# Patient Record
Sex: MALE | Race: WHITE | NOT HISPANIC OR LATINO | Employment: OTHER | URBAN - METROPOLITAN AREA
[De-identification: names, ages, dates, MRNs, and addresses within clinical notes are randomized per-mention and may not be internally consistent; named-entity substitution may affect disease eponyms.]

---

## 2017-11-22 PROBLEM — E78.5 HYPERLIPIDEMIA: Status: ACTIVE | Noted: 2017-11-22

## 2018-11-11 PROBLEM — I21.19 ACUTE ST ELEVATION MYOCARDIAL INFARCTION (STEMI) OF INFERIOR WALL (HCC): Status: ACTIVE | Noted: 2018-11-11

## 2018-11-12 PROBLEM — I25.10 CORONARY ARTERY DISEASE INVOLVING NATIVE CORONARY ARTERY OF NATIVE HEART WITHOUT ANGINA PECTORIS: Status: ACTIVE | Noted: 2018-11-12

## 2018-11-12 PROBLEM — I21.19 ACUTE ST ELEVATION MYOCARDIAL INFARCTION (STEMI) OF INFERIOR WALL (HCC): Status: RESOLVED | Noted: 2018-11-11 | Resolved: 2018-11-12

## 2020-11-03 PROBLEM — I10 HYPERTENSION: Status: RESOLVED | Noted: 2020-11-03 | Resolved: 2020-11-03

## 2021-11-05 PROBLEM — R55 SYNCOPE WITH NORMAL NEUROLOGIC EXAMINATION: Status: ACTIVE | Noted: 2021-11-05

## 2021-11-06 PROBLEM — F01.50 VASCULAR DEMENTIA WITHOUT BEHAVIORAL DISTURBANCE (HCC): Status: ACTIVE | Noted: 2021-11-06
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
## 2021-11-06 PROBLEM — Z91.89 AT RISK FOR DELIRIUM: Status: ACTIVE | Noted: 2021-11-06
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
## 2022-08-25 PROBLEM — I50.22 CHRONIC SYSTOLIC (CONGESTIVE) HEART FAILURE (HCC): Status: ACTIVE | Noted: 2022-08-25

## 2023-07-31 LAB
ANION GAP IN SER/PLAS: 15 MMOL/L (ref 10–20)
CALCIUM (MG/DL) IN SER/PLAS: 8 MG/DL (ref 8.6–10.3)
CARBON DIOXIDE, TOTAL (MMOL/L) IN SER/PLAS: 20 MMOL/L (ref 21–32)
CHLORIDE (MMOL/L) IN SER/PLAS: 107 MMOL/L (ref 98–107)
CREATININE (MG/DL) IN SER/PLAS: 1.06 MG/DL (ref 0.5–1.3)
ERYTHROCYTE DISTRIBUTION WIDTH (RATIO) BY AUTOMATED COUNT: 14.4 % (ref 11.5–14.5)
ERYTHROCYTE MEAN CORPUSCULAR HEMOGLOBIN CONCENTRATION (G/DL) BY AUTOMATED: 32.3 G/DL (ref 32–36)
ERYTHROCYTE MEAN CORPUSCULAR VOLUME (FL) BY AUTOMATED COUNT: 89 FL (ref 80–100)
ERYTHROCYTES (10*6/UL) IN BLOOD BY AUTOMATED COUNT: 5.8 X10E12/L (ref 4.5–5.9)
GFR MALE: 71 ML/MIN/1.73M2
GLUCOSE (MG/DL) IN SER/PLAS: 53 MG/DL (ref 74–99)
HEMATOCRIT (%) IN BLOOD BY AUTOMATED COUNT: 51.7 % (ref 41–52)
HEMOGLOBIN (G/DL) IN BLOOD: 16.7 G/DL (ref 13.5–17.5)
LEUKOCYTES (10*3/UL) IN BLOOD BY AUTOMATED COUNT: 16.5 X10E9/L (ref 4.4–11.3)
PLATELETS (10*3/UL) IN BLOOD AUTOMATED COUNT: 312 X10E9/L (ref 150–450)
POTASSIUM (MMOL/L) IN SER/PLAS: 4.5 MMOL/L (ref 3.5–5.3)
SODIUM (MMOL/L) IN SER/PLAS: 137 MMOL/L (ref 136–145)
UREA NITROGEN (MG/DL) IN SER/PLAS: 28 MG/DL (ref 6–23)

## 2023-08-03 LAB
ERYTHROCYTE DISTRIBUTION WIDTH (RATIO) BY AUTOMATED COUNT: 13.6 % (ref 11.5–14.5)
ERYTHROCYTE MEAN CORPUSCULAR HEMOGLOBIN CONCENTRATION (G/DL) BY AUTOMATED: 33.9 G/DL (ref 32–36)
ERYTHROCYTE MEAN CORPUSCULAR VOLUME (FL) BY AUTOMATED COUNT: 87 FL (ref 80–100)
ERYTHROCYTES (10*6/UL) IN BLOOD BY AUTOMATED COUNT: 5.2 X10E12/L (ref 4.5–5.9)
HEMATOCRIT (%) IN BLOOD BY AUTOMATED COUNT: 45.4 % (ref 41–52)
HEMOGLOBIN (G/DL) IN BLOOD: 15.4 G/DL (ref 13.5–17.5)
LEUKOCYTES (10*3/UL) IN BLOOD BY AUTOMATED COUNT: 13.5 X10E9/L (ref 4.4–11.3)
PLATELETS (10*3/UL) IN BLOOD AUTOMATED COUNT: 249 X10E9/L (ref 150–450)

## 2023-08-08 LAB — C. DIFFICILE TOXIN, PCR: DETECTED

## 2023-08-09 LAB
CAMPYLOBACTER GP: NOT DETECTED
NOROVIRUS GI/GII: NOT DETECTED
ROTAVIRUS A: NOT DETECTED
SALMONELLA SP.: NOT DETECTED
SHIGA TOXIN 1: NOT DETECTED
SHIGA TOXIN 2: NOT DETECTED
SHIGELLA SP.: NOT DETECTED
VIBRIO GRP.: NOT DETECTED
YERSINIA ENTEROCOLITICA: NOT DETECTED

## 2023-08-10 LAB
ERYTHROCYTE DISTRIBUTION WIDTH (RATIO) BY AUTOMATED COUNT: 13.9 % (ref 11.5–14.5)
ERYTHROCYTE MEAN CORPUSCULAR HEMOGLOBIN CONCENTRATION (G/DL) BY AUTOMATED: 34.1 G/DL (ref 32–36)
ERYTHROCYTE MEAN CORPUSCULAR VOLUME (FL) BY AUTOMATED COUNT: 86 FL (ref 80–100)
ERYTHROCYTES (10*6/UL) IN BLOOD BY AUTOMATED COUNT: 4.49 X10E12/L (ref 4.5–5.9)
HEMATOCRIT (%) IN BLOOD BY AUTOMATED COUNT: 38.4 % (ref 41–52)
HEMOGLOBIN (G/DL) IN BLOOD: 13.1 G/DL (ref 13.5–17.5)
KEPPRA: 2 UG/ML (ref 10–40)
LEUKOCYTES (10*3/UL) IN BLOOD BY AUTOMATED COUNT: 6.7 X10E9/L (ref 4.4–11.3)
PLATELETS (10*3/UL) IN BLOOD AUTOMATED COUNT: 222 X10E9/L (ref 150–450)

## 2023-08-14 LAB
ANION GAP IN SER/PLAS: 11 MMOL/L (ref 10–20)
CALCIUM (MG/DL) IN SER/PLAS: 8.1 MG/DL (ref 8.6–10.3)
CARBON DIOXIDE, TOTAL (MMOL/L) IN SER/PLAS: 25 MMOL/L (ref 21–32)
CHLORIDE (MMOL/L) IN SER/PLAS: 110 MMOL/L (ref 98–107)
CREATININE (MG/DL) IN SER/PLAS: 0.91 MG/DL (ref 0.5–1.3)
ERYTHROCYTE DISTRIBUTION WIDTH (RATIO) BY AUTOMATED COUNT: 14.3 % (ref 11.5–14.5)
ERYTHROCYTE MEAN CORPUSCULAR HEMOGLOBIN CONCENTRATION (G/DL) BY AUTOMATED: 32.7 G/DL (ref 32–36)
ERYTHROCYTE MEAN CORPUSCULAR VOLUME (FL) BY AUTOMATED COUNT: 88 FL (ref 80–100)
ERYTHROCYTES (10*6/UL) IN BLOOD BY AUTOMATED COUNT: 4.72 X10E12/L (ref 4.5–5.9)
GFR MALE: 85 ML/MIN/1.73M2
GLUCOSE (MG/DL) IN SER/PLAS: 68 MG/DL (ref 74–99)
HEMATOCRIT (%) IN BLOOD BY AUTOMATED COUNT: 41.6 % (ref 41–52)
HEMOGLOBIN (G/DL) IN BLOOD: 13.6 G/DL (ref 13.5–17.5)
LEUKOCYTES (10*3/UL) IN BLOOD BY AUTOMATED COUNT: 7.7 X10E9/L (ref 4.4–11.3)
PLATELETS (10*3/UL) IN BLOOD AUTOMATED COUNT: 298 X10E9/L (ref 150–450)
POTASSIUM (MMOL/L) IN SER/PLAS: 3.7 MMOL/L (ref 3.5–5.3)
SODIUM (MMOL/L) IN SER/PLAS: 142 MMOL/L (ref 136–145)
UREA NITROGEN (MG/DL) IN SER/PLAS: 9 MG/DL (ref 6–23)

## 2023-08-22 LAB — C. DIFFICILE TOXIN, PCR: NOT DETECTED

## 2024-01-01 ENCOUNTER — TELEPHONE (OUTPATIENT)
Dept: LDA SERVICES | Facility: HOSPITAL | Age: 79
End: 2024-01-01

## 2024-06-21 ENCOUNTER — HOSPITAL ENCOUNTER (INPATIENT)
Facility: HOSPITAL | Age: 79
End: 2024-06-21
Attending: STUDENT IN AN ORGANIZED HEALTH CARE EDUCATION/TRAINING PROGRAM | Admitting: INTERNAL MEDICINE
Payer: MEDICARE

## 2024-06-21 ENCOUNTER — APPOINTMENT (OUTPATIENT)
Dept: RADIOLOGY | Facility: HOSPITAL | Age: 79
End: 2024-06-21
Payer: MEDICARE

## 2024-06-21 ENCOUNTER — APPOINTMENT (OUTPATIENT)
Dept: CARDIOLOGY | Facility: HOSPITAL | Age: 79
End: 2024-06-21
Payer: MEDICARE

## 2024-06-21 DIAGNOSIS — T17.908D ASPIRATION INTO AIRWAY, SUBSEQUENT ENCOUNTER: ICD-10-CM

## 2024-06-21 DIAGNOSIS — Z86.73 HX OF TIA (TRANSIENT ISCHEMIC ATTACK) AND STROKE: ICD-10-CM

## 2024-06-21 DIAGNOSIS — R29.90 STROKE-LIKE SYMPTOMS: Primary | ICD-10-CM

## 2024-06-21 LAB
ALBUMIN SERPL BCP-MCNC: 4 G/DL (ref 3.4–5)
ALP SERPL-CCNC: 66 U/L (ref 33–136)
ALT SERPL W P-5'-P-CCNC: 18 U/L (ref 10–52)
ANION GAP SERPL CALC-SCNC: 15 MMOL/L (ref 10–20)
AORTIC VALVE MEAN GRADIENT: 2 MMHG
AORTIC VALVE PEAK VELOCITY: 1.01 M/S
APPEARANCE UR: CLEAR
APTT PPP: 37 SECONDS (ref 27–38)
AST SERPL W P-5'-P-CCNC: 18 U/L (ref 9–39)
AV PEAK GRADIENT: 4.1 MMHG
AVA (PEAK VEL): 3.24 CM2
AVA (VTI): 3.55 CM2
BASOPHILS # BLD AUTO: 0.04 X10*3/UL (ref 0–0.1)
BASOPHILS NFR BLD AUTO: 0.3 %
BILIRUB SERPL-MCNC: 1.1 MG/DL (ref 0–1.2)
BILIRUB UR STRIP.AUTO-MCNC: NEGATIVE MG/DL
BUN SERPL-MCNC: 15 MG/DL (ref 6–23)
CALCIUM SERPL-MCNC: 8.9 MG/DL (ref 8.6–10.3)
CARDIAC TROPONIN I PNL SERPL HS: 18 NG/L (ref 0–20)
CHLORIDE SERPL-SCNC: 107 MMOL/L (ref 98–107)
CO2 SERPL-SCNC: 23 MMOL/L (ref 21–32)
COLOR UR: ABNORMAL
CREAT SERPL-MCNC: 1.13 MG/DL (ref 0.5–1.3)
EGFRCR SERPLBLD CKD-EPI 2021: 65 ML/MIN/1.73M*2
EJECTION FRACTION APICAL 4 CHAMBER: 46.2
EOSINOPHIL # BLD AUTO: 0.14 X10*3/UL (ref 0–0.4)
EOSINOPHIL NFR BLD AUTO: 1.2 %
ERYTHROCYTE [DISTWIDTH] IN BLOOD BY AUTOMATED COUNT: 14 % (ref 11.5–14.5)
EST. AVERAGE GLUCOSE BLD GHB EST-MCNC: 117 MG/DL
GLUCOSE SERPL-MCNC: 91 MG/DL (ref 74–99)
GLUCOSE UR STRIP.AUTO-MCNC: NORMAL MG/DL
HBA1C MFR BLD: 5.7 %
HCT VFR BLD AUTO: 50.4 % (ref 41–52)
HGB BLD-MCNC: 17 G/DL (ref 13.5–17.5)
IMM GRANULOCYTES # BLD AUTO: 0.04 X10*3/UL (ref 0–0.5)
IMM GRANULOCYTES NFR BLD AUTO: 0.3 % (ref 0–0.9)
INR PPP: 1 (ref 0.9–1.1)
KETONES UR STRIP.AUTO-MCNC: NEGATIVE MG/DL
LEFT ATRIUM VOLUME AREA LENGTH INDEX BSA: 24.5 ML/M2
LEFT VENTRICLE INTERNAL DIMENSION DIASTOLE: 5.4 CM (ref 3.5–6)
LEFT VENTRICULAR OUTFLOW TRACT DIAMETER: 2.4 CM
LEUKOCYTE ESTERASE UR QL STRIP.AUTO: ABNORMAL
LV EJECTION FRACTION BIPLANE: 46 %
LYMPHOCYTES # BLD AUTO: 2.12 X10*3/UL (ref 0.8–3)
LYMPHOCYTES NFR BLD AUTO: 17.9 %
MCH RBC QN AUTO: 30.3 PG (ref 26–34)
MCHC RBC AUTO-ENTMCNC: 33.7 G/DL (ref 32–36)
MCV RBC AUTO: 90 FL (ref 80–100)
MITRAL VALVE E/A RATIO: 0.77
MITRAL VALVE E/E' RATIO: 9.9
MONOCYTES # BLD AUTO: 1.25 X10*3/UL (ref 0.05–0.8)
MONOCYTES NFR BLD AUTO: 10.6 %
NEUTROPHILS # BLD AUTO: 8.25 X10*3/UL (ref 1.6–5.5)
NEUTROPHILS NFR BLD AUTO: 69.7 %
NITRITE UR QL STRIP.AUTO: NEGATIVE
NRBC BLD-RTO: 0 /100 WBCS (ref 0–0)
PH UR STRIP.AUTO: 6.5 [PH]
PLATELET # BLD AUTO: 256 X10*3/UL (ref 150–450)
POTASSIUM SERPL-SCNC: 4.7 MMOL/L (ref 3.5–5.3)
PROT SERPL-MCNC: 7 G/DL (ref 6.4–8.2)
PROT UR STRIP.AUTO-MCNC: NEGATIVE MG/DL
PROTHROMBIN TIME: 10.8 SECONDS (ref 9.8–12.8)
RBC # BLD AUTO: 5.61 X10*6/UL (ref 4.5–5.9)
RBC # UR STRIP.AUTO: NEGATIVE /UL
RBC #/AREA URNS AUTO: ABNORMAL /HPF
RIGHT VENTRICLE FREE WALL PEAK S': 12.3 CM/S
SODIUM SERPL-SCNC: 140 MMOL/L (ref 136–145)
SP GR UR STRIP.AUTO: 1.02
SQUAMOUS #/AREA URNS AUTO: ABNORMAL /HPF
TRICUSPID ANNULAR PLANE SYSTOLIC EXCURSION: 2.3 CM
UROBILINOGEN UR STRIP.AUTO-MCNC: NORMAL MG/DL
WBC # BLD AUTO: 11.8 X10*3/UL (ref 4.4–11.3)
WBC #/AREA URNS AUTO: ABNORMAL /HPF
YEAST BUDDING #/AREA UR COMP ASSIST: PRESENT /HPF

## 2024-06-21 PROCEDURE — 81001 URINALYSIS AUTO W/SCOPE: CPT | Performed by: STUDENT IN AN ORGANIZED HEALTH CARE EDUCATION/TRAINING PROGRAM

## 2024-06-21 PROCEDURE — 70551 MRI BRAIN STEM W/O DYE: CPT

## 2024-06-21 PROCEDURE — 83036 HEMOGLOBIN GLYCOSYLATED A1C: CPT | Performed by: INTERNAL MEDICINE

## 2024-06-21 PROCEDURE — 99223 1ST HOSP IP/OBS HIGH 75: CPT | Performed by: INTERNAL MEDICINE

## 2024-06-21 PROCEDURE — 2500000001 HC RX 250 WO HCPCS SELF ADMINISTERED DRUGS (ALT 637 FOR MEDICARE OP): Performed by: INTERNAL MEDICINE

## 2024-06-21 PROCEDURE — 93005 ELECTROCARDIOGRAM TRACING: CPT

## 2024-06-21 PROCEDURE — 99223 1ST HOSP IP/OBS HIGH 75: CPT | Performed by: PSYCHIATRY & NEUROLOGY

## 2024-06-21 PROCEDURE — 99285 EMERGENCY DEPT VISIT HI MDM: CPT | Mod: 25

## 2024-06-21 PROCEDURE — 70547 MR ANGIOGRAPHY NECK W/O DYE: CPT | Performed by: RADIOLOGY

## 2024-06-21 PROCEDURE — 84484 ASSAY OF TROPONIN QUANT: CPT | Performed by: STUDENT IN AN ORGANIZED HEALTH CARE EDUCATION/TRAINING PROGRAM

## 2024-06-21 PROCEDURE — 70547 MR ANGIOGRAPHY NECK W/O DYE: CPT

## 2024-06-21 PROCEDURE — 71045 X-RAY EXAM CHEST 1 VIEW: CPT

## 2024-06-21 PROCEDURE — 93306 TTE W/DOPPLER COMPLETE: CPT

## 2024-06-21 PROCEDURE — 71045 X-RAY EXAM CHEST 1 VIEW: CPT | Performed by: RADIOLOGY

## 2024-06-21 PROCEDURE — 85025 COMPLETE CBC W/AUTO DIFF WBC: CPT | Performed by: STUDENT IN AN ORGANIZED HEALTH CARE EDUCATION/TRAINING PROGRAM

## 2024-06-21 PROCEDURE — 2060000001 HC INTERMEDIATE ICU ROOM DAILY

## 2024-06-21 PROCEDURE — 70450 CT HEAD/BRAIN W/O DYE: CPT | Performed by: STUDENT IN AN ORGANIZED HEALTH CARE EDUCATION/TRAINING PROGRAM

## 2024-06-21 PROCEDURE — 70450 CT HEAD/BRAIN W/O DYE: CPT

## 2024-06-21 PROCEDURE — 80053 COMPREHEN METABOLIC PANEL: CPT | Performed by: STUDENT IN AN ORGANIZED HEALTH CARE EDUCATION/TRAINING PROGRAM

## 2024-06-21 PROCEDURE — 71250 CT THORAX DX C-: CPT | Performed by: RADIOLOGY

## 2024-06-21 PROCEDURE — 96372 THER/PROPH/DIAG INJ SC/IM: CPT | Performed by: INTERNAL MEDICINE

## 2024-06-21 PROCEDURE — 70544 MR ANGIOGRAPHY HEAD W/O DYE: CPT

## 2024-06-21 PROCEDURE — 93306 TTE W/DOPPLER COMPLETE: CPT | Performed by: INTERNAL MEDICINE

## 2024-06-21 PROCEDURE — 70551 MRI BRAIN STEM W/O DYE: CPT | Performed by: RADIOLOGY

## 2024-06-21 PROCEDURE — 2500000004 HC RX 250 GENERAL PHARMACY W/ HCPCS (ALT 636 FOR OP/ED): Performed by: INTERNAL MEDICINE

## 2024-06-21 PROCEDURE — 85610 PROTHROMBIN TIME: CPT | Performed by: STUDENT IN AN ORGANIZED HEALTH CARE EDUCATION/TRAINING PROGRAM

## 2024-06-21 PROCEDURE — 3E0336Z INTRODUCTION OF NUTRITIONAL SUBSTANCE INTO PERIPHERAL VEIN, PERCUTANEOUS APPROACH: ICD-10-PCS | Performed by: INTERNAL MEDICINE

## 2024-06-21 PROCEDURE — 82947 ASSAY GLUCOSE BLOOD QUANT: CPT

## 2024-06-21 PROCEDURE — 85730 THROMBOPLASTIN TIME PARTIAL: CPT | Performed by: STUDENT IN AN ORGANIZED HEALTH CARE EDUCATION/TRAINING PROGRAM

## 2024-06-21 PROCEDURE — 71250 CT THORAX DX C-: CPT

## 2024-06-21 PROCEDURE — 36415 COLL VENOUS BLD VENIPUNCTURE: CPT | Performed by: STUDENT IN AN ORGANIZED HEALTH CARE EDUCATION/TRAINING PROGRAM

## 2024-06-21 RX ORDER — HYDRALAZINE HYDROCHLORIDE 25 MG/1
25 TABLET, FILM COATED ORAL EVERY 6 HOURS PRN
Status: DISCONTINUED | OUTPATIENT
Start: 2024-06-23 | End: 2024-06-27 | Stop reason: HOSPADM

## 2024-06-21 RX ORDER — LOPERAMIDE HCL 2 MG
2 TABLET ORAL AS NEEDED
COMMUNITY

## 2024-06-21 RX ORDER — LIDOCAINE 40 MG/G
1 CREAM TOPICAL DAILY PRN
COMMUNITY

## 2024-06-21 RX ORDER — ASPIRIN 325 MG
500 TABLET, DELAYED RELEASE (ENTERIC COATED) ORAL DAILY
COMMUNITY
End: 2024-06-27 | Stop reason: HOSPADM

## 2024-06-21 RX ORDER — HYDRALAZINE HYDROCHLORIDE 20 MG/ML
10 INJECTION INTRAMUSCULAR; INTRAVENOUS
Status: ACTIVE | OUTPATIENT
Start: 2024-06-21 | End: 2024-06-23

## 2024-06-21 RX ORDER — ASPIRIN 81 MG/1
81 TABLET ORAL DAILY
Status: DISCONTINUED | OUTPATIENT
Start: 2024-06-21 | End: 2024-06-27 | Stop reason: HOSPADM

## 2024-06-21 RX ORDER — ASPIRIN 325 MG
50000 TABLET, DELAYED RELEASE (ENTERIC COATED) ORAL
COMMUNITY

## 2024-06-21 RX ORDER — CLOPIDOGREL BISULFATE 75 MG/1
75 TABLET ORAL DAILY
COMMUNITY

## 2024-06-21 RX ORDER — ASPIRIN 300 MG/1
150 SUPPOSITORY RECTAL DAILY
Status: DISCONTINUED | OUTPATIENT
Start: 2024-06-21 | End: 2024-06-27 | Stop reason: HOSPADM

## 2024-06-21 RX ORDER — ACETAMINOPHEN 325 MG/1
650 TABLET ORAL EVERY 4 HOURS PRN
COMMUNITY

## 2024-06-21 RX ORDER — ATORVASTATIN CALCIUM 40 MG/1
80 TABLET, FILM COATED ORAL NIGHTLY
Status: DISCONTINUED | OUTPATIENT
Start: 2024-06-21 | End: 2024-06-27 | Stop reason: HOSPADM

## 2024-06-21 RX ORDER — CHOLESTYRAMINE 4 G/9G
1 POWDER, FOR SUSPENSION ORAL DAILY
COMMUNITY
End: 2024-06-27 | Stop reason: HOSPADM

## 2024-06-21 RX ORDER — MAGNESIUM HYDROXIDE 2400 MG/10ML
30 SUSPENSION ORAL AS NEEDED
COMMUNITY

## 2024-06-21 RX ORDER — LABETALOL HYDROCHLORIDE 5 MG/ML
10 INJECTION, SOLUTION INTRAVENOUS EVERY 10 MIN PRN
Status: DISCONTINUED | OUTPATIENT
Start: 2024-06-21 | End: 2024-06-22

## 2024-06-21 RX ORDER — METOPROLOL SUCCINATE 100 MG/1
100 TABLET, EXTENDED RELEASE ORAL DAILY
COMMUNITY
End: 2024-06-27 | Stop reason: HOSPADM

## 2024-06-21 RX ORDER — LOSARTAN POTASSIUM 100 MG/1
100 TABLET ORAL DAILY
COMMUNITY

## 2024-06-21 RX ORDER — BISACODYL 10 MG/1
10 SUPPOSITORY RECTAL AS NEEDED
COMMUNITY

## 2024-06-21 RX ORDER — ENOXAPARIN SODIUM 100 MG/ML
40 INJECTION SUBCUTANEOUS EVERY 24 HOURS
Status: DISCONTINUED | OUTPATIENT
Start: 2024-06-21 | End: 2024-06-27 | Stop reason: HOSPADM

## 2024-06-21 RX ORDER — ATORVASTATIN CALCIUM 40 MG/1
40 TABLET, FILM COATED ORAL DAILY
Status: ON HOLD | COMMUNITY
End: 2024-06-23 | Stop reason: DRUGHIGH

## 2024-06-21 RX ORDER — LANOLIN ALCOHOL/MO/W.PET/CERES
1000 CREAM (GRAM) TOPICAL DAILY
COMMUNITY

## 2024-06-21 RX ORDER — AMOXICILLIN 250 MG
2 CAPSULE ORAL 2 TIMES DAILY PRN
Status: DISCONTINUED | OUTPATIENT
Start: 2024-06-21 | End: 2024-06-27 | Stop reason: HOSPADM

## 2024-06-21 RX ORDER — HYDRALAZINE HYDROCHLORIDE 25 MG/1
25 TABLET, FILM COATED ORAL 3 TIMES DAILY
COMMUNITY

## 2024-06-21 RX ORDER — CHOLECALCIFEROL (VITAMIN D3) 50 MCG
100 TABLET ORAL DAILY
COMMUNITY
End: 2024-06-27 | Stop reason: HOSPADM

## 2024-06-21 SDOH — SOCIAL STABILITY: SOCIAL INSECURITY: ABUSE: ADULT

## 2024-06-21 SDOH — SOCIAL STABILITY: SOCIAL INSECURITY: WERE YOU ABLE TO COMPLETE ALL THE BEHAVIORAL HEALTH SCREENINGS?: NO

## 2024-06-21 SDOH — SOCIAL STABILITY: SOCIAL INSECURITY: HAVE YOU HAD THOUGHTS OF HARMING ANYONE ELSE?: UNABLE TO ASSESS

## 2024-06-21 ASSESSMENT — COGNITIVE AND FUNCTIONAL STATUS - GENERAL
WALKING IN HOSPITAL ROOM: A LOT
MOBILITY SCORE: 13
MOVING FROM LYING ON BACK TO SITTING ON SIDE OF FLAT BED WITH BEDRAILS: A LITTLE
PERSONAL GROOMING: A LOT
DAILY ACTIVITIY SCORE: 13
CLIMB 3 TO 5 STEPS WITH RAILING: TOTAL
MOVING TO AND FROM BED TO CHAIR: A LOT
PATIENT BASELINE BEDBOUND: YES
TURNING FROM BACK TO SIDE WHILE IN FLAT BAD: A LITTLE
STANDING UP FROM CHAIR USING ARMS: A LOT
DRESSING REGULAR LOWER BODY CLOTHING: A LITTLE
TOILETING: A LOT
DRESSING REGULAR UPPER BODY CLOTHING: A LITTLE
HELP NEEDED FOR BATHING: A LOT
EATING MEALS: TOTAL

## 2024-06-21 ASSESSMENT — ACTIVITIES OF DAILY LIVING (ADL)
DRESSING YOURSELF: UNABLE TO ASSESS
GROOMING: UNABLE TO ASSESS
JUDGMENT_ADEQUATE_SAFELY_COMPLETE_DAILY_ACTIVITIES: UNABLE TO ASSESS
FEEDING YOURSELF: UNABLE TO ASSESS
WALKS IN HOME: UNABLE TO ASSESS
BATHING: UNABLE TO ASSESS
HEARING - LEFT EAR: UNABLE TO ASSESS
TOILETING: UNABLE TO ASSESS
HEARING - RIGHT EAR: UNABLE TO ASSESS
ADEQUATE_TO_COMPLETE_ADL: UNABLE TO ASSESS
PATIENT'S MEMORY ADEQUATE TO SAFELY COMPLETE DAILY ACTIVITIES?: UNABLE TO ASSESS

## 2024-06-21 ASSESSMENT — LIFESTYLE VARIABLES
HOW MANY STANDARD DRINKS CONTAINING ALCOHOL DO YOU HAVE ON A TYPICAL DAY: PATIENT DECLINED
AUDIT-C TOTAL SCORE: -1
AUDIT-C TOTAL SCORE: -1
EVER HAD A DRINK FIRST THING IN THE MORNING TO STEADY YOUR NERVES TO GET RID OF A HANGOVER: NO
HAVE YOU EVER FELT YOU SHOULD CUT DOWN ON YOUR DRINKING: NO
SKIP TO QUESTIONS 9-10: 0
HAVE PEOPLE ANNOYED YOU BY CRITICIZING YOUR DRINKING: NO
TOTAL SCORE: 0
HOW OFTEN DO YOU HAVE 6 OR MORE DRINKS ON ONE OCCASION: PATIENT DECLINED
HOW OFTEN DO YOU HAVE A DRINK CONTAINING ALCOHOL: PATIENT DECLINED
EVER FELT BAD OR GUILTY ABOUT YOUR DRINKING: NO

## 2024-06-21 ASSESSMENT — PAIN SCALES - GENERAL
PAINLEVEL_OUTOF10: 0 - NO PAIN

## 2024-06-21 ASSESSMENT — PAIN - FUNCTIONAL ASSESSMENT
PAIN_FUNCTIONAL_ASSESSMENT: 0-10
PAIN_FUNCTIONAL_ASSESSMENT: FLACC (FACE, LEGS, ACTIVITY, CRY, CONSOLABILITY)

## 2024-06-21 NOTE — ED PROVIDER NOTES
Patient is presenting to the emergency department activated as a stroke alert, his current NIH stroke scale is 4.    HPI   Chief Complaint   Patient presents with    Stroke       History/Exam limitations: mental status, due to condition, dementia, and communication barrier aphasic.   Additional history was obtained from EMS personnel, nursing home, and past medical records.    HPI:  Patient is an 79-year-old male, coming from a skilled nursing facility, he has a reported history of prior stroke with vascular dementia, aphasia and dysphagia following cerebral infarction, atherosclerotic heart disease of native coronary arteries, repeated falls, hypertension, hyperlipidemia, unsteadiness, GERD, generalized muscle weakness, ulcerative colitis, he is presenting to the emergency department as reported stroke alert.  Reportedly was seen normal at 545 this morning and then at 6:00 for recheck he was confused and aphasic.  He was sent in for further evaluation.  Reportedly his baseline is ANO x 4 despite his history of vascular dementia with CVA       Last Known Well Time: 0545 on 6/21/24        ------------------------------------------------------------------------------------------------------------------------------------------     Physical Exam:    ED Triage Vitals  Temp: n/a  Pulse: n/a  Resp: n/a  BP: n/a  SpO2: n/a  Temp src: n/a  Heart Rate Source: n/a  Patient Position: n/a  BP Location: n/a  FiO2 (%): n/a     Gen: Not in acute distress  Head/Neck: NCAT  Eyes: Anicteric sclerae, noninjected conjunctivae  Nose: No rhinorrhea  Mouth:  MMM  Heart: Regular rate and rhythm w/ no murmurs, rubs, gallops  Lungs: CTAB w/o wheezes, rales, rhonchi, no increased work of breathing  Abdomen: Soft, NT/ND  Musculoskeletal: No deformities  Extremities: No edema.  Neurologic: Please see below for NIHSS  Skin: No rashes noted  Psychological: Calm        Interval: Baseline  Time: 8:46 AM  Person Administering Scale: Galdino WELLS  MD Joel     1a  Level of consciousness: 0=alert; keenly responsive  1b. LOC questions:  0=Performs both tasks correctly  1c. LOC commands: 0=Performs both tasks correctly  2.  Best Gaze: 0=normal  3. Visual: 0=No visual loss  4. Facial Palsy: 1=Minor paralysis (flattened nasolabial fold, asymmetric on smiling)  5a. Motor left arm: 0=No drift, limb holds 90 (or 45) degrees for full 10 seconds  5b.  Motor right arm: 0=No drift, limb holds 90 (or 45) degrees for full 10 seconds  6a. motor left le=No drift, limb holds 90 (or 45) degrees for full 10 seconds  6b  Motor right le=No drift, limb holds 90 (or 45) degrees for full 10 seconds  7. Limb Ataxia: 0=Absent  8.  Sensory: 0=Normal; no sensory loss  9. Best Language:  2=Severe Aphasia: fragmentary expression, inference needed, cannot identify materials  10. Dysarthria: 1=Mild to moderate, patient slurs at least some words and at worst, can be understood with some difficulty  11. Extinction and Inattention: 0=No abnormality    Total:   4        VAN: VAN: Negative     ------------------------------------------------------------------------------------------------------------------------------------------     Medical Decision Making: Patient is presenting to the emergency department today as a stroke alert, NIH of 4.  Taken immediately to CT which showed mild to moderate cerebral volume loss and chronic microvascular ischemia as in prior with no acute hemorrhage or herniation.  Workup was undertaken with labs and additional imaging.  I did discuss the option of pursuing TNK with the family at bedside, risk-benefit conversation was had, after discussion, TNK was deferred as the risks in this case outweigh any potential benefit given the patient's baseline state.  Patient was ultimately admitted to medicine undergo additional stroke workup and continued evaluation with neurology consultation          EKG interpreted by myself: Sinus rhythm with a  "ventricular rate of 70, normal axis, nonspecific interventricular conduction delay noted with prolonged ME and QRS interval, normal QTc interval, no evidence of an acute STEMI.     Independent Interpretation of Studies: I independently interpreted the CT head and see No obvious evidence of intracranial hemorrhage and No obvious evidence of skull fracture    Chronic Medical Conditions Significantly Affecting Care: dementia    Social Determinants of Health Significantly Affecting Care: none     External Records Reviewed: I reviewed recent and relevant outside records including: nursing facility records     Discussion of Management with Other Providers:   I discussed the patient/results with:  admitting team      IV Thrombolysis IV Thrombolysis Checklist        IV Thrombolysis Given: No; Thrombolysis contraindication reason: Patient was eligible for IV Thrombolysis but it was not given because pt/family refuse IV thrombolysis following discussion of benefits/risks         Procedure  [unfilled]                                   No data recorded                  Patient History   Past Medical History:   Diagnosis Date    Stroke (Multi)      History reviewed. No pertinent surgical history.  No family history on file.  Social History     Tobacco Use    Smoking status: Never     Passive exposure: Never    Smokeless tobacco: Never   Vaping Use    Vaping status: Not on file   Substance Use Topics    Alcohol use: Not on file    Drug use: Not on file       Physical Exam   Visit Vitals  /87 (BP Location: Right arm, Patient Position: Lying) Comment: rn notified   Pulse 71   Temp 36.6 °C (97.8 °F) (Temporal)   Resp 19   Ht 1.88 m (6' 2\")   Wt 91 kg (200 lb 9.9 oz)   SpO2 93%   BMI 25.76 kg/m²   Smoking Status Never   BSA 2.18 m²      Physical Exam    XR chest 2 views   Final Result   1.  Mild changes of chronic lung disease. Feeding tube tip looped in   the stomach as described above.                  MACRO:   None      "   Signed by: Jevon Fields 6/24/2024 8:45 AM   Dictation workstation:   [id number]      XR abdomen 1 view   Final Result   Enteric tube tip terminates over the gastric body.             MACRO:   None.        Signed by: Jim Person 6/23/2024 10:20 PM   Dictation workstation:   [id number]      XR abdomen 1 view   Final Result   1.  Feeding tube coils in the stomach.        MACRO:   None        Signed by: Jevon Ching 6/23/2024 7:04 PM   Dictation workstation:   [id number]      XR abdomen 1 view   Final Result   As above        MACRO:   None        Signed by: Shadi Pardo 6/23/2024 11:28 AM   Dictation workstation:   [id number]      CT chest wo IV contrast   Final Result   1.  No features of aspiration pneumonia. There is bronchial   thickening detected.   2. Heart size is enlarged robust coronary calcification. Ectasia of   the ascending aorta.   3. Hepatic steatosis             Signed by: Jevon Ching 6/21/2024 8:43 PM   Dictation workstation:   [id number]      MR brain wo IV contrast   Final Result   MRI Brain:        1. Acute infarct of the posterior limb of the right internal   capsule/anterior thalamus.   2. Moderate global parenchymal volume loss with additional remote   infarcts and chronic white matter changes favoring microangiopathy as   above.        MRA:        1. The right vertebral artery is occluded or markedly stenosed.   2. Flow irregularity within the distal right internal carotid artery   without definite visualization of the right anterior choroidal artery.   3. Otherwise, no evidence of major vessel cutoff or significant   stenosis on MRA head and neck.        I personally reviewed the images/study and I agree with the resident   findings as stated by Gavi Reddy MD. This study was interpreted at   [place], [place].        MACRO:   None        Signed by: Rob He 6/21/2024 4:31 PM   Dictation workstation:   [id number]       MR angio head wo IV contrast   Final Result   MRI Brain:        1. Acute infarct of the posterior limb of the right internal   capsule/anterior thalamus.   2. Moderate global parenchymal volume loss with additional remote   infarcts and chronic white matter changes favoring microangiopathy as   above.        MRA:        1. The right vertebral artery is occluded or markedly stenosed.   2. Flow irregularity within the distal right internal carotid artery   without definite visualization of the right anterior choroidal artery.   3. Otherwise, no evidence of major vessel cutoff or significant   stenosis on MRA head and neck.        I personally reviewed the images/study and I agree with the resident   findings as stated by Gavi Reddy MD. This study was interpreted at   [place], [place].        MACRO:   None        Signed by: Rob He 6/21/2024 4:31 PM   Dictation workstation:   [id number]      MR angio neck wo IV contrast   Final Result   MRI Brain:        1. Acute infarct of the posterior limb of the right internal   capsule/anterior thalamus.   2. Moderate global parenchymal volume loss with additional remote   infarcts and chronic white matter changes favoring microangiopathy as   above.        MRA:        1. The right vertebral artery is occluded or markedly stenosed.   2. Flow irregularity within the distal right internal carotid artery   without definite visualization of the right anterior choroidal artery.   3. Otherwise, no evidence of major vessel cutoff or significant   stenosis on MRA head and neck.        I personally reviewed the images/study and I agree with the resident   findings as stated by Gavi Reddy MD. This study was interpreted at   [place], [place].        MACRO:   None        Signed by: Rob He 6/21/2024 4:31 PM   Dictation workstation:   [id number]      Transthoracic Echo (TTE) Complete    Final Result      XR chest 1 view   Final Result   Allowing for the aforementioned limitation, no acute cardiopulmonary   disease.        Signed by: Yayo Barragan 6/21/2024 10:20 AM   Dictation workstation:   [id number]      CT brain attack head wo IV contrast   Final Result   1. No acute intracranial hemorrhage or serious brain herniation. If   continued clinical concern, advise further assessment by dedicated   brain MRI.   2. Mild to moderate cerebral volume loss and sequelae of chronic   microvascular ischemia as in prior.        MACRO:   None        Signed by: Conner Rosa 6/21/2024 9:11 AM   Dictation workstation:   [id number]          Labs Reviewed   CBC WITH AUTO DIFFERENTIAL - Abnormal       Result Value    WBC 11.8 (*)     nRBC 0.0      RBC 5.61      Hemoglobin 17.0      Hematocrit 50.4      MCV 90      MCH 30.3      MCHC 33.7      RDW 14.0      Platelets 256      Neutrophils % 69.7      Immature Granulocytes %, Automated 0.3      Lymphocytes % 17.9      Monocytes % 10.6      Eosinophils % 1.2      Basophils % 0.3      Neutrophils Absolute 8.25 (*)     Immature Granulocytes Absolute, Automated 0.04      Lymphocytes Absolute 2.12      Monocytes Absolute 1.25 (*)     Eosinophils Absolute 0.14      Basophils Absolute 0.04     URINALYSIS WITH REFLEX MICROSCOPIC - Abnormal    Color, Urine Light-Yellow      Appearance, Urine Clear      Specific Gravity, Urine 1.021      pH, Urine 6.5      Protein, Urine NEGATIVE      Glucose, Urine Normal      Blood, Urine NEGATIVE      Ketones, Urine NEGATIVE      Bilirubin, Urine NEGATIVE      Urobilinogen, Urine Normal      Nitrite, Urine NEGATIVE      Leukocyte Esterase, Urine 25 Lorna/µL (*)    HEMOGLOBIN A1C - Abnormal    Hemoglobin A1C 5.7 (*)     Estimated Average Glucose 117      Narrative:     Diagnosis of Diabetes-Adults  Non-Diabetic: < or = 5.6%  Increased risk for developing diabetes: 5.7-6.4%  Diagnostic of diabetes: > or = 6.5%    Monitoring of Diabetes  Age  (y)....................... Therapeutic Goal (%)  Adults: >18.........................<7.0  Pediatrics: 13-18...................<7.5  Pediatrics: 7-12....................<8.0  Pediatrics: 0-6..................... 7.5-8.5    American Diabetes Association. Diabetes Care 33(S1), Jan 2010       LIPID PANEL - Abnormal    Cholesterol 163      HDL-Cholesterol 39.3      Cholesterol/HDL Ratio 4.1      LDL Calculated 75      VLDL 49 (*)     Triglycerides 245 (*)     Non HDL Cholesterol 124     B-TYPE NATRIURETIC PEPTIDE - Abnormal     (*)     Narrative:        <100 pg/mL - Heart failure unlikely  100-299 pg/mL - Intermediate probability of acute heart                  failure exacerbation. Correlate with clinical                  context and patient history.    >=300 pg/mL - Heart Failure likely. Correlate with clinical                  context and patient history.    BNP testing is performed using different testing methodology at [facility] than at other [facility] hospitals. Direct result comparisons should only be made within the same method.      MICROSCOPIC ONLY, URINE - Abnormal    WBC, Urine 1-5      RBC, Urine 1-2      Squamous Epithelial Cells, Urine 1-9 (SPARSE)      Budding Yeast, Urine PRESENT (*)    CBC WITH AUTO DIFFERENTIAL - Abnormal    WBC 11.0      nRBC 0.0      RBC 5.59      Hemoglobin 16.8      Hematocrit 48.5      MCV 87      MCH 30.1      MCHC 34.6      RDW 13.3      Platelets 296      Neutrophils % 82.3      Immature Granulocytes %, Automated 0.3      Lymphocytes % 11.8      Monocytes % 5.5      Eosinophils % 0.0      Basophils % 0.1      Neutrophils Absolute 9.05 (*)     Immature Granulocytes Absolute, Automated 0.03      Lymphocytes Absolute 1.30      Monocytes Absolute 0.61      Eosinophils Absolute 0.00      Basophils Absolute 0.01     BASIC METABOLIC PANEL - Abnormal    Glucose 137 (*)     Sodium 138      Potassium 3.9      Chloride 106      Bicarbonate 18 (*)     Anion Gap 18       Urea Nitrogen 24 (*)     Creatinine 1.16      eGFR 63      Calcium 9.1     CBC WITH AUTO DIFFERENTIAL - Abnormal    WBC 14.6 (*)     nRBC 0.0      RBC 5.36      Hemoglobin 16.1      Hematocrit 47.3      MCV 88      MCH 30.0      MCHC 34.0      RDW 13.9      Platelets 274      Neutrophils % 69.9      Immature Granulocytes %, Automated 0.3      Lymphocytes % 18.3      Monocytes % 10.6      Eosinophils % 0.7      Basophils % 0.2      Neutrophils Absolute 10.17 (*)     Immature Granulocytes Absolute, Automated 0.05      Lymphocytes Absolute 2.66      Monocytes Absolute 1.54 (*)     Eosinophils Absolute 0.10      Basophils Absolute 0.03     BASIC METABOLIC PANEL - Abnormal    Glucose 90      Sodium 138      Potassium 3.4 (*)     Chloride 107      Bicarbonate 20 (*)     Anion Gap 14      Urea Nitrogen 29 (*)     Creatinine 1.11      eGFR 67      Calcium 9.0     CBC WITH AUTO DIFFERENTIAL - Abnormal    WBC 10.2      nRBC 0.0      RBC 5.41      Hemoglobin 16.2      Hematocrit 47.6      MCV 88      MCH 29.9      MCHC 34.0      RDW 14.0      Platelets 268      Neutrophils % 67.4      Immature Granulocytes %, Automated 0.2      Lymphocytes % 18.1      Monocytes % 11.9      Eosinophils % 1.9      Basophils % 0.5      Neutrophils Absolute 6.89 (*)     Immature Granulocytes Absolute, Automated 0.02      Lymphocytes Absolute 1.85      Monocytes Absolute 1.21 (*)     Eosinophils Absolute 0.19      Basophils Absolute 0.05     BASIC METABOLIC PANEL - Abnormal    Glucose 101 (*)     Sodium 140      Potassium 3.6      Chloride 108 (*)     Bicarbonate 22      Anion Gap 14      Urea Nitrogen 24 (*)     Creatinine 1.13      eGFR 65      Calcium 8.7     CBC WITH AUTO DIFFERENTIAL - Abnormal    WBC 9.0      nRBC 0.0      RBC 5.04      Hemoglobin 15.0      Hematocrit 44.5      MCV 88      MCH 29.8      MCHC 33.7      RDW 13.7      Platelets 238      Neutrophils % 66.3      Immature Granulocytes %, Automated 0.2      Lymphocytes % 18.3       Monocytes % 11.7      Eosinophils % 3.1      Basophils % 0.4      Neutrophils Absolute 5.99 (*)     Immature Granulocytes Absolute, Automated 0.02      Lymphocytes Absolute 1.65      Monocytes Absolute 1.06 (*)     Eosinophils Absolute 0.28      Basophils Absolute 0.04     BASIC METABOLIC PANEL - Abnormal    Glucose 115 (*)     Sodium 139      Potassium 3.5      Chloride 107      Bicarbonate 24      Anion Gap 12      Urea Nitrogen 20      Creatinine 0.94      eGFR 81      Calcium 8.2 (*)    POCT GLUCOSE - Abnormal    POCT Glucose 101 (*)    POCT GLUCOSE - Abnormal    POCT Glucose 118 (*)    POCT GLUCOSE - Abnormal    POCT Glucose 135 (*)    POCT GLUCOSE - Abnormal    POCT Glucose 132 (*)    POCT GLUCOSE - Abnormal    POCT Glucose 129 (*)    POCT GLUCOSE - Abnormal    POCT Glucose 123 (*)    POCT GLUCOSE - Abnormal    POCT Glucose 112 (*)    POCT GLUCOSE - Abnormal    POCT Glucose 112 (*)    POCT GLUCOSE - Abnormal    POCT Glucose 126 (*)    POCT GLUCOSE - Abnormal    POCT Glucose 121 (*)    POCT GLUCOSE - Abnormal    POCT Glucose 111 (*)    POCT GLUCOSE - Abnormal    POCT Glucose 120 (*)    POCT GLUCOSE - Abnormal    POCT Glucose 110 (*)    POCT GLUCOSE - Abnormal    POCT Glucose 127 (*)    POCT GLUCOSE - Abnormal    POCT Glucose 124 (*)    POCT GLUCOSE - Abnormal    POCT Glucose 119 (*)    POCT GLUCOSE - Abnormal    POCT Glucose 119 (*)    POCT GLUCOSE - Abnormal    POCT Glucose 112 (*)    COMPREHENSIVE METABOLIC PANEL - Normal    Glucose 91      Sodium 140      Potassium 4.7      Chloride 107      Bicarbonate 23      Anion Gap 15      Urea Nitrogen 15      Creatinine 1.13      eGFR 65      Calcium 8.9      Albumin 4.0      Alkaline Phosphatase 66      Total Protein 7.0      AST 18      Bilirubin, Total 1.1      ALT 18     TROPONIN I, HIGH SENSITIVITY - Normal    Troponin I, High Sensitivity 18      Narrative:     Less than 99th percentile of normal range cutoff-  Female and children under 18 years old <14 ng/L;  Male <21 ng/L: Negative  Repeat testing should be performed if clinically indicated.     Female and children under 18 years old 14-50 ng/L; Male 21-50 ng/L:  Consistent with possible cardiac damage and possible increased clinical   risk. Serial measurements may help to assess extent of myocardial damage.     >50 ng/L: Consistent with cardiac damage, increased clinical risk and  myocardial infarction. Serial measurements may help assess extent of   myocardial damage.      NOTE: Children less than 1 year old may have higher baseline troponin   levels and results should be interpreted in conjunction with the overall   clinical context.     NOTE: Troponin I testing is performed using a different   testing methodology at [facility] than at other   [facility]. Direct result comparisons should only   be made within the same method.   PROTIME-INR - Normal    Protime 10.8      INR 1.0     APTT - Normal    aPTT 37      Narrative:     The APTT is no longer used for monitoring Unfractionated Heparin Therapy. For monitoring Heparin Therapy, use the Heparin Assay.   MAGNESIUM - Normal    Magnesium 2.08     MAGNESIUM - Normal    Magnesium 2.06     VITAMIN B12 - Normal    Vitamin B12 261     MAGNESIUM - Normal    Magnesium 2.08     MAGNESIUM - Normal    Magnesium 1.86     POCT GLUCOSE - Normal    POCT Glucose 89     POCT GLUCOSE - Normal    POCT Glucose 99     POCT GLUCOSE - Normal    POCT Glucose 74     POCT GLUCOSE - Normal    POCT Glucose 90     POCT GLUCOSE - Normal    POCT Glucose 98     POCT GLUCOSE - Normal    POCT Glucose 93     POCT GLUCOSE - Normal    POCT Glucose 91     POCT GLUCOSE - Normal    POCT Glucose 88     POCT GLUCOSE METER   POCT GLUCOSE METER   POCT GLUCOSE METER   POCT GLUCOSE METER   POCT GLUCOSE METER   POCT GLUCOSE METER   POCT GLUCOSE METER   POCT GLUCOSE METER   POCT GLUCOSE METER         ED Course & MDM   Diagnoses as of 07/28/24 0042   Stroke-like symptoms           Medical Decision  Making         Your medication list        START taking these medications        Instructions Last Dose Given Next Dose Due   amoxicillin-pot clavulanate 875-125 mg tablet  Commonly known as: Augmentin      Take 1 tablet by mouth 2 times a day. 1 twice a day for 10 doses per NG.       aspirin 81 mg EC tablet      Take 1 tablet (81 mg) by mouth once daily. Take for 21 days and stop       atorvastatin 80 mg tablet  Commonly known as: Lipitor      Take 1 tablet (80 mg) by mouth once daily at bedtime.       carvedilol 6.25 mg tablet  Commonly known as: Coreg      Take 1 tablet (6.25 mg) by mouth 2 times a day.              CHANGE how you take these medications        Instructions Last Dose Given Next Dose Due   cholecalciferol 50,000 unit capsule  Commonly known as: Vitamin D-3  What changed: Another medication with the same name was removed. Continue taking this medication, and follow the directions you see here.                  CONTINUE taking these medications        Instructions Last Dose Given Next Dose Due   acetaminophen 325 mg tablet  Commonly known as: Tylenol           clopidogrel 75 mg tablet  Commonly known as: Plavix           cyanocobalamin 1,000 mcg tablet  Commonly known as: Vitamin B-12           Dulcolax (bisacodyl) 10 mg suppository  Generic drug: bisacodyl           hydrALAZINE 25 mg tablet  Commonly known as: Apresoline           lidocaine 4 % cream  Commonly known as: LMX           loperamide 2 mg tablet  Commonly known as: Imodium A-D           losartan 100 mg tablet  Commonly known as: Cozaar           magnesium hydroxide 2,400 mg/10 mL suspension suspension  Commonly known as: Milk of Magnesia                  STOP taking these medications      cholestyramine 4 gram packet  Commonly known as: Questran        Fish OiL 60- mg capsule  Generic drug: omega-3        metoprolol succinate  mg 24 hr tablet  Commonly known as: Toprol-XL                  Where to Get Your Medications         These medications were sent to [facility]  [address] [address]      Hours: 8AM to 6PM Mon-Fri, 8AM to 4PM Sat-Sun Phone: [phone]   amoxicillin-pot clavulanate 875-125 mg tablet  aspirin 81 mg EC tablet  atorvastatin 80 mg tablet       Information about where to get these medications is not yet available    Ask your nurse or doctor about these medications  carvedilol 6.25 mg tablet         Procedure  Critical Care    Performed by: Galdino Maldonado MD  Authorized by: Galdino Maldonado MD    Critical care provider statement:     Critical care time (minutes):  43    Critical care time was exclusive of:  Separately billable procedures and treating other patients    Critical care was necessary to treat or prevent imminent or life-threatening deterioration of the following conditions:  CNS failure or compromise    Critical care was time spent personally by me on the following activities:  Development of treatment plan with patient or surrogate, evaluation of patient's response to treatment, examination of patient, obtaining history from patient or surrogate, ordering and performing treatments and interventions, ordering and review of laboratory studies, ordering and review of radiographic studies, re-evaluation of patient's condition and review of old charts    Care discussed with: admitting provider         *This report was transcribed using voice recognition software.  Every effort was made to ensure accuracy; however, inadvertent computerized transcription errors may be present.*  Galdino Mladonado MD  07/28/24         Galdino Maldonado MD  07/28/24 0042

## 2024-06-21 NOTE — CONSULTS
"History Of Present Illness  Abdi Wilson is a 79 y.o. male left handed admitted to [facility] on 6/21/24 presenting with aphasia. Neurology consulted for above.    Last known well: 0545h 6/21/24  Had stroke symptoms resolved at time of presentation: No    Inpatient consult to Neurology  Consult performed by: Abdi Pimentel MD  Consult ordered by: Joe Lopez MD      Listed history of hypertension, dyslipidemia, coronary artery disease, prior stroke 20 years ago, ulcerative colitis, GERD, vascular dementia, falls.    Patient seen in the ER, daughter-in-law at the bedside.  Daughter-in-law provided much of the history, if only because patient has expressive aphasia.    At the time of me seeing the patient, admission H&P and ED documentation were not finalized yet.    Patient is a nursing home resident for at least 4 years now.  At baseline, he is mostly wheelchair-bound, but is able to walk with walker with assistance.  He is able to talk and answer questions, but takes a while to think about the answers.  He moves all extremities symmetrically.  He is somewhat forgetful, and does carry a history of \"dementia\".    It is in the backdrop of this that this patient apparently was last seen to be his usual about 0545h on 6/21/2024.  At 0600h, he apparently was noted to be confused and aphasic and was sent in for further evaluation.  Initial vital signs documented in the ED included blood pressure 163/91, heart rate 68, afebrile.  His NIH stroke scale score was 4.  Head CT without contrast done did not show any acute bleed, however there was mild to moderate cerebral volume loss and sequelae of chronic microvascular ischemia.  It appears that discussion about possible IV tenecteplase was made, but family elected to defer tenecteplase treatment.  Patient admitted to the hospital for further evaluation and management.    Per daughter-in-law, patient symptoms have not been better or worse since arriving in the " "ED.    Patient has no known seizure.  Patient's last known stroke was about 20 years ago.  Patient not sure about his medications from the nursing home.  Patient not sure if he is on a blood thinner.    He used to run marathons and \"really took good care of himself\" when he was younger.    Denies smoking, alcohol use, or street drug use.      Review of Systems   Constitutional:  Negative for appetite change, chills and fever.   HENT:  Negative for ear pain and nosebleeds.    Eyes:  Negative for discharge and itching.   Respiratory:  Negative for chest tightness.    Cardiovascular:  Negative for chest pain and palpitations.   Gastrointestinal:  Negative for abdominal distention, abdominal pain and nausea.   Endocrine: Negative for cold intolerance and heat intolerance.   Genitourinary:  Negative for difficulty urinating and dysuria.   Musculoskeletal:  Negative for myalgias.   Neurological:  Negative for dizziness, seizures and numbness.     Past Medical History  No past medical history on file.    Surgical History  No past surgical history on file.    Social History       Home Meds  (Not in a hospital admission)      Allergies  Patient has no known allergies.    Current Meds  Scheduled medications  aspirin, 81 mg, oral, Daily  atorvastatin, 80 mg, oral, Nightly  enoxaparin, 40 mg, subcutaneous, q24h  perflutren lipid microspheres, 0.5-10 mL of dilution, intravenous, Once in imaging  sulfur hexafluoride microsphr, 2 mL, intravenous, Once in imaging      Continuous medications     PRN medications  PRN medications: hydrALAZINE **FOLLOWED BY** [START ON 6/23/2024] hydrALAZINE, labetaloL, oxygen, sennosides-docusate sodium    Last Recorded Vitals  Blood pressure 151/85, pulse 60, temperature 36.6 °C (97.9 °F), temperature source Temporal, resp. rate 18, height 1.88 m (6' 2\"), weight 99 kg (218 lb 4.1 oz), SpO2 98%.    Awake, alert, appears to be able to comprehend, in no distress  Expressive aphasia +/- " dysarthria  Well-nourished, in bed  No leg edema    Mental status exam as above, tries to communicate verbally but difficult  Unable to fully assess fund of knowledge/memory  Fair attention span  Pupils round reactive to light, 3-4 mm, (-) RAPD   Fundoscopic examination was attempted but fundus was not visualized bilaterally   Full EOMs intact, no nystagmus, no ptosis   V1 to V3 sensation is intact   (+) ? L facial asymmetry at rest but appears symmetric on smiling (pt does not want to smile fully despite multiple requests)  Hearing grossly intact   (+) dysarthria (?)  Good shoulder shrug bilaterally   Tongue is midline     Motor strength is 5/5 on all extremities, tone/bulk normal   Reflexes trace on all 4 extremities, downgoing toes bilaterally   Sensation is intact to light touch,  vibration on all 4 extremities except absent vib distal to B ankles  Finger to nose test intact bilaterally   I did not have him stand or walk           NIH Stroke Scale  1A. Level of Consciousness: Alert, Keenly Responsive  1B. Ask Month and Age: Both Questions Right  1C. Blink Eyes & Squeeze Hands: Performs Both Tasks  2. Best Gaze: Normal  3. Visual: No Visual Loss  4. Facial Palsy: Minor Paralysis  5A. Motor - Left Arm: No Drift  5B. Motor - Right Arm: No Drift  6A. Motor - Left Leg: No Drift  6B. Motor - Right Leg: No Drift  7. Limb Ataxia: Absent  8. Sensory Loss: Normal  9. Best Language: Severe Aphasia  10. Dysarthria: Mild-to-Moderate Dysarthria  11. Extinction and Inattention: No Abnormality  NIH Stroke Scale: 4    Relevant Results  I have personally reviewed the following:    Labs  Results for orders placed or performed during the hospital encounter of 06/21/24 (from the past 24 hour(s))   ECG 12 lead   Result Value Ref Range    Ventricular Rate 70 BPM    Atrial Rate 69 BPM    WA Interval 294 ms    QRS Duration 121 ms    QT Interval 441 ms    QTC Calculation(Bazett) 476 ms    P Axis 15 degrees    R Axis -9 degrees    T Axis  146 degrees    QRS Count 12 beats    Q Onset 253 ms    T Offset 474 ms    QTC Fredericia 464 ms   CBC and Auto Differential   Result Value Ref Range    WBC 11.8 (H) 4.4 - 11.3 x10*3/uL    nRBC 0.0 0.0 - 0.0 /100 WBCs    RBC 5.61 4.50 - 5.90 x10*6/uL    Hemoglobin 17.0 13.5 - 17.5 g/dL    Hematocrit 50.4 41.0 - 52.0 %    MCV 90 80 - 100 fL    MCH 30.3 26.0 - 34.0 pg    MCHC 33.7 32.0 - 36.0 g/dL    RDW 14.0 11.5 - 14.5 %    Platelets 256 150 - 450 x10*3/uL    Neutrophils % 69.7 40.0 - 80.0 %    Immature Granulocytes %, Automated 0.3 0.0 - 0.9 %    Lymphocytes % 17.9 13.0 - 44.0 %    Monocytes % 10.6 2.0 - 10.0 %    Eosinophils % 1.2 0.0 - 6.0 %    Basophils % 0.3 0.0 - 2.0 %    Neutrophils Absolute 8.25 (H) 1.60 - 5.50 x10*3/uL    Immature Granulocytes Absolute, Automated 0.04 0.00 - 0.50 x10*3/uL    Lymphocytes Absolute 2.12 0.80 - 3.00 x10*3/uL    Monocytes Absolute 1.25 (H) 0.05 - 0.80 x10*3/uL    Eosinophils Absolute 0.14 0.00 - 0.40 x10*3/uL    Basophils Absolute 0.04 0.00 - 0.10 x10*3/uL   Comprehensive metabolic panel   Result Value Ref Range    Glucose 91 74 - 99 mg/dL    Sodium 140 136 - 145 mmol/L    Potassium 4.7 3.5 - 5.3 mmol/L    Chloride 107 98 - 107 mmol/L    Bicarbonate 23 21 - 32 mmol/L    Anion Gap 15 10 - 20 mmol/L    Urea Nitrogen 15 6 - 23 mg/dL    Creatinine 1.13 0.50 - 1.30 mg/dL    eGFR 65 >60 mL/min/1.73m*2    Calcium 8.9 8.6 - 10.3 mg/dL    Albumin 4.0 3.4 - 5.0 g/dL    Alkaline Phosphatase 66 33 - 136 U/L    Total Protein 7.0 6.4 - 8.2 g/dL    AST 18 9 - 39 U/L    Bilirubin, Total 1.1 0.0 - 1.2 mg/dL    ALT 18 10 - 52 U/L   Troponin I, High Sensitivity   Result Value Ref Range    Troponin I, High Sensitivity 18 0 - 20 ng/L   Protime-INR   Result Value Ref Range    Protime 10.8 9.8 - 12.8 seconds    INR 1.0 0.9 - 1.1   APTT   Result Value Ref Range    aPTT 37 27 - 38 seconds   Hemoglobin A1C   Result Value Ref Range    Hemoglobin A1C 5.7 (H) see below %    Estimated Average Glucose 117 Not  Established mg/dL   Transthoracic Echo (TTE) Complete   Result Value Ref Range    BSA 2.27 m2       Imaging results  No MRI head results found for the past 12 months     === 06/21/24 ===    CT BRAIN ATTACK HEAD WO CONTRAST    - Impression -  1. No acute intracranial hemorrhage or serious brain herniation. If  continued clinical concern, advise further assessment by dedicated  brain MRI.  2. Mild to moderate cerebral volume loss and sequelae of chronic  microvascular ischemia as in prior.    MACRO:  None    Signed by: Conner Rosa 6/21/2024 9:11 AM  Dictation workstation:   [id number]       IV Thrombolysis IV Thrombolysis Checklist      IV Thrombolysis Given: No; Thrombolysis contraindication reason: Patient was eligible for IV Thrombolysis but it was not given because pt/family refuse IV thrombolysis following discussion of benefits/risks       Assessment/Plan    New-onset expressive aphasia  2.    +/- dysarthria  3.    Hx prior stroke with residual expressive aphasia and walking difficulty  4.    Hx dementia (? Vascular)  Suspected acute ischemic stroke  IV TNK deferred by family per documentation  Unclear if L facial asymmetry is new or old  Pt appears to comprehend questions and at least able to answer yes/no by nodding head  Discussed with pt/daughter-in-law, likely stroke--symptoms, natural history, pathophysiology, risk factors explained and management  Head CT wo this admit grossly unchanged from 7/2023 head CT--no gross new large area of ischemia in posterior fossa--though there were multiple areas of chronic ischemia/encephalomalacia that likely anything new/recent will be difficult to appeciate  Unclear facility meds at this time--may need escalation of antiplatelet treatment if needed  Cannot completely exclude possibility of metabolic encephalopathy in setting of underlying dementia, though pt appears to be clearly aphasic, not confused/delirious    Type: Ischemic stroke  Subtype/etiology: presumably  lacune (small vessel disease)  Vessels involved: poss dominant hemisphere Broca's area  Neurological manifestations: worse aphasia  NIHSS (worst at presentation): 4   Diagnostic evaluation: Head CT, MRI brain, MRA brain/neck  Antiplatelet/antithrombotic plan for stroke prevention: ?  VTE prophylaxis: per hospitalist  Vascular Risk Factor modification goals:  Blood pressure goals: avoid hypotension SBP <100 that could worsen cerebral perfusion, Ischemic stroke- early permissive hypertension SBP < 220 mmHg with cautious inpatient lowering  Lipid Goals: education on healthy diet and statin therapy to maintain or achieve goal LDL-cholesterol < 70mg  Glucose Goals: early treatment of hyperglycemia to goal glucose 140-180 mg/dl with long-term goal A1c < 7%   Smoking Cessation and Education  Assessment for Rehabilitation needs   Patient and family education on signs and symptoms of stroke, calling 911, healthy strategies for stroke prevention.      Recommendations:  Escalation of antiplatelet treatment pending facility med list  Start/continue high intensity statin  Check lipid panel/Hba1c  Do MRI brain wo/MRA brain wo/MRA neck wo  5.   Check 2D echocardiogram   6.   Allow permissive hypertension for 48-72 hours after stroke onset  7.   Control vascular risk factors  8.   Cardiac telemetry  9. PT/OT/ST eval--likely will benefit from acute rehab    Discussed with pt/daughter in law. Message sent to primary team. All questions answered. Please call with questions.    Abdi Pimentel MD  6/21/2024  2:51 PM

## 2024-06-21 NOTE — PROGRESS NOTES
PHARMACY STROKE RESPONSE      Patient Name: Abdi Wilson  MRN: [id number]  Location: [place] [address]    Patient Weight (kg):   Wt Readings from Last 1 Encounters:   06/21/24 99 kg (218 lb 4.1 oz)        An acute Brain Attack has been activated, pharmacy participated in multidisciplinary team bedside response for Abdi Wilson.  Contraindications for fibrinolytic therapy have been reviewed by pharmacy and any issues relating to medication therapy have been discussed directly with the provider(s) caring for this patient.     Pharmacy aided in the bedside response for fibrinolytic therapy. Patient did not receive fibrinolysis.         Thank you for allowing me to take part in the care of this patient.     Toña Salazar, PharmD  6/21/2024  9:54 AM         References:    Neurological [place] Stroke Tools   Neurological [place] IV Thrombolysis Checklist

## 2024-06-21 NOTE — H&P
History Of Present Illness  Abdi Wilson is a 79 y.o. male with past medical history of hypertension, hyperlipidemia, coronary artery disease, CVA, ulcerative colitis GERD and ? Seizures presents to the emergency room from the skilled nursing facility with strokelike symptoms.  Patient is a poor historian due to his dysphagia but according to the family at bedside his last known well was about 5:45 AM this morning and by 6 and a wellness check attempted on him showed aphasia.  Patient is usually wheelchair-bound due to his chronic ambulatory difficulties.  But even with his dementia is able to communicate with family although with difficulty in remembering details.  It was also noticed that patient had slight drooping on the left side of the face.  EMS was called and patient was transported to the emergency room for further evaluation.      On admission in the emergency room he was hemodynamically stable with a slightly elevated blood pressure of 163/91 mmHg, heart rate of 68/min, respirate rate of 20/min, temperature of 36.1 °C and was saturating 95% on nasal cannula oxygen.  Initial CBC and BMP were unremarkable except for WBC of 11.8.  A CT of the brain was done which showed no acute intracranial hemorrhage or seizures brain herniation.  NIH stroke scale was 4 however after discussion with ER physician family elected to skip TNK.  He has been admitted for further evaluation and management.      Past Medical History  He has no past medical history on file.    Surgical History  He has no past surgical history on file.     Social History  He has no history on file for tobacco use, alcohol use, and drug use.    Family History  No family history on file.     Allergies  Patient has no known allergies.    Review of Systems  Unable to obtain due to expressive aphasia  Physical Exam     Constitutional: Well developed, well nourished, in  no acute respiratory distress. Laying back in bed comfortably, follows commands but  minimally verbally responsive   Eyes: PERRL, EOMI   Head/Neck: Normocephalic, atraumatic. Neck supple,  no thyromegaly, JVD. Trachea midline   Respiratory/Thorax: Normal respiratory effort. Lungs  CTAB with no wheezing, rales, or rhonchi noted   Cardiovascular: RRR, no murmurs, rubs, or gallops  noted. Peripheral pulses 2+   Gastrointestinal: Bowel sounds normal. Abdomen soft,  nontender, nondistended, with no palpable masses   Musculoskeletal: No gross deformities. No gross muscular  weakness with no ROM limitation   Extremities: No lower extremity edema noted bilaterally   Neurological: Expressive aphasia with some dysphasia.but obeys command for follow-up of at least grade 4-5 in both upper and lower extremities.  Romberg sign and ataxia evaluation not done.   Psychological: Appropriate mood and affect   Skin: No rashes or lesions noted     Last Recorded Vitals  BP (!) 151/115   Pulse 60   Temp 36.1 °C (97 °F)   Resp 20   Wt 99 kg (218 lb 4.1 oz)   SpO2 94%     Relevant Results             Assessment/Plan   Principal Problem:    Stroke-like symptoms      #Dysarthria/AMS? Stroke  Will consider delirium in the setting of metabolic derangement since patient has dementia.  Admitted under telemetry on the stepdown unit  Resume Plavix and add aspirin and atorvastatin  MRI of the brain and MRA of the head and neck  Hemoglobin A1c and lipid panel  Echocardiogram  Permissive hypertension using IV hydralazine and labetalol with parameters for 48-72 hours  Chest x-ray showed no signs of infection, urine analysis still pending  Neurologist consulted    #Hypertension  Permissive hypertension as above    # Hyperlipidemia  Atorvastatin as above    #CODE STATUS  Patient currently a DNR CCA    #DVT prophylaxis  Subcutaneous Lovenox    Joe Lopez MD

## 2024-06-21 NOTE — PROGRESS NOTES
Abdi Wilson is a 79 y.o. male admitted for Stroke-like symptoms. Pharmacy reviewed the patient's [id number] medications and allergies for accuracy.    The list below reflects the PTA list prior to pharmacy medication history. A summary a changes to the PTA medication list has been listed below. Please review each medication in order reconciliation for additional clarification and justification.    Source of information:  Facility list    Medications added:  Apap 325mg 2t q4 prn  Loperamide 2mg prn  Aspercreme 4% AAA every day prn  Atorvastatin 40mg hs  Clopidogrel 75mg every day   Bisacodyl 10mg prn  Fish oil 500mg every day   Hydralazine 25mg tid  Losartan 100mg every day   Metoprolol ER 100mg every day   MOM 30ml prn  Questran 4g 1 pkt every day   Vitamin B12 1000mcg every day   Vitamin D3 4000 units every day   Vitamin D3 50,000 units once a week on Monays      Medications modified:    Medications to be removed:    Medications of concern:      None       Apurva Watson

## 2024-06-21 NOTE — PROGRESS NOTES
Speech-Language Pathology                 Therapy Communication Note    Patient Name: Abdi Wilson  MRN: [id number]  Today's Date: 6/21/2024     Discipline: Speech Language Pathology    Missed Visit Reason: Missed Time Reason: Unavailable (Comment) (Off floor for MRI)    Missed Time: Attempt    Comment: Pt off the floor for MRI.  SLP d/w pt's son and dtr in law that SLP would be in 06/22/2024 to evaluate for swallowing and sp/lang.  Pt family given a variety of communication board handouts.   SLP recommended starting with y/n questions and starting with simple questions that they know the answers to before going to more complex questions.     SLP will follow and complete assessments when pt available.

## 2024-06-22 LAB
BNP SERPL-MCNC: 207 PG/ML (ref 0–99)
CHOLEST SERPL-MCNC: 163 MG/DL (ref 0–199)
CHOLESTEROL/HDL RATIO: 4.1
GLUCOSE BLD MANUAL STRIP-MCNC: 101 MG/DL (ref 74–99)
GLUCOSE BLD MANUAL STRIP-MCNC: 118 MG/DL (ref 74–99)
GLUCOSE BLD MANUAL STRIP-MCNC: 89 MG/DL (ref 74–99)
GLUCOSE BLD MANUAL STRIP-MCNC: 99 MG/DL (ref 74–99)
HDLC SERPL-MCNC: 39.3 MG/DL
LDLC SERPL CALC-MCNC: 75 MG/DL
NON HDL CHOLESTEROL: 124 MG/DL (ref 0–149)
TRIGL SERPL-MCNC: 245 MG/DL (ref 0–149)
VLDL: 49 MG/DL (ref 0–40)

## 2024-06-22 PROCEDURE — 97530 THERAPEUTIC ACTIVITIES: CPT | Mod: GO

## 2024-06-22 PROCEDURE — 2500000004 HC RX 250 GENERAL PHARMACY W/ HCPCS (ALT 636 FOR OP/ED): Performed by: INTERNAL MEDICINE

## 2024-06-22 PROCEDURE — 2500000005 HC RX 250 GENERAL PHARMACY W/O HCPCS: Performed by: INTERNAL MEDICINE

## 2024-06-22 PROCEDURE — 82947 ASSAY GLUCOSE BLOOD QUANT: CPT | Mod: 91

## 2024-06-22 PROCEDURE — 83880 ASSAY OF NATRIURETIC PEPTIDE: CPT | Performed by: INTERNAL MEDICINE

## 2024-06-22 PROCEDURE — 97162 PT EVAL MOD COMPLEX 30 MIN: CPT | Mod: KX,GP

## 2024-06-22 PROCEDURE — 92523 SPEECH SOUND LANG COMPREHEN: CPT | Mod: GN | Performed by: PHARMACIST

## 2024-06-22 PROCEDURE — 2060000001 HC INTERMEDIATE ICU ROOM DAILY

## 2024-06-22 PROCEDURE — 99233 SBSQ HOSP IP/OBS HIGH 50: CPT | Performed by: INTERNAL MEDICINE

## 2024-06-22 PROCEDURE — 2500000001 HC RX 250 WO HCPCS SELF ADMINISTERED DRUGS (ALT 637 FOR MEDICARE OP): Performed by: INTERNAL MEDICINE

## 2024-06-22 PROCEDURE — 36415 COLL VENOUS BLD VENIPUNCTURE: CPT | Performed by: INTERNAL MEDICINE

## 2024-06-22 PROCEDURE — 92610 EVALUATE SWALLOWING FUNCTION: CPT | Mod: GN | Performed by: PHARMACIST

## 2024-06-22 PROCEDURE — 99232 SBSQ HOSP IP/OBS MODERATE 35: CPT | Performed by: PSYCHIATRY & NEUROLOGY

## 2024-06-22 PROCEDURE — 97166 OT EVAL MOD COMPLEX 45 MIN: CPT | Mod: GO

## 2024-06-22 PROCEDURE — 92526 ORAL FUNCTION THERAPY: CPT | Mod: GN | Performed by: PHARMACIST

## 2024-06-22 PROCEDURE — 80061 LIPID PANEL: CPT | Performed by: INTERNAL MEDICINE

## 2024-06-22 RX ORDER — DEXAMETHASONE SODIUM PHOSPHATE 10 MG/ML
10 INJECTION INTRAMUSCULAR; INTRAVENOUS ONCE
Status: COMPLETED | OUTPATIENT
Start: 2024-06-22 | End: 2024-06-22

## 2024-06-22 SDOH — ECONOMIC STABILITY: HOUSING INSECURITY: IN THE LAST 12 MONTHS, HOW MANY PLACES HAVE YOU LIVED?: 1

## 2024-06-22 SDOH — ECONOMIC STABILITY: TRANSPORTATION INSECURITY
IN THE PAST 12 MONTHS, HAS LACK OF TRANSPORTATION KEPT YOU FROM MEETINGS, WORK, OR FROM GETTING THINGS NEEDED FOR DAILY LIVING?: PATIENT UNABLE TO ANSWER

## 2024-06-22 SDOH — ECONOMIC STABILITY: TRANSPORTATION INSECURITY
IN THE PAST 12 MONTHS, HAS THE LACK OF TRANSPORTATION KEPT YOU FROM MEDICAL APPOINTMENTS OR FROM GETTING MEDICATIONS?: PATIENT UNABLE TO ANSWER

## 2024-06-22 SDOH — ECONOMIC STABILITY: HOUSING INSECURITY
IN THE LAST 12 MONTHS, WAS THERE A TIME WHEN YOU DID NOT HAVE A STEADY PLACE TO SLEEP OR SLEPT IN A SHELTER (INCLUDING NOW)?: PATIENT UNABLE TO ANSWER

## 2024-06-22 SDOH — ECONOMIC STABILITY: INCOME INSECURITY
HOW HARD IS IT FOR YOU TO PAY FOR THE VERY BASICS LIKE FOOD, HOUSING, MEDICAL CARE, AND HEATING?: PATIENT UNABLE TO ANSWER

## 2024-06-22 SDOH — ECONOMIC STABILITY: INCOME INSECURITY
IN THE LAST 12 MONTHS, WAS THERE A TIME WHEN YOU WERE NOT ABLE TO PAY THE MORTGAGE OR RENT ON TIME?: PATIENT UNABLE TO ANSWER

## 2024-06-22 ASSESSMENT — COGNITIVE AND FUNCTIONAL STATUS - GENERAL
PERSONAL GROOMING: A LOT
EATING MEALS: TOTAL
STANDING UP FROM CHAIR USING ARMS: TOTAL
DAILY ACTIVITIY SCORE: 8
DRESSING REGULAR LOWER BODY CLOTHING: A LOT
EATING MEALS: A LOT
MOVING TO AND FROM BED TO CHAIR: A LOT
MOVING TO AND FROM BED TO CHAIR: TOTAL
CLIMB 3 TO 5 STEPS WITH RAILING: TOTAL
TURNING FROM BACK TO SIDE WHILE IN FLAT BAD: A LOT
DRESSING REGULAR LOWER BODY CLOTHING: TOTAL
STANDING UP FROM CHAIR USING ARMS: A LOT
MOBILITY SCORE: 11
TOILETING: TOTAL
WALKING IN HOSPITAL ROOM: A LOT
HELP NEEDED FOR BATHING: A LOT
PERSONAL GROOMING: TOTAL
WALKING IN HOSPITAL ROOM: TOTAL
DRESSING REGULAR UPPER BODY CLOTHING: A LOT
TURNING FROM BACK TO SIDE WHILE IN FLAT BAD: A LOT
MOVING FROM LYING ON BACK TO SITTING ON SIDE OF FLAT BED WITH BEDRAILS: A LOT
MOBILITY SCORE: 8
DAILY ACTIVITIY SCORE: 10
CLIMB 3 TO 5 STEPS WITH RAILING: TOTAL
MOVING FROM LYING ON BACK TO SITTING ON SIDE OF FLAT BED WITH BEDRAILS: A LOT
DRESSING REGULAR UPPER BODY CLOTHING: TOTAL
HELP NEEDED FOR BATHING: A LOT
TOILETING: TOTAL

## 2024-06-22 ASSESSMENT — PAIN - FUNCTIONAL ASSESSMENT
PAIN_FUNCTIONAL_ASSESSMENT: 0-10

## 2024-06-22 ASSESSMENT — PAIN SCALES - GENERAL
PAINLEVEL_OUTOF10: 0 - NO PAIN

## 2024-06-22 ASSESSMENT — ACTIVITIES OF DAILY LIVING (ADL)
LACK_OF_TRANSPORTATION: PATIENT UNABLE TO ANSWER
BATHING_ASSISTANCE: MAXIMAL

## 2024-06-22 NOTE — PROGRESS NOTES
Physical Therapy    Physical Therapy Evaluation    Patient Name: Abdi Wilson  MRN: [id number]  Today's Date: 6/22/2024   Time Calculation  Start Time: 0910  Stop Time: 0930  Time Calculation (min): 20 min    Assessment/Plan   PT Assessment  PT Assessment Results: Decreased strength, Decreased endurance, Impaired balance, Decreased mobility, Decreased safety awareness, Decreased cognition, Impaired judgement  Rehab Prognosis: Fair  End of Session Communication: Bedside nurse  End of Session Patient Position: Bed, 3 rail up, Alarm on  IP OR SWING BED PT PLAN  Inpatient or Swing Bed: Inpatient  PT Plan  Treatment/Interventions: Bed mobility, Transfer training, Gait training, Balance training, Strengthening, Therapeutic exercise  PT Plan: Ongoing PT  PT Frequency: 5 times per week  PT Discharge Recommendations: Moderate intensity level of continued care  PT - OK to Discharge: Yes      Subjective   General Visit Information:  General  Reason for Referral: stroke  Referred By: John MORE  Past Medical History Relevant to Rehab: hypertension, hyperlipidemia, coronary artery disease, CVA, ulcerative colitis GERD - from chart  Co-Treatment: OT  Co-Treatment Reason: safety of patient  Prior to Session Communication: Bedside nurse  Patient Position Received: Bed, 3 rail up, Alarm on  General Comment: son & thomas in room    Home Living:  Home Living  Type of Home: Skilled Nursing facility  Home Adaptive Equipment: Walker rolling or standard (wheelchair as well)    Prior Level of Function:  Prior Function Per Pt/Caregiver Report  Level of [place]: Needs assistance with ADLs (per son he can ambulate with FWW; has a wheelchair as well)    Precautions:  Precautions  Medical Precautions: Fall precautions       Objective   Pain:  Pain Assessment  Pain Assessment: 0-10  0-10 (Numeric) Pain Score:  (patient unable to communicate pain level)    Cognition:  Cognition  Overall Cognitive Status: Impaired    General  Assessments:  General Observation  General Observation: purewick in place     Functional Assessments:  Bed Mobility  Bed Mobility: Yes  Bed Mobility 1  Bed Mobility 1: Supine to sitting, Sitting to supine  Level of Assistance 1: Maximum assistance (x2)    Transfers  Transfer: Yes  Transfer 1  Transfer From 1: Sit to, Stand to  Transfer to 1: Sit, Stand  Transfer Level of Assistance 1: Maximum assistance (x2)    Ambulation/Gait Training  Ambulation/Gait Training Performed: Yes  Ambulation/Gait Training 1  Surface 1: Level tile  Device 1: No device  Assistance 1: Maximum assistance (x2)  Comments/Distance (ft) 1: 2 feet side stepping to HOB    Extremity/Trunk Assessments:  Strength RLE  R Knee Flexion: 4/5  R Knee Extension: 4/5  Strength LLE  L Knee Flexion: 4/5  L Knee Extension: 4/5    Outcome Measures:  [facility] Basic Mobility  Turning from your back to your side while in a flat bed without using bedrails: A lot  Moving from lying on your back to sitting on the side of a flat bed without using bedrails: A lot  Moving to and from bed to chair (including a wheelchair): A lot  Standing up from a chair using your arms (e.g. wheelchair or bedside chair): A lot  To walk in hospital room: A lot  Climbing 3-5 steps with railing: Total  Basic Mobility - Total Score: 11    Encounter Problems       Encounter Problems (Active)       PT Problem       PT Goal 1 (Progressing)       Start:  06/22/24    Expected End:  07/06/24       Increase EMELY LE strength to 5/5 to ease capability to perform bed mobility and transfers         PT Goal 2 (Progressing)       Start:  06/22/24    Expected End:  07/06/24       The patient will be able to perform supine to sit bed mobility Min A x 1         PT Goal 3 (Progressing)       Start:  06/22/24    Expected End:  07/06/24       The patient will be able to perform sit to stands transfers Min A x 1 with FWW         PT Goal 4 (Progressing)       Start:  06/22/24    Expected End:  07/06/24       The  patient will be able to ambulate 10 feet with FWW                 Education Documentation  Precautions, taught by Chaparro Fortune at 6/22/2024 10:12 AM.  Learner: Family  Readiness: Acceptance  Method: Explanation  Response: Verbalizes Understanding    Mobility Training, taught by Chaparro Fortune at 6/22/2024 10:12 AM.  Learner: Family  Readiness: Acceptance  Method: Explanation  Response: Verbalizes Understanding    Education Comments  No comments found.

## 2024-06-22 NOTE — CARE PLAN
Problem: PT Problem  Goal: PT Goal 1  Description: Increase EMELY LE strength to 5/5 to ease capability to perform bed mobility and transfers  6/22/2024 1024 by Chaparro Fortune  Outcome: Progressing  6/22/2024 1011 by Chaparro Fortune  Outcome: Progressing  Goal: PT Goal 2  Description: The patient will be able to perform supine to sit bed mobility Min A x 1  6/22/2024 1024 by Chaparro Fortune  Outcome: Progressing  6/22/2024 1011 by Chaparro Fortune  Outcome: Progressing  Goal: PT Goal 3  Description: The patient will be able to perform sit to stands transfers Min A x 1 with FWW  6/22/2024 1024 by Chaparro Fortune  Outcome: Progressing  6/22/2024 1011 by Chaparro Fortune  Outcome: Progressing  Goal: PT Goal 4  Description: The patient will be able to ambulate 10 feet with FWW   6/22/2024 1024 by Chaparro Fortune  Outcome: Progressing  6/22/2024 1011 by Chaparro Fortune  Outcome: Progressing

## 2024-06-22 NOTE — PROGRESS NOTES
Speech-Language Pathology Clinical Swallow and  Speech/Language/Cognition Evaluation    Patient Name: Abdi Wilson  MRN: [id number]  : 1945  Today's Date: 24  Start time: Start Time: 945  Stop time: Stop Time: 6  Time calculation (min) : Time Calculation (min): 41 min  21 min swallow eval   10 min speech/language eval   10 min swallow treatment    ASSESSMENT  Impressions:  Severe oral phase and suspected pharyngeal phase dysphagia based on clinical swallow evaluation.    Pt presents with severe receptive and expressive language impairment. He would benefit from skilled ST to improve swallow safety, improve functional communication skills for identifying wants/needs, improve functional communication skills for basic thoughts/ideas, and improve quality of life  Prognosis: Good    PLAN  Recommendations:  MBSS recommended: Yes, however pt is not appropriate for MBSS this date due to current severely impacted swallowing function  Solid consistency: NPO  Liquid consistency: NPO; 3-4 ice chips after completion of oral care for comfort and swallow stimulation.   Medication administration: Non oral    Recommended communication strategies:   simplify language  give simple, 1-step commands  ask questions in yes/no format  give pt extra time to respond  reduce visual and auditory distractions to help pt focus on task at hand  use slow speech rate  speak loudly/clearly  cue pt to look at you before giving instructions or asking questions    Recommended frequency/duration:  Skilled SLP services recommended: Yes  Frequency: 3x/week  Duration: Current admission  Discharge recommendation: Recommend MODERATE intensity ST upon DC in order to ensure safety with least restrictive diet.  Treatment/Interventions: assess diet tolerance, Bolus trials, Diet recommendations, Patient/family education, Complete MBSS, recommendation for speech/language services, recommendations for communication strategies  Strengths:  Family/caregiver support  Barriers to participation in tx: Severity of symptoms and Comorbidities    Goals (start date 6/22/24. Anticipated time frame for goal attainment: 2 weeks):  Pt will consume PO trials with SLP without s/sx aspiration in order to determine readiness for MBSS   Status: Goal initiated this date   Progress this date: N/A  Pt demonstrate improved secretion management as evidence by dry vocal quality, reduced baseline coughing, and no further need for suctioning.   Status: Goal initiated this date   Progress this date: n/a  Pt will use multimodal communication to communicate basic wants/needs by answering yes/no question in 80% of opportunities given min assist.   Status: Goal initiated this date   Progress this date: n/a  Pt will demonstrate CV sounds (ba, pa, ma) in 80% of opportunities given frequent phonemic placement cues.    Status: Goal initiated this date   Progress this date: n/a    SUBJECTIVE    PMHx relevant to rehab:   hypertension, hyperlipidemia, coronary artery disease, CVA, ulcerative colitis GERD, vascular dementia, and falls.     Chief complaint: Pt was admitted on 6/21/24 due to stroke-like symptoms    Relevant imaging results:  CT Chest:   1.  No features of aspiration pneumonia. There is bronchial  thickening detected.  2. Heart size is enlarged robust coronary calcification. Ectasia of  the ascending aorta.  3. Hepatic steatosis    XR Chest:   Allowing for the aforementioned limitation, no acute cardiopulmonary  disease.    MRI Brain:  1. Acute infarct of the posterior limb of the right internal  capsule/anterior thalamus.  2. Moderate global parenchymal volume loss with additional remote  infarcts and chronic white matter changes favoring microangiopathy as  above.      MRA:  1. The right vertebral artery is occluded or markedly stenosed.  2. Flow irregularity within the distal right internal carotid artery  without definite visualization of the right anterior choroidal  artery.  3. Otherwise, no evidence of major vessel cutoff or significant  stenosis on MRA head and neck.    General Visit Information:  SLP Received On: 06/22/24  Patient Class: Inpatient  Living Environment: Home  Ordering Physician: Joe Lopez MD  Reason for Referral: Swallow/speech & language evaluation  Prior to Session Communication: Bedside nurse    RN cleared pt to participate in session.    Pt son reported pt demonstrating excess coughing throughout the day yesterday. His son stated that prior to hospitalization, pt has displayed swallowing difficulty with coughing during meals but was on reg/thin diet.     Son reported pt hx included 2 prior massive strokes. Pt family reports pt ability to participate in conversation with them prior to onset of symptoms. However, pt has always been a quiet individual. Pt son states pt currently unable to answer questions verbally however, will answer yes/no questions inconsistently.    Date of Onset: 06/21/24  Date of Order: 06/21/24  Base[employer] Diet: Regular/thin  Current Diet : NPO    Pain Assessment  Pain Assessment: 0-10  Pain Score: 0 - No pain    Pt was alert, pleasant, and cooperative for session.  Orientation: Oriented to self and Did not assess orientation to time  Ability to follow functional commands: Follows simple commands inconsistently  Nutritional status: Appears well-nourished/no concerns    Respiratory status: Room air  Baseline Vocal Quality: Weak  Volitional Cough: Weak, Wet  Volitional Swallow: Absent  Patient positioning: Upright in bed      OBJECTIVE  Clinical swallow evaluation completed and consisted of interview, oral motor assessment, and PO trials ( 5 ice chips and 1 sip of thin water via spoon). Oral care completed prior to PO trials. Gag reflex present during oral care.   ORAL PHASE: Natural dentition in poor condition. Oral mucosa were pink, moist, and free of obvious lesions. Lingual and labial strength were difficult to assess due  to pt inconsistent ability to follow simple commands. However, lingual and labial strength/ROM appear diminished. Labial seal appears diminished. Mastication was not assessed due to pt not being appropriate to consume regular solids.  A/P transit and oral clearance was incoordinated and delayed, with oral holding of ice chips for up to 60+ sec.  PHARYNGEAL PHASE: Pharyngeal swallow was visualized in only 1 opportunities. Otherwise, pharyngeal swallow appeared absent. Immediate and delayed s/sx aspiration/penetration were observed with all consistencies. This was characterized by wet/gargled coughing. SLP used yankauer to suction with min amount of material removed from oropharynx.    Was 3oz challenge administered: No, deferred due to safety concerns.      Motor speech:  Intelligibility: nonverbal; except yes/no   Perceptual voice characteristics: Breathy, Weak     Receptive language:  Following 1-step commands: inconsistently follows commands  Simple yes/no questions: answered yes/no (verbal/gesture) accurately in 13/15 trials.     Expressive language:  Expressive language characteristics: responds inconsistently with yes/no (verbal/gestures) only this date.     Cognition:  Recall of demographic info: unable to accurately assess due to severity of communication deficits.  Attention: pt able to direct gaze towards therapist for duration of session. Appeared to be attempting to participate in all tasks.     Formal assessments were not completed at this date due to pt current level of functioning. SLP to continue formal assessment later this date.     Treatment/Education:  Results and recommendations were relayed to: Patient, Family, Bedside nurse, and Physician  Education provided: Yes   Learner: Patient and Family   Barriers to learning: Acuteness of illness barrier and Cognitive limitations barrier   Method of teaching: Verbal   Topic: role of ST, results of assessment, risk for aspiration, recommended diet  modifications, recommendation for MBSS, recommended communication strategies, and recommendation for speech/language/cognition therapy   Outcome of teaching: Caregiver demonstrated good understanding and Pt demonstrated partial understanding  Treatment provided: Yes  SLP provided pt with additional PO trials after completion of CSE. Pt demonstrated immediate s/sx of aspiration demonstrating wet/gargled coughing with ice chips. Pt able to initiate pharyngeal swallow x1 given verbal cues.   SLP provided pt with suctioning after demonstrating excessive coughing to assist with clearing liquid out of throat.   Next Treatment Priority: Continue evaluating swallowing function to determine appropriateness PO diet recommendations. Continue assessing speech/language impairment

## 2024-06-22 NOTE — PROGRESS NOTES
Occupational Therapy  Evaluation    Patient Name: Abdi Wilson  MRN: [id number]  Today's Date: 6/22/2024  Time Calculation  Start Time: 0904  Stop Time: 0927  Time Calculation (min): 23 min    Current Problem:   1. Stroke-like symptoms    2. Hx of TIA (transient ischemic attack) and stroke        OT order: OT eval and treat   Referred by: Joe Lopez MD  Reason for referral: ADLs and Safety Assess  Past medical history related to rehab: No past medical history on file.   No past surgical history on file.      Pt admitted 6/21 with CVA symptoms: aphasia and L facial droop    Orders received, chart reviewed, eval complete  MRI brain 6/21   IMPRESSION:  1. Acute infarct of the posterior limb of the right internal  capsule/anterior thalamus.  2. Moderate global parenchymal volume loss with additional remote  infarcts and chronic white matter changes favoring microangiopathy as  above.    Precautions:   Medical Precautions: Fall precautions    ASSESSMENT  OT Assessment: Pt demonstrated decreased activity tolerance, balance and safety awareness which impacted safe ADL/IADL completion. Pt is not at their baseline functional participation at this time. Pt would benefit from continued skills occupational therapy services during hospitalization in order to promote independence in daily routines for safe discharge . Pt with Decreased ADL status, Decreased upper extremity strength, Decreased safe judgment during ADL, Decreased endurance, Decreased functional mobility, Decreased cognition  Prognosis: Good  Barriers to discharge: None  Tolerance: Patient tolerated treatment well (with encouragement)    PLAN  Frequency: 4 times per week  Treatment Interventions: ADL retraining, Functional transfer training, UE strengthening/ROM, Endurance training, Patient/family training, Equipment evaluation/education, Cognitive reorientation, Visual perceptual retraining, Neuromuscular reeducation, Fine motor coordination activities,  Compensatory technique education  Discharge Recommendations: Moderate intensity level of continued care  OT OK to discharge: Yes    GENERAL VISIT INFORMATION   Start of session communication: Bedside nurse  End of session communication: Bedside nurse  Family/caregiver present: No  Caregiver feedback: n/a  Co-Treatment: PT  Reason for co-treatment: Co-eval and treat completed in order to optimize patient safety and function in order to promote OT goal achievement   Position Pt Received:  Bed, 3 rail up, Alarm on  End of session position: Bed, 3 rail up, Alarm on    SUBJECTIVE  Home Living:  Type of Home:  (Pt admitted from LTC facility where he has PRN assist with ADLs and functional mobility.)     Prior Level of Function:  Level of [place]:  (Pt is a poor historian. Pt's son and granddaughter present and assist with PLOF. Per report, pt uses FWW vs wc. Can complete functional mobility in the halls, but needs A for ADLs)    Pain:  Assessment: 0-10  Score:  (no s/s of pain, Pt shook head no when asked about pain. Limited verbal responses throughout session.)    OBJECTIVE  Cognition:  Overall Cognitive Status: Impaired (grossly oriented to 1. Pt with hx of dementia at baseline, formal orientation assessments limited 2/2 aphasia. Pt responds to his name)             Current ADL function:   EATING:   (NT pending SLP evaluation)     GROOMING:  (mod A washing face EOB)     BATHING: Maximal (simulated EOB)     UB DRESSING:  (mod A for hospital gown)     LB DRESSING:  (total A for footwear)     TOILETING:  (total A - simulated in standing for breif management and casandra care. 2 person assist)      Activity Tolerance:  Endurance:  (fair- tolerance with light activity. needs encouragement for functional mobility)    Bed Mobility/Transfers:   Bed Mobility  Bed Mobility: Yes  Bed Mobility 1  Bed Mobility 1: Supine to sitting  Level of Assistance 1: Maximum assistance, +2  Bed Mobility 2  Bed Mobility  2: Sitting to  supine  Level of Assistance 2: Maximum assistance, +2  Transfers  Transfer: No    Ambulation/Gait Training:  Functional Mobility  Functional Mobility Performed: Yes (Pt completed 1 STS from EOB with bed elevated and max x2 assist. Pt took a few side steps to the R to sit higher up in bed with max A x2.)    Sitting Balance:  Static Sitting Balance  Static Sitting-Balance Support:  (close SBA/CGA)  Dynamic Sitting Balance  Dynamic Sitting-Balance Support:  (min/mod A)    Standing Balance:  Static Standing Balance  Static Standing-Balance Support:  (max x2)  Dynamic Standing Balance  Dynamic Standing-Balance Support:  (max x2)    Vision: Vision - Basic Assessment  Current Vision:  (not formally assessed 2/2 cognition and aphasia)    Sensation:  Light Touch:  (not formally assessed 2/2 cognition and aphasia)    Strength:  Strength Comments: Pt is L handed per son. pt grossly 3/5 strength in BUE    Perception:  Inattention/Neglect: Appears intact    Coordination:  Movements are Fluid and Coordinated: No (impaired UE coorindation during functional tasks)     Hand Function:  Hand Function  Gross Grasp: Functional    Extremities: RUE   RUE : Within Functional Limits and LUE   LUE: Within Functional Limits    Outcome Measures: [facility] Daily Activity    Putting on and taking off regular lower body clothing: Total   Bathing (including washing, rinsing, drying): A lot   Putting on and taking off regular upper body clothing: A lot   Toileting, which includes using toilet, bedpan or urinal: Total   Taking care of personal grooming such as brushing teeth: A lot   Eating Meals: A lot  Daily Activity - Total Score: 10    Therapeutic Intervention   Pt edu on role of OT in the acute care setting. Pt verbalized understanding   Pt edu on the importance of completing ADLs as independently as possible while in the hospital in order to promote activity tolerance, strength, and return to PLOF. Pt verbalized understanding.   Pt edu to use call  light and not attempt any functional mobility until staff present. Pt verbalized understanding.   Pt completed above Adls with assist noted including instructions and cuing to promote goal achievement and return to PLOF.     EDUCATION:     Education Documentation  Handouts, taught by Abelardo Alarcon OT at 6/22/2024  9:59 AM.  Learner: Family, Patient  Readiness: Acceptance  Method: Explanation  Response: Needs Reinforcement    Body Mechanics, taught by Abelardo Alarcon OT at 6/22/2024  9:59 AM.  Learner: Family, Patient  Readiness: Acceptance  Method: Explanation  Response: Needs Reinforcement    Precautions, taught by Abelardo Alarcon OT at 6/22/2024  9:59 AM.  Learner: Family, Patient  Readiness: Acceptance  Method: Explanation  Response: Needs Reinforcement    Home Exercise Program, taught by Abelardo Alarcon OT at 6/22/2024  9:59 AM.  Learner: Family, Patient  Readiness: Acceptance  Method: Explanation  Response: Needs Reinforcement    ADL Training, taught by Abelardo Alarcon OT at 6/22/2024  9:59 AM.  Learner: Family, Patient  Readiness: Acceptance  Method: Explanation  Response: Needs Reinforcement    Education Comments  No comments found.        Goals:   Encounter Problems       Encounter Problems (Active)       ADLs       Patient will perform UB and LB bathing with moderate assist level of assistance and PRN AE/DME. (Progressing)       Start:  06/22/24    Expected End:  07/05/24            Patient with complete upper body dressing with minimal assist  level of assistance donning and doffing all UE clothes with PRN adaptive equipment while supported sitting (Progressing)       Start:  06/22/24    Expected End:  07/05/24            Patient with complete lower body dressing with moderate assist level of assistance donning and doffing all LE clothes  with PRN adaptive equipment while supported sitting (Progressing)       Start:  06/22/24    Expected End:  07/05/24            Patient  will feed self with supervision level of assistance and using PRN adaptive equipment. (Progressing)       Start:  06/22/24    Expected End:  07/05/24            Patient will complete daily grooming tasks brushing teeth and washing face/hair with supervision level of assistance and PRN adaptive equipment while supported sitting. (Progressing)       Start:  06/22/24    Expected End:  07/05/24            Patient will complete toileting including hygiene clothing management/hygiene with moderate assist level of assistance and PRN DME/AE. (Progressing)       Start:  06/22/24    Expected End:  07/05/24               EXERCISE/STRENGTHENING       Patient will complete BUE exercise with min A to promote ROM, strength, and coordination for increased participation in Adls and functional mobility  (Progressing)       Start:  06/22/24    Expected End:  07/05/24 06/22/24 at 10:01 AM - NUNO WRIGHT OT

## 2024-06-22 NOTE — PROGRESS NOTES
Social work consult placed for discharge planning. SW reviewed pt's chart and communicated with TCC. No SW needs foreseen at this time. SW signing off; available upon request.

## 2024-06-22 NOTE — PROGRESS NOTES
"PROGRESS NOTE    Patient seen this afternoon, son and granddaughter at bedside.    Pt remains aphasic. Family also reports not swallowing well and considering putting in feeding tube.    MRI did show acute infarct in posterior limb of R internal capsule/thalamus, with vol loss and remote infarcts. R vert artery markedly stenosed (though no posterior circulation acute stroke), flow irregularity within distal R ICA with no definite visualization of R anterior choroidal artery.    Allergies  Patient has no known allergies.    Current Meds  Scheduled medications  [Held by provider] aspirin, 81 mg, oral, Daily  aspirin, 150 mg, rectal, Daily  [Held by provider] atorvastatin, 80 mg, oral, Nightly  enoxaparin, 40 mg, subcutaneous, q24h  perflutren lipid microspheres, 0.5-10 mL of dilution, intravenous, Once in imaging      Continuous medications  Adult Clinimix Parenteral Nutrition, 83 mL/hr      PRN medications  PRN medications: hydrALAZINE **FOLLOWED BY** [START ON 6/23/2024] hydrALAZINE, oxygen, sennosides-docusate sodium    Last Recorded Vitals  Blood pressure 150/87, pulse 69, temperature 36.2 °C (97.2 °F), temperature source Temporal, resp. rate 18, height 1.88 m (6' 2\"), weight 92.3 kg (203 lb 7.8 oz), SpO2 92%.    Neuro Exam    Awake, alert, appears to be able to comprehend, in no distress  Expressive aphasia +/- dysarthria  Well-nourished, in bed     Mental status exam as above, tries to communicate verbally but difficult  (+) ? L facial asymmetry at rest but appears symmetric on smiling (pt does not want to smile fully despite multiple requests)  Hearing grossly intact     I did not have him stand or walk    Relevant Results  I have personally reviewed the following:    Labs  Results for orders placed or performed during the hospital encounter of 06/21/24 (from the past 24 hour(s))   Transthoracic Echo (TTE) Complete   Result Value Ref Range    LVOT diam 2.40 cm    LV Biplane EF 46 %    MV E/A ratio 0.77     MV " avg E/e' ratio 9.90     Tricuspid annular plane systolic excursion 2.3 cm    AV mn grad 2.0 mmHg    LA vol index A/L 24.5 ml/m2    AV pk samra 1.01 m/s    RV free wall pk S' 12.30 cm/s    LVIDd 5.40 cm    Aortic Valve Area by Continuity of VTI 3.55 cm2    Aortic Valve Area by Continuity of Peak Velocity 3.24 cm2    AV pk grad 4.1 mmHg    LV A4C EF 46.2    Urinalysis with Reflex Microscopic   Result Value Ref Range    Color, Urine Light-Yellow Light-Yellow, Yellow, Dark-Yellow    Appearance, Urine Clear Clear    Specific Gravity, Urine 1.021 1.005 - 1.035    pH, Urine 6.5 5.0, 5.5, 6.0, 6.5, 7.0, 7.5, 8.0    Protein, Urine NEGATIVE NEGATIVE, 10 (TRACE), 20 (TRACE) mg/dL    Glucose, Urine Normal Normal mg/dL    Blood, Urine NEGATIVE NEGATIVE    Ketones, Urine NEGATIVE NEGATIVE mg/dL    Bilirubin, Urine NEGATIVE NEGATIVE    Urobilinogen, Urine Normal Normal mg/dL    Nitrite, Urine NEGATIVE NEGATIVE    Leukocyte Esterase, Urine 25 Lorna/µL (A) NEGATIVE   Microscopic Only, Urine   Result Value Ref Range    WBC, Urine 1-5 1-5, NONE /HPF    RBC, Urine 1-2 NONE, 1-2, 3-5 /HPF    Squamous Epithelial Cells, Urine 1-9 (SPARSE) Reference range not established. /HPF    Budding Yeast, Urine PRESENT (A) NONE /HPF   Lipid Panel   Result Value Ref Range    Cholesterol 163 0 - 199 mg/dL    HDL-Cholesterol 39.3 mg/dL    Cholesterol/HDL Ratio 4.1     LDL Calculated 75 <=99 mg/dL    VLDL 49 (H) 0 - 40 mg/dL    Triglycerides 245 (H) 0 - 149 mg/dL    Non HDL Cholesterol 124 0 - 149 mg/dL   B-Type Natriuretic Peptide   Result Value Ref Range     (H) 0 - 99 pg/mL   POCT GLUCOSE   Result Value Ref Range    POCT Glucose 89 74 - 99 mg/dL   POCT GLUCOSE   Result Value Ref Range    POCT Glucose 99 74 - 99 mg/dL       Imaging results  === 06/21/24 ===    CT CHEST WO IV CONTRAST    - Impression -  1.  No features of aspiration pneumonia. There is bronchial  thickening detected.  2. Heart size is enlarged robust coronary calcification. Ectasia  of  the ascending aorta.  3. Hepatic steatosis      Signed by: Jevon Ching 6/21/2024 8:43 PM  Dictation workstation:   [id number]   === 06/21/24 ===    MR NECK ANGIO WO IV CONTRAST    - Impression -  MRI Brain:    1. Acute infarct of the posterior limb of the right internal  capsule/anterior thalamus.  2. Moderate global parenchymal volume loss with additional remote  infarcts and chronic white matter changes favoring microangiopathy as  above.    MRA:    1. The right vertebral artery is occluded or markedly stenosed.  2. Flow irregularity within the distal right internal carotid artery  without definite visualization of the right anterior choroidal artery.  3. Otherwise, no evidence of major vessel cutoff or significant  stenosis on MRA head and neck.    I personally reviewed the images/study and I agree with the resident  findings as stated by Gavi Reddy MD. This study was interpreted at  [place], [place].    MACRO:  None    Signed by: Rob He 6/21/2024 4:31 PM  Dictation workstation:   [id number]       Assessment/Plan    Acute R posterior limb internal capsule infarct  New-onset expressive aphasia 6/21/24  3.    +/- dysarthria  4.    Dysphagia  5.    Hx prior stroke with residual expressive aphasia and walking difficulty  6.    Hx dementia (? Vascular)  7.    R vertebral artery disease  8.    Cerebrovascular disease  IV TNK deferred by family per documentation  I was told pt was on clopidogrel and atorvastatin at nursing home    It is interesting that a R posterior limb internal capsule infarct is presenting with seeming expressive aphasia    Recommendations:  ASA rectal for now  Restart clopidogrel/statin when able  3.   Allow permissive hypertension for at least 24 hours more  4.   Control vascular risk factors  5.   Cardiac telemetry  6. PT/OT/ST eval  7.  Continue supportive care    Discussed with pt/family. All questions answered. Please call with  questions.     No inpatient neurology consult service available at [facility] on 6/23/24. Please call  transfer line if with urgent needs.    Abdi Pimentel MD  6/22/2024  2:04 PM

## 2024-06-22 NOTE — CARE PLAN
Problem: PT Problem  Goal: PT Goal 1  Description: Increase EMELY LE strength to 5/5 to ease capability to perform bed mobility and transfers  Outcome: Progressing  Goal: PT Goal 2  Description: The patient will be able to perform supine to sit bed mobility Min A x 1  Outcome: Progressing  Goal: PT Goal 3  Description: The patient will be able to perform sit to stands transfers Min A x 1 with FWW  Outcome: Progressing  Goal: PT Goal 4  Description: The patient will be able to ambulate 10 feet with FWW   Outcome: Progressing

## 2024-06-22 NOTE — PROGRESS NOTES
Abdi Wilson is a 79 y.o. male on day 1 of admission presenting with Stroke-like symptoms.    Subjective   Abdi Wilson is a 79 y.o. male with past medical history of hypertension, hyperlipidemia, coronary artery disease, CVA, ulcerative colitis GERD and ? Seizures presents to the emergency room from the skilled nursing facility with strokelike symptoms.  Patient is a poor historian due to his dysphagia but according to the family at bedside his last known well was about 5:45 AM this morning and by 6 and a wellness check attempted on him showed aphasia.  Patient is usually wheelchair-bound due to his chronic ambulatory difficulties.  But even with his dementia is able to communicate with family although with difficulty in remembering details.  It was also noticed that patient had slight drooping on the left side of the face.  EMS was called and patient was transported to the emergency room for further evaluation.      On admission in the emergency room he was hemodynamically stable with a slightly elevated blood pressure of 163/91 mmHg, heart rate of 68/min, respirate rate of 20/min, temperature of 36.1 °C and was saturating 95% on nasal cannula oxygen.  Initial CBC and BMP were unremarkable except for WBC of 11.8.  A CT of the brain was done which showed no acute intracranial hemorrhage or seizures brain herniation.  NIH stroke scale was 4 however after discussion with ER physician family elected to skip TNK.  He has been admitted for further evaluation and management.        Past Medical History  He has no past medical history on file.     Surgical History  He has no past surgical history on file.     Social History  He has no history on file for tobacco use, alcohol use, and drug use.     Family History  Family History   No family history on file.        Allergies  Patient has no known allergies.    6/22: Patient with significant dysphagia so n.p.o. did consult surgery to place KO feed.  Will use PPN overnight.  " Will give one-time dose of IV Decadron to help with reducing the swelling from the stroke to see whether this will help with symptoms and function in the short-term.  MRI does demonstrate acute infarct of the posterior limb of the right internal capsule/anterior thalamus.  Moderate global parenchymal volume loss with additional remote infarcts.  The right vertebral artery is markedly stenosed flow irregularity within the distal right internal carotid artery without definitive visualization of the right anterior choroidal artery.  Patient chronically wheelchair-bound patient does have dementia normally does have difficulty remembering details but is usually able to communicate with family well.         Objective     Physical Exam    EXAM:    Constitutional: Patient is aphasic currently    Head and facial: Breathing with his mouth open    Neck: No gross JVD    Lungs: Clear to auscultation    Heart: Regular heart rate and mike    Abdomen: Nontender    Pelvis/: No flank tenderness    Extremities: No gross edema    Neurologic: Neurologic aphasic somewhat snoring continuously move limbs able to raise hands and feet with commands    Dermatologic: No gross abnormalities    Psychiatric/behavioral: Unable to assess due to expressive aphasia      Last Recorded Vitals  Blood pressure 150/87, pulse 69, temperature 36.2 °C (97.2 °F), temperature source Temporal, resp. rate 18, height 1.88 m (6' 2\"), weight 92.3 kg (203 lb 7.8 oz), SpO2 92%.  Intake/Output last 3 Shifts:  No intake/output data recorded.    Relevant Results    Current Facility-Administered Medications:     Adult Clinimix Parenteral Nutrition, 83 mL/hr, intravenous, Daily PN, Ezekiel Cintron MD    [Held by provider] aspirin EC tablet 81 mg, 81 mg, oral, Daily, Joe Lopez MD    aspirin suppository 150 mg, 150 mg, rectal, Daily, Joe Lopez MD, 150 mg at 06/22/24 0807    [Held by provider] atorvastatin (Lipitor) tablet 80 mg, 80 mg, " oral, Nightly, Joe Lopez MD    dexAMETHasone (PF) (Decadron) injection 10 mg, 10 mg, intravenous, Once, Ezekiel Cintron MD    enoxaparin (Lovenox) syringe 40 mg, 40 mg, subcutaneous, q24h, Joe Lopez MD, 40 mg at 06/22/24 1216    hydrALAZINE (Apresoline) injection 10 mg, 10 mg, intravenous, q20 min PRN **FOLLOWED BY** [START ON 6/23/2024] hydrALAZINE (Apresoline) tablet 25 mg, 25 mg, oral, q6h PRN, Joe Lopez MD    labetaloL (Normodyne,Trandate) injection 10 mg, 10 mg, intravenous, q10 min PRN, Joe Lopez MD    oxygen (O2) therapy, , inhalation, Continuous PRN - O2/gases, Joe Lopez MD    perflutren lipid microspheres (Definity) injection 0.5-10 mL of dilution, 0.5-10 mL of dilution, intravenous, Once in imaging, Joe Loepz MD    sennosides-docusate sodium (Nasreen-Colace) 8.6-50 mg per tablet 2 tablet, 2 tablet, oral, BID PRN, Joe Lopez MD    sulfur hexafluoride microsphr (Lumason) injection 24.28 mg, 2 mL, intravenous, Once in imaging, Joe Lopez MD     Labs from the last few days have been reviewed.    Assessment/Plan   Acute stroke right anterior thalamus  Vertebral artery occlusion  Dysphagia  Expressive aphasia  Dementia  Hypertension  Hyperlipidemia  DNR CCA  DVT prophylaxis subcu Lovenox    Rectal aspirin  PPN overnight  Keofeed-surgery consult  Plavix  Statin  One-time dose of Decadron  Neurology consult  PT and OT  Speech therapy  N.p.o.  Permissive hypertension  Check labs in a.m.  See orders complete plan    .       I spent 40 minutes in the professional and overall care of this patient.      Ezekiel Cintron MD

## 2024-06-23 ENCOUNTER — APPOINTMENT (OUTPATIENT)
Dept: RADIOLOGY | Facility: HOSPITAL | Age: 79
End: 2024-06-23
Payer: MEDICARE

## 2024-06-23 VITALS
BODY MASS INDEX: 25.86 KG/M2 | WEIGHT: 201.5 LBS | HEART RATE: 95 BPM | DIASTOLIC BLOOD PRESSURE: 97 MMHG | HEIGHT: 74 IN | SYSTOLIC BLOOD PRESSURE: 176 MMHG | RESPIRATION RATE: 20 BRPM | TEMPERATURE: 97.3 F | OXYGEN SATURATION: 95 %

## 2024-06-23 LAB
ANION GAP SERPL CALC-SCNC: 18 MMOL/L (ref 10–20)
ATRIAL RATE: 69 BPM
BASOPHILS # BLD AUTO: 0.01 X10*3/UL (ref 0–0.1)
BASOPHILS NFR BLD AUTO: 0.1 %
BUN SERPL-MCNC: 24 MG/DL (ref 6–23)
CALCIUM SERPL-MCNC: 9.1 MG/DL (ref 8.6–10.3)
CHLORIDE SERPL-SCNC: 106 MMOL/L (ref 98–107)
CO2 SERPL-SCNC: 18 MMOL/L (ref 21–32)
CREAT SERPL-MCNC: 1.16 MG/DL (ref 0.5–1.3)
EGFRCR SERPLBLD CKD-EPI 2021: 63 ML/MIN/1.73M*2
EOSINOPHIL # BLD AUTO: 0 X10*3/UL (ref 0–0.4)
EOSINOPHIL NFR BLD AUTO: 0 %
ERYTHROCYTE [DISTWIDTH] IN BLOOD BY AUTOMATED COUNT: 13.3 % (ref 11.5–14.5)
GLUCOSE BLD MANUAL STRIP-MCNC: 123 MG/DL (ref 74–99)
GLUCOSE BLD MANUAL STRIP-MCNC: 129 MG/DL (ref 74–99)
GLUCOSE BLD MANUAL STRIP-MCNC: 132 MG/DL (ref 74–99)
GLUCOSE BLD MANUAL STRIP-MCNC: 135 MG/DL (ref 74–99)
GLUCOSE SERPL-MCNC: 137 MG/DL (ref 74–99)
HCT VFR BLD AUTO: 48.5 % (ref 41–52)
HGB BLD-MCNC: 16.8 G/DL (ref 13.5–17.5)
IMM GRANULOCYTES # BLD AUTO: 0.03 X10*3/UL (ref 0–0.5)
IMM GRANULOCYTES NFR BLD AUTO: 0.3 % (ref 0–0.9)
LYMPHOCYTES # BLD AUTO: 1.3 X10*3/UL (ref 0.8–3)
LYMPHOCYTES NFR BLD AUTO: 11.8 %
MAGNESIUM SERPL-MCNC: 2.08 MG/DL (ref 1.6–2.4)
MCH RBC QN AUTO: 30.1 PG (ref 26–34)
MCHC RBC AUTO-ENTMCNC: 34.6 G/DL (ref 32–36)
MCV RBC AUTO: 87 FL (ref 80–100)
MONOCYTES # BLD AUTO: 0.61 X10*3/UL (ref 0.05–0.8)
MONOCYTES NFR BLD AUTO: 5.5 %
NEUTROPHILS # BLD AUTO: 9.05 X10*3/UL (ref 1.6–5.5)
NEUTROPHILS NFR BLD AUTO: 82.3 %
NRBC BLD-RTO: 0 /100 WBCS (ref 0–0)
P AXIS: 15 DEGREES
PLATELET # BLD AUTO: 296 X10*3/UL (ref 150–450)
POTASSIUM SERPL-SCNC: 3.9 MMOL/L (ref 3.5–5.3)
PR INTERVAL: 294 MS
Q ONSET: 253 MS
QRS COUNT: 12 BEATS
QRS DURATION: 121 MS
QT INTERVAL: 441 MS
QTC CALCULATION(BAZETT): 476 MS
QTC FREDERICIA: 464 MS
R AXIS: -9 DEGREES
RBC # BLD AUTO: 5.59 X10*6/UL (ref 4.5–5.9)
SODIUM SERPL-SCNC: 138 MMOL/L (ref 136–145)
T AXIS: 146 DEGREES
T OFFSET: 474 MS
VENTRICULAR RATE: 70 BPM
WBC # BLD AUTO: 11 X10*3/UL (ref 4.4–11.3)

## 2024-06-23 PROCEDURE — 2500000001 HC RX 250 WO HCPCS SELF ADMINISTERED DRUGS (ALT 637 FOR MEDICARE OP): Performed by: INTERNAL MEDICINE

## 2024-06-23 PROCEDURE — 2500000001 HC RX 250 WO HCPCS SELF ADMINISTERED DRUGS (ALT 637 FOR MEDICARE OP)

## 2024-06-23 PROCEDURE — 43761 REPOSITION GASTROSTOMY TUBE: CPT | Performed by: INTERNAL MEDICINE

## 2024-06-23 PROCEDURE — 74018 RADEX ABDOMEN 1 VIEW: CPT | Performed by: RADIOLOGY

## 2024-06-23 PROCEDURE — 82947 ASSAY GLUCOSE BLOOD QUANT: CPT | Mod: 91

## 2024-06-23 PROCEDURE — 99233 SBSQ HOSP IP/OBS HIGH 50: CPT | Performed by: INTERNAL MEDICINE

## 2024-06-23 PROCEDURE — 2060000001 HC INTERMEDIATE ICU ROOM DAILY

## 2024-06-23 PROCEDURE — 2500000004 HC RX 250 GENERAL PHARMACY W/ HCPCS (ALT 636 FOR OP/ED): Performed by: INTERNAL MEDICINE

## 2024-06-23 PROCEDURE — 0DH67UZ INSERTION OF FEEDING DEVICE INTO STOMACH, VIA NATURAL OR ARTIFICIAL OPENING: ICD-10-PCS | Performed by: INTERNAL MEDICINE

## 2024-06-23 PROCEDURE — 74018 RADEX ABDOMEN 1 VIEW: CPT | Performed by: STUDENT IN AN ORGANIZED HEALTH CARE EDUCATION/TRAINING PROGRAM

## 2024-06-23 PROCEDURE — 83735 ASSAY OF MAGNESIUM: CPT | Performed by: INTERNAL MEDICINE

## 2024-06-23 PROCEDURE — 3E0G76Z INTRODUCTION OF NUTRITIONAL SUBSTANCE INTO UPPER GI, VIA NATURAL OR ARTIFICIAL OPENING: ICD-10-PCS | Performed by: INTERNAL MEDICINE

## 2024-06-23 PROCEDURE — 36415 COLL VENOUS BLD VENIPUNCTURE: CPT | Performed by: INTERNAL MEDICINE

## 2024-06-23 PROCEDURE — 85025 COMPLETE CBC W/AUTO DIFF WBC: CPT | Performed by: INTERNAL MEDICINE

## 2024-06-23 PROCEDURE — 80048 BASIC METABOLIC PNL TOTAL CA: CPT | Performed by: INTERNAL MEDICINE

## 2024-06-23 PROCEDURE — 74018 RADEX ABDOMEN 1 VIEW: CPT

## 2024-06-23 RX ORDER — ASPIRIN 325 MG
500 TABLET, DELAYED RELEASE (ENTERIC COATED) ORAL DAILY
Status: DISCONTINUED | OUTPATIENT
Start: 2024-06-23 | End: 2024-06-27 | Stop reason: HOSPADM

## 2024-06-23 RX ORDER — HYDRALAZINE HYDROCHLORIDE 25 MG/1
25 TABLET, FILM COATED ORAL 3 TIMES DAILY
Status: DISCONTINUED | OUTPATIENT
Start: 2024-06-23 | End: 2024-06-24

## 2024-06-23 RX ORDER — METOPROLOL SUCCINATE 50 MG/1
100 TABLET, EXTENDED RELEASE ORAL DAILY
Status: DISCONTINUED | OUTPATIENT
Start: 2024-06-23 | End: 2024-06-25

## 2024-06-23 RX ORDER — CHOLESTYRAMINE 4 G/9G
1 POWDER, FOR SUSPENSION ORAL DAILY
Status: DISCONTINUED | OUTPATIENT
Start: 2024-06-23 | End: 2024-06-27 | Stop reason: HOSPADM

## 2024-06-23 RX ORDER — CHOLECALCIFEROL (VITAMIN D3) 25 MCG
4000 TABLET ORAL DAILY
Status: DISCONTINUED | OUTPATIENT
Start: 2024-06-23 | End: 2024-06-27 | Stop reason: HOSPADM

## 2024-06-23 RX ORDER — ACETAMINOPHEN 325 MG/1
650 TABLET ORAL EVERY 4 HOURS PRN
Status: DISCONTINUED | OUTPATIENT
Start: 2024-06-23 | End: 2024-06-27 | Stop reason: HOSPADM

## 2024-06-23 RX ORDER — CLOPIDOGREL BISULFATE 75 MG/1
75 TABLET ORAL DAILY
Status: DISCONTINUED | OUTPATIENT
Start: 2024-06-23 | End: 2024-06-27 | Stop reason: HOSPADM

## 2024-06-23 RX ORDER — MAGNESIUM HYDROXIDE 2400 MG/10ML
30 SUSPENSION ORAL AS NEEDED
Status: DISCONTINUED | OUTPATIENT
Start: 2024-06-23 | End: 2024-06-27 | Stop reason: HOSPADM

## 2024-06-23 RX ORDER — LOPERAMIDE HYDROCHLORIDE 2 MG/1
2 CAPSULE ORAL 3 TIMES DAILY PRN
Status: DISCONTINUED | OUTPATIENT
Start: 2024-06-23 | End: 2024-06-27 | Stop reason: HOSPADM

## 2024-06-23 RX ORDER — ASPIRIN 325 MG
50000 TABLET, DELAYED RELEASE (ENTERIC COATED) ORAL
Status: DISCONTINUED | OUTPATIENT
Start: 2024-06-23 | End: 2024-06-25

## 2024-06-23 RX ORDER — LANOLIN ALCOHOL/MO/W.PET/CERES
1000 CREAM (GRAM) TOPICAL DAILY
Status: DISCONTINUED | OUTPATIENT
Start: 2024-06-23 | End: 2024-06-27 | Stop reason: HOSPADM

## 2024-06-23 RX ORDER — LOSARTAN POTASSIUM 50 MG/1
100 TABLET ORAL DAILY
Status: DISCONTINUED | OUTPATIENT
Start: 2024-06-23 | End: 2024-06-27 | Stop reason: HOSPADM

## 2024-06-23 RX ORDER — BISACODYL 10 MG/1
10 SUPPOSITORY RECTAL AS NEEDED
Status: DISCONTINUED | OUTPATIENT
Start: 2024-06-23 | End: 2024-06-27 | Stop reason: HOSPADM

## 2024-06-23 ASSESSMENT — COGNITIVE AND FUNCTIONAL STATUS - GENERAL
DRESSING REGULAR UPPER BODY CLOTHING: A LOT
CLIMB 3 TO 5 STEPS WITH RAILING: TOTAL
TURNING FROM BACK TO SIDE WHILE IN FLAT BAD: A LOT
EATING MEALS: TOTAL
TOILETING: TOTAL
PERSONAL GROOMING: TOTAL
DRESSING REGULAR LOWER BODY CLOTHING: A LOT
DAILY ACTIVITIY SCORE: 9
HELP NEEDED FOR BATHING: A LOT
STANDING UP FROM CHAIR USING ARMS: A LOT
WALKING IN HOSPITAL ROOM: TOTAL
MOBILITY SCORE: 11
MOVING TO AND FROM BED TO CHAIR: A LOT
MOVING FROM LYING ON BACK TO SITTING ON SIDE OF FLAT BED WITH BEDRAILS: A LITTLE

## 2024-06-23 ASSESSMENT — PAIN SCALES - PAIN ASSESSMENT IN ADVANCED DEMENTIA (PAINAD)
TOTALSCORE: 0
BREATHING: NORMAL
CONSOLABILITY: NO NEED TO CONSOLE
FACIALEXPRESSION: SMILING OR INEXPRESSIVE
BODYLANGUAGE: RELAXED

## 2024-06-23 ASSESSMENT — PAIN SCALES - GENERAL
PAINLEVEL_OUTOF10: 0 - NO PAIN

## 2024-06-23 ASSESSMENT — PAIN - FUNCTIONAL ASSESSMENT
PAIN_FUNCTIONAL_ASSESSMENT: 0-10
PAIN_FUNCTIONAL_ASSESSMENT: 0-10

## 2024-06-23 NOTE — CARE PLAN
The patient's goals for the shift include      The clinical goals for the shift include Patient will have improvments in swallowing this shift.        Problem: General Stroke  Goal: Establish a mutual long term goal with patient by discharge  Outcome: Progressing  Goal: Demonstrate improvement in neurological exam throughout the shift  Outcome: Progressing  Goal: Maintain BP within ordered limits throughout shift  Outcome: Progressing  Goal: Participate in treatment (ie., meds, therapy) throughout shift  Outcome: Progressing  Goal: No symptoms of aspiration throughout shift  Outcome: Progressing  Goal: No symptoms of hemorrhage throughout shift  Outcome: Progressing  Goal: Tolerate enteral feeding throughout shift  Outcome: Progressing  Goal: Decreased nausea/vomiting throughout shift  Outcome: Progressing  Goal: Controlled blood glucose throughout shift  Outcome: Progressing  Goal: Out of bed three times today  Outcome: Progressing     Problem: Skin  Goal: Decreased wound size/increased tissue granulation at next dressing change  Outcome: Progressing  Goal: Participates in plan/prevention/treatment measures  Outcome: Progressing  Goal: Prevent/manage excess moisture  Outcome: Progressing  Goal: Prevent/minimize sheer/friction injuries  Outcome: Progressing  Goal: Promote/optimize nutrition  Outcome: Progressing  Goal: Promote skin healing  Outcome: Progressing     Problem: Fall/Injury  Goal: Not fall by end of shift  Outcome: Progressing  Goal: Be free from injury by end of the shift  Outcome: Progressing  Goal: Verbalize understanding of personal risk factors for fall in the hospital  Outcome: Progressing  Goal: Verbalize understanding of risk factor reduction measures to prevent injury from fall in the home  Outcome: Progressing  Goal: Use assistive devices by end of the shift  Outcome: Progressing  Goal: Pace activities to prevent fatigue by end of the shift  Outcome: Progressing

## 2024-06-23 NOTE — PROGRESS NOTES
Abdi Wilson is a 79 y.o. male on day 2 of admission presenting with Stroke-like symptoms.    Subjective   Abdi Wilson is a 79 y.o. male with past medical history of hypertension, hyperlipidemia, coronary artery disease, CVA, ulcerative colitis GERD and ? Seizures presents to the emergency room from the skilled nursing facility with strokelike symptoms.  Patient is a poor historian due to his dysphagia but according to the family at bedside his last known well was about 5:45 AM this morning and by 6 and a wellness check attempted on him showed aphasia.  Patient is usually wheelchair-bound due to his chronic ambulatory difficulties.  But even with his dementia is able to communicate with family although with difficulty in remembering details.  It was also noticed that patient had slight drooping on the left side of the face.  EMS was called and patient was transported to the emergency room for further evaluation.      On admission in the emergency room he was hemodynamically stable with a slightly elevated blood pressure of 163/91 mmHg, heart rate of 68/min, respirate rate of 20/min, temperature of 36.1 °C and was saturating 95% on nasal cannula oxygen.  Initial CBC and BMP were unremarkable except for WBC of 11.8.  A CT of the brain was done which showed no acute intracranial hemorrhage or seizures brain herniation.  NIH stroke scale was 4 however after discussion with ER physician family elected to skip TNK.  He has been admitted for further evaluation and management.        Past Medical History  He has no past medical history on file.     Surgical History  He has no past surgical history on file.     Social History  He has no history on file for tobacco use, alcohol use, and drug use.     Family History  Family History   No family history on file.        Allergies  Patient has no known allergies.    6/22: Patient with significant dysphagia so n.p.o. did consult surgery to place KO feed.  Will use PPN overnight.  " Will give one-time dose of IV Decadron to help with reducing the swelling from the stroke to see whether this will help with symptoms and function in the short-term.  MRI does demonstrate acute infarct of the posterior limb of the right internal capsule/anterior thalamus.  Moderate global parenchymal volume loss with additional remote infarcts.  The right vertebral artery is markedly stenosed flow irregularity within the distal right internal carotid artery without definitive visualization of the right anterior choroidal artery.  Patient chronically wheelchair-bound patient does have dementia normally does have difficulty remembering details but is usually able to communicate with family well.  6/23: Patient's has a KO feed tube and that what appears to be in stomach on film awaiting official reading.  Will start some tube feeds after that is read.  Receiving TPN currently.  Speech is improved a little today.  Continue with supportive care likely will begin meds down KO feed starting tomorrow.  Continue PT OT and speech therapy.         Objective     Physical Exam    EXAM:    Constitutional: Patient is able to speak a little bit today.    Head and facial: Patient has a Keofeed in his left nostril currently.    Neck: No gross JVD    Lungs: Clear to auscultation some coughing bilaterally with moistness left base    Heart: Regular heart rate and mike    Abdomen: Nontender    Pelvis/: No flank tenderness    Extremities: No gross edema    Neurologic: Neurologic aphasic somewhat snoring continuously move limbs able to raise hands and feet with commands    Dermatologic: No gross abnormalities    Psychiatric/behavioral: Unable to assess due to expressive aphasia      Last Recorded Vitals  Blood pressure 154/84, pulse 80, temperature 36.7 °C (98.1 °F), temperature source Temporal, resp. rate 18, height 1.88 m (6' 2\"), weight 91.4 kg (201 lb 8 oz), SpO2 93%.  Intake/Output last 3 Shifts:  I/O last 3 completed " shifts:  In: 678.8 (7.4 mL/kg)   Out: 1200 (13.1 mL/kg) [Urine:1200 (0.4 mL/kg/hr)]  Weight: 91.4 kg     Relevant Results    Current Facility-Administered Medications:     Adult Clinimix Parenteral Nutrition, 83 mL/hr, intravenous, Daily PN, Ezekiel Cintron MD, Last Rate: 83 mL/hr at 06/23/24 0419, Rate Verify at 06/23/24 0419    [Held by provider] aspirin EC tablet 81 mg, 81 mg, oral, Daily, Joe Lopez MD    aspirin suppository 150 mg, 150 mg, rectal, Daily, Joe Lopez MD, 150 mg at 06/23/24 0840    [Held by provider] atorvastatin (Lipitor) tablet 80 mg, 80 mg, oral, Nightly, Joe Lopez MD    enoxaparin (Lovenox) syringe 40 mg, 40 mg, subcutaneous, q24h, Joe Lopez MD, 40 mg at 06/22/24 1216    hydrALAZINE (Apresoline) injection 10 mg, 10 mg, intravenous, q20 min PRN **FOLLOWED BY** hydrALAZINE (Apresoline) tablet 25 mg, 25 mg, oral, q6h PRN, Joe Lopez MD    oxygen (O2) therapy, , inhalation, Continuous PRN - O2/gases, Joe Lopez MD    perflutren lipid microspheres (Definity) injection 0.5-10 mL of dilution, 0.5-10 mL of dilution, intravenous, Once in imaging, Joe Lopez MD    sennosides-docusate sodium (Nasreen-Colace) 8.6-50 mg per tablet 2 tablet, 2 tablet, oral, BID PRN, Joe Lopez MD     Labs from the last few days have been reviewed.    Assessment/Plan   Acute stroke right anterior thalamus  Vertebral artery occlusion  Dysphagia  Expressive aphasia  Dementia  Hypertension  Hyperlipidemia  DNR CCA  DVT prophylaxis subcu Lovenox    Rectal aspirin  PPN overnight  Keofeed-await x-ray reading   Likely start tube feeds today  Plavix  Statin  Pulsed dosing thiamine  Neurology consult  PT and OT  Speech therapy  N.p.o.  Permissive hypertension  Check labs in a.m.  See orders complete plan    .       I spent 40 minutes in the professional and overall care of this patient.      Ezekiel Cintron MD

## 2024-06-23 NOTE — CARE PLAN
The patient's goals for the shift include  no further neuro decline this shift    The clinical goals for the shift include Patient will have improvments in swallowing this shift.

## 2024-06-23 NOTE — NURSING NOTE
Walked past patient's room and noticed he was holding his dobhoff in his hand. Catheter is intact and patient is in no apparent distress. Dr. Cintron notified. Attempted to notify Dr. Velásquez, but doctor is currently in OR.

## 2024-06-23 NOTE — PROGRESS NOTES
6/23/24 1235   06/23/24 1234   Discharge Planning   Living Arrangements Alone   Support Systems Children   Assistance Needed Wheelchair bound   Type of Residence Nursing home/residential care   Home or Post Acute Services Post acute facilities (Rehab/SNF/etc)   Type of Post Acute Facility Services Long term care   Patient expects to be discharged to: [facility]   Does the patient need discharge transport arranged? Yes   RoundTrip coordination needed? Yes   Patient Choice   Provider Choice list and CMS website (https://medicare.gov/care-compare#search) for post-acute Quality and Resource Measure Data were provided and reviewed with: Family   Patient / Family choosing to utilize agency / facility established prior to hospitalization Yes     Spoke with pt, pt sonRafi, and pt granddaughter, Rashaun. Confirmed from Rafi that pt is LTC currently at [facility]. Pt uses wheelchair to get around and usually is able to transfer self to and from the bed. Confirmed plan is for pt to return to [facility] pending pt needs. Pt currently with an NG tube in. Requested [facility] to send a return LTC referral and to see if there are any barriers to return. Reviewed IMM with Pt and pt Son. SonRafi, verbalized understanding and signed IMM. Copy given. No further needs noted at this time.   Dolores Strickland RN, BSN, [facility] Supervisor

## 2024-06-23 NOTE — POST-PROCEDURE NOTE
Patient with consent.  Patient had Keofeed/Dobbhoff placed in the left nostril and confirmed by x-ray.  Currently coiled in the stomach.  This was then bridled in place after reviewing x-ray.  Final reading demonstrates end of feeding tube in the stomach coiled.  Will begin gentle tube feeds keeping patient upright.    Successful placement of feeding tube and bridle placement.  Feeding tube in left nostril.  End of tube in stomach.

## 2024-06-23 NOTE — CONSULTS
"Reason For Consult  Post CVA dysphagia    History Of Present Illness  Abdi Wilson is a 79 y.o. male presenting with post CVA dysphagia.  He also has dementia.  He is unable to safely swallow at this point.  I discussed with the patient who has minimal understanding I discussed with his son Rafi who agrees with placement of Dobbhoff as requested by attending..     Past Medical History  He has no past medical history on file.    Surgical History  He has no past surgical history on file.     Social History  He has no history on file for tobacco use, alcohol use, and drug use.    Family History  No family history on file.     Allergies  Patient has no known allergies.    Review of Systems  Patient has currently no complaints     Physical Exam  Awake alert no distress  Regular rate rhythm without murmurs  Lungs clear  Abdomen benign     Last Recorded Vitals  Blood pressure 154/84, pulse 80, temperature 36.7 °C (98.1 °F), temperature source Temporal, resp. rate 18, height 1.88 m (6' 2\"), weight 91.4 kg (201 lb 8 oz), SpO2 93%.    Relevant Results         Assessment/Plan     After obtaining consent from son a Dobbhoff was placed on first pass in left nares.  Patient tolerated well.  Dobbhoff taped in place and x-ray pending.  Assuming Dobbhoff is in place may start feeding per attending I will sign off    I spent 25 minutes in the professional and overall care of this patient.      Jevon Velásquez MD    "

## 2024-06-24 ENCOUNTER — APPOINTMENT (OUTPATIENT)
Dept: RADIOLOGY | Facility: HOSPITAL | Age: 79
End: 2024-06-24
Payer: MEDICARE

## 2024-06-24 LAB
ANION GAP SERPL CALC-SCNC: 14 MMOL/L (ref 10–20)
BASOPHILS # BLD AUTO: 0.03 X10*3/UL (ref 0–0.1)
BASOPHILS NFR BLD AUTO: 0.2 %
BUN SERPL-MCNC: 29 MG/DL (ref 6–23)
CALCIUM SERPL-MCNC: 9 MG/DL (ref 8.6–10.3)
CHLORIDE SERPL-SCNC: 107 MMOL/L (ref 98–107)
CO2 SERPL-SCNC: 20 MMOL/L (ref 21–32)
CREAT SERPL-MCNC: 1.11 MG/DL (ref 0.5–1.3)
EGFRCR SERPLBLD CKD-EPI 2021: 67 ML/MIN/1.73M*2
EOSINOPHIL # BLD AUTO: 0.1 X10*3/UL (ref 0–0.4)
EOSINOPHIL NFR BLD AUTO: 0.7 %
ERYTHROCYTE [DISTWIDTH] IN BLOOD BY AUTOMATED COUNT: 13.9 % (ref 11.5–14.5)
GLUCOSE BLD MANUAL STRIP-MCNC: 112 MG/DL (ref 74–99)
GLUCOSE BLD MANUAL STRIP-MCNC: 74 MG/DL (ref 74–99)
GLUCOSE BLD MANUAL STRIP-MCNC: 90 MG/DL (ref 74–99)
GLUCOSE BLD MANUAL STRIP-MCNC: 93 MG/DL (ref 74–99)
GLUCOSE BLD MANUAL STRIP-MCNC: 98 MG/DL (ref 74–99)
GLUCOSE SERPL-MCNC: 90 MG/DL (ref 74–99)
HCT VFR BLD AUTO: 47.3 % (ref 41–52)
HGB BLD-MCNC: 16.1 G/DL (ref 13.5–17.5)
IMM GRANULOCYTES # BLD AUTO: 0.05 X10*3/UL (ref 0–0.5)
IMM GRANULOCYTES NFR BLD AUTO: 0.3 % (ref 0–0.9)
LYMPHOCYTES # BLD AUTO: 2.66 X10*3/UL (ref 0.8–3)
LYMPHOCYTES NFR BLD AUTO: 18.3 %
MAGNESIUM SERPL-MCNC: 2.06 MG/DL (ref 1.6–2.4)
MCH RBC QN AUTO: 30 PG (ref 26–34)
MCHC RBC AUTO-ENTMCNC: 34 G/DL (ref 32–36)
MCV RBC AUTO: 88 FL (ref 80–100)
MONOCYTES # BLD AUTO: 1.54 X10*3/UL (ref 0.05–0.8)
MONOCYTES NFR BLD AUTO: 10.6 %
NEUTROPHILS # BLD AUTO: 10.17 X10*3/UL (ref 1.6–5.5)
NEUTROPHILS NFR BLD AUTO: 69.9 %
NRBC BLD-RTO: 0 /100 WBCS (ref 0–0)
PLATELET # BLD AUTO: 274 X10*3/UL (ref 150–450)
POTASSIUM SERPL-SCNC: 3.4 MMOL/L (ref 3.5–5.3)
RBC # BLD AUTO: 5.36 X10*6/UL (ref 4.5–5.9)
SODIUM SERPL-SCNC: 138 MMOL/L (ref 136–145)
VIT B12 SERPL-MCNC: 261 PG/ML (ref 211–911)
WBC # BLD AUTO: 14.6 X10*3/UL (ref 4.4–11.3)

## 2024-06-24 PROCEDURE — 2500000001 HC RX 250 WO HCPCS SELF ADMINISTERED DRUGS (ALT 637 FOR MEDICARE OP): Performed by: INTERNAL MEDICINE

## 2024-06-24 PROCEDURE — 2500000004 HC RX 250 GENERAL PHARMACY W/ HCPCS (ALT 636 FOR OP/ED): Performed by: INTERNAL MEDICINE

## 2024-06-24 PROCEDURE — 92526 ORAL FUNCTION THERAPY: CPT | Mod: GN | Performed by: PHARMACIST

## 2024-06-24 PROCEDURE — 85025 COMPLETE CBC W/AUTO DIFF WBC: CPT | Performed by: INTERNAL MEDICINE

## 2024-06-24 PROCEDURE — 99233 SBSQ HOSP IP/OBS HIGH 50: CPT | Performed by: INTERNAL MEDICINE

## 2024-06-24 PROCEDURE — 83735 ASSAY OF MAGNESIUM: CPT | Performed by: INTERNAL MEDICINE

## 2024-06-24 PROCEDURE — 2500000001 HC RX 250 WO HCPCS SELF ADMINISTERED DRUGS (ALT 637 FOR MEDICARE OP)

## 2024-06-24 PROCEDURE — 97116 GAIT TRAINING THERAPY: CPT | Mod: GP,CQ

## 2024-06-24 PROCEDURE — 2060000001 HC INTERMEDIATE ICU ROOM DAILY

## 2024-06-24 PROCEDURE — 99233 SBSQ HOSP IP/OBS HIGH 50: CPT | Performed by: NURSE PRACTITIONER

## 2024-06-24 PROCEDURE — 82947 ASSAY GLUCOSE BLOOD QUANT: CPT | Mod: 91

## 2024-06-24 PROCEDURE — 82607 VITAMIN B-12: CPT | Performed by: INTERNAL MEDICINE

## 2024-06-24 PROCEDURE — 71046 X-RAY EXAM CHEST 2 VIEWS: CPT

## 2024-06-24 PROCEDURE — 97110 THERAPEUTIC EXERCISES: CPT | Mod: GP,CQ

## 2024-06-24 PROCEDURE — 92507 TX SP LANG VOICE COMM INDIV: CPT | Mod: GN | Performed by: PHARMACIST

## 2024-06-24 PROCEDURE — 97530 THERAPEUTIC ACTIVITIES: CPT | Mod: GO,CO

## 2024-06-24 PROCEDURE — 71046 X-RAY EXAM CHEST 2 VIEWS: CPT | Performed by: RADIOLOGY

## 2024-06-24 PROCEDURE — 80048 BASIC METABOLIC PNL TOTAL CA: CPT | Performed by: INTERNAL MEDICINE

## 2024-06-24 PROCEDURE — 36415 COLL VENOUS BLD VENIPUNCTURE: CPT | Performed by: INTERNAL MEDICINE

## 2024-06-24 RX ORDER — POTASSIUM CHLORIDE 1.5 G/1.58G
40 POWDER, FOR SOLUTION ORAL ONCE
Status: COMPLETED | OUTPATIENT
Start: 2024-06-24 | End: 2024-06-24

## 2024-06-24 RX ORDER — CYANOCOBALAMIN 1000 UG/ML
1000 INJECTION, SOLUTION INTRAMUSCULAR; SUBCUTANEOUS ONCE
Status: COMPLETED | OUTPATIENT
Start: 2024-06-24 | End: 2024-06-24

## 2024-06-24 ASSESSMENT — COGNITIVE AND FUNCTIONAL STATUS - GENERAL
PERSONAL GROOMING: A LOT
DAILY ACTIVITIY SCORE: 6
TURNING FROM BACK TO SIDE WHILE IN FLAT BAD: A LOT
DAILY ACTIVITIY SCORE: 6
HELP NEEDED FOR BATHING: TOTAL
MOVING TO AND FROM BED TO CHAIR: A LOT
TURNING FROM BACK TO SIDE WHILE IN FLAT BAD: A LOT
WALKING IN HOSPITAL ROOM: TOTAL
STANDING UP FROM CHAIR USING ARMS: TOTAL
MOVING FROM LYING ON BACK TO SITTING ON SIDE OF FLAT BED WITH BEDRAILS: A LOT
STANDING UP FROM CHAIR USING ARMS: A LOT
CLIMB 3 TO 5 STEPS WITH RAILING: TOTAL
MOBILITY SCORE: 10
WALKING IN HOSPITAL ROOM: A LOT
TOILETING: TOTAL
TOILETING: TOTAL
HELP NEEDED FOR BATHING: A LOT
WALKING IN HOSPITAL ROOM: TOTAL
DAILY ACTIVITIY SCORE: 11
PERSONAL GROOMING: TOTAL
DRESSING REGULAR LOWER BODY CLOTHING: TOTAL
DRESSING REGULAR UPPER BODY CLOTHING: A LOT
DRESSING REGULAR LOWER BODY CLOTHING: A LOT
CLIMB 3 TO 5 STEPS WITH RAILING: TOTAL
EATING MEALS: TOTAL
HELP NEEDED FOR BATHING: TOTAL
MOBILITY SCORE: 6
MOBILITY SCORE: 11
STANDING UP FROM CHAIR USING ARMS: A LOT
TOILETING: A LOT
DRESSING REGULAR UPPER BODY CLOTHING: TOTAL
PERSONAL GROOMING: TOTAL
DRESSING REGULAR UPPER BODY CLOTHING: TOTAL
MOVING TO AND FROM BED TO CHAIR: A LOT
DRESSING REGULAR LOWER BODY CLOTHING: TOTAL
TURNING FROM BACK TO SIDE WHILE IN FLAT BAD: TOTAL
MOVING FROM LYING ON BACK TO SITTING ON SIDE OF FLAT BED WITH BEDRAILS: A LOT
CLIMB 3 TO 5 STEPS WITH RAILING: TOTAL
EATING MEALS: TOTAL
MOVING TO AND FROM BED TO CHAIR: TOTAL
MOVING FROM LYING ON BACK TO SITTING ON SIDE OF FLAT BED WITH BEDRAILS: TOTAL
EATING MEALS: TOTAL

## 2024-06-24 ASSESSMENT — PAIN - FUNCTIONAL ASSESSMENT
PAIN_FUNCTIONAL_ASSESSMENT: 0-10

## 2024-06-24 ASSESSMENT — PAIN SCALES - GENERAL
PAINLEVEL_OUTOF10: 0 - NO PAIN

## 2024-06-24 ASSESSMENT — PAIN SCALES - PAIN ASSESSMENT IN ADVANCED DEMENTIA (PAINAD)
FACIALEXPRESSION: SMILING OR INEXPRESSIVE
BREATHING: NORMAL
CONSOLABILITY: NO NEED TO CONSOLE
BODYLANGUAGE: RELAXED
TOTALSCORE: 0

## 2024-06-24 NOTE — CONSULTS
Nutrition Initial Assessment:   Nutrition Assessment    Reason for Assessment: Tube feeding assessment and order    Medical history per chart: Abdi Wilson is a 79 y.o. male with past medical history of hypertension, hyperlipidemia, coronary artery disease, CVA, ulcerative colitis, GERD, dementia, and ? Seizures presents to the emergency room from the skilled nursing facility with strokelike symptoms. Patient is usually wheelchair-bound due to his chronic ambulatory difficulties.     6/24: Pt awake, unable to answer questions, but daughter-in-law at bedside. Pt currently has Dobhoff tube with Jevity 1.5 running at 20mL/hr which was placed yesterday.  Noted pt received TPN x 1 day starting on 6/22.  Reviewed TF, and goal rate to be determined with family.  Will continue to follow speech recommendations.  Pt was eating well prior to stroke and visitor does not endorse any pt weight loss PTA. Will continue to monitor and adjust recommendations per protocol.     Nutrition History:        Current Diet: Adult [employer]ix Parenteral Nutrition  Enteral feeding with NPO 20; 1800 mL free water; Every 4 hours  Jevity 1.5 @ 20 ml/hr    Nutrition Related Findings:   Oral Symptoms: swallowing and (new dysphagia)      GI symptoms: no GI issues at this time.   BM: Last BM Date: 06/22/24  Wound Type: none (nursing/wound notes provide further details)  Edema: No  Food allergies: NKFA. has No Known Allergies.  Meds/Labs reviewed.  ampicillin-sulbactam, 3 g, intravenous, q6h  aspirin, 81 mg, oral, Daily  aspirin, 150 mg, rectal, Daily  atorvastatin, 80 mg, oral, Nightly  [Held by provider] cholecalciferol, 50,000 Units, oral, Every Sunday  [Held by provider] cholecalciferol, 4,000 Units, oral, Daily  [Held by provider] cholestyramine, 1 packet, oral, Daily  clopidogrel, 75 mg, oral, Daily  [Held by provider] cyanocobalamin, 1,000 mcg, oral, Daily  enoxaparin, 40 mg, subcutaneous, q24h  losartan, 100 mg, oral, Daily  metoprolol succinate  "XL, 100 mg, oral, Daily  [Held by provider] omega-3, 500 mg, oral, Daily  thiamine, 500 mg, intravenous, Daily       Adult Clinimix Parenteral Nutrition, 83 mL/hr, Last Rate: Stopped (06/23/24 1959)         Nutrition Significant Labs:  Results from last 7 days   Lab Units 06/24/24  0514 06/23/24  0524 06/21/24  0906   GLUCOSE mg/dL 90 137* 91   SODIUM mmol/L 138 138 140   POTASSIUM mmol/L 3.4* 3.9 4.7   CHLORIDE mmol/L 107 106 107   CO2 mmol/L 20* 18* 23   BUN mg/dL 29* 24* 15   CREATININE mg/dL 1.11 1.16 1.13   EGFR mL/min/1.73m*2 67 63 65   CALCIUM mg/dL 9.0 9.1 8.9   MAGNESIUM mg/dL 2.06 2.08  --      Lab Results   Component Value Date    HGBA1C 5.7 (H) 06/21/2024    HGBA1C 5.9 (A) 07/18/2023     Results from last 7 days   Lab Units 06/24/24  0926 06/23/24 2018 06/23/24  1525 06/23/24  1141 06/23/24  0755 06/22/24  2020 06/22/24  1536 06/22/24  1104   POCT GLUCOSE mg/dL 74 123* 129* 132* 135* 118* 101* 99       Anthropometrics:  Height: 188 cm (6' 2\")   Weight: 91 kg (200 lb 9.9 oz)   BMI (Calculated): 25.75  IBW/kg (Dietitian Calculated): 86.4 kg  Percent of IBW: 105 %          Weight History:   Wt Readings from Last 10 Encounters:   06/24/24 91 kg (200 lb 9.9 oz)   09/16/22 90 kg (198 lb 6.6 oz)        Weight Change %:  Weight History / % Weight Change: Limited weight records available to assess weight change on monthly/yearly basis. Weight appears stable x1.5 years per available records (9/2022, 90kg; 6/2024, 91kg). Weights during admission indicate potential loss. ? accuracy of admission weight on 6/21, this would be sig loss of 8.3% x<1 week if accurate. (6/21 (admit date), 99kg; 6/22, 92.3kg; 6/23, 91.4kg; 6/24, 91kg). 1.5% sig loss since 6/22  .  Significant Weight Loss: Yes  Interpretation of Weight Loss: 1-2% in 1 week       Nutrition Focused Physical Exam Findings:  defer: no visible losses noted. Pt behind curtain with visitor, will continue to monitor for losses and assess as needed.     Estimated " Needs:   Total Energy Estimated Needs (kCal):  (7450-4107)  Method for Estimating Needs: 25-30kcal/kg current weight  Total Protein Estimated Needs (g):  ()  Method for Estimating Needs: 1-1.2g/kg current weight  Total Fluid Estimated Needs (mL):  (2217-1958)  Method for Estimating Needs: 1mL/kcal        Nutrition Diagnosis   Nutrition Diagnosis:  Malnutrition Diagnosis  Patient has Malnutrition Diagnosis: No    Nutrition Diagnosis  Patient has Nutrition Diagnosis: Yes  Diagnosis Status (1): New  Nutrition Diagnosis 1: Inadequate oral intake  Related to (1): dysphagia and new NG tube with need for enteral nutrition  As Evidenced by (1): NPO status x3 days       Nutrition Interventions/Recommendations   Nutrition Interventions and Recommendations:        Nutrition Prescription:  Individualized Nutrition Prescription Provided for : Enteral nutrition recommendation        Nutrition Interventions:   Food and/or Nutrient Delivery Interventions  Interventions: Enteral intake  Enteral Intake: Other (Comment) (enteral feed recommendations)  Goal: TF of Jevity 1.5 @ 70 ml/hr: 2520 kcals, 107 gm protein, 1277 ml free water.  Recommend Free water flush of 250 ml Q6H to provide with TF: 2277 ml free water.  Additional Interventions: Begin at 20mL/hr and advance rate 10mL q4h until goal rate of 70mL/hr is reached.         Nutrition Education:   Education Documentation  No documentation found.      Nutrition Counseling  Counseling Theoretical Approach: Other (Comment)  Goal: Discussed tube feed briefly with visitor and explained rate adjustment and recommended tube feed for patient's nutrition needs.       Nutrition Monitoring and Evaluation   Monitoring/Evaluation:   Food/Nutrient Related History Monitoring  Monitoring and Evaluation Plan: Energy intake  Energy Intake: Estimated energy intake  Criteria: Pt will meet estimated energy needs via enteral nutrition    Body Composition/Growth/Weight History  Monitoring and  Evaluation Plan: Weight  Weight: Measured weight  Criteria: Stable weight    Biochemical Data, Medical Tests and Procedures  Monitoring and Evaluation Plan: Electrolyte/renal panel, Glucose/endocrine profile  Electrolyte and Renal Panel: BUN, Creatinine, Magnesium, Phosphorus, Potassium, Sodium  Criteria: WNL  Glucose/Endocrine Profile: Glucose, casual  Criteria: WNL    Nutrition Focused Physical Findings  Monitoring and Evaluation Plan: Skin, Adipose, Muscles  Adipose: Other (Comment)  Criteria: Monitor for losses  Muscles: Other (Comment)  Criteria: Monitor for losses  Skin: Other (Comment)  Criteria: Maintain integrity            Time Spent/Follow-up Reminder:   Follow Up  Time Spent (min): 35 minutes  Last Date of Nutrition Visit: 06/24/24  Nutrition Follow-Up Needed?: Dietitian to reassess per policy  Follow up Comment: 6/27-7/1

## 2024-06-24 NOTE — PROGRESS NOTES
Occupational Therapy    OT Treatment    Patient Name: Abdi Wilson  MRN: [id number]  Today's Date: 6/24/2024  Time Calculation  Start Time: 1355  Stop Time: 1613  Time Calculation (min): 138 min first tx 13:55-14:11, second treatment 16:00-16:13 Total of 29 mins treatment time          Assessment:  End of Session Communication: Bedside nurse  End of Session Patient Position:  (left with PTA EOB)     Plan:  Treatment Interventions: ADL retraining, Functional transfer training, UE strengthening/ROM, Endurance training, Patient/family training, Equipment evaluation/education, Cognitive reorientation, Visual perceptual retraining, Neuromuscular reeducation, Fine motor coordination activities, Compensatory technique education      Subjective   Previous Visit Info:  OT Last Visit  OT Received On: 06/24/24  General:  General  Co-Treatment: PT (overlap with mobility on second attempt to get pt. back to bed.)  General Comment: Pt. is anxious on arrival nursing reports he is attempting to get up out of bed. Assisted getting pt. up in chair. MAX A x2 he had difficulty coordinating side step toward bed. On the second attempt patient needed MOD A x2 for transfers using FWW pt. is impulsive and need 2 skilled person to assist with mobility as he is a fall risk.       Pain:  Pain Assessment  Pain Assessment: 0-10  0-10 (Numeric) Pain Score: 0 - No pain    Objective    Cognition:  Cognition  Overall Cognitive Status: Impaired  Orientation Level:  (expressive aphasia noted. unable to access)  Coordination:     Activities of Daily Living: Grooming  Grooming Level of Assistance: Minimum assistance  Grooming Where Assessed: Chair  Grooming Comments: wiping mouth pt. drooling    Toileting  Toileting Level of Assistance: Maximum assistance (x2 one to assist with balance while another performs hygiene)  Where Assessed:  (from EOB and chair)  Functional Standing Tolerance:  Time: up to 1 min 30 secs  Activity: multiple stance with toileting  and transfers  Functional Standing Tolerance Comments: MOD A x2 maintain balance.  Bed Mobility/Transfers: Bed Mobility  Bed Mobility: Yes  Bed Mobility 1  Bed Mobility 1: Supine to sitting  Level of Assistance 1: Maximum assistance, +2, Moderate verbal cues, Moderate tactile cues  Bed Mobility Comments 1: tactile cues for hand placement  Bed Mobility 2  Bed Mobility  2: Rolling right  Level of Assistance 2: Moderate assistance  Bed Mobility Comments 2: cueing for hand placement    Transfers  Transfer: Yes  Transfer 1  Transfer From 1: Bed to  Transfer to 1: Chair with arms  Technique 1: Stand pivot  Transfer Device 1:  (arm in arm)  Transfer Level of Assistance 1: Maximum assistance, +2, Moderate verbal cues, Moderate tactile cues  Trials/Comments 1: LUCRETIA and RN assisted patient to chair noted RLE having difficulty steppiing to R side  Transfers 2  Transfer From 2: Chair with arms to  Transfer to 2: Bed  Technique 2: Stand pivot  Transfer Device 2:  (arm in arm)  Transfer Level of Assistance 2: Moderate assistance, +2  Trials/Comments 2: with PTA  improved transfer to bed  Transfers 3  Transfer From 3: Bed to  Transfer to 3: Stand  Technique 3: Sit to stand, Stand to sit  Transfer Device 3: Walker (FWW)  Transfer Level of Assistance 3: Moderate assistance, +2  Trials/Comments 3: pt. is impulsive need cueing for safety and balance as he tend to cross feet over one another high fall risk. Will benefit from continue skilled OT treatement to increase safety and self independence.      Functional Mobility:  Functional Mobility  Functional Mobility Performed: Yes  Functional Mobility 1  Surface 1:  (short distance from bed to hallway and turn around back to bed  x2)  Device 1: Rolling walker  Assistance 1: Moderate assistance, Loss of balance (Comment), Moderate tactile cues, Moderate verbal cues (x2 pt. tend to cross feet over one another and became unsteady. When stopped and re-started patient was able to regain  stability.)  Quality of Functional Mobility 1: Inconsistent stride length  Sitting Balance:  Static Sitting Balance  Static Sitting-Balance Support: Feet supported, Bilateral upper extremity supported  Static Sitting-Level of Assistance: Minimum assistance  Static Sitting-Comment/Number of Minutes: pt. tolerated up to 3-4 mins    Outcome Measures:[facility] Daily Activity  Putting on and taking off regular lower body clothing: A lot  Bathing (including washing, rinsing, drying): A lot  Putting on and taking off regular upper body clothing: A lot  Toileting, which includes using toilet, bedpan or urinal: A lot  Taking care of personal grooming such as brushing teeth: A lot  Eating Meals: Total  Daily Activity - Total Score: 11        Education Documentation  Body Mechanics, taught by LUCRETIA Chun at 6/24/2024  4:38 PM.  Learner: Patient  Readiness: Acceptance  Method: Explanation  Response: Verbalizes Understanding, Needs Reinforcement    Precautions, taught by LUCRETIA Chun at 6/24/2024  4:38 PM.  Learner: Patient  Readiness: Acceptance  Method: Explanation  Response: Verbalizes Understanding, Needs Reinforcement    Education Comments  No comments found.        OP EDUCATION:       Goals:  Encounter Problems       Encounter Problems (Active)       ADLs       Patient will perform UB and LB bathing with moderate assist level of assistance and PRN AE/DME. (Not Progressing)       Start:  06/22/24    Expected End:  07/05/24            Patient with complete upper body dressing with minimal assist  level of assistance donning and doffing all UE clothes with PRN adaptive equipment while supported sitting (Not Progressing)       Start:  06/22/24    Expected End:  07/05/24            Patient with complete lower body dressing with moderate assist level of assistance donning and doffing all LE clothes  with PRN adaptive equipment while supported sitting (Progressing)       Start:  06/22/24    Expected End:   07/05/24            Patient will feed self with supervision level of assistance and using PRN adaptive equipment. (Not Progressing)       Start:  06/22/24    Expected End:  07/05/24            Patient will complete daily grooming tasks brushing teeth and washing face/hair with supervision level of assistance and PRN adaptive equipment while supported sitting. (Progressing)       Start:  06/22/24    Expected End:  07/05/24            Patient will complete toileting including hygiene clothing management/hygiene with moderate assist level of assistance and PRN DME/AE. (Progressing)       Start:  06/22/24    Expected End:  07/05/24               EXERCISE/STRENGTHENING       Patient will complete BUE exercise with min A to promote ROM, strength, and coordination for increased participation in Adls and functional mobility  (Not Progressing)       Start:  06/22/24    Expected End:  07/05/24

## 2024-06-24 NOTE — PROGRESS NOTES
"Speech-Language Pathology Clinical Swallow Treatment and Speech/Language Treatment    Patient Name: Abdi Wilson  MRN: [id number]  : 1945  Today's Date: 24  Start time: Start Time: 1040  Stop time: Stop Time: 1130  Time calculation (min) : Time Calculation (min): 50 min    ASSESSMENT  SLP TX Intervention Outcome: Making Progress Towards Goals  Treatment Tolerance: Patient tolerated treatment well    Impressions:   Severe oral phase and suspected pharyngeal phase dysphagia based on clinical swallow evaluation.     Pt presents with severe expressive communication impairment (dysarthria and suspected apraxia of speech>potential aphasia). Pt demonstrating mild improvement in intentional communication this date. He would benefit from skilled ST to improve swallow safety, improve functional communication skills for identifying wants/needs, improve functional communication skills for basic thoughts/ideas, and improve quality of life.     Pt daughter-in-law reported that pt is continuing to have difficulty swallowing this date. She reported that pt is inconsistently using single words to communicate. She reported pt continues to vocalize \"yes/no\" and feels that he is understanding what she is saying based off his responses.     PLAN  MBSS recommended: Yes, however pt is not appropriate for MBSS this date due to current severely impacted swallowing function   Recommended solid consistency: NPO  Recommended liquid consistency: NPO; 3-4 ice chips after completion of oral care for comfort and swallow stimulation.   Recommended medication administration: Non oral  Discharge recommendation: Recommend HIGH intensity ST upon DC in order to ensure safety with least restrictive diet.  Inpatient/Swing Bed or Outpatient: Inpatient  SLP TX Plan: Continue Plan of Care  SLP Plan: Skilled SLP  SLP Frequency: 3x per week  Duration: Current admission  Next Treatment Priority: Continue evaluating swallowing function to determine " "appropriateness PO diet recommendations. Continue treating speech/language impairment.          SUBJECTIVE  Respiratory status: Room air  Positioning: Upright in bed  Pt was alert, pleasant, and cooperative for session. Pt became teary when physician discussed his deficits and POC. Pt became fatigued as session progressed.    Pain Assessment  PAINAD Score: 0    Orientation: Oriented to self and Did not assess orientation to place or time  Ability to follow functional commands: Follows simple commands inconsistently    OBJECTIVE  Therapeutic Swallow  Therapeutic Swallow Intervention : PO Trials, Caregiver Education, Oral Strengthening Techniques  Swallow/Speech-Language Comments:   Pt with wet vocal quality and wet respirations prior to PO trials. SLP completed oral care prior to PO trials. Pt with significant discomfort during oral care.  SLP provided pt with ice chips x4. Pt unable to complete pharyngeal swallow any of the ice chips and required SLP to utilize yankauer for suction of remaining material in oropharynx. SLP was able to clear min to mod material. Pt demonstrated s/sx of aspiration with wet/gargled coughing after ice chip trials.  Pt demonstrated the ability to suction excess saliva from his own mouth. SLP advised family member present to only permit pt to use suction when someone is present in the room with him.  Pt attempted to write down what he wanted to tell SLP/daughter-in-law with a pen and paper. Due to being limited to fatigue, pt only able to write \"I'm.\" SLP provided pt with options to what he might be trying to say however, unable to concluded his intended thought. SLP had pt take a break from writing as he appeared frustrated/fatigued.   SLP will provide pt with communication boards with a better variety of options for his wants/needs later this date.     Treatment/Education:  Results and recommendations were relayed to: Patient and Family  Education provided: Yes   Learner: Patient and " Family   Barriers to learning: Acuteness of illness barrier, Cognitive limitations barrier, and Communication limitations barrier   Method of teaching: Verbal   Topic: Role of ST, results of assessment, risk for aspiration, recommendation for MBSS, recommended safe swallow strategies, education of oral strengthening exercises, and recommended communication strategies.    Outcome of teaching: Caregiver demonstrated good understanding and Pt demonstrated partial understanding    Goals:(start date 6/22/24. Anticipated time frame for goal attainment: 2 weeks):  Pt will consume PO trials with SLP without s/sx aspiration in order to determine readiness for MBSS              Status: Partial progress              Progress this date: pt was stimulable for cues for oral manipulation of ice chips.   Pt demonstrate improved secretion management as evidence by dry vocal quality, reduced baseline coughing, and no further need for suctioning.              Status: Decline in function              Progress this date: Pt demonstrating excess drooling and requiring yankauer for suction as he is unable to manage secretions independently.   Pt will use multimodal communication to communicate basic wants/needs by answering yes/no question in 80% of opportunities given min assist.              Status: Progressing              Progress this date: Pt inconsistently demonstrated verbal yes/no responses to questions asked, and was responsive to visual/verbal cues to respond to remaining yes/no questions throughout session. Pt becoming more intelligible with short/single words such as yes and no. Pt yes/no appear to be meaningful.  Pt will demonstrate CV sounds (ba, pa, ma) in 80% of opportunities given frequent phonemic placement cues.               Status: No progress              Progress this date: goal not addressed during session.

## 2024-06-24 NOTE — CARE PLAN
The patient's goals for the shift include      The clinical goals for the shift include no further neuro decline this shift    Over the shift, the patient did make progress toward the following goals. Barriers to progression include understanding. Recommendations to address these barriers include education.

## 2024-06-24 NOTE — PROGRESS NOTES
Physical Therapy    Physical Therapy Treatment    Patient Name: Abdi Wilson  MRN: [id number]  Today's Date: 6/24/2024  Time Calculation  Start Time: 1555  Stop Time: 1627  Time Calculation (min): 32 min    Assessment/Plan   PT Assessment  End of Session Communication: Bedside nurse  Assessment Comment: pt required frequent cues for safety awareness and for energy conservatioon during activity. pt fatigues quickly and right sidedlean worsens but pt attempts to keep amb despite cues to rest  End of Session Patient Position: Bed, 3 rail up, Alarm on     PT Plan  Treatment/Interventions: Bed mobility, Transfer training, Gait training, Balance training, Strengthening, Therapeutic exercise  PT Plan: Ongoing PT  PT Frequency: 5 times per week  PT Discharge Recommendations: Moderate intensity level of continued care  PT - OK to Discharge: Yes    General Visit Information:   PT  Visit  PT Received On: 06/24/24  General  Prior to Session Communication: Bedside nurse  Patient Position Received: Up in chair, Alarm on    Subjective   Precautions:  Precautions  Medical Precautions: Fall precautions    Objective   Pain:  Pain Assessment  Pain Assessment: 0-10  0-10 (Numeric) Pain Score: 0 - No pain  Cognition:  Cognition  Orientation Level: Disoriented to place, Disoriented to time, Disoriented to situation    Treatments:  Therapeutic Exercise  Therapeutic Exercise Performed: Yes  Therapeutic Exercise Activity 1: pt completed seated LE  exercises: heel raises x 10 reps, LAQ x 10 reps, hip flexion x 10 reps, hip adduction x 10 reps, hip abduction x  10 reps    Therapeutic Activity  Therapeutic Activity Performed: Yes  Therapeutic Activity 1: pt completed 3 static standswith Mod Ax1-2 to mainatin for 30 seconds to 1 min each    Bed Mobility  Bed Mobility: Yes  Bed Mobility 1  Bed Mobility 1: Sitting to supine  Level of Assistance 1: Moderate assistance, Moderate verbal cues, Moderate tactile cues  Bed Mobility Comments 1: cued for  proper sequencing    Ambulation/Gait Training  Ambulation/Gait Training Performed: Yes  Ambulation/Gait Training 1  Surface 1: Level tile  Device 1: Rolling walker  Assistance 1: Moderate assistance, Maximum verbal cues, Maximum tactile cues, Loss of balance (Comment) (+2)  Quality of Gait 1: Narrow base of support, Inconsistent stride length, Decreased step length, Shuffling gait, Forward flexed posture (COG displaced to the right while feet stayed left; frequent verbal and tactile cues given to correct)  Comments/Distance (ft) 1: 5 feet; 30 feet x 2; cues and assist required for safe navigation of walker  Transfers  Transfer: Yes  Transfer 1  Technique 1: Sit to stand, Stand to sit  Transfer Device 1: Walker  Transfer Level of Assistance 1: Moderate assistance, +2, Moderate verbal cues, Moderate tactile cues    Outcome Measures:  [facility] Basic Mobility  Turning from your back to your side while in a flat bed without using bedrails: A lot  Moving from lying on your back to sitting on the side of a flat bed without using bedrails: A lot  Moving to and from bed to chair (including a wheelchair): A lot  Standing up from a chair using your arms (e.g. wheelchair or bedside chair): A lot  To walk in hospital room: A lot  Climbing 3-5 steps with railing: Total  Basic Mobility - Total Score: 11    Education Documentation  Precautions, taught by Yareli Trevino PTA at 6/24/2024  5:05 PM.  Learner: Patient  Readiness: Acceptance  Method: Explanation  Response: Needs Reinforcement    Mobility Training, taught by Yareli Trevino PTA at 6/24/2024  5:05 PM.  Learner: Patient  Readiness: Acceptance  Method: Explanation  Response: Needs Reinforcement    Education Comments  No comments found.        OP EDUCATION:       Encounter Problems       Encounter Problems (Active)       PT Problem       PT Goal 1 (Progressing)       Start:  06/22/24    Expected End:  07/06/24       Increase EMELY LE strength to 5/5 to ease capability to perform  bed mobility and transfers         PT Goal 2 (Progressing)       Start:  06/22/24    Expected End:  07/06/24       The patient will be able to perform supine to sit bed mobility Min A x 1         PT Goal 3 (Progressing)       Start:  06/22/24    Expected End:  07/06/24       The patient will be able to perform sit to stands transfers Min A x 1 with FWW         PT Goal 4 (Progressing)       Start:  06/22/24    Expected End:  07/06/24       The patient will be able to ambulate 10 feet with FWW

## 2024-06-24 NOTE — PROGRESS NOTES
Speech-Language Pathology                 Therapy Communication Note    Patient Name: Abdi Wilson  MRN: [id number]  Today's Date: 6/24/2024     Discipline: Speech Language Pathology    Missed Visit Reason:  Pt unavailable    Missed Time: Attempt    Comment: SLP attempted to see pt however, pt unavailable due to receiving care from nursing staff. SLP will re-attempt to see pt at a later time this date schedule permitting.

## 2024-06-24 NOTE — PROGRESS NOTES
[facility] Progress Note    Abdi Wilson is a 79 y.o. male on day 3 of admission presenting with Stroke-like symptoms.  Subjective   Pt seen this morning by myself for first time. Initially seen by Dr. Pimentel this weekend. Daughter-in-law at bedside. Pt resting in bed. Per family, pt seems to be no better or no worse since arrival in her opinion. Pt is able to clearly get out some words but still mostly aphasic still. Still L facial asymmetry noted. Does have dobhoff in place with bridle. Is having difficulty swallowing secretions. Sat bed upright. Encouraged to spit out secretions.        Objective     Vitals:    06/23/24 2333 06/24/24 0408 06/24/24 0800 06/24/24 0925   BP: (!) 176/97 174/89  (!) 186/71   BP Location:  Left arm  Left arm   Patient Position:  Lying  Lying   Pulse: 95 65  69   Resp: 20 22  20   Temp: 36.3 °C (97.3 °F) 36.1 °C (96.9 °F)  36.1 °C (97 °F)   TempSrc: Temporal Temporal  Temporal   SpO2: 95% 96% 96% 94%   Weight:  91 kg (200 lb 9.9 oz)     Height:             Physical Exam  Vitals reviewed.   Constitutional:       General: He is awake.   Eyes:      General: Lids are normal.      Extraocular Movements: Extraocular movements intact.      Pupils: Pupils are equal, round, and reactive to light.   Neurological:      Mental Status: He is alert.      Motor: Motor strength is normal.      Neurological Exam  Mental Status  Awake and alert. Expressive aphasia present. Follows two-step commands.    Cranial Nerves  CN III, IV, VI: Extraocular movements intact bilaterally. Normal lids and orbits bilaterally. Pupils equal round and reactive to light bilaterally.  CN V: Facial sensation is normal.  CN VII:  Left: There is central facial weakness.  CN VIII: Hearing is normal.  CN XI: Shoulder shrug strength is normal.  CN XII: Tongue midline without atrophy or fasciculations.    Motor  Normal muscle bulk throughout. No fasciculations present. Normal muscle tone. No abnormal involuntary movements.  Strength is 5/5 throughout all four extremities.    Sensory  Light touch is normal in upper and lower extremities.     Coordination  Right: Finger-to-nose normal.Left: Finger-to-nose normal.      Scheduled medications  ampicillin-sulbactam, 3 g, intravenous, q6h  aspirin, 81 mg, oral, Daily  aspirin, 150 mg, rectal, Daily  atorvastatin, 80 mg, oral, Nightly  [Held by provider] cholecalciferol, 50,000 Units, oral, Every   [Held by provider] cholecalciferol, 4,000 Units, oral, Daily  [Held by provider] cholestyramine, 1 packet, oral, Daily  clopidogrel, 75 mg, oral, Daily  [Held by provider] cyanocobalamin, 1,000 mcg, oral, Daily  enoxaparin, 40 mg, subcutaneous, q24h  losartan, 100 mg, oral, Daily  metoprolol succinate XL, 100 mg, oral, Daily  [Held by provider] omega-3, 500 mg, oral, Daily  thiamine, 500 mg, intravenous, Daily      Continuous medications  Adult Clinimix Parenteral Nutrition, 83 mL/hr, Last Rate: Stopped (24)      PRN medications  PRN medications: [Held by provider] acetaminophen, bisacodyl, [] hydrALAZINE **FOLLOWED BY** hydrALAZINE, [Held by provider] loperamide, magnesium hydroxide, oxygen, sennosides-docusate sodium     Lab Results   Component Value Date    WBC 14.6 (H) 2024    RBC 5.36 2024    HGB 16.1 2024    HCT 47.3 2024     2024     2024    K 3.4 (L) 2024     2024    BUN 29 (H) 2024    CREATININE 1.11 2024    EGFR 67 2024    CALCIUM 9.0 2024    ALKPHOS 66 2024    AST 18 2024    ALT 18 2024    MG 2.06 2024    JKFOESSI02 460 2023    VITD25 36 2023    HGBA1C 5.7 (H) 2024    LDLCALC 75 2024    CHOL 163 2024    HDL 39.3 2024    TRIG 245 (H) 2024    TSH 1.24 2023    TSH 3.35 2023    TSH 3.63 2022     MR brain wo IV contrast 2024  MR angio head wo IV contrast 2024  MR angio neck wo IV  contrast 06/21/2024    Narrative  Interpreted By:  Rob He and Ravi Shweta  STUDY:  MR BRAIN WO IV CONTRAST; MR ANGIO HEAD WO IV CONTRAST; MR ANGIO NECK  WO IV CONTRAST;  6/21/2024 3:55 pm    INDICATION:  Signs/Symptoms:Dysarthria ? CVA; Signs/Symptoms:Dysarthria ?CVA.  Stroke protocol.    COMPARISON:  CT head 06/21/2020    ACCESSION NUMBER(S):  [id number]; [id number]; [id number]    ORDERING CLINICIAN:  LUCY NUNES    TECHNIQUE:  Axial T2, FLAIR, DWI, gradient echo T2 and  sagittal and coronal T1  weighted images of brain were acquired.    Time-of-flight MRA of the head  and neck was performed. The images  were reviewed as source images and maximum intensity projections.    FINDINGS:  Brain:    CSF Spaces:  The ventricles, sulci and basal cisterns are concordant in size with  parenchymal volume in the setting of moderate parenchymal volume loss.    Parenchyma:  Acute infarct of the posterior limb of the right internal  capsule/anterior thalamus. In addition, there is likely a remote  infarct involving the external capsule with gliosis/encephalomalacia  and probable remote hemorrhage given local rim susceptibility  artifact (likely hemosiderin deposition).    Severe periventricular and subcortical white matter FLAIR and T2  hyperintensities are present, which may be associated with chronic  small vessel ischemic disease. T2 hyperintense and FLAIR hypointense  foci are present within the basal ganglia favoring perivascular  spaces remote infarcts. There is no mass effect or midline shift.    Paranasal Sinuses and Mastoids:  Visualized paranasal sinuses and mastoid air cells demonstrate  mucosal opacification of the left maxillary sinus with possible  retention cyst/polyp.    MRA of head:    Anterior circulation:  Irregular flow signal is noted within the  distal right internal carotid artery including the ophthalmic and  communicating segments. Evaluation of the anterior choroidal artery  is  limited on MRI, however it is not definitively visualized on the  right. Otherwise, there is expected flow signal in bilateral  intracranial internal carotid arteries, bilateral carotid terminals,  bilateral proximal anterior and middle cerebral arteries.    Posterior circulation: Left dominant vertebral artery with no flow  noted in the intradural segment of the right vertebral artery.  Otherwise, the vertebrobasilar junction, basilar artery and proximal  posterior cerebral arteries demonstrate expected flow signal.    MRA of neck:    The source images are mildly degraded by artifact.    Right carotid vessels:  There is expected flow signal in the  visualized portion of the common carotid artery.  There is mild  attenuation of flow signal at the carotid bifurcation which may be  secondary to flow related artifact. The internal carotid artery in  the neck demonstrates expected flow signal, though it has a tortuous  course. There is 0% stenosis of the ICA by NASCET criteria.    Left carotid vessels:   There is expected flow signal in the  visualized portion of the common carotid artery.  There is mild  attenuation of flow signal at the carotid bifurcation which may be  secondary to flow related artifact. The internal carotid artery in  the neck demonstrates expected flow signal, though there is tortuous  course. There is 0% stenosis of the ICA by NASCET criteria.    Vertebral vessels: Left dominant vertebral artery. No flow signal is  noted in the right vertebral artery. The intradural segment of the  right vertebral artery is nearly is large in caliber as the left  intradural segment suggesting occlusion versus severe  stenosis/limited flow.    Impression  MRI Brain:    1. Acute infarct of the posterior limb of the right internal  capsule/anterior thalamus.  2. Moderate global parenchymal volume loss with additional remote  infarcts and chronic white matter changes favoring microangiopathy as  above.    MRA:    1.  The right vertebral artery is occluded or markedly stenosed.  2. Flow irregularity within the distal right internal carotid artery  without definite visualization of the right anterior choroidal artery.  3. Otherwise, no evidence of major vessel cutoff or significant  stenosis on MRA head and neck.    I personally reviewed the images/study and I agree with the resident  findings as stated by Gavi Reddy MD. This study was interpreted at  [facility], [place], [place].    MACRO:  None    Signed by: Rob He 6/21/2024 4:31 PM  Dictation workstation:   [id number]      CT brain attack head wo IV contrast 06/21/2024    Narrative  Interpreted By:  Conner Rosa,  STUDY:  CT BRAIN ATTACK HEAD WO IV CONTRAST;  6/21/2024 8:46 am    INDICATION:  Signs/Symptoms:Stroke Evaluation. Facial droop (L), confusion,  slurred speech/aphasia.    COMPARISON:  Brain CT scan dated 07/17/2020    ACCESSION NUMBER(S):  [id number]    ORDERING CLINICIAN:  HIREN MAURER    TECHNIQUE:  Noncontrast axial CT scan of head was performed. Angled reformats in  brain and bone windows were generated. The images were reviewed in  bone, brain, blood and soft tissue windows.    FINDINGS:  CSF Spaces: The ventricles, sulci and basal cisterns are prominent.  There is no extraaxial fluid collection.    Parenchyma: Mild cerebral volume loss. Bilateral periventricular  white matter ill-defined hypodensities likely sequelae of chronic  microvascular ischemia. The grey-white differentiation is intact.  There is no mass effect or midline shift.  There is no intracranial  hemorrhage.    Calvarium: The calvarium is unremarkable.    Paranasal sinuses and mastoids: Visualized paranasal sinuses and  mastoids are clear.    Impression  1. No acute intracranial hemorrhage or serious brain herniation. If  continued clinical concern, advise further assessment by dedicated  brain MRI.  2. Mild to moderate cerebral volume loss  and sequelae of chronic  microvascular ischemia as in prior.    MACRO:  None    Signed by: Conner Rosa 6/21/2024 9:11 AM  Dictation workstation:   [id number]      NIH Stroke Scale  1A. Level of Consciousness: Alert, Keenly Responsive  1B. Ask Month and Age: No Questions Right  1C. Blink Eyes & Squeeze Hands: Performs Both Tasks  2. Best Gaze: Normal  3. Visual: No Visual Loss  4. Facial Palsy: Minor Paralysis  5A. Motor - Left Arm: No Drift  5B. Motor - Right Arm: No Drift  6A. Motor - Left Leg: No Drift  6B. Motor - Right Leg: No Drift  7. Limb Ataxia: Absent  8. Sensory Loss: Normal  9. Best Language: Severe Aphasia  10. Dysarthria: Mild-to-Moderate Dysarthria  11. Extinction and Inattention: No Abnormality  NIH Stroke Scale: 6      [place] Coma Scale  Best Eye Response: Spontaneous  Best Verbal Response: Incomprehensible sounds  Best Motor Response: Follows commands  [place] Coma Scale Score: 12        Assessment/Plan   This patient currently has cardiac telemetry ordered; if you would like to modify or discontinue the telemetry order, click here to go to the orders activity to modify/discontinue the order.  Principal Problem:    Stroke-like symptoms      IMPRESSION:  Abdi Wilson is a 79 y.o. male with history of HTN, DLD, CAD, prior stroke 20 years ago, and vascular dementia presented for confusion and aphasia. From SNF, has been resident x 4 years. Essentially wheelchair bound at baseline but can transfer briefly with walker and assistance per report. On Plavix and atorvastatin prior to arrival.     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 Discussed with family, will likely need DAPT indefinitely as had stroke while on plavix.    Continue swallowing precautions, dobhoff in place  Aspiration precautions, discussed with RN, needs suctioning set up at bedside to clear secretions.   Continue SLP  Pt would likely benefit from acute rehab if able.   > 48 hours from stroke onset, ok to normalize BP now  Continue to monitor and manage stroke risk factors.   Continue cardiac telemetry  Continue supportive care.     Discussed with patient and family, all questions answered.   Discussed separately with Dr. Cintron.   Will sign off from neurology, please reach out if further input needed.  Can follow up with myself in ~4 weeks.        I personally spent >55 minutes today, exclusive of procedures, providing care for this patient, including preparation, face to face time, documentation and other services such as review of medical records, diagnostic result, patient education, counseling, coordination of care as specified in the encounter.    Angela Garcia, APRN-CNP

## 2024-06-24 NOTE — PROGRESS NOTES
[facility] confirmed patient is LTC and no auth is needed to return.     1310 Patient is pending PT/ OT reassessment today. Patient may need to return to [facility] under skilled. TCC to follow.

## 2024-06-24 NOTE — PROGRESS NOTES
Abdi Wilson is a 79 y.o. male on day 3 of admission presenting with Stroke-like symptoms.    Subjective   Abdi Wilson is a 79 y.o. male with past medical history of hypertension, hyperlipidemia, coronary artery disease, CVA, ulcerative colitis GERD and ? Seizures presents to the emergency room from the skilled nursing facility with strokelike symptoms.  Patient is a poor historian due to his dysphagia but according to the family at bedside his last known well was about 5:45 AM this morning and by 6 and a wellness check attempted on him showed aphasia.  Patient is usually wheelchair-bound due to his chronic ambulatory difficulties.  But even with his dementia is able to communicate with family although with difficulty in remembering details.  It was also noticed that patient had slight drooping on the left side of the face.  EMS was called and patient was transported to the emergency room for further evaluation.      On admission in the emergency room he was hemodynamically stable with a slightly elevated blood pressure of 163/91 mmHg, heart rate of 68/min, respirate rate of 20/min, temperature of 36.1 °C and was saturating 95% on nasal cannula oxygen.  Initial CBC and BMP were unremarkable except for WBC of 11.8.  A CT of the brain was done which showed no acute intracranial hemorrhage or seizures brain herniation.  NIH stroke scale was 4 however after discussion with ER physician family elected to skip TNK.  He has been admitted for further evaluation and management.        Past Medical History  He has no past medical history on file.     Surgical History  He has no past surgical history on file.     Social History  He has no history on file for tobacco use, alcohol use, and drug use.     Family History  Family History   No family history on file.        Allergies  Patient has no known allergies.    6/22: Patient with significant dysphagia so n.p.o. did consult surgery to place KO feed.  Will use PPN overnight.   Will give one-time dose of IV Decadron to help with reducing the swelling from the stroke to see whether this will help with symptoms and function in the short-term.  MRI does demonstrate acute infarct of the posterior limb of the right internal capsule/anterior thalamus.  Moderate global parenchymal volume loss with additional remote infarcts.  The right vertebral artery is markedly stenosed flow irregularity within the distal right internal carotid artery without definitive visualization of the right anterior choroidal artery.  Patient chronically wheelchair-bound patient does have dementia normally does have difficulty remembering details but is usually able to communicate with family well.  6/23: Patient's has a KO feed tube and that what appears to be in stomach on film awaiting official reading.  Will start some tube feeds after that is read.  Receiving TPN currently.  Speech is improved a little today.  Continue with supportive care likely will begin meds down KO feed starting tomorrow.  Continue PT OT and speech therapy.  6/24: Patient still with significant dysphagia and expressive aphasia.  Is having some slight aspiration of secretions will start Unasyn right lower lobe pneumonia very small.  Will need to work on discharge may need acute rehab.  Patient somewhat tearful today understandably.       Objective     Physical Exam    EXAM:    Constitutional: Patient is able to speak a little bit today.    Head and facial: Patient has a Keofeed in his left nostril currently.    Neck: No gross JVD    Lungs: Clear to auscultation some coughing bilaterally with moistness left base    Heart: Regular heart rate and mike    Abdomen: Nontender    Pelvis/: No flank tenderness    Extremities: No gross edema    Neurologic: Neurologic aphasic somewhat snoring continuously move limbs able to raise hands and feet with commands    Dermatologic: No gross abnormalities    Psychiatric/behavioral: Unable to assess due to  "expressive aphasia      Last Recorded Vitals  Blood pressure (!) 186/71, pulse 69, temperature 36.1 °C (97 °F), temperature source Temporal, resp. rate 20, height 1.88 m (6' 2\"), weight 91 kg (200 lb 9.9 oz), SpO2 94%.  Intake/Output last 3 Shifts:  I/O last 3 completed shifts:  In: 2079.2 (22.8 mL/kg) [IV Piggyback:100]  Out: 1650 (18.1 mL/kg) [Urine:1650 (0.5 mL/kg/hr)]  Weight: 91 kg     Relevant Results    Current Facility-Administered Medications:     [Held by provider] acetaminophen (Tylenol) tablet 650 mg, 650 mg, oral, q4h PRN, Joe Lopez MD    Adult Clinimix Parenteral Nutrition, 83 mL/hr, intravenous, Daily PN, Ezekiel Cintron MD, Stopped at 24    ampicillin-sulbactam (Unasyn) in sodium chloride 0.9 % 100 mL 3 g, 3 g, intravenous, q6h, Ezekiel Cintron MD, Stopped at 24    aspirin EC tablet 81 mg, 81 mg, oral, Daily, Marybel Varma PA-C, 81 mg at 24    aspirin suppository 150 mg, 150 mg, rectal, Daily, Joe Lopez MD, 150 mg at 24    atorvastatin (Lipitor) tablet 80 mg, 80 mg, oral, Nightly, Marybel Varma PA-C, 80 mg at 24    bisacodyl (Dulcolax) suppository 10 mg, 10 mg, rectal, PRN, Ezekiel Cintron MD    [Held by provider] cholecalciferol (Vitamin D-3) capsule 50,000 Units, 50,000 Units, oral, Every , Ezekiel Cintron MD    [Held by provider] cholecalciferol (Vitamin D-3) tablet 4,000 Units, 4,000 Units, oral, Daily, Joe Lopez MD    [Held by provider] cholestyramine (Questran) 4 gram packet 4 g, 1 packet, oral, Daily, Joe Lopez MD    clopidogrel (Plavix) tablet 75 mg, 75 mg, oral, Daily, Marybel Varma PA-C, 75 mg at 24 0928    [Held by provider] cyanocobalamin (Vitamin B-12) tablet 1,000 mcg, 1,000 mcg, oral, Daily, Joe Lopez MD    enoxaparin (Lovenox) syringe 40 mg, 40 mg, subcutaneous, q24h, Joe Lopez MD, 40 mg at 24 1232    [] hydrALAZINE (Apresoline) " injection 10 mg, 10 mg, intravenous, q20 min PRN **FOLLOWED BY** hydrALAZINE (Apresoline) tablet 25 mg, 25 mg, oral, q6h PRN, Joe Lopez MD    [Held by provider] loperamide (Imodium) capsule 2 mg, 2 mg, oral, TID PRN, Joe Lopez MD    losartan (Cozaar) tablet 100 mg, 100 mg, oral, Daily, Marybel Varma PA-C, 100 mg at 06/24/24 0919    magnesium hydroxide (Milk of Magnesia) 2,400 mg/10 mL suspension 30 mL, 30 mL, oral, PRN, Ezekiel Cintron MD    metoprolol succinate XL (Toprol-XL) 24 hr tablet 100 mg, 100 mg, oral, Daily, Marybel Varma PA-C, 100 mg at 06/24/24 0919    [Held by provider] omega-3 (Fish Oil) capsule 500 mg, 500 mg, oral, Daily, Joe Lopez MD    oxygen (O2) therapy, , inhalation, Continuous PRN - O2/gases, Joe Lopez MD    sennosides-docusate sodium (Nasreen-Colace) 8.6-50 mg per tablet 2 tablet, 2 tablet, oral, BID PRN, Joe Lopez MD    thiamine (Vitamin B1) 500 mg in dextrose 5% 100 mL IV, 500 mg, intravenous, Daily, Ezekiel Cintron MD, Stopped at 06/23/24 1302     Labs from the last few days have been reviewed.    Assessment/Plan   Acute stroke right anterior thalamus  Vertebral artery occlusion  Dysphagia  Expressive aphasia  Small aspiration pneumonia  Dementia  Hypertension  Hyperlipidemia  DNR CCA  DVT prophylaxis subcu Lovenox    Rectal aspirin  PPN overnight  Keofeed-bridled in place  Increase tube feeds today  Suctioning as needed  Begin Unasyn  Plavix  Statin  Pulsed dosing thiamine  Neurology consult  PT and OT  Speech therapy  N.p.o.  Permissive hypertension  Check labs in a.m.  See orders complete plan    .       I spent 40 minutes in the professional and overall care of this patient.      Ezekiel Cintron MD

## 2024-06-24 NOTE — DISCHARGE INSTRUCTIONS
YOUR PERSONAL STROKE SNAPSHOT:  You have had a stroke event (stroke, TIA or hemorrhage). Please see below for your information from this hospitalization, page numbers correlate with the stroke education booklet attached for further explanation of how these numbers and values relate to your stroke event.     You have had a Ischemic stroke. This event likely happened because of large artery atherosclerosis (plaque buildup) and small vessel disease (lacunar stroke) including risk factors such as hypertension, diabetes, high cholesterol, etc. .    Your Lab Results (see Page 11):    Lipids (cholesterol):   Lab Results   Component Value Date    LDLCALC 75 06/22/2024    HDL 39.3 06/22/2024    CHOL 163 06/22/2024    TRIG 245 (H) 06/22/2024       A1C (blood sugars):  Lab Results   Component Value Date    HGBA1C 5.7 (H) 06/21/2024             Your Stroke Risk factors (Pages 10 & 11):  Check what applies.    [] Obesity    [] Diabetes    [] Smoking    [] Alcohol Use    [] Hypertension    [] High Cholesterol    [] Atrial Fibrillation    [] Sleep Apnea    [] Carotid Artery Disease    [] Clotting Disorder    [] Active Cancer    [] Family History    [] Other (______________________________________)      Your Related Medications (Page 12):    New medications:    Aspirin 81 mg daily    Continuing medications:    Plavix 75 mg daily  Atorvastatin 40 mg daily      Discharge Plan (Pages 16 & 17):    Discharge to: [] Home    [] Acute Rehab    [] Skilled Nursing/Rehabilitation    [] Other (__________________________)    Therapies:      [] Physical Therapy    [] Occupational Therapy    [] Speech Therapy    [] Cognitive Therapy

## 2024-06-25 LAB
ANION GAP SERPL CALC-SCNC: 14 MMOL/L (ref 10–20)
BASOPHILS # BLD AUTO: 0.05 X10*3/UL (ref 0–0.1)
BASOPHILS NFR BLD AUTO: 0.5 %
BUN SERPL-MCNC: 24 MG/DL (ref 6–23)
CALCIUM SERPL-MCNC: 8.7 MG/DL (ref 8.6–10.3)
CHLORIDE SERPL-SCNC: 108 MMOL/L (ref 98–107)
CO2 SERPL-SCNC: 22 MMOL/L (ref 21–32)
CREAT SERPL-MCNC: 1.13 MG/DL (ref 0.5–1.3)
EGFRCR SERPLBLD CKD-EPI 2021: 65 ML/MIN/1.73M*2
EOSINOPHIL # BLD AUTO: 0.19 X10*3/UL (ref 0–0.4)
EOSINOPHIL NFR BLD AUTO: 1.9 %
ERYTHROCYTE [DISTWIDTH] IN BLOOD BY AUTOMATED COUNT: 14 % (ref 11.5–14.5)
GLUCOSE BLD MANUAL STRIP-MCNC: 111 MG/DL (ref 74–99)
GLUCOSE BLD MANUAL STRIP-MCNC: 112 MG/DL (ref 74–99)
GLUCOSE BLD MANUAL STRIP-MCNC: 120 MG/DL (ref 74–99)
GLUCOSE BLD MANUAL STRIP-MCNC: 121 MG/DL (ref 74–99)
GLUCOSE BLD MANUAL STRIP-MCNC: 126 MG/DL (ref 74–99)
GLUCOSE SERPL-MCNC: 101 MG/DL (ref 74–99)
HCT VFR BLD AUTO: 47.6 % (ref 41–52)
HGB BLD-MCNC: 16.2 G/DL (ref 13.5–17.5)
IMM GRANULOCYTES # BLD AUTO: 0.02 X10*3/UL (ref 0–0.5)
IMM GRANULOCYTES NFR BLD AUTO: 0.2 % (ref 0–0.9)
LYMPHOCYTES # BLD AUTO: 1.85 X10*3/UL (ref 0.8–3)
LYMPHOCYTES NFR BLD AUTO: 18.1 %
MAGNESIUM SERPL-MCNC: 2.08 MG/DL (ref 1.6–2.4)
MCH RBC QN AUTO: 29.9 PG (ref 26–34)
MCHC RBC AUTO-ENTMCNC: 34 G/DL (ref 32–36)
MCV RBC AUTO: 88 FL (ref 80–100)
MONOCYTES # BLD AUTO: 1.21 X10*3/UL (ref 0.05–0.8)
MONOCYTES NFR BLD AUTO: 11.9 %
NEUTROPHILS # BLD AUTO: 6.89 X10*3/UL (ref 1.6–5.5)
NEUTROPHILS NFR BLD AUTO: 67.4 %
NRBC BLD-RTO: 0 /100 WBCS (ref 0–0)
PLATELET # BLD AUTO: 268 X10*3/UL (ref 150–450)
POTASSIUM SERPL-SCNC: 3.6 MMOL/L (ref 3.5–5.3)
RBC # BLD AUTO: 5.41 X10*6/UL (ref 4.5–5.9)
SODIUM SERPL-SCNC: 140 MMOL/L (ref 136–145)
WBC # BLD AUTO: 10.2 X10*3/UL (ref 4.4–11.3)

## 2024-06-25 PROCEDURE — 2500000004 HC RX 250 GENERAL PHARMACY W/ HCPCS (ALT 636 FOR OP/ED): Performed by: INTERNAL MEDICINE

## 2024-06-25 PROCEDURE — 85025 COMPLETE CBC W/AUTO DIFF WBC: CPT | Performed by: INTERNAL MEDICINE

## 2024-06-25 PROCEDURE — 2500000001 HC RX 250 WO HCPCS SELF ADMINISTERED DRUGS (ALT 637 FOR MEDICARE OP)

## 2024-06-25 PROCEDURE — 83735 ASSAY OF MAGNESIUM: CPT | Performed by: INTERNAL MEDICINE

## 2024-06-25 PROCEDURE — 2060000001 HC INTERMEDIATE ICU ROOM DAILY

## 2024-06-25 PROCEDURE — 97530 THERAPEUTIC ACTIVITIES: CPT | Mod: GP,CQ | Performed by: PHYSICAL THERAPY ASSISTANT

## 2024-06-25 PROCEDURE — 82947 ASSAY GLUCOSE BLOOD QUANT: CPT

## 2024-06-25 PROCEDURE — 92507 TX SP LANG VOICE COMM INDIV: CPT | Mod: GN | Performed by: PHARMACIST

## 2024-06-25 PROCEDURE — 92526 ORAL FUNCTION THERAPY: CPT | Mod: GN | Performed by: PHARMACIST

## 2024-06-25 PROCEDURE — 2500000001 HC RX 250 WO HCPCS SELF ADMINISTERED DRUGS (ALT 637 FOR MEDICARE OP): Performed by: INTERNAL MEDICINE

## 2024-06-25 PROCEDURE — 99233 SBSQ HOSP IP/OBS HIGH 50: CPT | Performed by: INTERNAL MEDICINE

## 2024-06-25 PROCEDURE — 36415 COLL VENOUS BLD VENIPUNCTURE: CPT | Performed by: INTERNAL MEDICINE

## 2024-06-25 PROCEDURE — 97530 THERAPEUTIC ACTIVITIES: CPT | Mod: GO,CO

## 2024-06-25 PROCEDURE — 97112 NEUROMUSCULAR REEDUCATION: CPT | Mod: GP,CQ | Performed by: PHYSICAL THERAPY ASSISTANT

## 2024-06-25 PROCEDURE — 80048 BASIC METABOLIC PNL TOTAL CA: CPT | Performed by: INTERNAL MEDICINE

## 2024-06-25 RX ORDER — ATORVASTATIN CALCIUM 80 MG/1
80 TABLET, FILM COATED ORAL NIGHTLY
Status: CANCELLED
Start: 2024-06-25

## 2024-06-25 RX ORDER — METOPROLOL SUCCINATE 25 MG/1
25 TABLET, EXTENDED RELEASE ORAL DAILY
Status: CANCELLED | OUTPATIENT
Start: 2024-06-25

## 2024-06-25 RX ORDER — CARVEDILOL 3.12 MG/1
3.12 TABLET ORAL 2 TIMES DAILY
Qty: 60 TABLET | Refills: 1 | Status: CANCELLED | OUTPATIENT
Start: 2024-06-25

## 2024-06-25 RX ORDER — CARVEDILOL 6.25 MG/1
6.25 TABLET ORAL 2 TIMES DAILY
Status: CANCELLED | OUTPATIENT
Start: 2024-06-25

## 2024-06-25 RX ORDER — CARVEDILOL 6.25 MG/1
6.25 TABLET ORAL 2 TIMES DAILY
Status: DISCONTINUED | OUTPATIENT
Start: 2024-06-25 | End: 2024-06-27 | Stop reason: HOSPADM

## 2024-06-25 ASSESSMENT — PAIN SCALES - PAIN ASSESSMENT IN ADVANCED DEMENTIA (PAINAD)
FACIALEXPRESSION: SMILING OR INEXPRESSIVE
TOTALSCORE: 1
BREATHING: NORMAL
NEGVOCALIZATION: OCCASIONAL MOAN/GROAN, LOW SPEECH, NEGATIVE/DISAPPROVING QUALITY
BREATHING: NORMAL
CONSOLABILITY: NO NEED TO CONSOLE
BODYLANGUAGE: RELAXED
FACIALEXPRESSION: SMILING OR INEXPRESSIVE
CONSOLABILITY: NO NEED TO CONSOLE
TOTALSCORE: 0
BODYLANGUAGE: RELAXED

## 2024-06-25 ASSESSMENT — COGNITIVE AND FUNCTIONAL STATUS - GENERAL
DRESSING REGULAR UPPER BODY CLOTHING: A LOT
TOILETING: TOTAL
MOBILITY SCORE: 6
WALKING IN HOSPITAL ROOM: TOTAL
EATING MEALS: TOTAL
DRESSING REGULAR LOWER BODY CLOTHING: TOTAL
EATING MEALS: TOTAL
MOVING TO AND FROM BED TO CHAIR: A LOT
TURNING FROM BACK TO SIDE WHILE IN FLAT BAD: A LOT
DRESSING REGULAR LOWER BODY CLOTHING: TOTAL
TOILETING: A LOT
STANDING UP FROM CHAIR USING ARMS: TOTAL
STANDING UP FROM CHAIR USING ARMS: A LOT
TURNING FROM BACK TO SIDE WHILE IN FLAT BAD: TOTAL
MOBILITY SCORE: 10
WALKING IN HOSPITAL ROOM: TOTAL
DRESSING REGULAR UPPER BODY CLOTHING: TOTAL
DAILY ACTIVITIY SCORE: 10
MOVING TO AND FROM BED TO CHAIR: TOTAL
PERSONAL GROOMING: A LOT
HELP NEEDED FOR BATHING: A LOT
DAILY ACTIVITIY SCORE: 6
MOVING FROM LYING ON BACK TO SITTING ON SIDE OF FLAT BED WITH BEDRAILS: A LOT
CLIMB 3 TO 5 STEPS WITH RAILING: TOTAL
MOVING FROM LYING ON BACK TO SITTING ON SIDE OF FLAT BED WITH BEDRAILS: TOTAL
CLIMB 3 TO 5 STEPS WITH RAILING: TOTAL
PERSONAL GROOMING: TOTAL
HELP NEEDED FOR BATHING: TOTAL

## 2024-06-25 ASSESSMENT — PAIN SCALES - GENERAL
PAINLEVEL_OUTOF10: 0 - NO PAIN

## 2024-06-25 ASSESSMENT — PAIN - FUNCTIONAL ASSESSMENT
PAIN_FUNCTIONAL_ASSESSMENT: 0-10

## 2024-06-25 NOTE — PROGRESS NOTES
Abdi Wilson is a 79 y.o. male on day 4 of admission presenting with Stroke-like symptoms.    Subjective   Abdi Wilson is a 79 y.o. male with past medical history of hypertension, hyperlipidemia, coronary artery disease, CVA, ulcerative colitis GERD and ? Seizures presents to the emergency room from the skilled nursing facility with strokelike symptoms.  Patient is a poor historian due to his dysphagia but according to the family at bedside his last known well was about 5:45 AM this morning and by 6 and a wellness check attempted on him showed aphasia.  Patient is usually wheelchair-bound due to his chronic ambulatory difficulties.  But even with his dementia is able to communicate with family although with difficulty in remembering details.  It was also noticed that patient had slight drooping on the left side of the face.  EMS was called and patient was transported to the emergency room for further evaluation.      On admission in the emergency room he was hemodynamically stable with a slightly elevated blood pressure of 163/91 mmHg, heart rate of 68/min, respirate rate of 20/min, temperature of 36.1 °C and was saturating 95% on nasal cannula oxygen.  Initial CBC and BMP were unremarkable except for WBC of 11.8.  A CT of the brain was done which showed no acute intracranial hemorrhage or seizures brain herniation.  NIH stroke scale was 4 however after discussion with ER physician family elected to skip TNK.  He has been admitted for further evaluation and management.        Past Medical History  He has no past medical history on file.     Surgical History  He has no past surgical history on file.     Social History  He has no history on file for tobacco use, alcohol use, and drug use.     Family History  Family History   No family history on file.        Allergies  Patient has no known allergies.    6/22: Patient with significant dysphagia so n.p.o. did consult surgery to place KO feed.  Will use PPN overnight.   Will give one-time dose of IV Decadron to help with reducing the swelling from the stroke to see whether this will help with symptoms and function in the short-term.  MRI does demonstrate acute infarct of the posterior limb of the right internal capsule/anterior thalamus.  Moderate global parenchymal volume loss with additional remote infarcts.  The right vertebral artery is markedly stenosed flow irregularity within the distal right internal carotid artery without definitive visualization of the right anterior choroidal artery.  Patient chronically wheelchair-bound patient does have dementia normally does have difficulty remembering details but is usually able to communicate with family well.  6/23: Patient's has a KO feed tube and that what appears to be in stomach on film awaiting official reading.  Will start some tube feeds after that is read.  Receiving TPN currently.  Speech is improved a little today.  Continue with supportive care likely will begin meds down KO feed starting tomorrow.  Continue PT OT and speech therapy.  6/24: Patient still with significant dysphagia and expressive aphasia.  Is having some slight aspiration of secretions will start Unasyn right lower lobe pneumonia very small.  Will need to work on discharge may need acute rehab.  Patient somewhat tearful today understandably.  6/25: Will switch metoprolol to carvedilol since it can be crushed and put down NG.  Working on discharge planning will need subacute versus acute rehab.  Continue IV Unasyn for now will switch to Augmentin on discharge.  Anticipate discharge in the next 48 hours.       Objective     Physical Exam    EXAM:    Constitutional: Patient is able to speak a little bit today.  Resting comfortably NG bridled in place.    Head and facial: Patient has a Keofeed in his left nostril currently.    Neck: No gross JVD    Lungs: Clear to auscultation some coughing bilaterally with moistness left base    Heart: Regular heart rate  "and mike    Abdomen: Nontender    Pelvis/: No flank tenderness    Extremities: No gross edema    Neurologic: Neurologic aphasic somewhat snoring continuously move limbs able to raise hands and feet with commands    Dermatologic: No gross abnormalities    Psychiatric/behavioral: Unable to assess due to expressive aphasia      Last Recorded Vitals  Blood pressure 160/84, pulse 73, temperature 36.5 °C (97.7 °F), temperature source Temporal, resp. rate 20, height 1.88 m (6' 2\"), weight 90.1 kg (198 lb 10.2 oz), SpO2 97%.  Intake/Output last 3 Shifts:  I/O last 3 completed shifts:  In: 1784.3 (19.8 mL/kg) [IV Piggyback:100]  Out: 2450 (27.2 mL/kg) [Urine:2450 (0.8 mL/kg/hr)]  Weight: 90.1 kg     Relevant Results  Scheduled medications  ampicillin-sulbactam, 3 g, intravenous, q6h  aspirin, 81 mg, oral, Daily  aspirin, 150 mg, rectal, Daily  atorvastatin, 80 mg, oral, Nightly  carvedilol, 6.25 mg, oral, BID  [Held by provider] cholecalciferol, 50,000 Units, oral, Every   [Held by provider] cholecalciferol, 4,000 Units, oral, Daily  [Held by provider] cholestyramine, 1 packet, oral, Daily  clopidogrel, 75 mg, oral, Daily  [Held by provider] cyanocobalamin, 1,000 mcg, oral, Daily  enoxaparin, 40 mg, subcutaneous, q24h  losartan, 100 mg, oral, Daily  [Held by provider] omega-3, 500 mg, oral, Daily      Continuous medications  Adult Clinimix Parenteral Nutrition, 83 mL/hr, Last Rate: Stopped (24)      PRN medications  PRN medications: [Held by provider] acetaminophen, bisacodyl, [] hydrALAZINE **FOLLOWED BY** hydrALAZINE, [Held by provider] loperamide, magnesium hydroxide, oxygen, sennosides-docusate sodium       Labs from the last few days have been reviewed.    Assessment/Plan   Acute stroke right anterior thalamus  Vertebral artery occlusion  Dysphagia  Expressive aphasia  Small aspiration pneumonia  Dementia  Hypertension  Hyperlipidemia  DNR CCA  DVT prophylaxis subcu Lovenox      PPN " overnight  Keofeed-bridled in place  Jevity 1.5 70 mL an hour  Free water per NG  Suctioning as needed  Begin Unasyn  Plavix  ASA per NG  Statin  Neurology consult  PT and OT  Speech therapy  N.p.o.  Start carvedilol 6.25 p.o. twice daily  Losartan 100 mg a day  Plan discharge to rehab versus subacute rehab  Check labs in a.m.  See orders complete plan    .       I spent 40 minutes in the professional and overall care of this patient.      Ezekiel Cintron MD

## 2024-06-25 NOTE — SIGNIFICANT EVENT
7000 Physician Certification      As the individual's attending physician , I certify that the above-named patient:  Is being discharged to a nursing facility directly from a hospital after receiving acute patient care at the hospital, and  Requires nursing facility services for the condition for which he/she received care in the hospital, and  Requires fewer than 30 days of nursing facility services, no later than the date of discharge.    I certify that inpatient care is required at the level recommended above. To the best of my knowledge, all information provided about the individual is a true and accurate reflection of the individual's condition.     Dr. Ezekiel Cintron    License number: [id number]  NPI: [id number]

## 2024-06-25 NOTE — PROGRESS NOTES
Occupational Therapy    OT Treatment    Patient Name: Abdi Wilson  MRN: [id number]  Today's Date: 6/25/2024  Time Calculation  Start Time: 1357  Stop Time: 1420  Time Calculation (min): 23 min        Assessment:  OT Assessment: Pt requires max A x2 to safely complete functional transfers with max A x2 and max verbal cues for safety, sequencing and transfer technique.  End of Session Communication: Bedside nurse  End of Session Patient Position: Up in chair, Alarm on     Plan:  Treatment Interventions: ADL retraining, Functional transfer training, UE strengthening/ROM, Endurance training, Patient/family training, Equipment evaluation/education, Cognitive reorientation, Visual perceptual retraining, Neuromuscular reeducation, Fine motor coordination activities, Compensatory technique education  OT Frequency: 4 times per week  OT Discharge Recommendations: Moderate intensity level of continued care  Equipment Recommended upon Discharge: Other (comment) (TBD)  OT - OK to Discharge: Yes  Treatment Interventions: ADL retraining, Functional transfer training, UE strengthening/ROM, Endurance training, Patient/family training, Equipment evaluation/education, Cognitive reorientation, Visual perceptual retraining, Neuromuscular reeducation, Fine motor coordination activities, Compensatory technique education    Subjective   Previous Visit Info:  OT Last Visit  OT Received On: 06/25/24  General:  General  Prior to Session Communication: Bedside nurse  Patient Position Received: Alarm on, Bed, 3 rail up  General Comment: Pt resting in bed with eyes closed, easily aorused.Pt agreeable and cooperative to therapy.  Precautions:  Medical Precautions: Fall precautions       Pain:  Pain Assessment  Pain Assessment: 0-10  0-10 (Numeric) Pain Score: 0 - No pain    Objective    Cognition:  Cognition  Overall Cognitive Status: Within Functional Limits         Functional Standing Tolerance:  Activity: Pt completed x2 stands tolerating for  15-30 seconds with max A x2 to maintain using arm in arm.  Bed Mobility/Transfers: Bed Mobility 1  Bed Mobility 1: Supine to sitting  Level of Assistance 1: Moderate assistance, +2, Moderate verbal cues    Transfer 1  Technique 1: Sit to stand, Stand to sit  Transfer Device 1:  (arm in arm)  Transfer Level of Assistance 1: Moderate assistance, +2, Moderate verbal cues  Transfers 2  Transfer From 2: Bed to  Transfer to 2: Chair with arms  Technique 2: Stand pivot  Transfer Device 2:  (arm in arm)  Transfer Level of Assistance 2: Maximum assistance, +2, Moderate verbal cues                    Therapy/Activity:      Therapeutic Activity  Therapeutic Activity 1: Pt tolerated sitting EOB for 5 minutes with min A.        Outcome Measures:[facility] Daily Activity  Putting on and taking off regular lower body clothing: Total  Bathing (including washing, rinsing, drying): A lot  Putting on and taking off regular upper body clothing: A lot  Toileting, which includes using toilet, bedpan or urinal: A lot  Taking care of personal grooming such as brushing teeth: A lot  Eating Meals: Total  Daily Activity - Total Score: 10        Education Documentation  Body Mechanics, taught by LUCRETIA Rm at 6/25/2024  3:43 PM.  Learner: Patient  Readiness: Acceptance  Method: Explanation  Response: Needs Reinforcement    Education Comments  No comments found.            Goals:  Encounter Problems       Encounter Problems (Active)       ADLs       Patient will perform UB and LB bathing with moderate assist level of assistance and PRN AE/DME. (Progressing)       Start:  06/22/24    Expected End:  07/05/24            Patient with complete upper body dressing with minimal assist  level of assistance donning and doffing all UE clothes with PRN adaptive equipment while supported sitting (Progressing)       Start:  06/22/24    Expected End:  07/05/24            Patient with complete lower body dressing with moderate assist level of  assistance donning and doffing all LE clothes  with PRN adaptive equipment while supported sitting (Progressing)       Start:  06/22/24    Expected End:  07/05/24            Patient will feed self with supervision level of assistance and using PRN adaptive equipment. (Progressing)       Start:  06/22/24    Expected End:  07/05/24            Patient will complete daily grooming tasks brushing teeth and washing face/hair with supervision level of assistance and PRN adaptive equipment while supported sitting. (Progressing)       Start:  06/22/24    Expected End:  07/05/24            Patient will complete toileting including hygiene clothing management/hygiene with moderate assist level of assistance and PRN DME/AE. (Progressing)       Start:  06/22/24    Expected End:  07/05/24               EXERCISE/STRENGTHENING       Patient will complete BUE exercise with min A to promote ROM, strength, and coordination for increased participation in Adls and functional mobility  (Progressing)       Start:  06/22/24    Expected End:  07/05/24

## 2024-06-25 NOTE — PROGRESS NOTES
Patient will be needing skilled services on return to [facility], they can accept him under Skilled. No auth needed.

## 2024-06-25 NOTE — CARE PLAN
The patient's goals for the shift include      The clinical goals for the shift include tolerate removing restraints      Problem: General Stroke  Goal: Establish a mutual long term goal with patient by discharge  Outcome: Progressing  Goal: Demonstrate improvement in neurological exam throughout the shift  Outcome: Progressing  Goal: Maintain BP within ordered limits throughout shift  Outcome: Progressing  Goal: Participate in treatment (ie., meds, therapy) throughout shift  Outcome: Progressing  Goal: No symptoms of aspiration throughout shift  Outcome: Progressing  Goal: No symptoms of hemorrhage throughout shift  Outcome: Progressing  Goal: Tolerate enteral feeding throughout shift  Outcome: Progressing  Goal: Decreased nausea/vomiting throughout shift  Outcome: Progressing  Goal: Controlled blood glucose throughout shift  Outcome: Progressing  Goal: Out of bed three times today  Outcome: Progressing     Problem: Skin  Goal: Decreased wound size/increased tissue granulation at next dressing change  Outcome: Progressing  Flowsheets (Taken 6/25/2024 1300)  Decreased wound size/increased tissue granulation at next dressing change: Promote sleep for wound healing  Goal: Participates in plan/prevention/treatment measures  Outcome: Progressing  Flowsheets (Taken 6/25/2024 1300)  Participates in plan/prevention/treatment measures: Elevate heels  Goal: Prevent/manage excess moisture  Outcome: Progressing  Flowsheets (Taken 6/25/2024 1300)  Prevent/manage excess moisture: Cleanse incontinence/protect with barrier cream  Goal: Prevent/minimize sheer/friction injuries  Outcome: Progressing  Flowsheets (Taken 6/25/2024 1300)  Prevent/minimize sheer/friction injuries: Turn/reposition every 2 hours/use positioning/transfer devices  Goal: Promote/optimize nutrition  Outcome: Progressing  Flowsheets (Taken 6/25/2024 1300)  Promote/optimize nutrition: Assist with feeding  Goal: Promote skin healing  Outcome:  Progressing  Flowsheets (Taken 6/25/2024 1300)  Promote skin healing: Turn/reposition every 2 hours/use positioning/transfer devices     Problem: Fall/Injury  Goal: Not fall by end of shift  Outcome: Progressing  Goal: Be free from injury by end of the shift  Outcome: Progressing  Goal: Verbalize understanding of personal risk factors for fall in the hospital  Outcome: Progressing  Goal: Verbalize understanding of risk factor reduction measures to prevent injury from fall in the home  Outcome: Progressing  Goal: Use assistive devices by end of the shift  Outcome: Progressing  Goal: Pace activities to prevent fatigue by end of the shift  Outcome: Progressing     Problem: Pain - Adult  Goal: Verbalizes/displays adequate comfort level or baseline comfort level  Outcome: Progressing     Problem: Safety - Adult  Goal: Free from fall injury  Outcome: Progressing     Problem: Discharge Planning  Goal: Discharge to home or other facility with appropriate resources  Outcome: Progressing     Problem: Chronic Conditions and Co-morbidities  Goal: Patient's chronic conditions and co-morbidity symptoms are monitored and maintained or improved  Outcome: Progressing     Problem: Safety - Medical Restraint  Goal: Remains free of injury from restraints (Restraint for Interference with Medical Device)  Outcome: Progressing  Goal: Free from restraint(s) (Restraint for Interference with Medical Device)  Outcome: Progressing

## 2024-06-25 NOTE — CARE PLAN
The patient's goals for the shift include      The clinical goals for the shift include pt will not have any further neuro deficits during shift.    Over the shift, the patient did not make progress toward the following goals. Barriers to progression include Pt remains confused and agitated. Recommendations to address these barriers include maintain the use of bilateral soft wrist restraints.

## 2024-06-25 NOTE — PROGRESS NOTES
Physical Therapy    Physical Therapy Treatment    Patient Name: Abdi Wilson  MRN: [id number]  Today's Date: 6/25/2024  Time Calculation  Start Time: 1356  Stop Time: 1420  Time Calculation (min): 24 min    Assessment/Plan   PT Assessment  End of Session Communication: Bedside nurse  Assessment Comment: pt is compliant with treatment and will benefit from further skilled PT services  End of Session Patient Position: Up in chair, Alarm on     PT Plan  Treatment/Interventions: Bed mobility, Transfer training, Gait training, Balance training, Strengthening, Therapeutic exercise  PT Plan: Ongoing PT  PT Frequency: 5 times per week  PT Discharge Recommendations: Moderate intensity level of continued care  PT - OK to Discharge: Yes      General Visit Information:   PT  Visit  PT Received On: 06/25/24  Response to Previous Treatment: Patient with no complaints from previous session.  General  Prior to Session Communication: Bedside nurse  Patient Position Received: Alarm on, Bed, 3 rail up  Preferred Learning Style: verbal, visual  General Comment: pt agreeable to PT and cleared by RN.    Precautions:  Precautions  Medical Precautions: Fall precautions    Pain:  Pain Assessment  Pain Assessment: 0-10  0-10 (Numeric) Pain Score: 0 - No pain  Cognition:  Cognition  Overall Cognitive Status: Within Functional Limits       Postural Control:  Static Sitting Balance  Static Sitting-Balance Support: Feet supported  Static Sitting-Level of Assistance: Minimum assistance  Static Sitting-Comment/Number of Minutes: cues for safety and to correct lean to R initially.  Static Standing Balance  Static Standing-Balance Support: Bilateral upper extremity supported  Static Standing-Level of Assistance: Maximum assistance (x2 with arm in arm and cues for posture.)       Treatments:  Bed Mobility  Bed Mobility: Yes  Bed Mobility 1  Bed Mobility 1: Supine to sitting  Level of Assistance 1: Moderate assistance, Moderate verbal cues, Moderate  tactile cues  Bed Mobility Comments 1: cues for technique    Transfers  Transfer: Yes  Transfer 1  Technique 1: Sit to stand, Stand to sit  Transfer Level of Assistance 1: Arm in arm assistance, Moderate assistance, +2, Maximum verbal cues  Trials/Comments 1: cues for hand placement and safety.  Transfers 2  Technique 2: Stand pivot  Transfer Level of Assistance 2: Maximum verbal cues, +2, Maximum assistance, Arm in arm assistance  Trials/Comments 2: cues for advancing LEs and safety.    Outcome Measures:  [facility] Basic Mobility  Turning from your back to your side while in a flat bed without using bedrails: A lot  Moving from lying on your back to sitting on the side of a flat bed without using bedrails: A lot  Moving to and from bed to chair (including a wheelchair): A lot  Standing up from a chair using your arms (e.g. wheelchair or bedside chair): A lot  To walk in hospital room: Total  Climbing 3-5 steps with railing: Total  Basic Mobility - Total Score: 10    Education Documentation  Precautions, taught by Chantale Cardenas PTA at 6/25/2024  2:51 PM.  Learner: Patient  Readiness: Acceptance  Method: Explanation  Response: Verbalizes Understanding, Needs Reinforcement    Mobility Training, taught by Chantale Cardenas PTA at 6/25/2024  2:51 PM.  Learner: Patient  Readiness: Acceptance  Method: Explanation  Response: Verbalizes Understanding, Needs Reinforcement    Education Comments  No comments found.               Encounter Problems       Encounter Problems (Active)       PT Problem       PT Goal 1 (Progressing)       Start:  06/22/24    Expected End:  07/06/24       Increase EMELY LE strength to 5/5 to ease capability to perform bed mobility and transfers         PT Goal 2 (Progressing)       Start:  06/22/24    Expected End:  07/06/24       The patient will be able to perform supine to sit bed mobility Min A x 1         PT Goal 3 (Progressing)       Start:  06/22/24    Expected End:  07/06/24       The patient will be  able to perform sit to stands transfers Min A x 1 with FWW         PT Goal 4 (Progressing)       Start:  06/22/24    Expected End:  07/06/24       The patient will be able to ambulate 10 feet with FWW             Pain - Adult

## 2024-06-25 NOTE — PROGRESS NOTES
Speech-Language Pathology Clinical Swallow Treatment    Patient Name: Abdi Wilson  MRN: [id number]  : 1945  Today's Date: 24  Start time: Start Time: 1421  Stop time: Stop Time: 1441  Time calculation (min) : Time Calculation (min): 20 min    ASSESSMENT  SLP TX Intervention Outcome: Making Progress Towards Goals  Treatment Tolerance: Patient limited by fatigue    Impressions:   Severe oral phase and suspected pharyngeal phase dysphagia based on clinical swallow evaluation.     Pt presents with severe expressive communication impairment (dysarthria and suspected apraxia of speech>potential aphasia). He would benefit from skilled ST to improve swallow safety, improve functional communication skills for identifying wants/needs, improve functional communication skills for basic thoughts/ideas, and improve quality of life.     PLAN  MBSS recommended: Yes, however pt is not appropriate for MBSS this date due to current severely impacted swallowing function  Recommended solid consistency: NPO  Recommended liquid consistency: NPO; 3-4 ice chips after completion of oral care for comfort and swallow stimulation.   Recommended medication administration: Non oral  Discharge recommendation: Recommend HIGH intensity ST upon DC in order to ensure safety with least restrictive diet.  Inpatient/Swing Bed or Outpatient: Inpatient  SLP TX Plan: Continue Plan of Care  SLP Plan: Skilled SLP  SLP Frequency: 3x per week  Duration: Current admission  Next Treatment Priority: Continue evaluating swallowing function to determine appropriateness PO diet recommendations; Continue oral/pharyngeal strengthening exercises and swallow stimulation as able; Continue treating speech/language impairment.          SUBJECTIVE  Prior to Session Communication: Bedside nurse  RN cleared pt to participate in session.   Respiratory status: Room air  Positioning: Upright in chair  Pt was drowsy but cooperative for session.    Pain  Assessment  Pain Assessment: 0-10  0-10 (Numeric) Pain Score: 0 - No pain    Orientation: Oriented to self, Did not assess orientation to time  Ability to follow functional commands: Follows simple commands inconsistently    OBJECTIVE  Therapeutic Swallow  Therapeutic Swallow Intervention : PO Trials, Compensatory Strategies  Swallow/Speech-language Comments:   SLP completed oral care prior to PO trials. Pt continues to demonstrate moderate discomfort during oral care but appeared more comfortable when oral care solution was diluted with water.  Pt able to complete a dry swallow on command x2 this date.  SLP provided pt with ice chips x2 and sips of mildly thickened liquid via tsp x2. Pt inconsistently remaines unable to complete pharyngeal swallow and required SLP to utilize yankauer for suction of remaining material in oropharynx x2. SLP was able to clear min to mod material. Pt demonstrated s/sx of aspiration with wet/gargled coughing after ice chip trials.  SLP provide pt with communication boards with a better variety of options for him to communicate his wants/needs. Did not trial use of AAC boards this date due to pt too drowsy at this point in the session.  SLP modeled CV sounds for the pt to repeat (pa, ma, ka). Pt repeated pa x2, ma x2, and ka x2. SLP modeled pt first and last name. Pt produced CV sounds and his name with poor to fair accuracy and required mod visual/verbal cueing from the clinician. Productions were judged to be intelligible in 5/8 trials.    Treatment/Education:  Results and recommendations were relayed to: Patient  Education provided: No    Goals:(start date 6/22/24. Anticipated time frame for goal attainment: 2 weeks):  Pt will consume PO trials with SLP without s/sx aspiration in order to determine readiness for MBSS              Status: Partial progress              Progress this date: pt continues to be stimulable for cues for oral manipulation of ice chips. However, s/sx of  aspiration through wet/gargled coughing still prominent this date.    Pt demonstrate improved secretion management as evidence by dry vocal quality, reduced baseline coughing, and no further need for suctioning.              Status: Progressing              Progress this date: Pt demonstrating improved management of drooling and did not require yankauer for suction for secretions this date, however moderate pooled secretions were seen in oral cavity.    Pt will use multimodal communication to communicate basic wants/needs by answering yes/no question in 80% of opportunities given min assist.              Status: Progressing              Progress this date: Pt inconsistently demonstrated verbal yes/no responses to questions asked, and was responsive to visual/verbal cues to respond to remaining yes/no questions throughout session. Pt more consistently responded to questions with gestural yes/no rather than verbalizing this date. Pt yes/no appear to be meaningful.    Pt will demonstrate CV sounds (ba, pa, ma) in 80% of opportunities given frequent phonemic placement cues.               Status: Progressing              Progress this date: Pt improving intelligibility of CV sounds and simple functional words this date requiring mod visual/verbal cues. Pt attempted to produce first and last name with poor to fair accuracy. 5/8 intelligible productions this date (63% acc). Pt vocal quality remains weak and strained this date.

## 2024-06-26 LAB
GLUCOSE BLD MANUAL STRIP-MCNC: 110 MG/DL (ref 74–99)
GLUCOSE BLD MANUAL STRIP-MCNC: 124 MG/DL (ref 74–99)
GLUCOSE BLD MANUAL STRIP-MCNC: 127 MG/DL (ref 74–99)
GLUCOSE BLD MANUAL STRIP-MCNC: 91 MG/DL (ref 74–99)

## 2024-06-26 PROCEDURE — 97116 GAIT TRAINING THERAPY: CPT | Mod: GP,CQ

## 2024-06-26 PROCEDURE — 2500000001 HC RX 250 WO HCPCS SELF ADMINISTERED DRUGS (ALT 637 FOR MEDICARE OP)

## 2024-06-26 PROCEDURE — 82947 ASSAY GLUCOSE BLOOD QUANT: CPT | Mod: 91

## 2024-06-26 PROCEDURE — 99233 SBSQ HOSP IP/OBS HIGH 50: CPT | Performed by: INTERNAL MEDICINE

## 2024-06-26 PROCEDURE — 2060000001 HC INTERMEDIATE ICU ROOM DAILY

## 2024-06-26 PROCEDURE — 97535 SELF CARE MNGMENT TRAINING: CPT | Mod: GO,CO

## 2024-06-26 PROCEDURE — 97530 THERAPEUTIC ACTIVITIES: CPT | Mod: GP,CQ

## 2024-06-26 PROCEDURE — 2500000004 HC RX 250 GENERAL PHARMACY W/ HCPCS (ALT 636 FOR OP/ED): Performed by: INTERNAL MEDICINE

## 2024-06-26 PROCEDURE — 92507 TX SP LANG VOICE COMM INDIV: CPT | Mod: GN | Performed by: SPEECH-LANGUAGE PATHOLOGIST

## 2024-06-26 PROCEDURE — 97110 THERAPEUTIC EXERCISES: CPT | Mod: GP,CQ

## 2024-06-26 PROCEDURE — 92526 ORAL FUNCTION THERAPY: CPT | Mod: GN | Performed by: SPEECH-LANGUAGE PATHOLOGIST

## 2024-06-26 PROCEDURE — 2500000001 HC RX 250 WO HCPCS SELF ADMINISTERED DRUGS (ALT 637 FOR MEDICARE OP): Performed by: INTERNAL MEDICINE

## 2024-06-26 ASSESSMENT — COGNITIVE AND FUNCTIONAL STATUS - GENERAL
DRESSING REGULAR LOWER BODY CLOTHING: TOTAL
HELP NEEDED FOR BATHING: A LOT
HELP NEEDED FOR BATHING: A LOT
PERSONAL GROOMING: A LOT
TOILETING: A LOT
DAILY ACTIVITIY SCORE: 11
TOILETING: A LOT
DAILY ACTIVITIY SCORE: 10
CLIMB 3 TO 5 STEPS WITH RAILING: TOTAL
DRESSING REGULAR UPPER BODY CLOTHING: TOTAL
WALKING IN HOSPITAL ROOM: A LOT
WALKING IN HOSPITAL ROOM: A LOT
MOVING TO AND FROM BED TO CHAIR: A LOT
MOVING FROM LYING ON BACK TO SITTING ON SIDE OF FLAT BED WITH BEDRAILS: A LITTLE
DRESSING REGULAR UPPER BODY CLOTHING: A LOT
TURNING FROM BACK TO SIDE WHILE IN FLAT BAD: A LOT
DRESSING REGULAR LOWER BODY CLOTHING: A LOT
DRESSING REGULAR LOWER BODY CLOTHING: A LOT
MOBILITY SCORE: 12
TOILETING: A LOT
CLIMB 3 TO 5 STEPS WITH RAILING: TOTAL
CLIMB 3 TO 5 STEPS WITH RAILING: TOTAL
HELP NEEDED FOR BATHING: TOTAL
EATING MEALS: TOTAL
PERSONAL GROOMING: TOTAL
MOBILITY SCORE: 12
MOVING TO AND FROM BED TO CHAIR: A LOT
MOVING TO AND FROM BED TO CHAIR: A LOT
DAILY ACTIVITIY SCORE: 11
DRESSING REGULAR UPPER BODY CLOTHING: A LOT
TURNING FROM BACK TO SIDE WHILE IN FLAT BAD: A LITTLE
STANDING UP FROM CHAIR USING ARMS: A LOT
STANDING UP FROM CHAIR USING ARMS: A LOT
MOVING FROM LYING ON BACK TO SITTING ON SIDE OF FLAT BED WITH BEDRAILS: A LOT
WALKING IN HOSPITAL ROOM: TOTAL
MOBILITY SCORE: 11
PERSONAL GROOMING: A LOT
EATING MEALS: TOTAL
TURNING FROM BACK TO SIDE WHILE IN FLAT BAD: A LOT
STANDING UP FROM CHAIR USING ARMS: TOTAL

## 2024-06-26 ASSESSMENT — ACTIVITIES OF DAILY LIVING (ADL)
BATHING_LEVEL_OF_ASSISTANCE: MAXIMUM ASSISTANCE
HOME_MANAGEMENT_TIME_ENTRY: 43

## 2024-06-26 ASSESSMENT — PAIN SCALES - GENERAL
PAINLEVEL_OUTOF10: 0 - NO PAIN

## 2024-06-26 ASSESSMENT — PAIN - FUNCTIONAL ASSESSMENT
PAIN_FUNCTIONAL_ASSESSMENT: 0-10

## 2024-06-26 NOTE — PROGRESS NOTES
Physical Therapy    Physical Therapy Treatment    Patient Name: Abdi Wilson  MRN: [id number]  Today's Date: 6/26/2024  Time Calculation  Start Time: 1555  Stop Time: 1627  Time Calculation (min): 32 min    Assessment/Plan   PT Assessment  End of Session Communication: Bedside nurse  End of Session Patient Position: Up in chair, Alarm on     PT Plan  Treatment/Interventions: Bed mobility, Transfer training, Gait training, Balance training, Strengthening, Therapeutic exercise  PT Plan: Ongoing PT  PT Frequency: 5 times per week  PT Discharge Recommendations: Moderate intensity level of continued care  PT - OK to Discharge: Yes    General Visit Information:   PT  Visit  PT Received On: 06/26/24  General  Prior to Session Communication: Bedside nurse  Patient Position Received: Bed, 3 rail up, Alarm on    Subjective   Precautions:  Precautions  Medical Precautions: Fall precautions    Objective   Pain:  Pain Assessment  Pain Assessment: 0-10  0-10 (Numeric) Pain Score: 0 - No pain  Cognition:  Cognition  Orientation Level: Disoriented X4 (able to nod yes/no appropriately for orientation questions)    Treatments:  Therapeutic Exercise  Therapeutic Exercise Performed: Yes  Therapeutic Exercise Activity 1: pt completed seated LE  exercises: heel raises x 15 reps, LAQ x 15 reps, hip flexion x 15 reps, hip adduction x 15 reps, hip abduction x  15 reps    Therapeutic Activity  Therapeutic Activity Performed: Yes  Therapeutic Activity 1: pt completed a static stand for 1-2 mi nwith Min Ax1-2 to maintain    Bed Mobility  Bed Mobility: Yes  Bed Mobility 1  Bed Mobility 1: Supine to sitting  Level of Assistance 1: Moderate assistance, +2, Moderate verbal cues  Bed Mobility Comments 1: cues for proper hand placement    Ambulation/Gait Training  Ambulation/Gait Training Performed: Yes  Ambulation/Gait Training 1  Surface 1: Level tile  Device 1: Rolling walker  Assistance 1: Moderate assistance, Maximum verbal cues, Maximum tactile  cues (+2)  Quality of Gait 1: Narrow base of support, Inconsistent stride length, Decreased step length, Shuffling gait, Forward flexed posture (periodic stepping on outside of walker)  Comments/Distance (ft) 1: 10 feet x 2; 65 feet; pt exhibits a strong rigth sided lean with fatigue and amb requiring frequent cues and assist to correct  Transfers  Transfer: Yes  Transfer 1  Technique 1: Sit to stand, Stand to sit  Transfer Device 1: Walker  Transfer Level of Assistance 1: Minimum assistance, +2, Minimal verbal cues, Minimal tactile cues  Trials/Comments 1: cues for proper hand placement  Transfers 2  Transfer to 2: Toilet  Transfer Device 2:  (BSC)  Transfer Level of Assistance 2: Minimum assistance, +2, Moderate verbal cues, Moderate tactile cues  Trials/Comments 2: cues for proper hand placement    Outcome Measures:  [facility] Basic Mobility  Turning from your back to your side while in a flat bed without using bedrails: A lot  Moving from lying on your back to sitting on the side of a flat bed without using bedrails: A lot  Moving to and from bed to chair (including a wheelchair): A lot  Standing up from a chair using your arms (e.g. wheelchair or bedside chair): A lot  To walk in hospital room: A lot  Climbing 3-5 steps with railing: Total  Basic Mobility - Total Score: 11    Education Documentation  No documentation found.  Education Comments  No comments found.        OP EDUCATION:       Encounter Problems       Encounter Problems (Active)       PT Problem       PT Goal 1 (Progressing)       Start:  06/22/24    Expected End:  07/06/24       Increase EMELY LE strength to 5/5 to ease capability to perform bed mobility and transfers         PT Goal 2 (Progressing)       Start:  06/22/24    Expected End:  07/06/24       The patient will be able to perform supine to sit bed mobility Min A x 1         PT Goal 3 (Progressing)       Start:  06/22/24    Expected End:  07/06/24       The patient will be able to perform sit  to stands transfers Min A x 1 with FWW         PT Goal 4 (Progressing)       Start:  06/22/24    Expected End:  07/06/24       The patient will be able to ambulate 10 feet with FWW             Pain - Adult

## 2024-06-26 NOTE — PROGRESS NOTES
Occupational Therapy    OT Treatment    Patient Name: Abdi Wilson  MRN: [id number]  Today's Date: 6/26/2024  Time Calculation  Start Time: 1001  Stop Time: 1044  Time Calculation (min): 43 min         Assessment:  End of Session Communication: Bedside nurse  End of Session Patient Position: Up in chair, Alarm on (acquired new chair for patient, chair in room was too reclined and low.)     Plan:  Treatment Interventions: ADL retraining, Functional transfer training, UE strengthening/ROM, Endurance training, Patient/family training, Equipment evaluation/education, Cognitive reorientation, Visual perceptual retraining, Neuromuscular reeducation, Fine motor coordination activities, Compensatory technique education  OT Frequency: 4 times per week  OT Discharge Recommendations: Moderate intensity level of continued care  Equipment Recommended upon Discharge: Other (comment) (TBD)  OT - OK to Discharge: Yes  Treatment Interventions: ADL retraining, Functional transfer training, UE strengthening/ROM, Endurance training, Patient/family training, Equipment evaluation/education, Cognitive reorientation, Visual perceptual retraining, Neuromuscular reeducation, Fine motor coordination activities, Compensatory technique education    Subjective   Previous Visit Info:  OT Last Visit  OT Received On: 06/26/24  General:  General  Prior to Session Communication: Bedside nurse  Patient Position Received: Bed, 3 rail up, Alarm on  Precautions:  Medical Precautions: Fall precautions (external catheter, dobhoff)     Pain:  Pain Assessment  Pain Assessment: 0-10  0-10 (Numeric) Pain Score: 0 - No pain    Objective    Cognition:  Cognition  Orientation Level: Disoriented X4 (difficult to assess due to aphasia)     Activities of Daily Living: LE Bathing  LE Bathing Level of Assistance: Maximum assistance  LE Bathing Comments: Patient able to participate in LB bathing tasks with wipes while seated on bedside commode.    LE Dressing  LE  Dressing: Yes  Sock Level of Assistance: Dependent  Adult Briefs Level of Assistance: Maximum assistance  LE Dressing Where Assessed: Bedsied commode  LE Dressing Comments: Patient incontinent of bowels while standing, applied brief and changed socks.    Toileting  Toileting Level of Assistance: Maximum assistance  Where Assessed: Bedside commode     Bed Mobility/Transfers: Bed Mobility  Bed Mobility: Yes  Bed Mobility 1  Bed Mobility 1: Supine to sitting  Level of Assistance 1: Moderate assistance, +2    Transfer 1  Technique 1: Stand to sit, Sit to stand  Transfer Device 1: Walker  Transfer Level of Assistance 1: Minimum assistance, +2    Toilet Transfers  Toilet Transfer From: Bed  Toilet Transfer Type: To  Toilet Transfer to: Standard bedside commode  Toilet Transfers: Moderate assistance (assist of 2)  Toilet Transfers Comments: Patient completed bed to bedside commode and to chair with walker and Mod assist of 2.    Functional Mobility:  Functional Mobility  Functional Mobility Performed: Yes  Functional Mobility 1  Surface 1: Level tile  Device 1: Rolling walker  Assistance 1: Moderate assistance (assist of 2.)    Outcome Measures:[facility] Daily Activity  Putting on and taking off regular lower body clothing: A lot  Bathing (including washing, rinsing, drying): A lot  Putting on and taking off regular upper body clothing: A lot  Toileting, which includes using toilet, bedpan or urinal: A lot  Taking care of personal grooming such as brushing teeth: A lot  Eating Meals: Total  Daily Activity - Total Score: 11    Education Documentation  ADL Training, taught by LUCRETIA Howell at 6/26/2024 12:11 PM.  Learner: Family, Patient  Readiness: Acceptance  Method: Explanation  Response: Needs Reinforcement    Education Comments  No comments found.           Goals:  Encounter Problems       Encounter Problems (Active)       ADLs       Patient will perform UB and LB bathing with moderate assist level of assistance  and PRN AE/DME. (Progressing)       Start:  06/22/24    Expected End:  07/05/24            Patient with complete upper body dressing with minimal assist  level of assistance donning and doffing all UE clothes with PRN adaptive equipment while supported sitting (Progressing)       Start:  06/22/24    Expected End:  07/05/24            Patient with complete lower body dressing with moderate assist level of assistance donning and doffing all LE clothes  with PRN adaptive equipment while supported sitting (Progressing)       Start:  06/22/24    Expected End:  07/05/24            Patient will feed self with supervision level of assistance and using PRN adaptive equipment. (Progressing)       Start:  06/22/24    Expected End:  07/05/24            Patient will complete daily grooming tasks brushing teeth and washing face/hair with supervision level of assistance and PRN adaptive equipment while supported sitting. (Progressing)       Start:  06/22/24    Expected End:  07/05/24            Patient will complete toileting including hygiene clothing management/hygiene with moderate assist level of assistance and PRN DME/AE. (Progressing)       Start:  06/22/24    Expected End:  07/05/24               EXERCISE/STRENGTHENING       Patient will complete BUE exercise with min A to promote ROM, strength, and coordination for increased participation in Adls and functional mobility  (Progressing)       Start:  06/22/24    Expected End:  07/05/24

## 2024-06-26 NOTE — PROGRESS NOTES
Abdi Wilson is a 79 y.o. male on day 5 of admission presenting with Stroke-like symptoms.    Subjective   Abdi Wilson is a 79 y.o. male with past medical history of hypertension, hyperlipidemia, coronary artery disease, CVA, ulcerative colitis GERD and ? Seizures presents to the emergency room from the skilled nursing facility with strokelike symptoms.  Patient is a poor historian due to his dysphagia but according to the family at bedside his last known well was about 5:45 AM this morning and by 6 and a wellness check attempted on him showed aphasia.  Patient is usually wheelchair-bound due to his chronic ambulatory difficulties.  But even with his dementia is able to communicate with family although with difficulty in remembering details.  It was also noticed that patient had slight drooping on the left side of the face.  EMS was called and patient was transported to the emergency room for further evaluation.      On admission in the emergency room he was hemodynamically stable with a slightly elevated blood pressure of 163/91 mmHg, heart rate of 68/min, respirate rate of 20/min, temperature of 36.1 °C and was saturating 95% on nasal cannula oxygen.  Initial CBC and BMP were unremarkable except for WBC of 11.8.  A CT of the brain was done which showed no acute intracranial hemorrhage or seizures brain herniation.  NIH stroke scale was 4 however after discussion with ER physician family elected to skip TNK.  He has been admitted for further evaluation and management.        Past Medical History  He has no past medical history on file.     Surgical History  He has no past surgical history on file.     Social History  He has no history on file for tobacco use, alcohol use, and drug use.     Family History  Family History   No family history on file.        Allergies  Patient has no known allergies.    6/22: Patient with significant dysphagia so n.p.o. did consult surgery to place KO feed.  Will use PPN overnight.   Will give one-time dose of IV Decadron to help with reducing the swelling from the stroke to see whether this will help with symptoms and function in the short-term.  MRI does demonstrate acute infarct of the posterior limb of the right internal capsule/anterior thalamus.  Moderate global parenchymal volume loss with additional remote infarcts.  The right vertebral artery is markedly stenosed flow irregularity within the distal right internal carotid artery without definitive visualization of the right anterior choroidal artery.  Patient chronically wheelchair-bound patient does have dementia normally does have difficulty remembering details but is usually able to communicate with family well.  6/23: Patient's has a KO feed tube and that what appears to be in stomach on film awaiting official reading.  Will start some tube feeds after that is read.  Receiving TPN currently.  Speech is improved a little today.  Continue with supportive care likely will begin meds down KO feed starting tomorrow.  Continue PT OT and speech therapy.  6/24: Patient still with significant dysphagia and expressive aphasia.  Is having some slight aspiration of secretions will start Unasyn right lower lobe pneumonia very small.  Will need to work on discharge may need acute rehab.  Patient somewhat tearful today understandably.  6/25: Will switch metoprolol to carvedilol since it can be crushed and put down NG.  Working on discharge planning will need subacute versus acute rehab.  Continue IV Unasyn for now will switch to Augmentin on discharge.  Anticipate discharge in the next 48 hours.  6/26: Patient having difficulty with KO feed clogging.  Did recently guidewire down several times and irrigate with very hot water as well as diet ginger ale.  Was able to get the tube open initially then it clogged.  Repeated the procedures and now KO feed is working for now.  Plan discharge to [facility] in the morning.  Continue IV Unasyn.  Speech therapy  "working aggressively with patient.  Somewhat fatigued sitting up in the chair today.  Continues to have difficulty controlling secretions.       Objective     Physical Exam    EXAM:    Constitutional: Patient is able to speak a little bit today.  Resting comfortably sitting up in chair NG bridled in place.    Head and facial: Patient has a Keofeed in his left nostril currently.  Some drooling    Neck: No gross JVD some drooling    Lungs: Clear slight decreased breath sounds in the bases.  Some moist cough    Heart: Regular heart rate and mike    Abdomen: Nontender    Pelvis/: No flank tenderness    Extremities: No gross edema    Neurologic: Neurologic expressive aphasia and component of receptive aphasia.  Mostly expressive with slow and difficult articulation.  Difficulty with initiating swallow response with thermal stimulation is able to do it but fatigues.  Some slight left facial droop strength is grossly normal in upper and lower extremities.  Normal touch normal finger-to-nose    Dermatologic: No gross abnormalities    Psychiatric/behavioral: Unable to assess due to expressive aphasia      Last Recorded Vitals  Blood pressure 156/89, pulse 97, temperature 36.2 °C (97.1 °F), temperature source Temporal, resp. rate 20, height 1.88 m (6' 2\"), weight 90.5 kg (199 lb 8.3 oz), SpO2 91%.  Intake/Output last 3 Shifts:  I/O last 3 completed shifts:  In: 2631 (29.1 mL/kg) [NG/GT:2231; IV Piggyback:400]  Out: 1750 (19.3 mL/kg) [Urine:1750 (0.5 mL/kg/hr)]  Weight: 90.5 kg     Relevant Results  Scheduled medications  ampicillin-sulbactam, 3 g, intravenous, q6h  aspirin, 81 mg, oral, Daily  aspirin, 150 mg, rectal, Daily  atorvastatin, 80 mg, oral, Nightly  carvedilol, 6.25 mg, oral, BID  [Held by provider] cholecalciferol, 4,000 Units, oral, Daily  [Held by provider] cholestyramine, 1 packet, oral, Daily  clopidogrel, 75 mg, oral, Daily  [Held by provider] cyanocobalamin, 1,000 mcg, oral, Daily  enoxaparin, 40 mg, " subcutaneous, q24h  losartan, 100 mg, oral, Daily  [Held by provider] omega-3, 500 mg, oral, Daily      Continuous medications       PRN medications  PRN medications: [Held by provider] acetaminophen, bisacodyl, [] hydrALAZINE **FOLLOWED BY** hydrALAZINE, [Held by provider] loperamide, magnesium hydroxide, oxygen, sennosides-docusate sodium       Labs from the last few days have been reviewed.    Assessment/Plan   Acute stroke right anterior thalamus  Vertebral artery occlusion  Dysphagia  Expressive aphasia  Small aspiration pneumonia  Dementia  Hypertension  Hyperlipidemia  DNR CCA  DVT prophylaxis subcu Lovenox        Keofeed-bridled in place  Jevity 1.5 70 mL an hour  Free water per NG  Suctioning as needed  KO feed unclogged  Unasyn  Plavix  ASA per NG  Statin  Neurology consult  PT and OT  Speech therapy  N.p.o.  carvedilol 6.25 p.o. twice daily  Losartan 100 mg a day  Plan discharge to rehab versus subacute rehab  Check labs in a.m.  See orders complete plan    .       I spent 40 minutes in the professional and overall care of this patient.      Ezekiel Cintron MD

## 2024-06-26 NOTE — CARE PLAN
The patient's goals for the shift include      The clinical goals for the shift include Pt will not pull NG tube out.    Over the shift, the patient did not make progress toward the following goals. Barriers to progression include confusion. Recommendations to address these barriers include frequent rounding.

## 2024-06-26 NOTE — PROGRESS NOTES
Speech-Language Pathology    SLP Adult Inpatient SLP Treatment:     Patient Name: Abdi Wilson  MRN: [id number]  Today's Date: 6/26/2024   Time Calculation  Start Time: 1122  Stop Time: 1226  Time Calculation (min): 64 min         Current Problem:   1. Stroke-like symptoms  Transthoracic Echo (TTE) Complete    Transthoracic Echo (TTE) Complete      2. Hx of TIA (transient ischemic attack) and stroke  Transthoracic Echo (TTE) Complete    Transthoracic Echo (TTE) Complete        Impression:  Pt was alert and cooperative during the SLP visit but as the session progressed pt limited by fatigue.  Pt continues to exhibit severe oral phase and suspected pharyngeal phase dysphagia based on continued swallow assessments as evidenced by poor oral control, decreased oral coordination, anterior bolus spillage and overall reduced oral strength as well as inability to consistently trigger the swallow independently.   Pt has a Dobbhoff  and continued tube feeding is recommended to meet adequate nutrition and hydration.     Pt continues to present with moderate to severe expressive communication impairment (dysarthria and suspected apraxia of speech>potential aphasia). In implementing AAC boards, pt was accurate at identifying 25/26 letter. Continued therapy in this area to explore other avenues of expression. Receptive communication skills appeared intact for personal and simple yes/no questions as evidenced by pointing to Y (yes) and N (no) on the alphabet board.       He would benefit from skilled ST to improve swallow safety, improve functional communication skills for identifying wants/needs, improve functional communication skills for basic thoughts/ideas, and improve quality of life.     SLP Assessment:  SLP Assessment  SLP TX Intervention Outcome: Making Progress Towards Goals  Treatment Tolerance: Patient tolerated treatment well      SLP Plan:  Plan  Inpatient/Swing Bed or Outpatient: Inpatient  SLP TX Plan: Continue Plan  of Care  SLP Plan: Skilled SLP  SLP Frequency: 3x per week  Duration: Current admission  Next Treatment Priority: Continue thermal stimulation with the SLP and assessing utilization of AAC boards.   Discussed POC: Patient, Nursing, Physician  Patient/Caregiver Agreeable: Yes  SLP - OK to Discharge: Yes (when medically stable as per physician/TANNER)      Subjective   Current Problem:  Pt presenting with receptive and expressive communication deficits and oropharyngeal dysphagia due to stroke.      Most Recent Visit:  SLP Most Recent Visit  SLP Received On: 06/26/24      General Visit Information:  General Information  Prior to Session Communication: Bedside nurse      Objective   Pain:  Pain Assessment  Pain Assessment: 0-10  0-10 (Numeric) Pain Score: 0 - No pain      Therapeutic swallow:    Pt exhibited drooling of saliva throughout the session. Pt had an immediate cough, after being cued to swallow.      SLP educated the family on the thermal stimulation exercises and the rationale behind it.      SLP utilized ice water and swabs for thermal stimulation of the right and left faucial pillars.   Decreased oral coordination noted during thermal stimulation prior to swallow initiation.   Pt triggered 19/21 swallows in response to stimulation. No voluntary swallows seen.      The swallowing trigger was timely for few trials, and at the end of the session during 18 th trial, the delay in swallowing was ~ 15 seconds. For the last two trials, pt was limited by fatigue and no swallow was triggered in response to stimulation.        Motor Speech Production:   SLP modeled CV sounds for the pt to repeat (pa, ba, ka). Pt repeated pa -2/5, ba -4/5 and ka -1/5. SLP modeled pt first and last name and pt was able to say his first name (2/2) with increased intelligibility however the last name was unintelligible. Pt had difficulty with the /k/ sound in terms of tongue retraction to produce the /k/ sound.   Similarly, during automatic  "language task, pt exhibited reduced lingual range of motion mainly for /s/, /t/, and /n/ sound.         Augmentative device intervention:  Pt was given alphabet communication board and a communication board with day to day objects and emotions in them. Pt was able to locate 25/26 letters and 10/10 (0-9) numbers in the alphabet communication board accurately without any additional cues/prompts.     Pt was provided with 7 emotion icons an pt was able to locate \"tired\" when asked how he was feeling.     Verbal:   Automatic Language Tasks: Pt was asked to name RADHA and pt was able to name until Wednesday with decreased intelligibility due to distortion of sounds and low volume. For the remaining 3 days of the week, response was a low soft tone without any recognizable sounds.     Language comprehension:  Yes/No questions: Pt was given the alphabet board and pt was able to answer 7/7 yes or no questions accurately. Pt would point to Y for yes and N for No.          Goals: (start date 6/22/24. Anticipated time frame for goal attainment: 2 weeks):  Pt will consume PO trials with SLP without s/sx aspiration in order to determine readiness for MBSS              Status: Ongoing              Progress this date: No PO trials were given during this session.      Pt demonstrate improved secretion management as evidence by dry vocal quality, reduced baseline coughing, and no further need for suctioning.              Status: Ongoing              Progress this date: Pt exhibited increased drooling during the session and pt needed verbal and visual cues x 3 to initiate one swallow and pt started coughing immediately.      Pt will use multimodal communication to communicate basic wants/needs by answering yes/no question in 80% of opportunities given min assist.              Status: Progressing              Progress this date: Pt was able to use communication board and answer 7/7 yes/no questions accurately and point to emotion icon " "\"tired\" when asked how he was feeling.      Pt will demonstrate CV sounds (ba, pa, ma) in 80% of opportunities given frequent phonemic placement cues.               Status: Progressing              Progress this date: Pt improving intelligibility of CV sounds and simple functional words this date requiring mod visual/verbal cues. Pt produced his first name with increased intelligibility however for the last name the intelligibility dropped to being unintelligible.      Treatment/Education:  Results and recommendations were relayed to: Patient, Family, and Physician  Education provided: Yes              Learner: Patient and Family              Barriers to learning: Acuteness of illness barrier and Communication limitations barrier              Method of teaching: Verbal and Written              Topic: Role of ST, results of assessment, risk for aspiration, recommended diet modifications, recommendation for MBSS, recommended safe swallow strategies, and recommendation for dysphagia follow-up              Outcome of teaching: Pt/family demonstrated good understanding                  "

## 2024-06-27 VITALS
TEMPERATURE: 97.8 F | RESPIRATION RATE: 19 BRPM | HEIGHT: 74 IN | DIASTOLIC BLOOD PRESSURE: 87 MMHG | OXYGEN SATURATION: 93 % | BODY MASS INDEX: 25.75 KG/M2 | SYSTOLIC BLOOD PRESSURE: 170 MMHG | WEIGHT: 200.62 LBS | HEART RATE: 71 BPM

## 2024-06-27 PROBLEM — R41.0 CONFUSION: Status: ACTIVE | Noted: 2024-06-27

## 2024-06-27 PROBLEM — I63.9 CEREBRAL INFARCTION, UNSPECIFIED (MULTI): Status: ACTIVE | Noted: 2023-07-28

## 2024-06-27 PROBLEM — I63.50 CEREBRAL ARTERY OCCLUSION WITH CEREBRAL INFARCTION (MULTI): Status: ACTIVE | Noted: 2021-11-06

## 2024-06-27 LAB
ANION GAP SERPL CALC-SCNC: 12 MMOL/L (ref 10–20)
BASOPHILS # BLD AUTO: 0.04 X10*3/UL (ref 0–0.1)
BASOPHILS NFR BLD AUTO: 0.4 %
BUN SERPL-MCNC: 20 MG/DL (ref 6–23)
CALCIUM SERPL-MCNC: 8.2 MG/DL (ref 8.6–10.3)
CHLORIDE SERPL-SCNC: 107 MMOL/L (ref 98–107)
CO2 SERPL-SCNC: 24 MMOL/L (ref 21–32)
CREAT SERPL-MCNC: 0.94 MG/DL (ref 0.5–1.3)
EGFRCR SERPLBLD CKD-EPI 2021: 81 ML/MIN/1.73M*2
EOSINOPHIL # BLD AUTO: 0.28 X10*3/UL (ref 0–0.4)
EOSINOPHIL NFR BLD AUTO: 3.1 %
ERYTHROCYTE [DISTWIDTH] IN BLOOD BY AUTOMATED COUNT: 13.7 % (ref 11.5–14.5)
GLUCOSE BLD MANUAL STRIP-MCNC: 112 MG/DL (ref 74–99)
GLUCOSE BLD MANUAL STRIP-MCNC: 119 MG/DL (ref 74–99)
GLUCOSE BLD MANUAL STRIP-MCNC: 119 MG/DL (ref 74–99)
GLUCOSE BLD MANUAL STRIP-MCNC: 88 MG/DL (ref 74–99)
GLUCOSE SERPL-MCNC: 115 MG/DL (ref 74–99)
HCT VFR BLD AUTO: 44.5 % (ref 41–52)
HGB BLD-MCNC: 15 G/DL (ref 13.5–17.5)
IMM GRANULOCYTES # BLD AUTO: 0.02 X10*3/UL (ref 0–0.5)
IMM GRANULOCYTES NFR BLD AUTO: 0.2 % (ref 0–0.9)
LYMPHOCYTES # BLD AUTO: 1.65 X10*3/UL (ref 0.8–3)
LYMPHOCYTES NFR BLD AUTO: 18.3 %
MAGNESIUM SERPL-MCNC: 1.86 MG/DL (ref 1.6–2.4)
MCH RBC QN AUTO: 29.8 PG (ref 26–34)
MCHC RBC AUTO-ENTMCNC: 33.7 G/DL (ref 32–36)
MCV RBC AUTO: 88 FL (ref 80–100)
MONOCYTES # BLD AUTO: 1.06 X10*3/UL (ref 0.05–0.8)
MONOCYTES NFR BLD AUTO: 11.7 %
NEUTROPHILS # BLD AUTO: 5.99 X10*3/UL (ref 1.6–5.5)
NEUTROPHILS NFR BLD AUTO: 66.3 %
NRBC BLD-RTO: 0 /100 WBCS (ref 0–0)
PLATELET # BLD AUTO: 238 X10*3/UL (ref 150–450)
POTASSIUM SERPL-SCNC: 3.5 MMOL/L (ref 3.5–5.3)
RBC # BLD AUTO: 5.04 X10*6/UL (ref 4.5–5.9)
SODIUM SERPL-SCNC: 139 MMOL/L (ref 136–145)
WBC # BLD AUTO: 9 X10*3/UL (ref 4.4–11.3)

## 2024-06-27 PROCEDURE — 2500000004 HC RX 250 GENERAL PHARMACY W/ HCPCS (ALT 636 FOR OP/ED): Performed by: INTERNAL MEDICINE

## 2024-06-27 PROCEDURE — 85025 COMPLETE CBC W/AUTO DIFF WBC: CPT | Performed by: INTERNAL MEDICINE

## 2024-06-27 PROCEDURE — 82947 ASSAY GLUCOSE BLOOD QUANT: CPT | Mod: 91

## 2024-06-27 PROCEDURE — 2500000001 HC RX 250 WO HCPCS SELF ADMINISTERED DRUGS (ALT 637 FOR MEDICARE OP)

## 2024-06-27 PROCEDURE — 2500000001 HC RX 250 WO HCPCS SELF ADMINISTERED DRUGS (ALT 637 FOR MEDICARE OP): Performed by: INTERNAL MEDICINE

## 2024-06-27 PROCEDURE — 99239 HOSP IP/OBS DSCHRG MGMT >30: CPT | Performed by: INTERNAL MEDICINE

## 2024-06-27 PROCEDURE — 36415 COLL VENOUS BLD VENIPUNCTURE: CPT | Performed by: INTERNAL MEDICINE

## 2024-06-27 PROCEDURE — 83735 ASSAY OF MAGNESIUM: CPT | Performed by: INTERNAL MEDICINE

## 2024-06-27 PROCEDURE — 80048 BASIC METABOLIC PNL TOTAL CA: CPT | Performed by: INTERNAL MEDICINE

## 2024-06-27 RX ORDER — CARVEDILOL 6.25 MG/1
6.25 TABLET ORAL 2 TIMES DAILY
Start: 2024-06-27

## 2024-06-27 RX ORDER — ASPIRIN 81 MG/1
81 TABLET ORAL DAILY
Qty: 21 TABLET | Refills: 0 | Status: SHIPPED | OUTPATIENT
Start: 2024-06-27

## 2024-06-27 RX ORDER — AMOXICILLIN AND CLAVULANATE POTASSIUM 875; 125 MG/1; MG/1
1 TABLET, FILM COATED ORAL 2 TIMES DAILY
Qty: 10 TABLET | Refills: 0 | Status: SHIPPED | OUTPATIENT
Start: 2024-06-27

## 2024-06-27 RX ORDER — ATORVASTATIN CALCIUM 80 MG/1
80 TABLET, FILM COATED ORAL NIGHTLY
Qty: 90 TABLET | Refills: 1 | Status: SHIPPED | OUTPATIENT
Start: 2024-06-27

## 2024-06-27 ASSESSMENT — PAIN SCALES - GENERAL
PAINLEVEL_OUTOF10: 0 - NO PAIN

## 2024-06-27 NOTE — DISCHARGE SUMMARY
Discharge Diagnosis      Acute stroke right anterior thalamus  Vertebral artery occlusion  Dysphagia  Expressive aphasia  Small aspiration pneumonia  Difficulty with secretions  Dementia  Hypertension  Hyperlipidemia  DNR CCA    Issues Requiring Follow-Up  [facility] and OT n.p.o. continue tube feeds.  See after visit summary and goldenrod for complete plan.    Discharge Meds     Your medication list        START taking these medications        Instructions Last Dose Given Next Dose Due   amoxicillin-pot clavulanate 875-125 mg tablet  Commonly known as: Augmentin      Take 1 tablet by mouth 2 times a day. 1 twice a day for 10 doses per NG.       aspirin 81 mg EC tablet      Take 1 tablet (81 mg) by mouth once daily. Take for 21 days and stop       atorvastatin 80 mg tablet  Commonly known as: Lipitor      Take 1 tablet (80 mg) by mouth once daily at bedtime.       carvedilol 6.25 mg tablet  Commonly known as: Coreg      Take 1 tablet (6.25 mg) by mouth 2 times a day.              CHANGE how you take these medications        Instructions Last Dose Given Next Dose Due   cholecalciferol 50,000 unit capsule  Commonly known as: Vitamin D-3  What changed: Another medication with the same name was removed. Continue taking this medication, and follow the directions you see here.                  CONTINUE taking these medications        Instructions Last Dose Given Next Dose Due   acetaminophen 325 mg tablet  Commonly known as: Tylenol           clopidogrel 75 mg tablet  Commonly known as: Plavix           cyanocobalamin 1,000 mcg tablet  Commonly known as: Vitamin B-12           Dulcolax (bisacodyl) 10 mg suppository  Generic drug: bisacodyl           hydrALAZINE 25 mg tablet  Commonly known as: Apresoline           lidocaine 4 % cream  Commonly known as: LMX           loperamide 2 mg tablet  Commonly known as: Imodium A-D           losartan 100 mg tablet  Commonly known as: Cozaar            magnesium hydroxide 2,400 mg/10 mL suspension suspension  Commonly known as: Milk of Magnesia                  STOP taking these medications      cholestyramine 4 gram packet  Commonly known as: Questran        Fish OiL 60- mg capsule  Generic drug: omega-3        metoprolol succinate  mg 24 hr tablet  Commonly known as: Toprol-XL                  Where to Get Your Medications        These medications were sent to [facility]  [id number] [address]      Hours: 8AM to 6PM Mon-Fri, 8AM to 4PM Sat-Sun Phone: [phone]   amoxicillin-pot clavulanate 875-125 mg tablet  aspirin 81 mg EC tablet  atorvastatin 80 mg tablet       Information about where to get these medications is not yet available    Ask your nurse or doctor about these medications  carvedilol 6.25 mg tablet         Test Results Pending At Discharge  Pending Labs       No current pending labs.            Hospital Course   Abdi Wilson is a 79 y.o. male on day 6 of admission presenting with Stroke-like symptoms.        Subjective   Abdi Wilson is a 79 y.o. male with past medical history of hypertension, hyperlipidemia, coronary artery disease, CVA, ulcerative colitis GERD and ? Seizures presents to the emergency room from the [facility] nursing facility with strokelike symptoms.  Patient is a poor historian due to his dysphagia but according to the family at bedside his last known well was about 5:45 AM this morning and by 6 and a wellness check attempted on him showed aphasia.  Patient is usually wheelchair-bound due to his chronic ambulatory difficulties.  But even with his dementia is able to communicate with family although with difficulty in remembering details.  It was also noticed that patient had slight drooping on the left side of the face.  EMS was called and patient was transported to the emergency room for further evaluation.      On admission in the emergency room he was hemodynamically stable with a  slightly elevated blood pressure of 163/91 mmHg, heart rate of 68/min, respirate rate of 20/min, temperature of 36.1 °C and was saturating 95% on nasal cannula oxygen.  Initial CBC and BMP were unremarkable except for WBC of 11.8.  A CT of the brain was done which showed no acute intracranial hemorrhage or seizures brain herniation.  NIH stroke scale was 4 however after discussion with ER physician family elected to skip TNK.  He has been admitted for further evaluation and management.        Past Medical History  He has no past medical history on file.     Surgical History  He has no past surgical history on file.     Social History  He has no history on file for tobacco use, alcohol use, and drug use.     Family History  Family History   No family history on file.         Allergies  Patient has no known allergies.     6/22: Patient with significant dysphagia so n.p.o. did consult surgery to place KO feed.  Will use PPN overnight.  Will give one-time dose of IV Decadron to help with reducing the swelling from the stroke to see whether this will help with symptoms and function in the short-term.  MRI does demonstrate acute infarct of the posterior limb of the right internal capsule/anterior thalamus.  Moderate global parenchymal volume loss with additional remote infarcts.  The right vertebral artery is markedly stenosed flow irregularity within the distal right internal carotid artery without definitive visualization of the right anterior choroidal artery.  Patient chronically wheelchair-bound patient does have dementia normally does have difficulty remembering details but is usually able to communicate with family well.  6/23: Patient's has a KO feed tube and that what appears to be in stomach on film awaiting official reading.  Will start some tube feeds after that is read.  Receiving TPN currently.  Speech is improved a little today.  Continue with supportive care likely will begin meds down KO feed starting  tomorrow.  Continue PT OT and speech therapy.  6/24: Patient still with significant dysphagia and expressive aphasia.  Is having some slight aspiration of secretions will start Unasyn right lower lobe pneumonia very small.  Will need to work on discharge may need acute rehab.  Patient somewhat tearful today understandably.  6/25: Will switch metoprolol to carvedilol since it can be crushed and put down NG.  Working on discharge planning will need subacute versus acute rehab.  Continue IV Unasyn for now will switch to Augmentin on discharge.  Anticipate discharge in the next 48 hours.  6/26: Patient having difficulty with KO feed clogging.  Did recently guidewire down several times and irrigate with very hot water as well as diet ginger ale.  Was able to get the tube open initially then it clogged.  Repeated the procedures and now KO feed is working for now.  Plan discharge to ECF in the morning.  Continue IV Unasyn.  Speech therapy working aggressively with patient.  Somewhat fatigued sitting up in the chair today.  Continues to have difficulty controlling secretions.  6/27: Patient still having difficulty with secretions requiring suctioning.  KO feed tube is working.  Will need intensive PT OT and speech therapy at [facility] nursing facility.  Labs are within acceptable range.     Okay to discharge to ECF hopefully they are able to handle his level of care.     See after visit summary and goldenrod for complete plan     35 minutes spent in the care of this patient.          Pertinent Physical Exam At Time of Discharge  Physical Exam  See today's progress note for more physical exam findings  Outpatient Follow-Up  No future appointments.      Ezekiel Cintron MD

## 2024-06-27 NOTE — CARE PLAN
The patient's goals for the shift include      The clinical goals for the shift include Pt will show improvement in neuro exam.    Over the shift, the patient did not make progress toward the following goals. Barriers to progression include history of CVA. Recommendations to address these barriers include frequent neuro assessments.

## 2024-06-27 NOTE — PROGRESS NOTES
Abdi Wilson is a 79 y.o. male on day 6 of admission presenting with Stroke-like symptoms.    Subjective   Abdi Wilson is a 79 y.o. male with past medical history of hypertension, hyperlipidemia, coronary artery disease, CVA, ulcerative colitis GERD and ? Seizures presents to the emergency room from the skilled nursing facility with strokelike symptoms.  Patient is a poor historian due to his dysphagia but according to the family at bedside his last known well was about 5:45 AM this morning and by 6 and a wellness check attempted on him showed aphasia.  Patient is usually wheelchair-bound due to his chronic ambulatory difficulties.  But even with his dementia is able to communicate with family although with difficulty in remembering details.  It was also noticed that patient had slight drooping on the left side of the face.  EMS was called and patient was transported to the emergency room for further evaluation.      On admission in the emergency room he was hemodynamically stable with a slightly elevated blood pressure of 163/91 mmHg, heart rate of 68/min, respirate rate of 20/min, temperature of 36.1 °C and was saturating 95% on nasal cannula oxygen.  Initial CBC and BMP were unremarkable except for WBC of 11.8.  A CT of the brain was done which showed no acute intracranial hemorrhage or seizures brain herniation.  NIH stroke scale was 4 however after discussion with ER physician family elected to skip TNK.  He has been admitted for further evaluation and management.        Past Medical History  He has no past medical history on file.     Surgical History  He has no past surgical history on file.     Social History  He has no history on file for tobacco use, alcohol use, and drug use.     Family History  Family History   No family history on file.        Allergies  Patient has no known allergies.    6/22: Patient with significant dysphagia so n.p.o. did consult surgery to place KO feed.  Will use PPN overnight.   Will give one-time dose of IV Decadron to help with reducing the swelling from the stroke to see whether this will help with symptoms and function in the short-term.  MRI does demonstrate acute infarct of the posterior limb of the right internal capsule/anterior thalamus.  Moderate global parenchymal volume loss with additional remote infarcts.  The right vertebral artery is markedly stenosed flow irregularity within the distal right internal carotid artery without definitive visualization of the right anterior choroidal artery.  Patient chronically wheelchair-bound patient does have dementia normally does have difficulty remembering details but is usually able to communicate with family well.  6/23: Patient's has a KO feed tube and that what appears to be in stomach on film awaiting official reading.  Will start some tube feeds after that is read.  Receiving TPN currently.  Speech is improved a little today.  Continue with supportive care likely will begin meds down KO feed starting tomorrow.  Continue PT OT and speech therapy.  6/24: Patient still with significant dysphagia and expressive aphasia.  Is having some slight aspiration of secretions will start Unasyn right lower lobe pneumonia very small.  Will need to work on discharge may need acute rehab.  Patient somewhat tearful today understandably.  6/25: Will switch metoprolol to carvedilol since it can be crushed and put down NG.  Working on discharge planning will need subacute versus acute rehab.  Continue IV Unasyn for now will switch to Augmentin on discharge.  Anticipate discharge in the next 48 hours.  6/26: Patient having difficulty with KO feed clogging.  Did recently guidewire down several times and irrigate with very hot water as well as diet ginger ale.  Was able to get the tube open initially then it clogged.  Repeated the procedures and now KO feed is working for now.  Plan discharge to [facility] in the morning.  Continue IV Unasyn.  Speech therapy  "working aggressively with patient.  Somewhat fatigued sitting up in the chair today.  Continues to have difficulty controlling secretions.  6/27: Patient still having difficulty with secretions requiring suctioning.  KO feed tube is working.  Will need intensive PT OT and speech therapy at [facility] nursing facility.  Labs are within acceptable range.    Okay to discharge to [facility] hopefully they are able to handle his level of care.    See after visit summary and goldenrod for complete plan    35 minutes spent in the care of this patient.       Objective     Physical Exam    EXAM:    Constitutional: Patient is able to speak a little bit today.  Resting comfortably sitting up in chair NG bridled in place.    Head and facial: Patient has a Keofeed in his left nostril currently.  Some drooling    Neck: No gross JVD some drooling    Lungs: Clear slight decreased breath sounds in the bases.  Some moist cough    Heart: Regular heart rate and mike    Abdomen: Nontender    Pelvis/: No flank tenderness    Extremities: No gross edema    Neurologic: Neurologic expressive aphasia and component of receptive aphasia.  Mostly expressive with slow and difficult articulation.  Difficulty with initiating swallow response with thermal stimulation is able to do it but fatigues.  Some slight left facial droop strength is grossly normal in upper and lower extremities.  Normal touch normal finger-to-nose    Dermatologic: No gross abnormalities    Psychiatric/behavioral: Unable to assess due to expressive aphasia      Last Recorded Vitals  Blood pressure 170/87, pulse 71, temperature 36.6 °C (97.8 °F), temperature source Temporal, resp. rate 19, height 1.88 m (6' 2\"), weight 91 kg (200 lb 9.9 oz), SpO2 93%.  Intake/Output last 3 Shifts:  I/O last 3 completed shifts:  In: 2408 (26.5 mL/kg) [NG/GT:2408]  Out: 1325 (14.6 mL/kg) [Urine:1325 (0.4 mL/kg/hr)]  Weight: 91 kg     Relevant Results  Scheduled medications  ampicillin-sulbactam, 3 g, " intravenous, q6h  aspirin, 81 mg, oral, Daily  aspirin, 150 mg, rectal, Daily  atorvastatin, 80 mg, oral, Nightly  carvedilol, 6.25 mg, oral, BID  [Held by provider] cholecalciferol, 4,000 Units, oral, Daily  [Held by provider] cholestyramine, 1 packet, oral, Daily  clopidogrel, 75 mg, oral, Daily  [Held by provider] cyanocobalamin, 1,000 mcg, oral, Daily  enoxaparin, 40 mg, subcutaneous, q24h  losartan, 100 mg, oral, Daily  [Held by provider] omega-3, 500 mg, oral, Daily      Continuous medications       PRN medications  PRN medications: [Held by provider] acetaminophen, bisacodyl, [] hydrALAZINE **FOLLOWED BY** hydrALAZINE, [Held by provider] loperamide, magnesium hydroxide, oxygen, sennosides-docusate sodium       Labs from the last few days have been reviewed.    Assessment/Plan   Acute stroke right anterior thalamus  Vertebral artery occlusion  Dysphagia  Expressive aphasia  Small aspiration pneumonia  Dementia  Hypertension  Hyperlipidemia  DNR CCA  DVT prophylaxis subcu Lovenox        See after visit summary and goldenrod for complete plan.    .       I spent 40 minutes in the professional and overall care of this patient.      Ezekiel Cintron MD

## 2024-06-27 NOTE — PROGRESS NOTES
06/27/24 1352   Discharge Planning   Does the patient need discharge transport arranged? Yes   RoundTrip coordination needed? Yes   Has discharge transport been arranged? Yes   What day is the transport expected? 06/27/24   What time is the transport expected? 1730     Notified RN and patients son of transport time of 5:30 to [facility] . Answered all questions and verbalized understanding.  Report number is

## 2024-06-27 NOTE — CARE PLAN
Patient being discharged.  Report called to [facility].  Transport scheduled for 1730    The clinical goals for the shift include pt will show improvement with neuro exam, tolerate ng tube, maintain safety      Problem: General Stroke  Goal: Establish a mutual long term goal with patient by discharge  Outcome: Adequate for Discharge  Goal: Demonstrate improvement in neurological exam throughout the shift  Outcome: Adequate for Discharge  Goal: Maintain BP within ordered limits throughout shift  Outcome: Adequate for Discharge  Goal: Participate in treatment (ie., meds, therapy) throughout shift  Outcome: Adequate for Discharge  Goal: No symptoms of aspiration throughout shift  Outcome: Adequate for Discharge  Goal: No symptoms of hemorrhage throughout shift  Outcome: Adequate for Discharge  Goal: Tolerate enteral feeding throughout shift  Outcome: Adequate for Discharge  Goal: Decreased nausea/vomiting throughout shift  Outcome: Adequate for Discharge  Goal: Controlled blood glucose throughout shift  Outcome: Adequate for Discharge  Goal: Out of bed three times today  Outcome: Adequate for Discharge     Problem: Skin  Goal: Decreased wound size/increased tissue granulation at next dressing change  Outcome: Adequate for Discharge  Goal: Participates in plan/prevention/treatment measures  Outcome: Adequate for Discharge  Goal: Prevent/manage excess moisture  Outcome: Adequate for Discharge  Goal: Prevent/minimize sheer/friction injuries  Outcome: Adequate for Discharge  Goal: Promote/optimize nutrition  Outcome: Adequate for Discharge  Goal: Promote skin healing  Outcome: Adequate for Discharge     Problem: Fall/Injury  Goal: Not fall by end of shift  Outcome: Adequate for Discharge  Goal: Be free from injury by end of the shift  Outcome: Adequate for Discharge  Goal: Verbalize understanding of personal risk factors for fall in the hospital  Outcome: Adequate for Discharge  Goal: Verbalize understanding of  risk factor reduction measures to prevent injury from fall in the home  Outcome: Adequate for Discharge  Goal: Use assistive devices by end of the shift  Outcome: Adequate for Discharge  Goal: Pace activities to prevent fatigue by end of the shift  Outcome: Adequate for Discharge     Problem: Pain - Adult  Goal: Verbalizes/displays adequate comfort level or baseline comfort level  Outcome: Adequate for Discharge     Problem: Safety - Adult  Goal: Free from fall injury  Outcome: Adequate for Discharge     Problem: Discharge Planning  Goal: Discharge to home or other facility with appropriate resources  Outcome: Adequate for Discharge     Problem: Chronic Conditions and Co-morbidities  Goal: Patient's chronic conditions and co-morbidity symptoms are monitored and maintained or improved  Outcome: Adequate for Discharge

## 2024-06-27 NOTE — CARE PLAN
The patient's goals for the shift include to get stronger    The clinical goals for the shift include Pt will show improvement in neuro exam. Pt will tolerate ng tube, maintain safety

## 2024-06-29 ENCOUNTER — HOSPITAL ENCOUNTER (EMERGENCY)
Facility: HOSPITAL | Age: 79
Discharge: HOME | End: 2024-06-29
Attending: EMERGENCY MEDICINE
Payer: MEDICARE

## 2024-06-29 VITALS
RESPIRATION RATE: 18 BRPM | BODY MASS INDEX: 34.53 KG/M2 | TEMPERATURE: 98.1 F | DIASTOLIC BLOOD PRESSURE: 100 MMHG | HEIGHT: 67 IN | OXYGEN SATURATION: 96 % | WEIGHT: 220 LBS | SYSTOLIC BLOOD PRESSURE: 162 MMHG | HEART RATE: 85 BPM

## 2024-06-29 DIAGNOSIS — T85.598A OBSTRUCTION OF FEEDING TUBE, INITIAL ENCOUNTER: Primary | ICD-10-CM

## 2024-06-29 PROCEDURE — 99283 EMERGENCY DEPT VISIT LOW MDM: CPT

## 2024-06-29 ASSESSMENT — COLUMBIA-SUICIDE SEVERITY RATING SCALE - C-SSRS
1. IN THE PAST MONTH, HAVE YOU WISHED YOU WERE DEAD OR WISHED YOU COULD GO TO SLEEP AND NOT WAKE UP?: NO
6. HAVE YOU EVER DONE ANYTHING, STARTED TO DO ANYTHING, OR PREPARED TO DO ANYTHING TO END YOUR LIFE?: NO
2. HAVE YOU ACTUALLY HAD ANY THOUGHTS OF KILLING YOURSELF?: NO

## 2024-06-29 ASSESSMENT — PAIN - FUNCTIONAL ASSESSMENT
PAIN_FUNCTIONAL_ASSESSMENT: 0-10
PAIN_FUNCTIONAL_ASSESSMENT: 0-10

## 2024-06-29 ASSESSMENT — PAIN SCALES - GENERAL
PAINLEVEL_OUTOF10: 0 - NO PAIN
PAINLEVEL_OUTOF10: 0 - NO PAIN

## 2024-06-29 NOTE — ED PROVIDER NOTES
HPI   Chief Complaint   Patient presents with    clogged NG tube       Patient presents the emergency department secondary to clogged NG tube.  Patient has an NG to been secondary to stroke.  It is a Dobbhoff tube which is bridled.  Patient is unable to provide significant history.  He does have aphasia from his stroke.  He is able to tell me his name.  He shakes his head no when asked him if he is having any pain.  Patient is on Plavix secondary to stroke.  Patient does have a DNR comfort care only order with his nursing home paperwork.                          [place] Coma Scale Score: 15                  Patient History   Past Medical History:   Diagnosis Date    Stroke (Multi)      No past surgical history on file.  No family history on file.  Social History     Tobacco Use    Smoking status: Never     Passive exposure: Never    Smokeless tobacco: Never   Vaping Use    Vaping status: Not on file   Substance Use Topics    Alcohol use: Not on file    Drug use: Not on file       Physical Exam   ED Triage Vitals [06/29/24 1944]   Temperature Heart Rate Respirations BP   36.7 °C (98.1 °F) 77 18 (!) 162/140      Pulse Ox Temp src Heart Rate Source Patient Position   95 % -- -- Sitting      BP Location FiO2 (%)     Left arm --       Physical Exam  Vitals and nursing note reviewed.   Constitutional:       General: He is not in acute distress.     Appearance: He is not toxic-appearing.      Comments: Patient is a chronically ill appearing elderly male.   HENT:      Head: Normocephalic and atraumatic.      Nose: Nose normal.      Mouth/Throat:      Mouth: Mucous membranes are moist.   Eyes:      Pupils: Pupils are equal, round, and reactive to light.   Cardiovascular:      Rate and Rhythm: Normal rate and regular rhythm.      Pulses: Normal pulses.      Heart sounds: Normal heart sounds.   Pulmonary:      Effort: Pulmonary effort is normal.      Comments: Slightly coarse breath sounds bilaterally.  No evidence of  respiratory distress.  No tachypnea or accessory muscle use.  Abdominal:      General: Abdomen is flat. Bowel sounds are normal.      Palpations: Abdomen is soft.      Tenderness: There is no abdominal tenderness. There is no guarding or rebound.   Musculoskeletal:      Cervical back: Normal range of motion.      Comments: Chronic weakness.   Skin:     General: Skin is warm and dry.      Capillary Refill: Capillary refill takes less than 2 seconds.   Neurological:      Mental Status: He is alert. Mental status is at baseline.       Labs Reviewed - No data to display  Pain Management Panel           No data to display              No orders to display     ED Course & MDM   Diagnoses as of 06/29/24 2111   Obstruction of feeding tube, initial encounter       Medical Decision Making  Patient presents secondary to clogged Dobbhoff tube.  Nursing staff will attempt to unclog the tube.  Nursing staff was able to get the NG unclogged.  At this time patient is hemodynamically stable.  He does not meet criteria for further testing in the emergency department.  He is able to go back to his skilled nursing facility.  Arrangements were made for transport back to his skilled nursing facility.        Procedure  Procedures     Karen Gutierrez MD  06/29/24 2111

## 2024-07-02 ENCOUNTER — HOSPITAL ENCOUNTER (EMERGENCY)
Facility: HOSPITAL | Age: 79
Discharge: HOME | End: 2024-07-02
Attending: EMERGENCY MEDICINE
Payer: MEDICARE

## 2024-07-02 VITALS
WEIGHT: 208.11 LBS | SYSTOLIC BLOOD PRESSURE: 165 MMHG | HEIGHT: 69 IN | BODY MASS INDEX: 30.82 KG/M2 | DIASTOLIC BLOOD PRESSURE: 77 MMHG | HEART RATE: 86 BPM | TEMPERATURE: 98 F | RESPIRATION RATE: 18 BRPM | OXYGEN SATURATION: 98 %

## 2024-07-02 DIAGNOSIS — T85.598A FEEDING TUBE DYSFUNCTION, INITIAL ENCOUNTER: Primary | ICD-10-CM

## 2024-07-02 PROCEDURE — 99283 EMERGENCY DEPT VISIT LOW MDM: CPT | Performed by: EMERGENCY MEDICINE

## 2024-07-02 ASSESSMENT — LIFESTYLE VARIABLES
TOTAL SCORE: 0
HAVE YOU EVER FELT YOU SHOULD CUT DOWN ON YOUR DRINKING: NO
EVER HAD A DRINK FIRST THING IN THE MORNING TO STEADY YOUR NERVES TO GET RID OF A HANGOVER: NO
HAVE PEOPLE ANNOYED YOU BY CRITICIZING YOUR DRINKING: NO
EVER FELT BAD OR GUILTY ABOUT YOUR DRINKING: NO

## 2024-07-02 ASSESSMENT — PAIN - FUNCTIONAL ASSESSMENT: PAIN_FUNCTIONAL_ASSESSMENT: 0-10

## 2024-07-02 ASSESSMENT — COLUMBIA-SUICIDE SEVERITY RATING SCALE - C-SSRS
2. HAVE YOU ACTUALLY HAD ANY THOUGHTS OF KILLING YOURSELF?: NO
6. HAVE YOU EVER DONE ANYTHING, STARTED TO DO ANYTHING, OR PREPARED TO DO ANYTHING TO END YOUR LIFE?: NO
1. IN THE PAST MONTH, HAVE YOU WISHED YOU WERE DEAD OR WISHED YOU COULD GO TO SLEEP AND NOT WAKE UP?: NO

## 2024-07-02 ASSESSMENT — PAIN SCALES - GENERAL
PAINLEVEL_OUTOF10: 0 - NO PAIN
PAINLEVEL_OUTOF10: 0 - NO PAIN

## 2024-07-02 NOTE — ED NOTES
Report called to Sara RN at [facility]. I explained how veronica LPN was able to get the tube flowing well. Dr Bashir at bedside to assess prior to pt discharge.      Alda Jones, RN  07/02/24 [id number]

## 2024-07-02 NOTE — ED PROVIDER NOTES
HPI   Chief Complaint   Patient presents with   • Feeding Tube     clog       Patient is here with a clogged NG tube.  Nursing home staff are unable to unclog it at the facility.  He was seen here 3 days ago.  He does have a Dobbhoff tube which is bridled.  Patient is unable to give history due to his history of stroke.  Does have aphasia and was aspirating which is why he has this Dobbhoff tube in.  He is on Plavix.  He is DNR comfort care only.                          [place] Coma Scale Score: 15                  Patient History   Past Medical History:   Diagnosis Date   • Stroke (Multi)      History reviewed. No pertinent surgical history.  No family history on file.  Social History     Tobacco Use   • Smoking status: Never     Passive exposure: Never   • Smokeless tobacco: Never   Vaping Use   • Vaping status: Not on file   Substance Use Topics   • Alcohol use: Not on file   • Drug use: Not on file       Physical Exam   ED Triage Vitals [07/02/24 1136]   Temperature Heart Rate Respirations BP   36.3 °C (97.3 °F) 81 18 (!) 166/94      Pulse Ox Temp Source Heart Rate Source Patient Position   96 % Temporal Monitor --      BP Location FiO2 (%)     -- --       Physical Exam  Constitutional:       Appearance: Normal appearance.   HENT:      Head: Normocephalic and atraumatic.      Nose:      Comments: Dobbhoff tube in place     Mouth/Throat:      Mouth: Mucous membranes are moist.   Eyes:      Extraocular Movements: Extraocular movements intact.      Pupils: Pupils are equal, round, and reactive to light.   Cardiovascular:      Rate and Rhythm: Normal rate and regular rhythm.   Pulmonary:      Effort: Pulmonary effort is normal.      Breath sounds: Normal breath sounds. No wheezing.   Abdominal:      General: Abdomen is flat.      Palpations: Abdomen is soft.   Skin:     General: Skin is warm and dry.      Capillary Refill: Capillary refill takes less than 2 seconds.   Neurological:      Mental Status: He is alert.  Mental status is at baseline.      Motor: Weakness (Diffuse generalized) present.       Labs Reviewed - No data to display  No orders to display     ED Course & MDM   Diagnoses as of 07/02/24 1207   Feeding tube dysfunction, initial encounter       Medical Decision Making  Differentials include NG tube dysfunction.  Imaging studies, if performed, were independently reviewed and interpreted by myself and confirmed by radiologist. EKG(s), if performed, were interpreted by myself.Nursing was able to get the tube functional.  There was a cath in the end that prevented it from being flushed.  This was removed and it flushes to gravity easily.  Apparently there was report from the nursing home asking for the patient to be admitted for a PEG tube however I do not feel that this is needed at this time.  The patient is in his acute phase of his illness.  He is a DNR comfort care only.  At this time he will be discharged back to facility for them to have this discussion there.        Procedure  Procedures     Benson Bashir MD  07/02/24 1208

## 2024-07-26 ENCOUNTER — APPOINTMENT (OUTPATIENT)
Dept: SURGERY | Facility: CLINIC | Age: 79
End: 2024-07-26
Payer: MEDICARE

## 2024-07-30 ENCOUNTER — APPOINTMENT (OUTPATIENT)
Dept: GENERAL RADIOLOGY | Age: 79
End: 2024-07-30
Payer: MEDICARE

## 2024-07-30 ENCOUNTER — HOSPITAL ENCOUNTER (EMERGENCY)
Age: 79
Discharge: HOME | End: 2024-07-31
Payer: MEDICARE

## 2024-07-30 DIAGNOSIS — Z01.89 ENCOUNTER FOR IMAGING STUDY TO CONFIRM NASOGASTRIC (NG) TUBE PLACEMENT: Primary | ICD-10-CM

## 2024-07-30 PROCEDURE — 74018 RADEX ABDOMEN 1 VIEW: CPT

## 2024-07-30 PROCEDURE — 99283 EMERGENCY DEPT VISIT LOW MDM: CPT

## 2024-07-30 NOTE — ED PROVIDER NOTES
Independent ANNABELLA Visit      [facility]  Department of Emergency Medicine   ED  Encounter Note  Admit Date/RoomTime: 2024  6:54 PM  ED Room: KELLEN/KELLEN    NAME: Dillon Townsend  : 1945  MRN: [id number]     Chief Complaint:  Other (NG displaced. )    History of Present Illness      Dillon Townsend is a 79 y.o. old male with a past medical history of:   Past Medical History:   Diagnosis Date    CAD (coronary artery disease)     Cerebral artery occlusion with cerebral infarction (HCC)     CVA (cerebral vascular accident) (HCC)     Dementia (HCC)     GERD (gastroesophageal reflux disease)     History of left heart catheterization (LHC)     Hyperlipidemia     Hypertension     Impaired speech articulation     Osteoarthritis     Other speech and language deficits following unspecified cerebrovascular disease     Pure hypercholesterolemia     Ulcerative colitis (HCC)     presents to the emergency department from skilled nursing facility, for evaluation of nasogastric tube for partial dislodgement / placement confirmation. NG tube placed post CVA due to dysphagia. He had two xrays post NG tube advancement at [facility] which showed NG tube was not advanced into the stomach and was sent in for further evaluation. He has a temporary bridled Dobbhoff nasogastric tube.  The patient denies discomfort.     ROS   Pertinent positives and negatives are stated within HPI, all other systems reviewed and are negative.    Past Medical History:  has a past medical history of CAD (coronary artery disease), Cerebral artery occlusion with cerebral infarction (HCC), CVA (cerebral vascular accident) (HCC), Dementia (HCC), GERD (gastroesophageal reflux disease), History of left heart catheterization (LHC), Hyperlipidemia, Hypertension, Impaired speech articulation, Osteoarthritis, Other speech and language deficits following unspecified cerebrovascular disease, Pure hypercholesterolemia, and Ulcerative colitis

## 2024-07-31 VITALS
OXYGEN SATURATION: 95 % | WEIGHT: 185 LBS | RESPIRATION RATE: 20 BRPM | BODY MASS INDEX: 25.06 KG/M2 | DIASTOLIC BLOOD PRESSURE: 81 MMHG | HEIGHT: 72 IN | HEART RATE: 82 BPM | TEMPERATURE: 98 F | SYSTOLIC BLOOD PRESSURE: 188 MMHG

## 2024-07-31 ASSESSMENT — LIFESTYLE VARIABLES
HOW OFTEN DO YOU HAVE A DRINK CONTAINING ALCOHOL: PATIENT UNABLE TO ANSWER
HOW MANY STANDARD DRINKS CONTAINING ALCOHOL DO YOU HAVE ON A TYPICAL DAY: PATIENT UNABLE TO ANSWER

## 2024-08-07 ENCOUNTER — ANESTHESIA EVENT (OUTPATIENT)
Dept: OPERATING ROOM | Facility: HOSPITAL | Age: 79
End: 2024-08-07
Payer: MEDICARE

## 2024-08-08 ENCOUNTER — ANESTHESIA (OUTPATIENT)
Dept: OPERATING ROOM | Facility: HOSPITAL | Age: 79
End: 2024-08-08
Payer: MEDICARE

## 2024-08-08 ENCOUNTER — APPOINTMENT (OUTPATIENT)
Dept: OPERATING ROOM | Facility: HOSPITAL | Age: 79
DRG: 853 | End: 2024-08-08
Payer: MEDICARE

## 2024-08-08 ENCOUNTER — HOSPITAL ENCOUNTER (OUTPATIENT)
Facility: HOSPITAL | Age: 79
Setting detail: OUTPATIENT SURGERY
Discharge: NURSING, REHAB OR LONG-TERM CARE | DRG: 853 | End: 2024-08-08
Attending: SURGERY | Admitting: SURGERY
Payer: MEDICARE

## 2024-08-08 VITALS
RESPIRATION RATE: 18 BRPM | TEMPERATURE: 97.4 F | HEART RATE: 84 BPM | HEIGHT: 69 IN | WEIGHT: 208 LBS | DIASTOLIC BLOOD PRESSURE: 90 MMHG | BODY MASS INDEX: 30.81 KG/M2 | OXYGEN SATURATION: 97 % | SYSTOLIC BLOOD PRESSURE: 150 MMHG

## 2024-08-08 DIAGNOSIS — R13.10 DYSPHAGIA: Primary | ICD-10-CM

## 2024-08-08 PROCEDURE — 2500000004 HC RX 250 GENERAL PHARMACY W/ HCPCS (ALT 636 FOR OP/ED): Performed by: ANESTHESIOLOGY

## 2024-08-08 PROCEDURE — 2500000004 HC RX 250 GENERAL PHARMACY W/ HCPCS (ALT 636 FOR OP/ED): Performed by: NURSE ANESTHETIST, CERTIFIED REGISTERED

## 2024-08-08 PROCEDURE — 7100000002 HC RECOVERY ROOM TIME - EACH INCREMENTAL 1 MINUTE: Performed by: SURGERY

## 2024-08-08 PROCEDURE — 2500000005 HC RX 250 GENERAL PHARMACY W/O HCPCS: Performed by: SURGERY

## 2024-08-08 PROCEDURE — 7100000010 HC PHASE TWO TIME - EACH INCREMENTAL 1 MINUTE: Performed by: SURGERY

## 2024-08-08 PROCEDURE — 3700000001 HC GENERAL ANESTHESIA TIME - INITIAL BASE CHARGE: Performed by: SURGERY

## 2024-08-08 PROCEDURE — 2720000007 HC OR 272 NO HCPCS: Performed by: SURGERY

## 2024-08-08 PROCEDURE — 3600000002 HC OR TIME - INITIAL BASE CHARGE - PROCEDURE LEVEL TWO: Performed by: SURGERY

## 2024-08-08 PROCEDURE — 7100000009 HC PHASE TWO TIME - INITIAL BASE CHARGE: Performed by: SURGERY

## 2024-08-08 PROCEDURE — 0DH63UZ INSERTION OF FEEDING DEVICE INTO STOMACH, PERCUTANEOUS APPROACH: ICD-10-PCS | Performed by: SURGERY

## 2024-08-08 PROCEDURE — 3600000007 HC OR TIME - EACH INCREMENTAL 1 MINUTE - PROCEDURE LEVEL TWO: Performed by: SURGERY

## 2024-08-08 PROCEDURE — 2500000004 HC RX 250 GENERAL PHARMACY W/ HCPCS (ALT 636 FOR OP/ED): Mod: JZ | Performed by: SURGERY

## 2024-08-08 PROCEDURE — 2500000005 HC RX 250 GENERAL PHARMACY W/O HCPCS: Performed by: NURSE ANESTHETIST, CERTIFIED REGISTERED

## 2024-08-08 PROCEDURE — 3700000002 HC GENERAL ANESTHESIA TIME - EACH INCREMENTAL 1 MINUTE: Performed by: SURGERY

## 2024-08-08 PROCEDURE — 7100000001 HC RECOVERY ROOM TIME - INITIAL BASE CHARGE: Performed by: SURGERY

## 2024-08-08 RX ORDER — HYDRALAZINE HYDROCHLORIDE 20 MG/ML
5 INJECTION INTRAMUSCULAR; INTRAVENOUS ONCE
Status: DISCONTINUED | OUTPATIENT
Start: 2024-08-08 | End: 2024-08-08 | Stop reason: HOSPADM

## 2024-08-08 RX ORDER — CEFAZOLIN SODIUM 2 G/100ML
2 INJECTION, SOLUTION INTRAVENOUS ONCE
Status: COMPLETED | OUTPATIENT
Start: 2024-08-08 | End: 2024-08-08

## 2024-08-08 RX ORDER — MORPHINE SULFATE 2 MG/ML
2 INJECTION, SOLUTION INTRAMUSCULAR; INTRAVENOUS EVERY 5 MIN PRN
Status: DISCONTINUED | OUTPATIENT
Start: 2024-08-08 | End: 2024-08-08 | Stop reason: HOSPADM

## 2024-08-08 RX ORDER — SODIUM CHLORIDE, SODIUM LACTATE, POTASSIUM CHLORIDE, CALCIUM CHLORIDE 600; 310; 30; 20 MG/100ML; MG/100ML; MG/100ML; MG/100ML
50 INJECTION, SOLUTION INTRAVENOUS CONTINUOUS
Status: DISCONTINUED | OUTPATIENT
Start: 2024-08-08 | End: 2024-08-08 | Stop reason: HOSPADM

## 2024-08-08 RX ORDER — LIDOCAINE HCL/PF 100 MG/5ML
SYRINGE (ML) INTRAVENOUS AS NEEDED
Status: DISCONTINUED | OUTPATIENT
Start: 2024-08-08 | End: 2024-08-08

## 2024-08-08 RX ORDER — ONDANSETRON HYDROCHLORIDE 2 MG/ML
4 INJECTION, SOLUTION INTRAVENOUS ONCE AS NEEDED
Status: DISCONTINUED | OUTPATIENT
Start: 2024-08-08 | End: 2024-08-08 | Stop reason: HOSPADM

## 2024-08-08 RX ORDER — ETOMIDATE 2 MG/ML
INJECTION INTRAVENOUS AS NEEDED
Status: DISCONTINUED | OUTPATIENT
Start: 2024-08-08 | End: 2024-08-08

## 2024-08-08 RX ORDER — LIDOCAINE HYDROCHLORIDE 10 MG/ML
INJECTION INFILTRATION; PERINEURAL AS NEEDED
Status: DISCONTINUED | OUTPATIENT
Start: 2024-08-08 | End: 2024-08-08 | Stop reason: HOSPADM

## 2024-08-08 RX ORDER — LABETALOL HYDROCHLORIDE 5 MG/ML
INJECTION, SOLUTION INTRAVENOUS AS NEEDED
Status: DISCONTINUED | OUTPATIENT
Start: 2024-08-08 | End: 2024-08-08

## 2024-08-08 RX ORDER — PROPOFOL 10 MG/ML
INJECTION, EMULSION INTRAVENOUS AS NEEDED
Status: DISCONTINUED | OUTPATIENT
Start: 2024-08-08 | End: 2024-08-08

## 2024-08-08 RX ORDER — FAMOTIDINE 10 MG/ML
20 INJECTION INTRAVENOUS ONCE
Status: COMPLETED | OUTPATIENT
Start: 2024-08-08 | End: 2024-08-08

## 2024-08-08 RX ORDER — FENTANYL CITRATE 50 UG/ML
INJECTION, SOLUTION INTRAMUSCULAR; INTRAVENOUS AS NEEDED
Status: DISCONTINUED | OUTPATIENT
Start: 2024-08-08 | End: 2024-08-08

## 2024-08-08 RX ORDER — MORPHINE SULFATE 2 MG/ML
1 INJECTION, SOLUTION INTRAMUSCULAR; INTRAVENOUS EVERY 5 MIN PRN
Status: DISCONTINUED | OUTPATIENT
Start: 2024-08-08 | End: 2024-08-08 | Stop reason: HOSPADM

## 2024-08-08 RX ORDER — METOCLOPRAMIDE HYDROCHLORIDE 5 MG/ML
5 INJECTION INTRAMUSCULAR; INTRAVENOUS ONCE
Status: COMPLETED | OUTPATIENT
Start: 2024-08-08 | End: 2024-08-08

## 2024-08-08 RX ORDER — HYDRALAZINE HYDROCHLORIDE 20 MG/ML
10 INJECTION INTRAMUSCULAR; INTRAVENOUS ONCE
Status: COMPLETED | OUTPATIENT
Start: 2024-08-08 | End: 2024-08-08

## 2024-08-08 RX ORDER — MIDAZOLAM HYDROCHLORIDE 1 MG/ML
INJECTION, SOLUTION INTRAMUSCULAR; INTRAVENOUS AS NEEDED
Status: DISCONTINUED | OUTPATIENT
Start: 2024-08-08 | End: 2024-08-08

## 2024-08-08 SDOH — HEALTH STABILITY: MENTAL HEALTH: CURRENT SMOKER: 0

## 2024-08-08 ASSESSMENT — ENCOUNTER SYMPTOMS
ABDOMINAL PAIN: 0
CHILLS: 1
DIFFICULTY URINATING: 0
HEADACHES: 0
SHORTNESS OF BREATH: 0
FEVER: 0
BACK PAIN: 0
WOUND: 0
VOMITING: 0
CHEST TIGHTNESS: 0
NAUSEA: 0

## 2024-08-08 ASSESSMENT — PAIN SCALES - GENERAL
PAINLEVEL_OUTOF10: 0 - NO PAIN
PAIN_LEVEL: 0

## 2024-08-08 ASSESSMENT — PAIN - FUNCTIONAL ASSESSMENT: PAIN_FUNCTIONAL_ASSESSMENT: 0-10

## 2024-08-08 NOTE — ANESTHESIA POSTPROCEDURE EVALUATION
Patient: Abdi Wilson    Procedure Summary       Date: 08/08/24 Room / Location: [facility] OR 02 / Virtual [facility] OR    Anesthesia Start: 1225 Anesthesia Stop: 1326    Procedure: EGD WITH PEG TUBE PLACEMENT (Abdomen) Diagnosis:       Dysphagia      (Dysphagia [R13.10])    Surgeons: Jevon Velásquez MD Responsible Provider: QUINCY Brennan    Anesthesia Type: MAC ASA Status: 3            Anesthesia Type: MAC    Vitals Value Taken Time   /98 08/08/24 1450   Temp 36.1 08/08/24 1515   Pulse 85 08/08/24 1446   Resp 25 08/08/24 1447   SpO2 94 % 08/08/24 1455   Vitals shown include unfiled device data.    Anesthesia Post Evaluation    Patient location during evaluation: PACU  Patient participation: complete - patient cannot participate  Level of consciousness: awake and alert and responsive to verbal stimuli  Pain score: 0  Pain management: satisfactory to patient  Airway patency: patent  Cardiovascular status: hemodynamically stable  Respiratory status: room air  Hydration status: acceptable  Postoperative Nausea and Vomiting: none  Comments: POST OP DIASTOLIC HTN TX WITH APRESOLINE IV.  BP TRENDING DOWN.  DIASTOLIC < 100.  CARETAKERS NOTIFIED BY PACU RN TO GIVE PT HIS EVENING DOSES OF ANTI HYPERTENSIVES BACK ON SCHEDULE.      There were no known notable events for this encounter.

## 2024-08-08 NOTE — H&P
"History Of Present Illness  Abdi Wilson is a 79 y.o. male presenting for PEG tube placement.     Past Medical History  Past Medical History:   Diagnosis Date    Stroke (Multi)        Surgical History  No past surgical history on file.     Social History  He reports that he has never smoked. He has never been exposed to tobacco smoke. He has never used smokeless tobacco. No history on file for alcohol use and drug use.    Family History  No family history on file.     Allergies  Patient has no known allergies.    Review of Systems   Constitutional:  Positive for chills. Negative for fever.   Respiratory:  Negative for chest tightness and shortness of breath.    Cardiovascular:  Negative for chest pain and leg swelling.   Gastrointestinal:  Negative for abdominal pain, nausea and vomiting.   Genitourinary:  Negative for difficulty urinating.   Musculoskeletal:  Negative for back pain.   Skin:  Negative for wound.   Neurological:  Negative for headaches.        Physical Exam  Constitutional:       General: He is not in acute distress.  HENT:      Head: Normocephalic and atraumatic.   Eyes:      Conjunctiva/sclera: Conjunctivae normal.   Cardiovascular:      Rate and Rhythm: Normal rate and regular rhythm.      Pulses: Normal pulses.   Pulmonary:      Effort: Pulmonary effort is normal. No respiratory distress.   Abdominal:      General: There is no distension.      Palpations: Abdomen is soft.      Tenderness: There is no abdominal tenderness.   Musculoskeletal:         General: No swelling.      Cervical back: Neck supple.   Skin:     General: Skin is warm and dry.   Neurological:      Mental Status: He is alert.          Last Recorded Vitals  Blood pressure (!) 179/114, pulse 92, temperature 36.1 °C (97 °F), temperature source Temporal, resp. rate 18, height 1.753 m (5' 9\"), weight 94.3 kg (208 lb).    Relevant Results       Assessment/Plan   Active Problems:  There are no active Hospital Problems.      79 male " presenting for PEG tube placement.    -OR for EGD with PEG tube placement       Nadia Massey DO

## 2024-08-08 NOTE — ANESTHESIA PREPROCEDURE EVALUATION
Patient: Abdi Wilson    Procedure Information       Date/Time: 08/08/24 1330    Procedure: EGD WITH PEG TUBE PLACEMENT (Abdomen) - EGD WITH PEG TUBE PLACEMENT  60 MIN    Location: [facility] OR 02 / Virtual [facility] OR    Surgeons: Jevon Velásquez MD            Relevant Problems   Anesthesia (within normal limits)      Neuro   (+) Cerebral artery occlusion with cerebral infarction (Multi)       Clinical information reviewed:    Allergies   Problems  Med Hx             NPO Detail:  No data recorded     Physical Exam    Airway  Mallampati: III     Cardiovascular - normal exam     Dental    Pulmonary - normal exam     Abdominal          Anesthesia Plan    History of general anesthesia?: yes  History of complications of general anesthesia?: no    ASA 3     MAC     The patient is not a current smoker.    Anesthetic plan and risks discussed with patient and healthcare power of .  Use of blood products discussed with who consented to blood products.

## 2024-08-08 NOTE — OP NOTE
EGD WITH PEG TUBE PLACEMENT Operative Note     Date: 2024  OR Location: [facility]    Name: Abdi Wilson, : 1945, Age: 79 y.o., MRN: [id number], Sex: male    Diagnosis  Pre-op Diagnosis      * Dysphagia [R13.10] Post-op Diagnosis     * Dysphagia [R13.10]     Procedures  EGD WITH PEG TUBE PLACEMENT  19380 - MS PERQ REPLACEMENT GTUBE NOT REQ REVJ [facility]      Surgeons      * Jevon Velásquez - Primary    Resident/Fellow/Other Assistant:  Surgeons and Role:  * No surgeons found with a matching role *  Nadia Massey  Procedure Summary  Anesthesia: Monitor Anesthesia Care  ASA: III  Anesthesia Staff: CRNA: QUINCY Brennan  Estimated Blood Loss: 1mL  Intra-op Medications: Administrations occurring from 1330 to 1500 on 24:  * No intraprocedure medications in log *        Specimen: No specimens collected     Staff:   Circulator: Brooke Bentleyub Person: Margret         Drains and/or Catheters:   NG/OG/Feeding Tube Left nostril (Active)       Tourniquet Times:         Implants:     Findings:       Indications: Abdi Wilson is an 79 y.o. male who is having surgery for Dysphagia [R13.10].       The patient was seen in the preoperative area. The risks, benefits, complications, treatment options, non-operative alternatives, expected recovery and outcomes were discussed with the patient. The possibilities of reaction to medication, pulmonary aspiration, injury to surrounding structures, bleeding, recurrent infection, the need for additional procedures, failure to diagnose a condition, and creating a complication requiring transfusion or operation were discussed with the patient. The patient concurred with the proposed plan, giving informed consent.  The site of surgery was properly noted/marked if necessary per policy. The patient has been actively warmed in preoperative area. Preoperative antibiotics have been ordered and given within 1 hours of incision. Venous thrombosis prophylaxis are not  indicated.    Procedure Details: Patient had a previous stroke has been having a Dobbhoff tube long-term now wants to convert to PEG tube.    Patient was brought to the operating room sedated placed in the supine position.  EGD scope was placed in his mouth passed without incident to the duodenum.  Retrograde duodenoscopy was normal scope was used to insufflate the stomach the stomach was normal on anteflexed and retroflexed views.  Retrograde duodenoscopy was normal.  The scope was used to insufflate the stomach lights were turned out and where the red dot was patient was prepped and draped.  Safe track technique was then used to ascertain trajectory.  Once we obtain the trajectory of the larger needle was placed through the incision and once in the gastric lumen was snared the needle was removed and through the catheter at the string was placed grasped with a snare brought out of the patient's mouth and the PEG tube was placed retrograde.  The PEG tube was affixed at 3 cm at the skin.  This had easy spin ability.  The scope was then replaced and PEG tube was found to be in good position without bleeding.  Scope was removed.  He was dressed and will be returned to the recovery room in satisfactory condition.  Complications:  None; patient tolerated the procedure well.    Disposition: PACU - hemodynamically stable.  Condition: stable         Additional Details:       Attending Attestation: I was present for the entire procedure.    Jevon Velásquez  Phone Number: [phone]

## 2024-08-10 ENCOUNTER — APPOINTMENT (OUTPATIENT)
Dept: RADIOLOGY | Facility: HOSPITAL | Age: 79
DRG: 853 | End: 2024-08-10
Payer: MEDICARE

## 2024-08-10 ENCOUNTER — ANESTHESIA (OUTPATIENT)
Dept: OPERATING ROOM | Facility: HOSPITAL | Age: 79
End: 2024-08-10
Payer: MEDICARE

## 2024-08-10 ENCOUNTER — ANESTHESIA EVENT (OUTPATIENT)
Dept: OPERATING ROOM | Facility: HOSPITAL | Age: 79
End: 2024-08-10
Payer: MEDICARE

## 2024-08-10 ENCOUNTER — APPOINTMENT (OUTPATIENT)
Dept: CARDIOLOGY | Facility: HOSPITAL | Age: 79
DRG: 853 | End: 2024-08-10
Payer: MEDICARE

## 2024-08-10 ENCOUNTER — HOSPITAL ENCOUNTER (INPATIENT)
Facility: HOSPITAL | Age: 79
End: 2024-08-10
Attending: EMERGENCY MEDICINE | Admitting: HOSPITALIST
Payer: MEDICARE

## 2024-08-10 DIAGNOSIS — A41.9 SEPSIS, DUE TO UNSPECIFIED ORGANISM, UNSPECIFIED WHETHER ACUTE ORGAN DYSFUNCTION PRESENT (MULTI): ICD-10-CM

## 2024-08-10 DIAGNOSIS — K65.9 PERITONITIS (MULTI): ICD-10-CM

## 2024-08-10 DIAGNOSIS — I63.50 CEREBRAL ARTERY OCCLUSION WITH CEREBRAL INFARCTION (MULTI): ICD-10-CM

## 2024-08-10 DIAGNOSIS — K94.23 PEG TUBE MALFUNCTION (MULTI): Primary | ICD-10-CM

## 2024-08-10 DIAGNOSIS — Z87.898 H/O ABDOMINAL ABSCESS: ICD-10-CM

## 2024-08-10 DIAGNOSIS — R09.89 PULMONARY CONGESTION: ICD-10-CM

## 2024-08-10 PROBLEM — R47.01 APHASIA: Status: ACTIVE | Noted: 2024-08-10

## 2024-08-10 PROBLEM — F03.90 DEMENTIA WITHOUT BEHAVIORAL DISTURBANCE (MULTI): Status: ACTIVE | Noted: 2024-08-10

## 2024-08-10 LAB
ALBUMIN SERPL BCP-MCNC: 4 G/DL (ref 3.4–5)
ALP SERPL-CCNC: 99 U/L (ref 33–136)
ALT SERPL W P-5'-P-CCNC: 16 U/L (ref 10–52)
ANION GAP SERPL CALC-SCNC: 15 MMOL/L (ref 10–20)
AST SERPL W P-5'-P-CCNC: 20 U/L (ref 9–39)
BASOPHILS # BLD AUTO: 0.05 X10*3/UL (ref 0–0.1)
BASOPHILS NFR BLD AUTO: 0.4 %
BILIRUB SERPL-MCNC: 1.5 MG/DL (ref 0–1.2)
BUN SERPL-MCNC: 37 MG/DL (ref 6–23)
CALCIUM SERPL-MCNC: 9.4 MG/DL (ref 8.6–10.3)
CHLORIDE SERPL-SCNC: 112 MMOL/L (ref 98–107)
CO2 SERPL-SCNC: 25 MMOL/L (ref 21–32)
CREAT SERPL-MCNC: 1.38 MG/DL (ref 0.5–1.3)
EGFRCR SERPLBLD CKD-EPI 2021: 51 ML/MIN/1.73M*2
EOSINOPHIL # BLD AUTO: 0.02 X10*3/UL (ref 0–0.4)
EOSINOPHIL NFR BLD AUTO: 0.1 %
ERYTHROCYTE [DISTWIDTH] IN BLOOD BY AUTOMATED COUNT: 15.7 % (ref 11.5–14.5)
GLUCOSE SERPL-MCNC: 144 MG/DL (ref 74–99)
HCT VFR BLD AUTO: 60.7 % (ref 41–52)
HGB BLD-MCNC: 19.7 G/DL (ref 13.5–17.5)
IMM GRANULOCYTES # BLD AUTO: 0.06 X10*3/UL (ref 0–0.5)
IMM GRANULOCYTES NFR BLD AUTO: 0.4 % (ref 0–0.9)
LACTATE SERPL-SCNC: 2.3 MMOL/L (ref 0.4–2)
LACTATE SERPL-SCNC: 2.4 MMOL/L (ref 0.4–2)
LACTATE SERPL-SCNC: 3.3 MMOL/L (ref 0.4–2)
LYMPHOCYTES # BLD AUTO: 1.21 X10*3/UL (ref 0.8–3)
LYMPHOCYTES NFR BLD AUTO: 8.9 %
MCH RBC QN AUTO: 29.6 PG (ref 26–34)
MCHC RBC AUTO-ENTMCNC: 32.5 G/DL (ref 32–36)
MCV RBC AUTO: 91 FL (ref 80–100)
MONOCYTES # BLD AUTO: 0.93 X10*3/UL (ref 0.05–0.8)
MONOCYTES NFR BLD AUTO: 6.9 %
NEUTROPHILS # BLD AUTO: 11.3 X10*3/UL (ref 1.6–5.5)
NEUTROPHILS NFR BLD AUTO: 83.3 %
NRBC BLD-RTO: 0 /100 WBCS (ref 0–0)
PLATELET # BLD AUTO: 269 X10*3/UL (ref 150–450)
POTASSIUM SERPL-SCNC: 4.2 MMOL/L (ref 3.5–5.3)
PROT SERPL-MCNC: 7.9 G/DL (ref 6.4–8.2)
RBC # BLD AUTO: 6.65 X10*6/UL (ref 4.5–5.9)
SODIUM SERPL-SCNC: 148 MMOL/L (ref 136–145)
WBC # BLD AUTO: 13.6 X10*3/UL (ref 4.4–11.3)

## 2024-08-10 PROCEDURE — 83605 ASSAY OF LACTIC ACID: CPT | Performed by: EMERGENCY MEDICINE

## 2024-08-10 PROCEDURE — 85025 COMPLETE CBC W/AUTO DIFF WBC: CPT | Performed by: EMERGENCY MEDICINE

## 2024-08-10 PROCEDURE — 0DH60UZ INSERTION OF FEEDING DEVICE INTO STOMACH, OPEN APPROACH: ICD-10-PCS | Performed by: SURGERY

## 2024-08-10 PROCEDURE — 2500000004 HC RX 250 GENERAL PHARMACY W/ HCPCS (ALT 636 FOR OP/ED)

## 2024-08-10 PROCEDURE — 74177 CT ABD & PELVIS W/CONTRAST: CPT | Performed by: RADIOLOGY

## 2024-08-10 PROCEDURE — 87070 CULTURE OTHR SPECIMN AEROBIC: CPT | Mod: PORLAB | Performed by: SURGERY

## 2024-08-10 PROCEDURE — 2500000004 HC RX 250 GENERAL PHARMACY W/ HCPCS (ALT 636 FOR OP/ED): Performed by: EMERGENCY MEDICINE

## 2024-08-10 PROCEDURE — 7100000001 HC RECOVERY ROOM TIME - INITIAL BASE CHARGE: Performed by: SURGERY

## 2024-08-10 PROCEDURE — 83605 ASSAY OF LACTIC ACID: CPT

## 2024-08-10 PROCEDURE — 3600000003 HC OR TIME - INITIAL BASE CHARGE - PROCEDURE LEVEL THREE: Performed by: SURGERY

## 2024-08-10 PROCEDURE — 2500000004 HC RX 250 GENERAL PHARMACY W/ HCPCS (ALT 636 FOR OP/ED): Performed by: NURSE ANESTHETIST, CERTIFIED REGISTERED

## 2024-08-10 PROCEDURE — 2550000001 HC RX 255 CONTRASTS: Performed by: EMERGENCY MEDICINE

## 2024-08-10 PROCEDURE — 99223 1ST HOSP IP/OBS HIGH 75: CPT | Performed by: NURSE PRACTITIONER

## 2024-08-10 PROCEDURE — 2500000005 HC RX 250 GENERAL PHARMACY W/O HCPCS: Performed by: NURSE ANESTHETIST, CERTIFIED REGISTERED

## 2024-08-10 PROCEDURE — 36415 COLL VENOUS BLD VENIPUNCTURE: CPT | Performed by: EMERGENCY MEDICINE

## 2024-08-10 PROCEDURE — 2020000001 HC ICU ROOM DAILY

## 2024-08-10 PROCEDURE — 3600000008 HC OR TIME - EACH INCREMENTAL 1 MINUTE - PROCEDURE LEVEL THREE: Performed by: SURGERY

## 2024-08-10 PROCEDURE — 99291 CRITICAL CARE FIRST HOUR: CPT | Performed by: EMERGENCY MEDICINE

## 2024-08-10 PROCEDURE — 71045 X-RAY EXAM CHEST 1 VIEW: CPT

## 2024-08-10 PROCEDURE — 71045 X-RAY EXAM CHEST 1 VIEW: CPT | Performed by: RADIOLOGY

## 2024-08-10 PROCEDURE — 87040 BLOOD CULTURE FOR BACTERIA: CPT | Mod: PORLAB | Performed by: EMERGENCY MEDICINE

## 2024-08-10 PROCEDURE — 0DNW0ZZ RELEASE PERITONEUM, OPEN APPROACH: ICD-10-PCS | Performed by: SURGERY

## 2024-08-10 PROCEDURE — 7100000002 HC RECOVERY ROOM TIME - EACH INCREMENTAL 1 MINUTE: Performed by: SURGERY

## 2024-08-10 PROCEDURE — 99291 CRITICAL CARE FIRST HOUR: CPT

## 2024-08-10 PROCEDURE — 3700000001 HC GENERAL ANESTHESIA TIME - INITIAL BASE CHARGE: Performed by: SURGERY

## 2024-08-10 PROCEDURE — 87077 CULTURE AEROBIC IDENTIFY: CPT | Mod: PORLAB | Performed by: SURGERY

## 2024-08-10 PROCEDURE — 3700000002 HC GENERAL ANESTHESIA TIME - EACH INCREMENTAL 1 MINUTE: Performed by: SURGERY

## 2024-08-10 PROCEDURE — 2720000007 HC OR 272 NO HCPCS: Performed by: SURGERY

## 2024-08-10 PROCEDURE — 2500000004 HC RX 250 GENERAL PHARMACY W/ HCPCS (ALT 636 FOR OP/ED): Performed by: NURSE PRACTITIONER

## 2024-08-10 PROCEDURE — 2500000005 HC RX 250 GENERAL PHARMACY W/O HCPCS: Performed by: ANESTHESIOLOGY

## 2024-08-10 PROCEDURE — 80053 COMPREHEN METABOLIC PANEL: CPT | Performed by: EMERGENCY MEDICINE

## 2024-08-10 PROCEDURE — 74177 CT ABD & PELVIS W/CONTRAST: CPT

## 2024-08-10 PROCEDURE — 93005 ELECTROCARDIOGRAM TRACING: CPT

## 2024-08-10 RX ORDER — ESOMEPRAZOLE MAGNESIUM 40 MG/1
40 GRANULE, DELAYED RELEASE ORAL
Status: DISCONTINUED | OUTPATIENT
Start: 2024-08-11 | End: 2024-08-23 | Stop reason: HOSPADM

## 2024-08-10 RX ORDER — SODIUM CHLORIDE, SODIUM LACTATE, POTASSIUM CHLORIDE, CALCIUM CHLORIDE 600; 310; 30; 20 MG/100ML; MG/100ML; MG/100ML; MG/100ML
INJECTION, SOLUTION INTRAVENOUS CONTINUOUS PRN
Status: DISCONTINUED | OUTPATIENT
Start: 2024-08-10 | End: 2024-08-10

## 2024-08-10 RX ORDER — LIDOCAINE HYDROCHLORIDE 10 MG/ML
0.1 INJECTION, SOLUTION EPIDURAL; INFILTRATION; INTRACAUDAL; PERINEURAL ONCE
Status: DISCONTINUED | OUTPATIENT
Start: 2024-08-10 | End: 2024-08-10 | Stop reason: HOSPADM

## 2024-08-10 RX ORDER — PANTOPRAZOLE SODIUM 40 MG/10ML
40 INJECTION, POWDER, LYOPHILIZED, FOR SOLUTION INTRAVENOUS
Status: DISCONTINUED | OUTPATIENT
Start: 2024-08-11 | End: 2024-08-23 | Stop reason: HOSPADM

## 2024-08-10 RX ORDER — ERGOCALCIFEROL 1.25 MG/1
1.25 CAPSULE ORAL
COMMUNITY

## 2024-08-10 RX ORDER — PHENYLEPHRINE 10 MG/250 ML(40 MCG/ML)IN 0.9 % SOD.CHLORIDE INTRAVENOUS
0-2 CONTINUOUS
Status: DISCONTINUED | OUTPATIENT
Start: 2024-08-10 | End: 2024-08-12

## 2024-08-10 RX ORDER — ROCURONIUM BROMIDE 10 MG/ML
INJECTION, SOLUTION INTRAVENOUS AS NEEDED
Status: DISCONTINUED | OUTPATIENT
Start: 2024-08-10 | End: 2024-08-10

## 2024-08-10 RX ORDER — ONDANSETRON HYDROCHLORIDE 2 MG/ML
INJECTION, SOLUTION INTRAVENOUS AS NEEDED
Status: DISCONTINUED | OUTPATIENT
Start: 2024-08-10 | End: 2024-08-10

## 2024-08-10 RX ORDER — PHENYLEPHRINE HYDROCHLORIDE 10 MG/ML
INJECTION INTRAVENOUS AS NEEDED
Status: DISCONTINUED | OUTPATIENT
Start: 2024-08-10 | End: 2024-08-10

## 2024-08-10 RX ORDER — SODIUM CHLORIDE, SODIUM LACTATE, POTASSIUM CHLORIDE, CALCIUM CHLORIDE 600; 310; 30; 20 MG/100ML; MG/100ML; MG/100ML; MG/100ML
100 INJECTION, SOLUTION INTRAVENOUS CONTINUOUS
Status: DISCONTINUED | OUTPATIENT
Start: 2024-08-10 | End: 2024-08-10 | Stop reason: HOSPADM

## 2024-08-10 RX ORDER — MORPHINE SULFATE 2 MG/ML
2 INJECTION, SOLUTION INTRAMUSCULAR; INTRAVENOUS EVERY 5 MIN PRN
Status: DISCONTINUED | OUTPATIENT
Start: 2024-08-10 | End: 2024-08-10 | Stop reason: HOSPADM

## 2024-08-10 RX ORDER — PROPOFOL 10 MG/ML
INJECTION, EMULSION INTRAVENOUS AS NEEDED
Status: DISCONTINUED | OUTPATIENT
Start: 2024-08-10 | End: 2024-08-10

## 2024-08-10 RX ORDER — PANTOPRAZOLE SODIUM 40 MG/1
40 TABLET, DELAYED RELEASE ORAL
Status: DISCONTINUED | OUTPATIENT
Start: 2024-08-11 | End: 2024-08-23 | Stop reason: HOSPADM

## 2024-08-10 RX ORDER — LIDOCAINE 40 MG/G
CREAM TOPICAL DAILY PRN
COMMUNITY

## 2024-08-10 RX ORDER — FENTANYL CITRATE 50 UG/ML
INJECTION, SOLUTION INTRAMUSCULAR; INTRAVENOUS AS NEEDED
Status: DISCONTINUED | OUTPATIENT
Start: 2024-08-10 | End: 2024-08-10

## 2024-08-10 RX ORDER — HEPARIN SODIUM 5000 [USP'U]/ML
5000 INJECTION, SOLUTION INTRAVENOUS; SUBCUTANEOUS EVERY 8 HOURS
Status: DISCONTINUED | OUTPATIENT
Start: 2024-08-10 | End: 2024-08-23 | Stop reason: HOSPADM

## 2024-08-10 RX ORDER — SODIUM CHLORIDE, SODIUM LACTATE, POTASSIUM CHLORIDE, CALCIUM CHLORIDE 600; 310; 30; 20 MG/100ML; MG/100ML; MG/100ML; MG/100ML
125 INJECTION, SOLUTION INTRAVENOUS CONTINUOUS
Status: DISCONTINUED | OUTPATIENT
Start: 2024-08-10 | End: 2024-08-12

## 2024-08-10 RX ORDER — LIDOCAINE HYDROCHLORIDE 20 MG/ML
INJECTION, SOLUTION INFILTRATION; PERINEURAL AS NEEDED
Status: DISCONTINUED | OUTPATIENT
Start: 2024-08-10 | End: 2024-08-10

## 2024-08-10 RX ORDER — HYOSCYAMINE SULFATE 0.125 MG
0.12 TABLET ORAL EVERY 4 HOURS PRN
COMMUNITY

## 2024-08-10 RX ADMIN — Medication 0.5 MCG/KG/MIN: at 21:23

## 2024-08-10 RX ADMIN — SODIUM CHLORIDE, POTASSIUM CHLORIDE, SODIUM LACTATE AND CALCIUM CHLORIDE 1000 ML: 600; 310; 30; 20 INJECTION, SOLUTION INTRAVENOUS at 22:02

## 2024-08-10 RX ADMIN — SODIUM CHLORIDE, POTASSIUM CHLORIDE, SODIUM LACTATE AND CALCIUM CHLORIDE 125 ML/HR: 600; 310; 30; 20 INJECTION, SOLUTION INTRAVENOUS at 20:16

## 2024-08-10 RX ADMIN — SODIUM CHLORIDE 500 ML: 9 INJECTION, SOLUTION INTRAVENOUS at 14:07

## 2024-08-10 RX ADMIN — SODIUM CHLORIDE 500 ML: 9 INJECTION, SOLUTION INTRAVENOUS at 16:05

## 2024-08-10 RX ADMIN — HEPARIN SODIUM 5000 UNITS: 5000 INJECTION INTRAVENOUS; SUBCUTANEOUS at 20:16

## 2024-08-10 RX ADMIN — Medication 5 L/MIN: at 19:00

## 2024-08-10 RX ADMIN — SODIUM CHLORIDE 1000 ML: 9 INJECTION, SOLUTION INTRAVENOUS at 11:42

## 2024-08-10 RX ADMIN — IOHEXOL 75 ML: 350 INJECTION, SOLUTION INTRAVENOUS at 14:09

## 2024-08-10 RX ADMIN — PIPERACILLIN SODIUM AND TAZOBACTAM SODIUM 3.38 G: 3; .375 INJECTION, SOLUTION INTRAVENOUS at 20:16

## 2024-08-10 SDOH — HEALTH STABILITY: MENTAL HEALTH: CURRENT SMOKER: 0

## 2024-08-10 SDOH — SOCIAL STABILITY: SOCIAL INSECURITY: HAVE YOU HAD THOUGHTS OF HARMING ANYONE ELSE?: UNABLE TO ASSESS

## 2024-08-10 SDOH — SOCIAL STABILITY: SOCIAL INSECURITY: ABUSE: ADULT

## 2024-08-10 SDOH — SOCIAL STABILITY: SOCIAL INSECURITY: WERE YOU ABLE TO COMPLETE ALL THE BEHAVIORAL HEALTH SCREENINGS?: NO

## 2024-08-10 ASSESSMENT — ACTIVITIES OF DAILY LIVING (ADL)
GROOMING: DEPENDENT
FEEDING YOURSELF: DEPENDENT
PATIENT'S MEMORY ADEQUATE TO SAFELY COMPLETE DAILY ACTIVITIES?: NO
JUDGMENT_ADEQUATE_SAFELY_COMPLETE_DAILY_ACTIVITIES: NO
BATHING: DEPENDENT
DRESSING YOURSELF: DEPENDENT
HEARING - RIGHT EAR: UNABLE TO ASSESS
TOILETING: DEPENDENT
ADEQUATE_TO_COMPLETE_ADL: NO
HEARING - LEFT EAR: UNABLE TO ASSESS
WALKS IN HOME: DEPENDENT

## 2024-08-10 ASSESSMENT — PAIN - FUNCTIONAL ASSESSMENT
PAIN_FUNCTIONAL_ASSESSMENT: FLACC (FACE, LEGS, ACTIVITY, CRY, CONSOLABILITY)
PAIN_FUNCTIONAL_ASSESSMENT: FLACC (FACE, LEGS, ACTIVITY, CRY, CONSOLABILITY)
PAIN_FUNCTIONAL_ASSESSMENT: CPOT (CRITICAL CARE PAIN OBSERVATION TOOL)
PAIN_FUNCTIONAL_ASSESSMENT: UNABLE TO SELF-REPORT
PAIN_FUNCTIONAL_ASSESSMENT: FLACC (FACE, LEGS, ACTIVITY, CRY, CONSOLABILITY)

## 2024-08-10 ASSESSMENT — COGNITIVE AND FUNCTIONAL STATUS - GENERAL
STANDING UP FROM CHAIR USING ARMS: TOTAL
MOVING FROM LYING ON BACK TO SITTING ON SIDE OF FLAT BED WITH BEDRAILS: TOTAL
MOVING TO AND FROM BED TO CHAIR: TOTAL
DRESSING REGULAR UPPER BODY CLOTHING: TOTAL
EATING MEALS: TOTAL
PATIENT BASELINE BEDBOUND: YES
TOILETING: TOTAL
TURNING FROM BACK TO SIDE WHILE IN FLAT BAD: TOTAL
HELP NEEDED FOR BATHING: TOTAL
MOBILITY SCORE: 6
DAILY ACTIVITIY SCORE: 6
CLIMB 3 TO 5 STEPS WITH RAILING: TOTAL
WALKING IN HOSPITAL ROOM: TOTAL
PERSONAL GROOMING: TOTAL
DRESSING REGULAR LOWER BODY CLOTHING: TOTAL

## 2024-08-10 ASSESSMENT — LIFESTYLE VARIABLES
HOW OFTEN DO YOU HAVE 6 OR MORE DRINKS ON ONE OCCASION: NEVER
AUDIT-C TOTAL SCORE: 0
SKIP TO QUESTIONS 9-10: 1
HOW MANY STANDARD DRINKS CONTAINING ALCOHOL DO YOU HAVE ON A TYPICAL DAY: PATIENT DOES NOT DRINK
HOW OFTEN DO YOU HAVE A DRINK CONTAINING ALCOHOL: NEVER
AUDIT-C TOTAL SCORE: 0

## 2024-08-10 ASSESSMENT — COLUMBIA-SUICIDE SEVERITY RATING SCALE - C-SSRS
1. IN THE PAST MONTH, HAVE YOU WISHED YOU WERE DEAD OR WISHED YOU COULD GO TO SLEEP AND NOT WAKE UP?: NO
2. HAVE YOU ACTUALLY HAD ANY THOUGHTS OF KILLING YOURSELF?: NO
6. HAVE YOU EVER DONE ANYTHING, STARTED TO DO ANYTHING, OR PREPARED TO DO ANYTHING TO END YOUR LIFE?: NO

## 2024-08-10 ASSESSMENT — PAIN SCALES - GENERAL
PAIN_LEVEL: 0
PAINLEVEL_OUTOF10: 0 - NO PAIN

## 2024-08-10 NOTE — H&P
History Obtained From: Chart and family     History Of Present Illness:  Abdi Wilson is a 79 y.o. male with PMHx s/f hypertension dyslipidemia old CVA mild cardiomyopathy ulcerative colitis GERD recent CVA in June of this year with dysphagia and expressive aphasia presenting with abdominal pain fever.  Patient had a PEG tube placed for nutritional purposes about 2 days ago.  Had presented to emergency department complaining of some pain in the abdominal area the patient was noted to have low blood pressure and fever in emergency department.  On presenting to emergency department patient had temperature 96.3 heart rate 102 respiratory rate 24 blood pressure 135/81 Javy panel shows sodium 148 potassium 4.2 chloride 112 bicarb 25 BUN 37 creatinine 1.38 glucose 144 liver enzymes within normal limits total bilirubin 1.5 initial lactate 2.5 repeat lactate 2.4 CBC showing WBC 13.6 hemoglobin 19.7 hematocrit 60.7 platelets 269 CT scan of the abdomen pelvis was done showing malposition PEG tube there is a small amount of free air, patient was seen by general surgery who felt patient had peritonitis plan is to take patient for emergent surgery.          ED Course:  ED Course as of 08/10/24 1551   Sat Aug 10, 2024   1202 Surgery resident was contacted and will come down to see the patient. [CD]   1204 EKG interpreted by me. Sinus. Rate 99. No acute ischemic changes. Normal QRS duration. Normal QTC. [CD]   1415 Blood pressure is in the 80s systolic. Was given a 500 mL fluid bolus. Lactic 2.4. White count 13.6. Creatinine 1.38. Urinalysis still pending. Chest x-ray pending. Surgery evaluated. CT abdomen/pelvis order to evaluate for further pathology. [CD]   1515 Dr. Velásquez at bedside. CT scan reveals that the G-tube is dislodged. He is going to the OR for repair. [CD]      ED Course User Index  [CD] Bora Bartlett MD         Diagnoses as of 08/10/24 1551   PEG tube malfunction (Multi)   Sepsis, due to unspecified  organism, unspecified whether acute organ dysfunction present (Multi)     Relevant Results  Results for orders placed or performed during the hospital encounter of 08/10/24 (from the past 24 hour(s))   CBC and Auto Differential   Result Value Ref Range    WBC 13.6 (H) 4.4 - 11.3 x10*3/uL    nRBC 0.0 0.0 - 0.0 /100 WBCs    RBC 6.65 (H) 4.50 - 5.90 x10*6/uL    Hemoglobin 19.7 (H) 13.5 - 17.5 g/dL    Hematocrit 60.7 (H) 41.0 - 52.0 %    MCV 91 80 - 100 fL    MCH 29.6 26.0 - 34.0 pg    MCHC 32.5 32.0 - 36.0 g/dL    RDW 15.7 (H) 11.5 - 14.5 %    Platelets 269 150 - 450 x10*3/uL    Neutrophils % 83.3 40.0 - 80.0 %    Immature Granulocytes %, Automated 0.4 0.0 - 0.9 %    Lymphocytes % 8.9 13.0 - 44.0 %    Monocytes % 6.9 2.0 - 10.0 %    Eosinophils % 0.1 0.0 - 6.0 %    Basophils % 0.4 0.0 - 2.0 %    Neutrophils Absolute 11.30 (H) 1.60 - 5.50 x10*3/uL    Immature Granulocytes Absolute, Automated 0.06 0.00 - 0.50 x10*3/uL    Lymphocytes Absolute 1.21 0.80 - 3.00 x10*3/uL    Monocytes Absolute 0.93 (H) 0.05 - 0.80 x10*3/uL    Eosinophils Absolute 0.02 0.00 - 0.40 x10*3/uL    Basophils Absolute 0.05 0.00 - 0.10 x10*3/uL   Comprehensive Metabolic Panel   Result Value Ref Range    Glucose 144 (H) 74 - 99 mg/dL    Sodium 148 (H) 136 - 145 mmol/L    Potassium 4.2 3.5 - 5.3 mmol/L    Chloride 112 (H) 98 - 107 mmol/L    Bicarbonate 25 21 - 32 mmol/L    Anion Gap 15 10 - 20 mmol/L    Urea Nitrogen 37 (H) 6 - 23 mg/dL    Creatinine 1.38 (H) 0.50 - 1.30 mg/dL    eGFR 51 (L) >60 mL/min/1.73m*2    Calcium 9.4 8.6 - 10.3 mg/dL    Albumin 4.0 3.4 - 5.0 g/dL    Alkaline Phosphatase 99 33 - 136 U/L    Total Protein 7.9 6.4 - 8.2 g/dL    AST 20 9 - 39 U/L    Bilirubin, Total 1.5 (H) 0.0 - 1.2 mg/dL    ALT 16 10 - 52 U/L   Lactate   Result Value Ref Range    Lactate 2.3 (H) 0.4 - 2.0 mmol/L   Lactate   Result Value Ref Range    Lactate 2.4 (H) 0.4 - 2.0 mmol/L      CT abdomen pelvis w IV contrast    Result Date: 8/10/2024  Interpreted By:   Yamilet Heck, STUDY: CT ABDOMEN PELVIS W IV CONTRAST;  8/10/2024 2:08 pm   INDICATION: Signs/Symptoms:abbdominal pain.   COMPARISON: None.   ACCESSION NUMBER(S): [id number]   ORDERING CLINICIAN: DONITA ROBLES   TECHNIQUE: CT of the abdomen and pelvis was performed.  75 mL Omnipaque 350   FINDINGS: LOWER CHEST: There is mild bibasilar atelectasis.   ABDOMEN:   LIVER: There is no hepatic mass.   BILE DUCTS: There is no intrahepatic, common hepatic or common bile ductal dilatation.   GALLBLADDER: The gallbladder is unremarkable.   PANCREAS: The pancreas is unremarkable.   SPLEEN: The spleen is unremarkable. There is no splenic mass or splenomegaly.   ADRENAL GLANDS: The adrenal glands are unremarkable.   KIDNEYS AND URETERS: The kidneys function symmetrically. There are benign bilateral simple renal cysts. There is no intrarenal calculus or hydronephrosis. The bladder is distended measuring 13.6 x 11.3 x 7.0 cm with a volume of 559 cc. There is bladder wall trabeculation, likely secondary to postobstructive neuropathic change from prostate enlargement.   BOWEL: There is free intraperitoneal air. There is a feeding tube insufflated in the fatty anterior to the stomach. This is where the free air is located. This could be iatrogenic from malpositioned PEG tube rather than a ruptured viscus. There is thickening of loops of small bowel in the left side of the abdomen. There is a small amount of ascites in the left side of the abdomen interdigitated between the thickened loops of small bowel. There is induration of the mesentery. This could represent Crohn's disease or enteritis. There is diffuse fatty infiltration of the colon from the mid transverse colon to the rectum. Fibrofatty infiltration can be seen with Crohn's disease, inactive.   VESSELS: There is atherosclerotic calcification of the abdominal aorta, iliac and femoral arteries.   PERITONEUM/RETROPERITONEUM/LYMPH NODES: There is no retroperitoneal or  pelvic adenopathy.   ABDOMINAL WALL: The abdominal wall is unremarkable.   BONE AND SOFT TISSUE: There is no acute osseous finding.   The prostate gland is enlarged measuring 6.2 x 4.8 cm.       1. There is a malpositioned PEG tube insufflated in the fatty anterior to the stomach. There is a small amount of free air in this location. This could be secondary to malpositioned PEG tube rather than a ruptured viscus but should be correlated clinically. 2. Nonspecific thickening of loops of small bowel in the left side of the abdomen with induration of the mesentery and a small amount of ascites. This could represent Crohn's disease or enteritis. 3. Fibrofatty infiltration of the colon from the transverse colon to the rectum. This can be seen with Crohn's disease, inactive. 4. Benign bilateral simple renal cysts. 5. The urinary bladder with a volume of 559 cc. 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   MACRO: None   Signed by: Yamilet Heck 8/10/2024 2:22 PM Dictation workstation:   [id number]    XR chest 1 view    Result Date: 8/10/2024  Interpreted By:  Yamilet Heck, STUDY: XR CHEST 1 VIEW;  8/10/2024 1:44 pm   INDICATION: Signs/Symptoms:cough.   COMPARISON: 06/24/2024   ACCESSION NUMBER(S): [id number]   ORDERING CLINICIAN: DONITA ROBLES   FINDINGS: CARDIOMEDIASTINAL SILHOUETTE: The heart is enlarged in a left ventricular configuration, unchanged.     LUNGS: Lungs are clear.   ABDOMEN: No remarkable upper abdominal findings.     BONES: No acute osseous changes.       No acute cardiopulmonary process.   MACRO: None   Signed by: Yamilet Heck 8/10/2024 1:46 PM Dictation workstation:   [id number]    XR ABDOMEN FOR NG/OG/NE TUBE PLACEMENT    Result Date: 7/30/2024  EXAMINATION: ONE SUPINE XRAY VIEW(S) OF THE ABDOMEN 7/30/2024 9:50 pm COMPARISON: 7:22 p.m. HISTORY: ORDERING SYSTEM PROVIDED HISTORY: Confirmation of course of NG tube and  location of tip of tube post advancement TECHNOLOGIST PROVIDED HISTORY: Reason for exam:->Confirmation of course of NG tube and location of tip of tube post advancement Portable?->Yes FINDINGS: Feeding tube advanced.  Tip now projects over the region of the gastric antrum/duodenal bulb.  Consider advancement 10-15 cm if post pyloric positioning is desired.  No ileus or obstruction lung bases clear.  Osseous and body wall soft tissues unremarkable.    Feeding tube tip projects over the region of the gastric antrum/duodenal bulb.    XR ABDOMEN FOR NG/OG/NE TUBE PLACEMENT    Result Date: 7/30/2024  EXAMINATION: ONE SUPINE XRAY VIEW(S) OF THE ABDOMEN 7/30/2024 7:21 pm COMPARISON: None. HISTORY: ORDERING SYSTEM PROVIDED HISTORY: G-tube Placement / Confirmation TECHNOLOGIST PROVIDED HISTORY: Reason for exam:->G-tube Placement / Confirmation FINDINGS: Single image shows esophageal route catheter into the left upper quadrant expected proximal stomach region.  No dilated bowels evident.    Findings suggestive of NG/OG into the proximal stomach. RECOMMENDATION: Clinical correlation.     Scheduled medications:  piperacillin-tazobactam, 3.375 g, intravenous, Once      Continuous medications:     PRN medications:        Past Medical History  He has a past medical history of Stroke (Multi).    Surgical History  He has no past surgical history on file.     Social History  He reports that he has never smoked. He has never been exposed to tobacco smoke. He has never used smokeless tobacco. No history on file for alcohol use and drug use.    Family History  No family history on file.     Allergies  Patient has no known allergies.    Code Status  DNR     Review of Systems   Reason unable to perform ROS: Patient is nonverbal poor historian.       Last Recorded Vitals  BP 86/62   Pulse (!) 105   Temp 35.7 °C (96.3 °F) (Temporal)   Resp 18   Wt 94.3 kg (208 lb)   SpO2 100%      Physical Exam  Constitutional:       Appearance: He is  ill-appearing and toxic-appearing. He is not diaphoretic.   HENT:      Head: Normocephalic.      Nose: Nose normal.      Mouth/Throat:      Mouth: Mucous membranes are dry.      Pharynx: No posterior oropharyngeal erythema.   Eyes:      General: No scleral icterus.     Conjunctiva/sclera: Conjunctivae normal.   Neck:      Vascular: No carotid bruit.   Cardiovascular:      Rate and Rhythm: Regular rhythm. Tachycardia present.      Heart sounds: No murmur heard.  Pulmonary:      Effort: Pulmonary effort is normal. No respiratory distress.      Breath sounds: No wheezing, rhonchi or rales.      Comments: Tachypneic  Abdominal:      General: There is distension.      Tenderness: There is abdominal tenderness. There is rebound.      Comments: No drainage at G-tube site   Musculoskeletal:      Right lower leg: No edema.      Left lower leg: No edema.   Skin:     General: Skin is warm.      Capillary Refill: Capillary refill takes less than 2 seconds.   Neurological:      Mental Status: Mental status is at baseline.      Sensory: No sensory deficit.      Comments: Patient oriented to self is very groggy has expressive aphasia secondary to recent stroke   Psychiatric:         Mood and Affect: Mood normal.         Assessment/Plan   Principal Problem:    PEG tube malfunction (Multi)    Acute abdominal pain, secondary to peritonitis pt evaluated for emergent surgery cardiac risk index 2, class 3 risk  Pt would likely die without surgical intervention, advise going forward with surgery as potential benefits significant and outway moderate associated risks  IV fluid bolus  Continue IV hydration, IV Zosyn   Follow blood cultures  Will likely need ICU support post op.   PT is DNR CCA    Dehydration  Patient appears clinically to be dehydrated  He appears to be hemo concentrated  He did receive IV fluid boluses and will continue IV fluids    G-tube dysfunction  CT scan suggest G-tube is dislodged  Plan for emergent surgery  today      HX CVA w S/L deficits, memory loss patient nonambulatory  Pt speaks some at baseline  Resume medications post surgery, is currently NPO, no GT access    GT dysfunction  Recent placement 8/8/24        No new Assessment & Plan notes have been filed under this hospital service since the last note was generated.  Service: Internal Medicine          Mainor Kramer, APRN-CNP    Dragon dictation software was used to dictate this note and thus there may be minor errors in translation/transcription including garbled speech or misspellings. Please contact for clarification if needed.

## 2024-08-10 NOTE — PROGRESS NOTES
Abdi Wilson is a 79 y.o. male admitted for No Principal Problem: There is no principal problem currently on the Problem List. Please update the Problem List and refresh.. Pharmacy reviewed the patient's [id number]-qlhgkhugd medications and allergies for accuracy.    The list below reflects the PTA list prior to pharmacy medication history. A summary a changes to the PTA medication list has been listed below. Please review each medication in order reconciliation for additional clarification and justification.    Source of information:    Medications added:  ASPERCREAM LIDOCAINE 4%  HYOSCYAMINE 0.125MG 1 Q4 PRN  Medications modified:    Medications to be removed:  AMOX/CLAV  ASPIRIN  VITAMIN B12  LIDOCAINE CREAM    Medications of concern:      Prior to Admission Medications   Prescriptions Last Dose Informant Patient Reported? Taking?   acetaminophen (Tylenol) 325 mg tablet   Yes No   Sig: Take 2 tablets (650 mg) by mouth every 4 hours if needed for mild pain (1 - 3) or fever (temp greater than 38.0 C).   amoxicillin-pot clavulanate (Augmentin) 875-125 mg tablet   No No   Sig: Take 1 tablet by mouth 2 times a day. 1 twice a day for 10 doses per NG.   aspirin 81 mg EC tablet   No No   Sig: Take 1 tablet (81 mg) by mouth once daily. Take for 21 days and stop   atorvastatin (Lipitor) 80 mg tablet   No No   Sig: Take 1 tablet (80 mg) by mouth once daily at bedtime.   bisacodyl (Dulcolax, bisacodyl,) 10 mg suppository   Yes No   Sig: Insert 1 suppository (10 mg) into the rectum if needed for constipation.   carvedilol (Coreg) 6.25 mg tablet   No No   Sig: Take 1 tablet (6.25 mg) by mouth 2 times a day.   cholecalciferol (Vitamin D-3) 50,000 unit capsule   Yes No   Sig: Take 1 capsule (50,000 Units) by mouth 1 (one) time per week. On Mondays   clopidogrel (Plavix) 75 mg tablet   Yes No   Sig: Take 1 tablet (75 mg) by mouth once daily.   cyanocobalamin (Vitamin B-12) 1,000 mcg tablet   Yes No   Sig: Take 1 tablet (1,000 mcg)  by mouth once daily.   hydrALAZINE (Apresoline) 25 mg tablet   Yes No   Sig: Take 1 tablet (25 mg) by mouth 3 times a day.   lidocaine (LMX) 4 % cream   Yes No   Sig: Apply 0.1 g topically once daily as needed for mild pain (1 - 3).   loperamide (Imodium A-D) 2 mg tablet   Yes No   Sig: Take 1 tablet (2 mg) by mouth if needed for diarrhea.   losartan (Cozaar) 100 mg tablet   Yes No   Sig: Take 1 tablet (100 mg) by mouth once daily.   magnesium hydroxide (Milk of Magnesia) 2,400 mg/10 mL suspension suspension   Yes No   Sig: Take 30 mL by mouth if needed for constipation.      Facility-Administered Medications: None       Kasandra Lopez

## 2024-08-10 NOTE — ANESTHESIA PROCEDURE NOTES
Airway  Date/Time: 8/10/2024 4:25 PM  Urgency: emergent    Airway not difficult    Staffing  Performed: CRNA   Authorized by: QUINCY Escobar    Performed by: QUINCY Escobar  Patient location during procedure: OR    Consent for Airway (if performed for an anesthetic, see related documentation for consents)  Patient identity confirmed: verbally with patient, hospital-assigned identification number and arm band  Consent: The procedure was performed in an emergent situation. Verbal consent obtained. Written consent obtained.  Risks and benefits: risks, benefits and alternatives were discussed  Consent given by: patient, guardian and power of       Indications and Patient Condition  Indications for airway management: anesthesia  Spontaneous ventilation: present  Sedation level: deep  Preoxygenated: yes  Patient position: sniffing  MILS maintained throughout  Mask difficulty assessment: 1 - vent by mask  Planned trial extubation    Final Airway Details  Final airway type: endotracheal airway      Successful airway: ETT  Cuffed: yes   Successful intubation technique: direct laryngoscopy  Facilitating devices/methods: intubating stylet  Endotracheal tube insertion site: oral  Blade: Ananya  Blade size: #3  ETT size (mm): 7.0  Cormack-Lehane Classification: grade I - full view of glottis  Placement verified by: chest auscultation and capnometry   Cuff volume (mL): 3  Measured from: lips  ETT to lips (cm): 22  Number of attempts at approach: 1  Ventilation between attempts: none  Number of other approaches attempted: 0

## 2024-08-10 NOTE — CONSULTS
"Reason For Consult  Malfunctioning PEG tube    History Of Present Illness  Abdi Wilson is a 79 y.o. male presenting with PEG tube malfunction.  Patient underwent PEG tube placement on 8/8/2024.  He has been reportedly having abdominal pain per his facility which prompted him to come to be brought to the emergency department for further evaluation.    Family is accompanying the patient at bedside to help provide some of the history as the patient's current mentation is unable to do so.  They report that he has been more lethargic than usual.  They stated he has not been having diarrhea but I are unsure how well his tube feeds have been tolerated and how many has received during this time.    The facility also noted an increased heart rate as well as lower blood pressures.     Past Medical History  He has a past medical history of Stroke (Multi).    Surgical History  He has no past surgical history on file.     Social History  He reports that he has never smoked. He has never been exposed to tobacco smoke. He has never used smokeless tobacco. No history on file for alcohol use and drug use.    Family History  No family history on file.     Allergies  Patient has no known allergies.    Review of Systems  Unable to be obtained secondary to patient's altered mentation     Physical Exam  General: Ill-appearing.  Nondiaphoretic.  HEENT: Atraumatic.  Normocephalic  Cardiovascular: Tachycardic rate and regular rhythm  Respiratory: 2 L nasal cannula without respiratory distress.  No accessory muscle usage.  No tachypnea  Abdomen: Abdomen is soft with PEG tube in place.  3 cm from the skin.  Bumper freely able to rotate.  Patient has guarding upon palpation with tenderness that is diffuse and poorly localized.    Last Recorded Vitals  Blood pressure 105/61, pulse (!) 101, temperature 35.7 °C (96.3 °F), temperature source Temporal, resp. rate 18, height 1.753 m (5' 9\"), weight 94.3 kg (208 lb), SpO2 99%.    Relevant " Results  CT scan of the abdomen pelvis shows a malpositioned PEG tube insufflating the fat anterior to the stomach     Assessment/Plan   Patient 79-year-old male with past medical history significant for multiple strokes presenting to the emergency department with abdominal pain following placement of PEG tube on 8/8/2024.  Patient is appearing septic with tachycardia and hypotension.  Labs are also significant for a leukocytosis of 13.6 with left shift.  Lactate elevated 2.4.    Discussion was had with the attending Dr. Velásquez who agrees to take the patient emergently to surgery for washout as well as reimplantation of PEG tube.    Patient has been admitted to the internal medicine service with plans to be transferred to the intensive care unit postoperatively.      Justino Prather, DO PGY-2

## 2024-08-10 NOTE — ANESTHESIA PREPROCEDURE EVALUATION
Patient: Abdi Wilson    Procedure Information       Date/Time: 08/10/24 1830    Procedure: Exploration Laparotomy    [facility]: [facility] / [facility]    Surgeons: Jevon Velásquez MD            Relevant Problems   Neuro   (+) Cerebral artery occlusion with cerebral infarction (Multi)       Clinical information reviewed:   Tobacco  Allergies  Meds  Problems  Med Hx  Surg Hx   Fam Hx  Soc   Hx        NPO Detail:  No data recorded     Physical Exam    Airway  Mallampati: III  TM distance: >3 FB  Neck ROM: limited     Cardiovascular - normal exam  Rhythm: regular     Dental   Comments: Multiple missing teeth upper and lower   Pulmonary - normal exam  (+) rhonchi, decreased breath sounds     Abdominal   Abdomen: soft and tender             Anesthesia Plan    History of general anesthesia?: yes  History of complications of general anesthesia?: no    ASA 4 - emergent     general     The patient is not a current smoker.  Patient was not previously instructed to abstain from smoking on day of procedure.  Patient did not smoke on day of procedure.    intravenous induction   Postoperative administration of opioids is intended.  Anesthetic plan and risks discussed with patient.    Plan discussed with CRNA.

## 2024-08-10 NOTE — ANESTHESIA POSTPROCEDURE EVALUATION
Patient: Abdi Wilson    Procedure Summary       Date: 08/10/24 Room / Location: [facility] 01 / [facility]    Anesthesia Start: 1619 Anesthesia Stop: 1814    Procedure: Exploration Laparotomy, washout, replacement gastrotomy tube, NESSA drain placement, lysis of adhesions Diagnosis:     Surgeons: Jevon Velásquez MD Responsible Provider: QUINCY Escobar    Anesthesia Type: general ASA Status: 4 - Emergent            Anesthesia Type: general    Vitals Value Taken Time   BP  08/10/24 1814   Temp  08/10/24 1814   Pulse 87 08/10/24 1813   Resp 39 08/10/24 1813   SpO2 82 % 08/10/24 1813   Vitals shown include unfiled device data.    Anesthesia Post Evaluation    Patient participation: complete - patient cannot participate  Level of consciousness: lethargic, sedated, awake and confused  Pain score: 0  Pain management: adequate  Multimodal analgesia pain management approach  Airway patency: patent  Two or more strategies used to mitigate risk of obstructive sleep apnea  Cardiovascular status: acceptable and hemodynamically stable  Respiratory status: face mask and acceptable  Hydration status: acceptable  Postoperative Nausea and Vomiting: none      No notable events documented.

## 2024-08-10 NOTE — ED PROVIDER NOTES
HPI   Chief Complaint   Patient presents with    Abdominal Pain       79-year-old male postop day to pick two by Dr. Velásquez. History of stroke, aphasic, peg tube placed for nutrition. At a skilled facility. Was complaining of abdominal pain today. He was localizing into the peg site. His blood pressure was also noted to be low. History difficult to obtain from him because of his stroke. Family at bedside. Further history obtained from family.              Patient History   Past Medical History:   Diagnosis Date    Stroke (Multi)      No past surgical history on file.  No family history on file.  Social History     Tobacco Use    Smoking status: Never     Passive exposure: Never    Smokeless tobacco: Never   Vaping Use    Vaping status: Not on file   Substance Use Topics    Alcohol use: Not on file    Drug use: Not on file       Physical Exam   ED Triage Vitals [08/10/24 1108]   Temperature Heart Rate Respirations BP   35.7 °C (96.3 °F) 100 (!) 24 122/88      Pulse Ox Temp Source Heart Rate Source Patient Position   94 % Temporal -- --      BP Location FiO2 (%)     -- --       Physical Exam  Vitals and nursing note reviewed.   Constitutional:       General: He is not in acute distress.     Appearance: He is ill-appearing.      Comments: Somewhat somnolent, appears chronically ill.   HENT:      Head: Normocephalic and atraumatic.   Eyes:      Conjunctiva/sclera: Conjunctivae normal.   Cardiovascular:      Rate and Rhythm: Normal rate and regular rhythm.      Heart sounds: No murmur heard.  Pulmonary:      Effort: Pulmonary effort is normal. No respiratory distress.      Breath sounds: Normal breath sounds.   Abdominal:      Palpations: Abdomen is soft.      Tenderness: There is no abdominal tenderness.      Comments: Skin surrounding Peg site is clean and dry. No drainage.   Musculoskeletal:         General: No swelling.      Cervical back: Neck supple.   Skin:     General: Skin is warm and dry.      Capillary Refill:  Capillary refill takes less than 2 seconds.   Neurological:      Comments: Neurologic exam unchanged profamily, does not follow commands.   Psychiatric:      Comments: Baseline mental status per family           ED Course & MDM   ED Course as of 08/10/24 1518   Sat Aug 10, 2024   1202 Surgery resident was contacted and will come down to see the patient. [CD]   1204 EKG interpreted by me. Sinus. Rate 99. No acute ischemic changes. Normal QRS duration. Normal QTC. [CD]   1415 Blood pressure is in the 80s systolic. Was given a 500 mL fluid bolus. Lactic 2.4. White count 13.6. Creatinine 1.38. Urinalysis still pending. Chest x-ray pending. Surgery evaluated. CT abdomen/pelvis order to evaluate for further pathology. [CD]   1515 Dr. Velásquez at bedside. CT scan reveals that the G-tube is dislodged. He is going to the OR for repair. [CD]      ED Course User Index  [CD] Bora Bartlett MD         Diagnoses as of 08/10/24 1518   PEG tube malfunction (Multi)   Sepsis, due to unspecified organism, unspecified whether acute organ dysfunction present (Multi)                 No data recorded     [place] Coma Scale Score: 10 (08/10/24 1110 : Shwtea Ivan, ISHMAEL)                           Medical Decision Making      Procedure  Critical Care    Performed by: Bora Bartlett MD  Authorized by: Bora Bartlett MD    Critical care provider statement:     Critical care time (minutes):  42    Critical care time was exclusive of:  Separately billable procedures and treating other patients    Critical care was necessary to treat or prevent imminent or life-threatening deterioration of the following conditions:  Sepsis    Critical care was time spent personally by me on the following activities:  Discussions with consultants, evaluation of patient's response to treatment, examination of patient, interpretation of cardiac output measurements, obtaining history from patient or surrogate, review of old charts, ordering and  review of radiographic studies, ordering and review of laboratory studies and ordering and performing treatments and interventions    Care discussed with: admitting provider         Bora Bartlett MD  08/10/24 7874

## 2024-08-10 NOTE — OP NOTE
Exploration Laparotomy Operative Note     Date: 8/10/2024  OR Location: [facility]    Name: Abdi Wilson, : 1945, Age: 79 y.o., MRN: [id number], Sex: male    Diagnosis  Peritonitis secondary to extraluminal PEG tube Same     Procedures  Exploration Laparotomy  76201 - NE EXPLORATORY LAPAROTOMY CELIOTOMY W/WO BIOPSY SPX  Replacement gastrostomy tube lysis of adhesions 15 minutes washout and placement of Robin-Trevino drain    Surgeons      * Jevon Velásquez - Primary    Resident/Fellow/Other Assistant:  Surgeons and Role:  * No surgeons found with a matching role *  Justino Collier  Procedure Summary  Anesthesia: General  ASA: IV  Anesthesia Staff: CRNA: QUINCY Escobar  Estimated Blood Loss: 3mL  Intra-op Medications: Administrations occurring from  to  on 08/10/24:  * No intraprocedure medications in log *      Intraprocedure I/O Totals       None           Specimen: No specimens collected     Staff:   Circulator: Judy Bentleyub Person: Diana         Drains and/or Catheters:   Closed/Suction Drain 1 LUQ Bulb 10 Fr. (Active)       NG/OG/Feeding Tube Left nostril (Active)       Urethral Catheter Non-latex 16 Fr. (Active)       Tourniquet Times:         Implants:     Findings:       Indications: Abdi Wilson is an 79 y.o. male who is having surgery for .  Peritonitis secondary to extraluminal PEG tube    The patient was seen in the preoperative area. The risks, benefits, complications, treatment options, non-operative alternatives, expected recovery and outcomes were discussed with the patient. The possibilities of reaction to medication, pulmonary aspiration, injury to surrounding structures, bleeding, recurrent infection, the need for additional procedures, failure to diagnose a condition, and creating a complication requiring transfusion or operation were discussed with the patient. The patient concurred with the proposed plan, giving informed consent.  The site of surgery was  properly noted/marked if necessary per policy. The patient has been actively warmed in preoperative area. Preoperative antibiotics have been ordered and given within 1 hours of incision. Venous thrombosis prophylaxis have been ordered including bilateral sequential compression devices    Procedure Details: Approximately 48 hours ago patient had uneventful placement of PEG tube as he has had multiple strokes.  He is unable to eat on his own.  He was sent back to the nursing home.  The PEG tube at the conclusion of the procedure was photographed intraluminally and was approximately 4 cm at the skin.  Complications:  None; patient tolerated the procedure well.  He was reported to lose mentation and become hypotensive at the nursing home and upon arrival in the emergency room he was found to be hypotensive tachycardic have peritoneal signs and on imaging have his PEG tube extraluminal.  The PEG tube was now at 2.5 cm at the skin..  On my exam he had four-quadrant peritoneal signs.  He was brought expeditiously to the operating room.In the operating room patient was placed asleep [place] catheter was placed nasogastric tube was placed abdomen was prepped and draped in the usual sterile fashion to include prepping the PEG tube in.  He had a previous lower midline incision so the initial incision was just inferior to the area of the PEG tube and was carried down to his previous incision line.  This was taken down through the subcutaneous tissues to the fascia which was open peritoneum was entered superiorly and inferiorly.  Inferiorly he did have adhesions of omentum up to the previous suture line in the abdominal wall and for approximately 15 minutes these were taken down.  Initial findings showed purulence in all 4 quadrants most notably in the left upper quadrant.  This was irrigated out.  This was suctioned out.  The PEG tube was found lodged taut against the abdominal wall.  Babcocks were placed on the stomach and he  was found to have a clean whole in the stomach that was exuding gastric contents.  The suction catheter was placed through the hole into the stomach to empty the stomach and the nasogastric tube was also used to suction the stomach.  We pulled more PEG tube into the stomach and demonstrated that the hole in the stomach from the previous PEG tube was under no tension when placed against the gastric wall in the area where the PEG tube and to the abdominal cavity.  As such two pursestrings of 0 silk were made leaving the needles on the suture.  The PEG tube was then manipulated so that the button was linearalised, placed in a Faith clamp, and easily placed back within the gastric lumen we manipulated it in the stomach to ensure no false passage.  We then again pulled the stomach up to the abdominal wall with the PEG tube the stomach was not under tension.  We tied the 2 pursestring and then using the needles already on the suture sutured the stomach to the anterior abdominal wall.  We then irrigated some more suctioned out the abdomen there was no more purulence we pulled the PEG tube to approximately 4 to 4.5 cm at the skin and sure that it was not under any tension fascia was then closed with double-stranded PDS after a Robin-Trevino drain was placed in the left upper quadrant wound was irrigated skin was closed with spaces after double tap staples and 1 inch iodoform gauze was placed as padma wounds were dressed the drain and the PEG tube were both sutured to the skin the PEG tube being sutured at the bolster level he was awakened extubated and will be moved eventually to the ICU currently in stable condition      Disposition: ICU - extubated and stable.  Condition: stable         Additional Details:       Attending Attestation: I performed the procedure.    Jevon Velásquez  Phone Number: [phone]

## 2024-08-11 VITALS
BODY MASS INDEX: 30.81 KG/M2 | OXYGEN SATURATION: 97 % | HEIGHT: 69 IN | DIASTOLIC BLOOD PRESSURE: 57 MMHG | HEART RATE: 89 BPM | SYSTOLIC BLOOD PRESSURE: 106 MMHG | TEMPERATURE: 98.8 F | WEIGHT: 208 LBS | RESPIRATION RATE: 22 BRPM

## 2024-08-11 LAB
ALBUMIN SERPL BCP-MCNC: 2.9 G/DL (ref 3.4–5)
ANION GAP SERPL CALC-SCNC: 12 MMOL/L (ref 10–20)
ANION GAP SERPL CALC-SCNC: 15 MMOL/L (ref 10–20)
APPEARANCE UR: CLEAR
BACTERIA BLD CULT: NORMAL
BACTERIA BLD CULT: NORMAL
BACTERIA SPEC CULT: ABNORMAL
BASOPHILS # BLD MANUAL: 0 X10*3/UL (ref 0–0.1)
BASOPHILS NFR BLD MANUAL: 0 %
BILIRUB UR STRIP.AUTO-MCNC: NEGATIVE MG/DL
BUN SERPL-MCNC: 41 MG/DL (ref 6–23)
BUN SERPL-MCNC: 42 MG/DL (ref 6–23)
CALCIUM SERPL-MCNC: 6.6 MG/DL (ref 8.6–10.3)
CALCIUM SERPL-MCNC: 8.2 MG/DL (ref 8.6–10.3)
CHLORIDE SERPL-SCNC: 118 MMOL/L (ref 98–107)
CHLORIDE SERPL-SCNC: 118 MMOL/L (ref 98–107)
CO2 SERPL-SCNC: 21 MMOL/L (ref 21–32)
CO2 SERPL-SCNC: 21 MMOL/L (ref 21–32)
COLOR UR: YELLOW
CREAT SERPL-MCNC: 1.5 MG/DL (ref 0.5–1.3)
CREAT SERPL-MCNC: 1.61 MG/DL (ref 0.5–1.3)
EGFRCR SERPLBLD CKD-EPI 2021: 43 ML/MIN/1.73M*2
EGFRCR SERPLBLD CKD-EPI 2021: 46 ML/MIN/1.73M*2
EOSINOPHIL # BLD MANUAL: 0 X10*3/UL (ref 0–0.4)
EOSINOPHIL NFR BLD MANUAL: 0 %
ERYTHROCYTE [DISTWIDTH] IN BLOOD BY AUTOMATED COUNT: 14.8 % (ref 11.5–14.5)
GLUCOSE SERPL-MCNC: 119 MG/DL (ref 74–99)
GLUCOSE SERPL-MCNC: 94 MG/DL (ref 74–99)
GLUCOSE UR STRIP.AUTO-MCNC: NORMAL MG/DL
GRAM STN SPEC: ABNORMAL
GRAM STN SPEC: ABNORMAL
HCT VFR BLD AUTO: 53.3 % (ref 41–52)
HGB BLD-MCNC: 17.4 G/DL (ref 13.5–17.5)
IMM GRANULOCYTES # BLD AUTO: 0.09 X10*3/UL (ref 0–0.5)
IMM GRANULOCYTES NFR BLD AUTO: 0.6 % (ref 0–0.9)
KETONES UR STRIP.AUTO-MCNC: ABNORMAL MG/DL
LACTATE SERPL-SCNC: 2.1 MMOL/L (ref 0.4–2)
LACTATE SERPL-SCNC: 2.2 MMOL/L (ref 0.4–2)
LACTATE SERPL-SCNC: 2.4 MMOL/L (ref 0.4–2)
LACTATE SERPL-SCNC: 2.7 MMOL/L (ref 0.4–2)
LEUKOCYTE ESTERASE UR QL STRIP.AUTO: NEGATIVE
LYMPHOCYTES # BLD MANUAL: 1.87 X10*3/UL (ref 0.8–3)
LYMPHOCYTES NFR BLD MANUAL: 12 %
MAGNESIUM SERPL-MCNC: 1.98 MG/DL (ref 1.6–2.4)
MCH RBC QN AUTO: 29.6 PG (ref 26–34)
MCHC RBC AUTO-ENTMCNC: 32.6 G/DL (ref 32–36)
MCV RBC AUTO: 91 FL (ref 80–100)
MONOCYTES # BLD MANUAL: 1.09 X10*3/UL (ref 0.05–0.8)
MONOCYTES NFR BLD MANUAL: 7 %
MUCOUS THREADS #/AREA URNS AUTO: ABNORMAL /LPF
NEUTROPHILS # BLD MANUAL: 12.64 X10*3/UL (ref 1.6–5.5)
NEUTS BAND # BLD MANUAL: 0.78 X10*3/UL (ref 0–0.5)
NEUTS BAND NFR BLD MANUAL: 5 %
NEUTS SEG # BLD MANUAL: 11.86 X10*3/UL (ref 1.6–5)
NEUTS SEG NFR BLD MANUAL: 76 %
NITRITE UR QL STRIP.AUTO: NEGATIVE
NRBC BLD-RTO: 0 /100 WBCS (ref 0–0)
PH UR STRIP.AUTO: 5.5 [PH]
PHOSPHATE SERPL-MCNC: 4.3 MG/DL (ref 2.5–4.9)
PLATELET # BLD AUTO: 217 X10*3/UL (ref 150–450)
POTASSIUM SERPL-SCNC: 3.1 MMOL/L (ref 3.5–5.3)
POTASSIUM SERPL-SCNC: 3.9 MMOL/L (ref 3.5–5.3)
PROT UR STRIP.AUTO-MCNC: ABNORMAL MG/DL
RBC # BLD AUTO: 5.87 X10*6/UL (ref 4.5–5.9)
RBC # UR STRIP.AUTO: ABNORMAL /UL
RBC #/AREA URNS AUTO: >20 /HPF
RBC MORPH BLD: ABNORMAL
SODIUM SERPL-SCNC: 148 MMOL/L (ref 136–145)
SODIUM SERPL-SCNC: 150 MMOL/L (ref 136–145)
SP GR UR STRIP.AUTO: 1.05
TOTAL CELLS COUNTED BLD: 100
UROBILINOGEN UR STRIP.AUTO-MCNC: NORMAL MG/DL
WBC # BLD AUTO: 15.6 X10*3/UL (ref 4.4–11.3)
WBC #/AREA URNS AUTO: ABNORMAL /HPF

## 2024-08-11 PROCEDURE — 99232 SBSQ HOSP IP/OBS MODERATE 35: CPT | Performed by: SURGERY

## 2024-08-11 PROCEDURE — 99233 SBSQ HOSP IP/OBS HIGH 50: CPT | Performed by: INTERNAL MEDICINE

## 2024-08-11 PROCEDURE — 36415 COLL VENOUS BLD VENIPUNCTURE: CPT

## 2024-08-11 PROCEDURE — C9113 INJ PANTOPRAZOLE SODIUM, VIA: HCPCS | Performed by: NURSE PRACTITIONER

## 2024-08-11 PROCEDURE — 2500000004 HC RX 250 GENERAL PHARMACY W/ HCPCS (ALT 636 FOR OP/ED): Performed by: NURSE PRACTITIONER

## 2024-08-11 PROCEDURE — 85007 BL SMEAR W/DIFF WBC COUNT: CPT | Performed by: NURSE PRACTITIONER

## 2024-08-11 PROCEDURE — 81001 URINALYSIS AUTO W/SCOPE: CPT | Performed by: EMERGENCY MEDICINE

## 2024-08-11 PROCEDURE — 2500000001 HC RX 250 WO HCPCS SELF ADMINISTERED DRUGS (ALT 637 FOR MEDICARE OP)

## 2024-08-11 PROCEDURE — 2500000004 HC RX 250 GENERAL PHARMACY W/ HCPCS (ALT 636 FOR OP/ED): Performed by: INTERNAL MEDICINE

## 2024-08-11 PROCEDURE — 2020000001 HC ICU ROOM DAILY

## 2024-08-11 PROCEDURE — 2500000004 HC RX 250 GENERAL PHARMACY W/ HCPCS (ALT 636 FOR OP/ED)

## 2024-08-11 PROCEDURE — 83605 ASSAY OF LACTIC ACID: CPT | Performed by: INTERNAL MEDICINE

## 2024-08-11 PROCEDURE — 83735 ASSAY OF MAGNESIUM: CPT | Performed by: NURSE PRACTITIONER

## 2024-08-11 PROCEDURE — 84100 ASSAY OF PHOSPHORUS: CPT

## 2024-08-11 PROCEDURE — 87086 URINE CULTURE/COLONY COUNT: CPT | Mod: PORLAB | Performed by: EMERGENCY MEDICINE

## 2024-08-11 PROCEDURE — 80048 BASIC METABOLIC PNL TOTAL CA: CPT | Mod: CCI | Performed by: INTERNAL MEDICINE

## 2024-08-11 PROCEDURE — 99024 POSTOP FOLLOW-UP VISIT: CPT | Performed by: SURGERY

## 2024-08-11 PROCEDURE — 83605 ASSAY OF LACTIC ACID: CPT

## 2024-08-11 PROCEDURE — 85027 COMPLETE CBC AUTOMATED: CPT | Performed by: NURSE PRACTITIONER

## 2024-08-11 PROCEDURE — 2500000004 HC RX 250 GENERAL PHARMACY W/ HCPCS (ALT 636 FOR OP/ED): Performed by: SURGERY

## 2024-08-11 RX ORDER — ACETAMINOPHEN 650 MG/1
650 SUPPOSITORY RECTAL EVERY 4 HOURS PRN
Status: DISCONTINUED | OUTPATIENT
Start: 2024-08-11 | End: 2024-08-11

## 2024-08-11 RX ORDER — FENTANYL CITRATE 50 UG/ML
12.5 INJECTION, SOLUTION INTRAMUSCULAR; INTRAVENOUS ONCE
Status: COMPLETED | OUTPATIENT
Start: 2024-08-11 | End: 2024-08-11

## 2024-08-11 RX ORDER — DEXTROSE MONOHYDRATE 50 MG/ML
125 INJECTION, SOLUTION INTRAVENOUS CONTINUOUS
Status: DISCONTINUED | OUTPATIENT
Start: 2024-08-11 | End: 2024-08-12

## 2024-08-11 RX ORDER — ACETAMINOPHEN 325 MG/1
650 TABLET ORAL EVERY 4 HOURS PRN
Status: DISCONTINUED | OUTPATIENT
Start: 2024-08-11 | End: 2024-08-11

## 2024-08-11 RX ORDER — ACETAMINOPHEN 10 MG/ML
1000 INJECTION, SOLUTION INTRAVENOUS EVERY 8 HOURS
Status: DISCONTINUED | OUTPATIENT
Start: 2024-08-11 | End: 2024-08-18

## 2024-08-11 RX ORDER — ACETAMINOPHEN 160 MG/5ML
650 SOLUTION ORAL EVERY 4 HOURS PRN
Status: DISCONTINUED | OUTPATIENT
Start: 2024-08-11 | End: 2024-08-11

## 2024-08-11 RX ORDER — MORPHINE SULFATE 2 MG/ML
2 INJECTION, SOLUTION INTRAMUSCULAR; INTRAVENOUS EVERY 4 HOURS PRN
Status: DISCONTINUED | OUTPATIENT
Start: 2024-08-11 | End: 2024-08-23 | Stop reason: HOSPADM

## 2024-08-11 RX ADMIN — ACETAMINOPHEN 1000 MG: 10 INJECTION INTRAVENOUS at 11:00

## 2024-08-11 RX ADMIN — HEPARIN SODIUM 5000 UNITS: 5000 INJECTION INTRAVENOUS; SUBCUTANEOUS at 04:22

## 2024-08-11 RX ADMIN — DEXTROSE MONOHYDRATE 125 ML/HR: 50 INJECTION, SOLUTION INTRAVENOUS at 08:43

## 2024-08-11 RX ADMIN — PIPERACILLIN SODIUM AND TAZOBACTAM SODIUM 3.38 G: 3; .375 INJECTION, SOLUTION INTRAVENOUS at 16:15

## 2024-08-11 RX ADMIN — DEXTROSE MONOHYDRATE 125 ML/HR: 50 INJECTION, SOLUTION INTRAVENOUS at 16:18

## 2024-08-11 RX ADMIN — ACETAMINOPHEN 650 MG: 160 SOLUTION ORAL at 07:33

## 2024-08-11 RX ADMIN — HEPARIN SODIUM 5000 UNITS: 5000 INJECTION INTRAVENOUS; SUBCUTANEOUS at 11:00

## 2024-08-11 RX ADMIN — PIPERACILLIN SODIUM AND TAZOBACTAM SODIUM 3.38 G: 3; .375 INJECTION, SOLUTION INTRAVENOUS at 01:08

## 2024-08-11 RX ADMIN — Medication 0.25 MCG/KG/MIN: at 01:13

## 2024-08-11 RX ADMIN — HEPARIN SODIUM 5000 UNITS: 5000 INJECTION INTRAVENOUS; SUBCUTANEOUS at 20:20

## 2024-08-11 RX ADMIN — PIPERACILLIN SODIUM AND TAZOBACTAM SODIUM 3.38 G: 3; .375 INJECTION, SOLUTION INTRAVENOUS at 20:20

## 2024-08-11 RX ADMIN — PIPERACILLIN SODIUM AND TAZOBACTAM SODIUM 3.38 G: 3; .375 INJECTION, SOLUTION INTRAVENOUS at 07:33

## 2024-08-11 RX ADMIN — PANTOPRAZOLE SODIUM 40 MG: 40 INJECTION, POWDER, FOR SOLUTION INTRAVENOUS at 06:22

## 2024-08-11 RX ADMIN — FENTANYL CITRATE 12.5 MCG: 50 INJECTION INTRAMUSCULAR; INTRAVENOUS at 06:56

## 2024-08-11 RX ADMIN — ACETAMINOPHEN 1000 MG: 10 INJECTION INTRAVENOUS at 18:09

## 2024-08-11 SDOH — HEALTH STABILITY: PHYSICAL HEALTH
ON AVERAGE, HOW MANY DAYS PER WEEK DO YOU ENGAGE IN MODERATE TO STRENUOUS EXERCISE (LIKE A BRISK WALK)?: PATIENT UNABLE TO ANSWER

## 2024-08-11 SDOH — HEALTH STABILITY: MENTAL HEALTH: HOW OFTEN DO YOU HAVE A DRINK CONTAINING ALCOHOL?: PATIENT UNABLE TO ANSWER

## 2024-08-11 SDOH — ECONOMIC STABILITY: HOUSING INSECURITY: IN THE PAST 12 MONTHS, HOW MANY TIMES HAVE YOU MOVED WHERE YOU WERE LIVING?: 0

## 2024-08-11 SDOH — ECONOMIC STABILITY: HOUSING INSECURITY: AT ANY TIME IN THE PAST 12 MONTHS, WERE YOU HOMELESS OR LIVING IN A SHELTER (INCLUDING NOW)?: PATIENT UNABLE TO ANSWER

## 2024-08-11 SDOH — HEALTH STABILITY: MENTAL HEALTH: HOW MANY STANDARD DRINKS CONTAINING ALCOHOL DO YOU HAVE ON A TYPICAL DAY?: PATIENT UNABLE TO ANSWER

## 2024-08-11 SDOH — HEALTH STABILITY: MENTAL HEALTH: HOW OFTEN DO YOU HAVE 6 OR MORE DRINKS ON ONE OCCASION?: PATIENT UNABLE TO ANSWER

## 2024-08-11 SDOH — HEALTH STABILITY: PHYSICAL HEALTH: ON AVERAGE, HOW MANY MINUTES DO YOU ENGAGE IN EXERCISE AT THIS LEVEL?: PATIENT UNABLE TO ANSWER

## 2024-08-11 ASSESSMENT — COGNITIVE AND FUNCTIONAL STATUS - GENERAL
WALKING IN HOSPITAL ROOM: TOTAL
DAILY ACTIVITIY SCORE: 6
HELP NEEDED FOR BATHING: TOTAL
MOBILITY SCORE: 6
TOILETING: TOTAL
DRESSING REGULAR LOWER BODY CLOTHING: TOTAL
MOVING TO AND FROM BED TO CHAIR: TOTAL
HELP NEEDED FOR BATHING: TOTAL
CLIMB 3 TO 5 STEPS WITH RAILING: TOTAL
PERSONAL GROOMING: TOTAL
DRESSING REGULAR UPPER BODY CLOTHING: TOTAL
EATING MEALS: TOTAL
DAILY ACTIVITIY SCORE: 6
STANDING UP FROM CHAIR USING ARMS: TOTAL
STANDING UP FROM CHAIR USING ARMS: TOTAL
MOVING FROM LYING ON BACK TO SITTING ON SIDE OF FLAT BED WITH BEDRAILS: A LOT
TURNING FROM BACK TO SIDE WHILE IN FLAT BAD: TOTAL
TOILETING: TOTAL
EATING MEALS: TOTAL
MOBILITY SCORE: 8
DRESSING REGULAR UPPER BODY CLOTHING: TOTAL
MOVING FROM LYING ON BACK TO SITTING ON SIDE OF FLAT BED WITH BEDRAILS: TOTAL
DRESSING REGULAR LOWER BODY CLOTHING: TOTAL
MOVING TO AND FROM BED TO CHAIR: TOTAL
CLIMB 3 TO 5 STEPS WITH RAILING: TOTAL
PERSONAL GROOMING: TOTAL
TURNING FROM BACK TO SIDE WHILE IN FLAT BAD: A LOT
WALKING IN HOSPITAL ROOM: TOTAL

## 2024-08-11 ASSESSMENT — PAIN DESCRIPTION - LOCATION: LOCATION: ABDOMEN

## 2024-08-11 ASSESSMENT — LIFESTYLE VARIABLES
AUDIT-C TOTAL SCORE: -1
SKIP TO QUESTIONS 9-10: 0

## 2024-08-11 ASSESSMENT — PAIN SCALES - GENERAL: PAINLEVEL_OUTOF10: 5 - MODERATE PAIN

## 2024-08-11 ASSESSMENT — PAIN SCALES - WONG BAKER: WONGBAKER_NUMERICALRESPONSE: HURTS LITTLE MORE

## 2024-08-11 ASSESSMENT — PAIN - FUNCTIONAL ASSESSMENT
PAIN_FUNCTIONAL_ASSESSMENT: CPOT (CRITICAL CARE PAIN OBSERVATION TOOL)

## 2024-08-11 NOTE — PROGRESS NOTES
"Abdi Wilson is a 79 y.o. male  who presented on 8/10/2024 with PEG tube malfunction (Multi) after initially being placed on 8/8/2024    Subjective   Patient brought to the intensive care unit following surgery yesterday.  Pressures remained soft with systolics in the 70s requiring peripheral phenylephrine.  NG tube remains in place.  Patient is able to move all 4 extremities in the room but is unable to engage in conversation regarding how he is feeling this morning.    Objective     Physical Exam  Constitutional:       General: He is not in acute distress.     Appearance: He is ill-appearing.      Interventions: He is not intubated.  HENT:      Head: Normocephalic and atraumatic.      Comments: Nasogastric tube in place  Cardiovascular:      Rate and Rhythm: Normal rate and regular rhythm.      Pulses: No decreased pulses.           Radial pulses are 2+ on the right side and 2+ on the left side.        Dorsalis pedis pulses are 2+ on the right side and 2+ on the left side.   Pulmonary:      Effort: Tachypnea present. No accessory muscle usage or respiratory distress. He is not intubated.   Abdominal:      Comments: Abdominal binder in place.  There is tenderness to palpation which is improved from yesterday's examination when the patient initially arrived.  He does seem to slightly wince in pain upon deep palpation but not to the same extent as he did upon septic presentation    PEG tube remains in place approximately 4 cm from the skin.  Bumper able to spin freely.  No erythema purulence or discharge from site   Musculoskeletal:      Right lower leg: No edema.      Left lower leg: No edema.   Skin:     General: Skin is cool and dry.   Neurological:      Comments: Patient is able to track and follow simple commands but is not able to engage further         Last Recorded Vitals  Blood pressure 94/69, pulse 92, temperature 36.7 °C (98.1 °F), temperature source Temporal, resp. rate (!) 31, height 1.753 m (5' 9\"), " weight 94.3 kg (208 lb), SpO2 98%.  Intake/Output last 3 Shifts:  I/O last 3 completed shifts:  In: 1400 (14.8 mL/kg) [I.V.:1400 (14.8 mL/kg)]  Out: 1036 (11 mL/kg) [Urine:1000 (0.3 mL/kg/hr); Emesis/NG output:30; Blood:6]  Weight: 94.3 kg         Assessment/Plan   Principal Problem:    PEG tube malfunction (Multi)  Active Problems:    Cerebral infarction, unspecified (Multi)    Peritonitis (Multi)    Aphasia    Patient is a 79-year-old male who initially underwent PEG tube placement due to dysphagia on 8/8/2024.  Patient Krystal presented on 8/10/2024 due to PEG tube malfunction and found to be septic on presentation the emergency department    Patient underwent replacement of gastrostomy tube due to malfunction on 8/10/2024 with Dr. Velásquez    -Continue close monitoring of care in the intensive care unit  -Continue maintenance IV fluids with as needed fluid boluses for hypotension keeping aware patient has reduced ejection fraction (appears to be 46% on most previous echo)  -MAP goals ideally greater than 65   -Continue to trend lactate  -Refrain from using PEG tube at this time for feeds or medications  -Blood cultures pending  -Continue Zosyn  -DVT prophylaxis with subcutaneous heparin and SCDs  -Currently on GI prophylaxis with PPI  -Maintain NPO diet  -Continue tracking strict I's and O's  -Maintain abdominal binder    Appreciate the assistance of the ICU team in the care of this patient    Case has been discussed with attending Dr. Deondre Prather,  PGY-2

## 2024-08-11 NOTE — PROGRESS NOTES
Abdi Wilson is a 79 y.o. male on day 1 of admission presenting with PEG tube malfunction (Multi).      Subjective   Abdi Wilson is a 79 y.o. male with PMHx s/f hypertension dyslipidemia old CVA mild cardiomyopathy ulcerative colitis GERD recent CVA in June of this year with dysphagia and expressive aphasia presenting with abdominal pain fever.  Patient had a PEG tube placed for nutritional purposes about 2 days ago.  Had presented to emergency department complaining of some pain in the abdominal area the patient was noted to have low blood pressure and fever in emergency department.  On presenting to emergency department patient had temperature 96.3 heart rate 102 respiratory rate 24 blood pressure 135/81 Javy panel shows sodium 148 potassium 4.2 chloride 112 bicarb 25 BUN 37 creatinine 1.38 glucose 144 liver enzymes within normal limits total bilirubin 1.5 initial lactate 2.5 repeat lactate 2.4 CBC showing WBC 13.6 hemoglobin 19.7 hematocrit 60.7 platelets 269 CT scan of the abdomen pelvis was done showing malposition PEG tube there is a small amount of free air, patient was seen by general surgery who felt patient had peritonitis plan is to take patient for emergent surgery.   8/11: Patient was seen and examined.  He is nonverbal at baseline and still looks lethargic this morning.  Blood pressure is improved and patient is off pressors.  Hypernatremia persistent and patient has been started on IV fluid D5 water.  Will get repeat BMP and trend.  Continue current IV antibiotics.  Blood cultures are still pending.  Gastrostomy replacement done 8/10/2024.  Continue n.p.o. by general surgery recommendation.  Repeat CBC and BMP in a.m.       Objective     Last Recorded Vitals  BP 88/61   Pulse 95   Temp 36.6 °C (97.9 °F) (Temporal)   Resp (!) 27   Wt 94.3 kg (208 lb)   SpO2 98%   Intake/Output last 3 Shifts:    Intake/Output Summary (Last 24 hours) at 8/11/2024 7296  Last data filed at 8/11/2024 7955  Gross  per 24 hour   Intake 4292.22 ml   Output 2298 ml   Net 1994.22 ml       Admission Weight  Weight: 94.3 kg (208 lb) (08/10/24 1113)    Daily Weight  08/10/24 : 94.3 kg (208 lb)    Image Results  CT abdomen pelvis w IV contrast  Narrative: Interpreted By:  Yamilet Heck,   STUDY:  CT ABDOMEN PELVIS W IV CONTRAST;  8/10/2024 2:08 pm      INDICATION:  Signs/Symptoms:abbdominal pain.      COMPARISON:  None.      ACCESSION NUMBER(S):  [id number]      ORDERING CLINICIAN:  DONITA ROBLES      TECHNIQUE:  CT of the abdomen and pelvis was performed.  75 mL Omnipaque 350      FINDINGS:  LOWER CHEST:  There is mild bibasilar atelectasis.      ABDOMEN:      LIVER:  There is no hepatic mass.      BILE DUCTS:  There is no intrahepatic, common hepatic or common bile ductal  dilatation.      GALLBLADDER:  The gallbladder is unremarkable.      PANCREAS:  The pancreas is unremarkable.      SPLEEN:  The spleen is unremarkable. There is no splenic mass or splenomegaly.      ADRENAL GLANDS:  The adrenal glands are unremarkable.      KIDNEYS AND URETERS:  The kidneys function symmetrically.  There are benign bilateral simple renal cysts.  There is no intrarenal calculus or hydronephrosis.  The bladder is distended measuring 13.6 x 11.3 x 7.0 cm with a volume  of 559 cc. There is bladder wall trabeculation, likely secondary to  postobstructive neuropathic change from prostate enlargement.      BOWEL:  There is free intraperitoneal air. There is a feeding tube  insufflated in the fatty anterior to the stomach. This is where the  free air is located. This could be iatrogenic from malpositioned PEG  tube rather than a ruptured viscus. There is thickening of loops of  small bowel in the left side of the abdomen. There is a small amount  of ascites in the left side of the abdomen interdigitated between the  thickened loops of small bowel. There is induration of the mesentery.  This could represent Crohn's disease or enteritis. There is  diffuse  fatty infiltration of the colon from the mid transverse colon to the  rectum. Fibrofatty infiltration can be seen with Crohn's disease,  inactive.      VESSELS:  There is atherosclerotic calcification of the abdominal aorta, iliac  and femoral arteries.      PERITONEUM/RETROPERITONEUM/LYMPH NODES:  There is no retroperitoneal or pelvic adenopathy.      ABDOMINAL WALL:  The abdominal wall is unremarkable.      BONE AND SOFT TISSUE:  There is no acute osseous finding.      The prostate gland is enlarged measuring 6.2 x 4.8 cm.      Impression: 1. There is a malpositioned PEG tube insufflated in the fatty  anterior to the stomach. There is a small amount of free air in this  location. This could be secondary to malpositioned PEG tube rather  than a ruptured viscus but should be correlated clinically.  2. Nonspecific thickening of loops of small bowel in the left side of  the abdomen with induration of the mesentery and a small amount of  ascites. This could represent Crohn's disease or enteritis.  3. Fibrofatty infiltration of the colon from the transverse colon to  the rectum. This can be seen with Crohn's disease, inactive.  4. Benign bilateral simple renal cysts.  5. The urinary bladder with a volume of 559 cc. Bladder wall  trabeculation, likely secondary to postobstructive uropathic change  from prostate enlargement.      MACRO:  None      Signed by: Yamilet Heck 8/10/2024 2:22 PM  Dictation workstation:   [id number]  XR chest 1 view  Narrative: Interpreted By:  Yamilet Heck,   STUDY:  XR CHEST 1 VIEW;  8/10/2024 1:44 pm      INDICATION:  Signs/Symptoms:cough.      COMPARISON:  06/24/2024      ACCESSION NUMBER(S):  [id number]      ORDERING CLINICIAN:  DONITA ROBLES      FINDINGS:  CARDIOMEDIASTINAL SILHOUETTE:  The heart is enlarged in a left ventricular configuration, unchanged.          LUNGS:  Lungs are clear.      ABDOMEN:  No remarkable upper abdominal findings.          BONES:  No  acute osseous changes.      Impression: No acute cardiopulmonary process.      MACRO:  None      Signed by: Yamilet Heck 8/10/2024 1:46 PM  Dictation workstation:   [id number]      Physical Exam  Constitutional:       Appearance: He is ill-appearing and toxic-appearing. He is not diaphoretic.   HENT:      Head: Normocephalic.      Nose: Nose normal.      Mouth/Throat:      Mouth: Mucous membranes are dry.      Pharynx: No posterior oropharyngeal erythema.   Eyes:      General: No scleral icterus.     Conjunctiva/sclera: Conjunctivae normal.   Neck:      Vascular: No carotid bruit.   Cardiovascular:      Rate and Rhythm: Regular rhythm. Tachycardia present.      Heart sounds: No murmur heard.  Pulmonary:      Effort: Pulmonary effort is normal. No respiratory distress.      Breath sounds: No wheezing, rhonchi or rales.      Comments: Tachypneic  Abdominal:      General: There is distension.      Tenderness: There is abdominal tenderness. There is rebound.      Comments: No drainage at G-tube site   Musculoskeletal:      Right lower leg: No edema.      Left lower leg: No edema.   Skin:     General: Skin is warm.      Capillary Refill: Capillary refill takes less than 2 seconds.   Neurological:      Mental Status: Mental status is at baseline.      Sensory: No sensory deficit.      Comments: Patient oriented to self is very groggy has expressive aphasia secondary to recent stroke   Psychiatric:         Mood and Affect: Mood normal.      Relevant Results               Assessment/Plan                  Principal Problem:    PEG tube malfunction (Multi)  Active Problems:    Cerebral infarction, unspecified (Multi)    Peritonitis (Multi)    Aphasia    Acute abdominal pain, secondary to peritonitis pt evaluated for emergent surgery cardiac risk index 2, class 3 risk  Pt would likely die without surgical intervention, advise going forward with surgery as potential benefits significant and outway moderate associated  risks  IV fluid bolus  Continue IV hydration, IV Zosyn   Follow blood cultures  Intensivist on board and signed off.   PT is DNR [facility]     Dehydration  Patient appears clinically to be dehydrated  He appears to be hemo concentrated  He did receive IV fluid boluses and will continue IV fluids     G-tube dysfunction  G-tube replacement completed  Continue n.p.o. for now per general surgery recommendation          HX CVA w S/L deficits, memory loss patient nonambulatory  Pt speaks some at baseline  Resume medications post surgery, is currently NPO, no GT access     GT dysfunction  Recent placement 8/8/24          Spent 35 minutes in follow-up management of this patient       Joe Lopez MD

## 2024-08-11 NOTE — CONSULTS
Critical Care Medicine Consult      Reason For Consult  Peritonitis, Septic shock    History Of Present Illness  Abdi Wilson is a 79 y.o. male with a past medical history significant for cardiomyopathy, GERD, HTN, HLD, CVA (most recently in June 2024 with residual dysphagia s/p PEG tube placement, expressive aphasia), and ulcerative colitis admitted to the ICU for peritonitis and g-tube dysfunction s/p exploratory laparotomy, washout, and replacement of gastrotomy tube. Critical care medicine consulted for septic shock. Initially presented to the hospital from his [facility] with complaints of abdominal pain, localized to peg tube site. Of note, patient had peg tube placement two days prior secondary to dysphagia in setting of recent CVA. The procedure was uneventful and patient was discharged back to his SNF. On presentation to the ED, CT scan of the abdomen and pelvis was done noting malposition of PEG tube with small amount of free air. Patient was taken to the OR and underwent and exploratory laparotomy with replacement gastrostomy tube, lysis of adhesions, washout, and placement of NESSA drain. Patient was then transferred to the ICU. Patient was hypotensive post operatively. Was treated with IV fluid boluses and started on vasopressors.     Past Medical History:   Diagnosis Date    Stroke (Multi)      History reviewed. No pertinent surgical history.  Medications Prior to Admission   Medication Sig Dispense Refill Last Dose    acetaminophen (Tylenol) 325 mg tablet Take 2 tablets (650 mg) by mouth every 4 hours if needed for mild pain (1 - 3) or fever (temp greater than 38.0 C).       amoxicillin-pot clavulanate (Augmentin) 875-125 mg tablet Take 1 tablet by mouth 2 times a day. 1 twice a day for 10 doses per NG. 10 tablet 0     aspirin 81 mg EC tablet Take 1 tablet (81 mg) by mouth once daily. Take for 21 days and stop 21 tablet 0     atorvastatin (Lipitor) 80 mg tablet Take 1 tablet (80 mg) by mouth once daily at  bedtime. 90 tablet 1     bisacodyl (Dulcolax, bisacodyl,) 10 mg suppository Insert 1 suppository (10 mg) into the rectum if needed for constipation.       carvedilol (Coreg) 6.25 mg tablet Take 1 tablet (6.25 mg) by mouth 2 times a day.       clopidogrel (Plavix) 75 mg tablet Take 1 tablet (75 mg) by mouth once daily. N-G TUBE       ergocalciferol (Vitamin D2) 1.25 MG (62740 UT) capsule Take 1 capsule (1,250 mcg) by mouth.       hydrALAZINE (Apresoline) 25 mg tablet Take 1 tablet (25 mg) by mouth 3 times a day. VIA G-TUBE       hyoscyamine (Anaspaz, Levsin) 0.125 mg tablet Take 1 tablet (0.125 mg) by mouth every 4 hours if needed for cramping. VIA G-TUBE       lidocaine (LMX) 4 % cream Apply topically once daily as needed for mild pain (1 - 3).       loperamide (Imodium A-D) 2 mg tablet Take 1 tablet (2 mg) by mouth if needed for diarrhea. VIA N-G TUBE       losartan (Cozaar) 100 mg tablet Take 1 tablet (100 mg) by mouth once daily. VIA N-G TUBE       magnesium hydroxide (Milk of Magnesia) 2,400 mg/10 mL suspension suspension Take 30 mL by mouth if needed for constipation. VIA-G-TUBE        Patient has no known allergies.  Social History     Tobacco Use    Smoking status: Never     Passive exposure: Never    Smokeless tobacco: Never     No family history on file.    Scheduled Medications:   [START ON 8/11/2024] pantoprazole, 40 mg, oral, Daily before breakfast   Or  [START ON 8/11/2024] esomeprazole, 40 mg, nasoduodenal tube, Daily before breakfast   Or  [START ON 8/11/2024] pantoprazole, 40 mg, intravenous, Daily before breakfast  heparin (porcine), 5,000 Units, subcutaneous, q8h  piperacillin-tazobactam, 3.375 g, intravenous, q6h         Continuous Medications:   lactated Ringer's, 125 mL/hr, Last Rate: 125 mL/hr (08/10/24 2016)  phenylephrine, 0-2 mcg/kg/min, Last Rate: 0.5 mcg/kg/min (08/10/24 2123)         PRN Medications:       Review of Systems:  Review of Systems   Unable to perform ROS: Patient nonverbal  (No family present at [facility])        Objective   Vitals:  Most Recent:  Vitals:    08/10/24 1948   BP:    Pulse:    Resp:    Temp: 36.7 °C (98.1 °F)   SpO2:        24hr Min/Max:  Temp  Min: 35.7 °C (96.3 °F)  Max: 36.9 °C (98.5 °F)  Pulse  Min: 85  Max: 109  BP  Min: 84/60  Max: 135/84  Resp  Min: 18  Max: 39  SpO2  Min: 92 %  Max: 100 %    LDA:   Urethral Catheter Non-latex 16 Fr. (Active)   No placement date or time found.   Placed by: resident  Catheter Type: Non-latex  Tube Size (Fr.): 16 Fr.  Catheter Balloon Size: 10 mL  Urine Returned: Yes   Number of days:        Closed/Suction Drain 1 LUQ Bulb 10 Fr. (Active)   No placement date or time found.   Tube Number: 1  Location: LUQ  Drain Tube Type: Bulb  Size (Fr.): 10 Fr.  Drain Reservoir Size (mL): 100 mL   Number of days:        NG/OG/Feeding Tube Left nostril (Active)   Placement Date/Time: 06/24/24 1400   Type of Tube: (c) Feeding Tube  Tube Location: Left nostril   Number of days: 47       Gastrostomy/Enterostomy Gastrostomy LUQ (Active)   Placement Date/Time: 08/10/24 1800   Placed by: dr gonzalez  Type: Gastrostomy  Location: LUQ   Number of days: 0         Vent settings:       Hemodynamic parameters for last 24 hours:         Intake/Output Summary (Last 24 hours) at 8/10/2024 2141  Last data filed at 8/10/2024 1803  Gross per 24 hour   Intake 1400 ml   Output 1036 ml   Net 364 ml           Physical exam:    Physical Exam  Constitutional:       General: He is not in acute distress.     Appearance: He is ill-appearing.      Interventions: Face mask in place.   HENT:      Mouth/Throat:      Mouth: Mucous membranes are dry.   Eyes:      Pupils: Pupils are equal, round, and reactive to light.   Cardiovascular:      Rate and Rhythm: Normal rate and regular rhythm.      Pulses: Normal pulses.      Heart sounds: Normal heart sounds. No murmur heard.  Pulmonary:      Effort: Tachypnea present.      Breath sounds: Normal breath sounds.   Abdominal:      General:  Bowel sounds are normal.      Palpations: Abdomen is soft.      Tenderness: There is no abdominal tenderness.      Comments: Peg tube, abdominal binder   Musculoskeletal:      Right lower leg: No edema.      Left lower leg: No edema.   Skin:     General: Skin is dry.      Capillary Refill: Capillary refill takes less than 2 seconds.   Neurological:      Mental Status: He is lethargic.      Comments: Responds to voice. Able to follow simple commands          Lab/Radiology/Diagnostic Review:  Results for orders placed or performed during the hospital encounter of 08/10/24 (from the past 24 hour(s))   CBC and Auto Differential   Result Value Ref Range    WBC 13.6 (H) 4.4 - 11.3 x10*3/uL    nRBC 0.0 0.0 - 0.0 /100 WBCs    RBC 6.65 (H) 4.50 - 5.90 x10*6/uL    Hemoglobin 19.7 (H) 13.5 - 17.5 g/dL    Hematocrit 60.7 (H) 41.0 - 52.0 %    MCV 91 80 - 100 fL    MCH 29.6 26.0 - 34.0 pg    MCHC 32.5 32.0 - 36.0 g/dL    RDW 15.7 (H) 11.5 - 14.5 %    Platelets 269 150 - 450 x10*3/uL    Neutrophils % 83.3 40.0 - 80.0 %    Immature Granulocytes %, Automated 0.4 0.0 - 0.9 %    Lymphocytes % 8.9 13.0 - 44.0 %    Monocytes % 6.9 2.0 - 10.0 %    Eosinophils % 0.1 0.0 - 6.0 %    Basophils % 0.4 0.0 - 2.0 %    Neutrophils Absolute 11.30 (H) 1.60 - 5.50 x10*3/uL    Immature Granulocytes Absolute, Automated 0.06 0.00 - 0.50 x10*3/uL    Lymphocytes Absolute 1.21 0.80 - 3.00 x10*3/uL    Monocytes Absolute 0.93 (H) 0.05 - 0.80 x10*3/uL    Eosinophils Absolute 0.02 0.00 - 0.40 x10*3/uL    Basophils Absolute 0.05 0.00 - 0.10 x10*3/uL   Comprehensive Metabolic Panel   Result Value Ref Range    Glucose 144 (H) 74 - 99 mg/dL    Sodium 148 (H) 136 - 145 mmol/L    Potassium 4.2 3.5 - 5.3 mmol/L    Chloride 112 (H) 98 - 107 mmol/L    Bicarbonate 25 21 - 32 mmol/L    Anion Gap 15 10 - 20 mmol/L    Urea Nitrogen 37 (H) 6 - 23 mg/dL    Creatinine 1.38 (H) 0.50 - 1.30 mg/dL    eGFR 51 (L) >60 mL/min/1.73m*2    Calcium 9.4 8.6 - 10.3 mg/dL    Albumin 4.0  3.4 - 5.0 g/dL    Alkaline Phosphatase 99 33 - 136 U/L    Total Protein 7.9 6.4 - 8.2 g/dL    AST 20 9 - 39 U/L    Bilirubin, Total 1.5 (H) 0.0 - 1.2 mg/dL    ALT 16 10 - 52 U/L   Blood Culture    Specimen: Peripheral Venipuncture; Blood culture   Result Value Ref Range    Blood Culture Loaded on Instrument - Culture in progress    Blood Culture    Specimen: Peripheral Venipuncture; Blood culture   Result Value Ref Range    Blood Culture Loaded on Instrument - Culture in progress    Lactate   Result Value Ref Range    Lactate 2.3 (H) 0.4 - 2.0 mmol/L   Lactate   Result Value Ref Range    Lactate 2.4 (H) 0.4 - 2.0 mmol/L     CT abdomen pelvis w IV contrast    Result Date: 8/10/2024  Interpreted By:  Yamilet Heck, STUDY: CT ABDOMEN PELVIS W IV CONTRAST;  8/10/2024 2:08 pm   INDICATION: Signs/Symptoms:abbdominal pain.   COMPARISON: None.   ACCESSION NUMBER(S): [id number]   ORDERING CLINICIAN: DONITA ROBLES   TECHNIQUE: CT of the abdomen and pelvis was performed.  75 mL Omnipaque 350   FINDINGS: LOWER CHEST: There is mild bibasilar atelectasis.   ABDOMEN:   LIVER: There is no hepatic mass.   BILE DUCTS: There is no intrahepatic, common hepatic or common bile ductal dilatation.   GALLBLADDER: The gallbladder is unremarkable.   PANCREAS: The pancreas is unremarkable.   SPLEEN: The spleen is unremarkable. There is no splenic mass or splenomegaly.   ADRENAL GLANDS: The adrenal glands are unremarkable.   KIDNEYS AND URETERS: The kidneys function symmetrically. There are benign bilateral simple renal cysts. There is no intrarenal calculus or hydronephrosis. The bladder is distended measuring 13.6 x 11.3 x 7.0 cm with a volume of 559 cc. There is bladder wall trabeculation, likely secondary to postobstructive neuropathic change from prostate enlargement.   BOWEL: There is free intraperitoneal air. There is a feeding tube insufflated in the fatty anterior to the stomach. This is where the free air is located. This  could be iatrogenic from malpositioned PEG tube rather than a ruptured viscus. There is thickening of loops of small bowel in the left side of the abdomen. There is a small amount of ascites in the left side of the abdomen interdigitated between the thickened loops of small bowel. There is induration of the mesentery. This could represent Crohn's disease or enteritis. There is diffuse fatty infiltration of the colon from the mid transverse colon to the rectum. Fibrofatty infiltration can be seen with Crohn's disease, inactive.   VESSELS: There is atherosclerotic calcification of the abdominal aorta, iliac and femoral arteries.   PERITONEUM/RETROPERITONEUM/LYMPH NODES: There is no retroperitoneal or pelvic adenopathy.   ABDOMINAL WALL: The abdominal wall is unremarkable.   BONE AND SOFT TISSUE: There is no acute osseous finding.   The prostate gland is enlarged measuring 6.2 x 4.8 cm.       1. There is a malpositioned PEG tube insufflated in the fatty anterior to the stomach. There is a small amount of free air in this location. This could be secondary to malpositioned PEG tube rather than a ruptured viscus but should be correlated clinically. 2. Nonspecific thickening of loops of small bowel in the left side of the abdomen with induration of the mesentery and a small amount of ascites. This could represent Crohn's disease or enteritis. 3. Fibrofatty infiltration of the colon from the transverse colon to the rectum. This can be seen with Crohn's disease, inactive. 4. Benign bilateral simple renal cysts. 5. The urinary bladder with a volume of 559 cc. 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   MACRO: None   Signed by: Yamilet Heck 8/10/2024 2:22 PM Dictation workstation:   [id number]    XR chest 1 view    Result Date: 8/10/2024  Interpreted By:  Yamilet Heck, STUDY: XR CHEST 1 VIEW;  8/10/2024 1:44 pm   INDICATION: Signs/Symptoms:cough.    COMPARISON: 06/24/2024   ACCESSION NUMBER(S): [id number]   ORDERING CLINICIAN: DONITA ROBLES   FINDINGS: CARDIOMEDIASTINAL SILHOUETTE: The heart is enlarged in a left ventricular configuration, unchanged.     LUNGS: Lungs are clear.   ABDOMEN: No remarkable upper abdominal findings.     BONES: No acute osseous changes.       No acute cardiopulmonary process.   MACRO: None   Signed by: Yamilet Heck 8/10/2024 1:46 PM Dictation workstation:   [id number]    XR ABDOMEN FOR NG/OG/NE TUBE PLACEMENT    Result Date: 7/30/2024  EXAMINATION: ONE SUPINE XRAY VIEW(S) OF THE ABDOMEN 7/30/2024 9:50 pm COMPARISON: 7:22 p.m. HISTORY: ORDERING SYSTEM PROVIDED HISTORY: Confirmation of course of NG tube and location of tip of tube post advancement TECHNOLOGIST PROVIDED HISTORY: Reason for exam:->Confirmation of course of NG tube and location of tip of tube post advancement Portable?->Yes FINDINGS: Feeding tube advanced.  Tip now projects over the region of the gastric antrum/duodenal bulb.  Consider advancement 10-15 cm if post pyloric positioning is desired.  No ileus or obstruction lung bases clear.  Osseous and body wall soft tissues unremarkable.    Feeding tube tip projects over the region of the gastric antrum/duodenal bulb.    XR ABDOMEN FOR NG/OG/NE TUBE PLACEMENT    Result Date: 7/30/2024  EXAMINATION: ONE SUPINE XRAY VIEW(S) OF THE ABDOMEN 7/30/2024 7:21 pm COMPARISON: None. HISTORY: ORDERING SYSTEM PROVIDED HISTORY: G-tube Placement / Confirmation TECHNOLOGIST PROVIDED HISTORY: Reason for exam:->G-tube Placement / Confirmation FINDINGS: Single image shows esophageal route catheter into the left upper quadrant expected proximal stomach region.  No dilated bowels evident.    Findings suggestive of NG/OG into the proximal stomach. RECOMMENDATION: Clinical correlation.      Assessment/Plan   Principal Problem:    PEG tube malfunction (Multi)  Active Problems:    Cerebral infarction, unspecified (Multi)    Peritonitis  (Multi)    Aphasia    Abdi Wilson is a 79 y.o. male with a past medical history significant for cardiomyopathy, GERD, HTN, HLD, CVA (most recently in June 2024 with residual dysphagia s/p PEG tube placement, expressive aphasia), and ulcerative colitis admitted to the ICU for peritonitis and g-tube dysfunction s/p exploratory laparotomy, washout, and replacement of gastrotomy tube. Critical care medicine consulted for septic shock.    Septic shock secondary to peritonitis  -Tachycardia, hypotensive, leukocytosis, with elevated lactate on admission  -Presented with complaints of abdominal pain following recent PEG tube placement on 8/8/24  -WBC 13.6  -Lactate 2.3--->2.4--->3.3  -CT abdomen pelvis with malpositioned PEG tube insufflated in the fatty anterior to the stomach. Small amount of free air  -s/p exploratory laparotomy, washout, and replacement of gastrotomy tube  -Hypotensive postoperatively with BP 80s/50s, received 2 liters IV fluid bolus  -Transferred to ICU with continued hypotension with BP as low as 60s/40s  -Initiated on Brent-synephrine to maintain MAP>60  -Blood cultures x 2 drawn in ED; pending  -Received Ancef for surgical prophylaxis  -Started empirically on zosyn for suspected peritonitis; continue pending culture results  -Ordered additional 1 liter lactated ringers IV fluid bolus  -Continue pressors as needed to maintain adequate perfusion; wean for MAP>60  -Trend lactate  -Continue IV fluids with LR at 125ml/hr    G-tube dysfunction  -s/p exploratory laparotomy, washout, and replacement of gastrotomy tube  -NPO  -PEG tube clamped  -Acute care surgery following  -Dietician consult for nutrition assessment/recommendations    History CVA  -Prior history old CVA with recent CVA June 2024  -On DAPT with ASA/Plavix  -Resume home medications in AM  -PT/OT    I spent 45 minutes of cumulative critical care time with the patient.  Greater than 50% of that time was spent in the direct collaboration and or  coordination of care of the patient.     Dragon dictation software was used to dictate this note and thus there may be minor errors in translation/transcription including garbled speech or misspellings. Please contact for clarification if needed.

## 2024-08-11 NOTE — PROGRESS NOTES
Critical Care Daily Progress        Subjective   Patient is a 79 y.o. male admitted on 8/10/2024 11:11 AM with PEG tube displacement.  Operative note reviewed.  Hole in the stomach exuding gastric contents was found.    Interval History: Hemodynamics have improved and patient has been off of pressors all night..     Scheduled Medications:   pantoprazole, 40 mg, oral, Daily before breakfast   Or  esomeprazole, 40 mg, nasoduodenal tube, Daily before breakfast   Or  pantoprazole, 40 mg, intravenous, Daily before breakfast  heparin (porcine), 5,000 Units, subcutaneous, q8h  piperacillin-tazobactam, 3.375 g, intravenous, q6h         Continuous Medications:   lactated Ringer's, 125 mL/hr, Last Rate: 125 mL/hr (08/11/24 0642)  phenylephrine, 0-2 mcg/kg/min, Last Rate: Stopped (08/11/24 0400)         PRN Medications:   PRN medications: acetaminophen **OR** acetaminophen **OR** acetaminophen    Objective   Vitals:  Most Recent:  Vitals:    08/11/24 0600   BP: 109/61   Pulse: 93   Resp: (!) 35   Temp:    SpO2: 98%       24hr Min/Max:  Temp  Min: 35.7 °C (96.3 °F)  Max: 36.9 °C (98.5 °F)  Pulse  Min: 85  Max: 109  BP  Min: 66/48  Max: 135/84  Resp  Min: 18  Max: 39  SpO2  Min: 92 %  Max: 100 %        I/O:    Intake/Output Summary (Last 24 hours) at 8/11/2024 0749  Last data filed at 8/11/2024 0642  Gross per 24 hour   Intake 3890.22 ml   Output 2268 ml   Net 1622.22 ml       Physical Exam:   Physical Exam  Vitals reviewed.   Constitutional:       General: He is not in acute distress.  HENT:      Head: Normocephalic and atraumatic.      Mouth/Throat:      Mouth: Mucous membranes are dry.   Eyes:      General: No scleral icterus.  Cardiovascular:      Rate and Rhythm: Normal rate and regular rhythm.      Heart sounds: No murmur heard.  Pulmonary:      Breath sounds: Normal breath sounds.   Abdominal:      Comments: Abdomen wrapped in a large binder.  Somewhat tender to gentle palpation.   Genitourinary:     Comments: Urinary  catheter draining  Musculoskeletal:      Cervical back: Neck supple. No rigidity.      Right lower leg: No edema.      Left lower leg: No edema.   Skin:     Findings: No rash.   Neurological:      Mental Status: He is alert. Mental status is at baseline.          Lab/Radiology/Diagnostic Review:  Results for orders placed or performed during the hospital encounter of 08/10/24 (from the past 24 hour(s))   CBC and Auto Differential   Result Value Ref Range    WBC 13.6 (H) 4.4 - 11.3 x10*3/uL    nRBC 0.0 0.0 - 0.0 /100 WBCs    RBC 6.65 (H) 4.50 - 5.90 x10*6/uL    Hemoglobin 19.7 (H) 13.5 - 17.5 g/dL    Hematocrit 60.7 (H) 41.0 - 52.0 %    MCV 91 80 - 100 fL    MCH 29.6 26.0 - 34.0 pg    MCHC 32.5 32.0 - 36.0 g/dL    RDW 15.7 (H) 11.5 - 14.5 %    Platelets 269 150 - 450 x10*3/uL    Neutrophils % 83.3 40.0 - 80.0 %    Immature Granulocytes %, Automated 0.4 0.0 - 0.9 %    Lymphocytes % 8.9 13.0 - 44.0 %    Monocytes % 6.9 2.0 - 10.0 %    Eosinophils % 0.1 0.0 - 6.0 %    Basophils % 0.4 0.0 - 2.0 %    Neutrophils Absolute 11.30 (H) 1.60 - 5.50 x10*3/uL    Immature Granulocytes Absolute, Automated 0.06 0.00 - 0.50 x10*3/uL    Lymphocytes Absolute 1.21 0.80 - 3.00 x10*3/uL    Monocytes Absolute 0.93 (H) 0.05 - 0.80 x10*3/uL    Eosinophils Absolute 0.02 0.00 - 0.40 x10*3/uL    Basophils Absolute 0.05 0.00 - 0.10 x10*3/uL   Comprehensive Metabolic Panel   Result Value Ref Range    Glucose 144 (H) 74 - 99 mg/dL    Sodium 148 (H) 136 - 145 mmol/L    Potassium 4.2 3.5 - 5.3 mmol/L    Chloride 112 (H) 98 - 107 mmol/L    Bicarbonate 25 21 - 32 mmol/L    Anion Gap 15 10 - 20 mmol/L    Urea Nitrogen 37 (H) 6 - 23 mg/dL    Creatinine 1.38 (H) 0.50 - 1.30 mg/dL    eGFR 51 (L) >60 mL/min/1.73m*2    Calcium 9.4 8.6 - 10.3 mg/dL    Albumin 4.0 3.4 - 5.0 g/dL    Alkaline Phosphatase 99 33 - 136 U/L    Total Protein 7.9 6.4 - 8.2 g/dL    AST 20 9 - 39 U/L    Bilirubin, Total 1.5 (H) 0.0 - 1.2 mg/dL    ALT 16 10 - 52 U/L   Blood Culture     Specimen: Peripheral Venipuncture; Blood culture   Result Value Ref Range    Blood Culture Loaded on Instrument - Culture in progress    Blood Culture    Specimen: Peripheral Venipuncture; Blood culture   Result Value Ref Range    Blood Culture Loaded on Instrument - Culture in progress    Lactate   Result Value Ref Range    Lactate 2.3 (H) 0.4 - 2.0 mmol/L   Lactate   Result Value Ref Range    Lactate 2.4 (H) 0.4 - 2.0 mmol/L   Lactate   Result Value Ref Range    Lactate 3.3 (H) 0.4 - 2.0 mmol/L   CBC and Auto Differential   Result Value Ref Range    WBC 15.6 (H) 4.4 - 11.3 x10*3/uL    nRBC 0.0 0.0 - 0.0 /100 WBCs    RBC 5.87 4.50 - 5.90 x10*6/uL    Hemoglobin 17.4 13.5 - 17.5 g/dL    Hematocrit 53.3 (H) 41.0 - 52.0 %    MCV 91 80 - 100 fL    MCH 29.6 26.0 - 34.0 pg    MCHC 32.6 32.0 - 36.0 g/dL    RDW 14.8 (H) 11.5 - 14.5 %    Platelets 217 150 - 450 x10*3/uL    Immature Granulocytes %, Automated 0.6 0.0 - 0.9 %    Immature Granulocytes Absolute, Automated 0.09 0.00 - 0.50 x10*3/uL   Magnesium   Result Value Ref Range    Magnesium 1.98 1.60 - 2.40 mg/dL   Renal function panel   Result Value Ref Range    Glucose 94 74 - 99 mg/dL    Sodium 150 (H) 136 - 145 mmol/L    Potassium 3.9 3.5 - 5.3 mmol/L    Chloride 118 (H) 98 - 107 mmol/L    Bicarbonate 21 21 - 32 mmol/L    Anion Gap 15 10 - 20 mmol/L    Urea Nitrogen 42 (H) 6 - 23 mg/dL    Creatinine 1.50 (H) 0.50 - 1.30 mg/dL    eGFR 46 (L) >60 mL/min/1.73m*2    Calcium 8.2 (L) 8.6 - 10.3 mg/dL    Phosphorus 4.3 2.5 - 4.9 mg/dL    Albumin 2.9 (L) 3.4 - 5.0 g/dL   Lactate   Result Value Ref Range    Lactate 2.4 (H) 0.4 - 2.0 mmol/L   Manual Differential   Result Value Ref Range    Neutrophils %, Manual 76.0 40.0 - 80.0 %    Bands %, Manual 5.0 0.0 - 5.0 %    Lymphocytes %, Manual 12.0 13.0 - 44.0 %    Monocytes %, Manual 7.0 2.0 - 10.0 %    Eosinophils %, Manual 0.0 0.0 - 6.0 %    Basophils %, Manual 0.0 0.0 - 2.0 %    Seg Neutrophils Absolute, Manual 11.86 (H) 1.60 -  5.00 x10*3/uL    Bands Absolute, Manual 0.78 (H) 0.00 - 0.50 x10*3/uL    Lymphocytes Absolute, Manual 1.87 0.80 - 3.00 x10*3/uL    Monocytes Absolute, Manual 1.09 (H) 0.05 - 0.80 x10*3/uL    Eosinophils Absolute, Manual 0.00 0.00 - 0.40 x10*3/uL    Basophils Absolute, Manual 0.00 0.00 - 0.10 x10*3/uL    Total Cells Counted 100     Neutrophils Absolute, Manual 12.64 (H) 1.60 - 5.50 x10*3/uL    RBC Morphology No significant RBC morphology present      CT abdomen pelvis w IV contrast    Result Date: 8/10/2024  Interpreted By:  Yamilet Heck, STUDY: CT ABDOMEN PELVIS W IV CONTRAST;  8/10/2024 2:08 pm   INDICATION: Signs/Symptoms:abbdominal pain.   COMPARISON: None.   ACCESSION NUMBER(S): [id number]   ORDERING CLINICIAN: DONITA ROBLES   TECHNIQUE: CT of the abdomen and pelvis was performed.  75 mL Omnipaque 350   FINDINGS: LOWER CHEST: There is mild bibasilar atelectasis.   ABDOMEN:   LIVER: There is no hepatic mass.   BILE DUCTS: There is no intrahepatic, common hepatic or common bile ductal dilatation.   GALLBLADDER: The gallbladder is unremarkable.   PANCREAS: The pancreas is unremarkable.   SPLEEN: The spleen is unremarkable. There is no splenic mass or splenomegaly.   ADRENAL GLANDS: The adrenal glands are unremarkable.   KIDNEYS AND URETERS: The kidneys function symmetrically. There are benign bilateral simple renal cysts. There is no intrarenal calculus or hydronephrosis. The bladder is distended measuring 13.6 x 11.3 x 7.0 cm with a volume of 559 cc. There is bladder wall trabeculation, likely secondary to postobstructive neuropathic change from prostate enlargement.   BOWEL: There is free intraperitoneal air. There is a feeding tube insufflated in the fatty anterior to the stomach. This is where the free air is located. This could be iatrogenic from malpositioned PEG tube rather than a ruptured viscus. There is thickening of loops of small bowel in the left side of the abdomen. There is a small amount  of ascites in the left side of the abdomen interdigitated between the thickened loops of small bowel. There is induration of the mesentery. This could represent Crohn's disease or enteritis. There is diffuse fatty infiltration of the colon from the mid transverse colon to the rectum. Fibrofatty infiltration can be seen with Crohn's disease, inactive.   VESSELS: There is atherosclerotic calcification of the abdominal aorta, iliac and femoral arteries.   PERITONEUM/RETROPERITONEUM/LYMPH NODES: There is no retroperitoneal or pelvic adenopathy.   ABDOMINAL WALL: The abdominal wall is unremarkable.   BONE AND SOFT TISSUE: There is no acute osseous finding.   The prostate gland is enlarged measuring 6.2 x 4.8 cm.       1. There is a malpositioned PEG tube insufflated in the fatty anterior to the stomach. There is a small amount of free air in this location. This could be secondary to malpositioned PEG tube rather than a ruptured viscus but should be correlated clinically. 2. Nonspecific thickening of loops of small bowel in the left side of the abdomen with induration of the mesentery and a small amount of ascites. This could represent Crohn's disease or enteritis. 3. Fibrofatty infiltration of the colon from the transverse colon to the rectum. This can be seen with Crohn's disease, inactive. 4. Benign bilateral simple renal cysts. 5. The urinary bladder with a volume of 559 cc. 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   MACRO: None   Signed by: Yamilet Heck 8/10/2024 2:22 PM Dictation workstation:   [id number]    XR chest 1 view    Result Date: 8/10/2024  Interpreted By:  Yamilet Heck, STUDY: XR CHEST 1 VIEW;  8/10/2024 1:44 pm   INDICATION: Signs/Symptoms:cough.   COMPARISON: 06/24/2024   ACCESSION NUMBER(S): [id number]   ORDERING CLINICIAN: DONITA ROBLES   FINDINGS: CARDIOMEDIASTINAL SILHOUETTE: The heart is enlarged in a left ventricular  configuration, unchanged.     LUNGS: Lungs are clear.   ABDOMEN: No remarkable upper abdominal findings.     BONES: No acute osseous changes.       No acute cardiopulmonary process.   MACRO: None   Signed by: Yamilet Heck 8/10/2024 1:46 PM Dictation workstation:   [id number]    XR ABDOMEN FOR NG/OG/NE TUBE PLACEMENT    Result Date: 7/30/2024  EXAMINATION: ONE SUPINE XRAY VIEW(S) OF THE ABDOMEN 7/30/2024 9:50 pm COMPARISON: 7:22 p.m. HISTORY: ORDERING SYSTEM PROVIDED HISTORY: Confirmation of course of NG tube and location of tip of tube post advancement TECHNOLOGIST PROVIDED HISTORY: Reason for exam:->Confirmation of course of NG tube and location of tip of tube post advancement Portable?->Yes FINDINGS: Feeding tube advanced.  Tip now projects over the region of the gastric antrum/duodenal bulb.  Consider advancement 10-15 cm if post pyloric positioning is desired.  No ileus or obstruction lung bases clear.  Osseous and body wall soft tissues unremarkable.    Feeding tube tip projects over the region of the gastric antrum/duodenal bulb.    XR ABDOMEN FOR NG/OG/NE TUBE PLACEMENT    Result Date: 7/30/2024  EXAMINATION: ONE SUPINE XRAY VIEW(S) OF THE ABDOMEN 7/30/2024 7:21 pm COMPARISON: None. HISTORY: ORDERING SYSTEM PROVIDED HISTORY: G-tube Placement / Confirmation TECHNOLOGIST PROVIDED HISTORY: Reason for exam:->G-tube Placement / Confirmation FINDINGS: Single image shows esophageal route catheter into the left upper quadrant expected proximal stomach region.  No dilated bowels evident.    Findings suggestive of NG/OG into the proximal stomach. RECOMMENDATION: Clinical correlation.                     Assessment/Plan   Principal Problem:    PEG tube malfunction (Multi)  Active Problems:    Cerebral infarction, unspecified (Multi)    Peritonitis (Multi)    Aphasia    Hypotension.  In addition to sepsis from peritonitis, patient appears to be clinically volume depleted as well as dehydrated as evidenced by  presenting hematocrit greater than 60, creatinine increased over baseline and serum sodium is greater than or equal to 148.  I suspect he received nothing for more than a day when his PEG tube had become dislodged at the nursing home. Cultures are negative or pending.  Remains on antibiotics.  Off pressors for many hours now.  I suspect he is/was a bit under volume resuscitated.  Would add some D5 water to correct the hypernatremia.  From my standpoint, I do not see a reason for continued critical care medicine follow-up, if the surgical service desires to keep the patient in the intensive care unit, they can comanage with the hospitalist and we will sign off.  Please reconsult if patient's condition worsens.    History of recent stroke, mild LV dysfunction on recent echo from a couple of months ago, history of hypertension; chronic problems stable.    Dragon dictation software was used to dictate this note and thus there may be minor errors in translation/transcription including garbled speech or misspellings. Please contact for clarification if needed.     Jim Lopez MD

## 2024-08-12 ENCOUNTER — APPOINTMENT (OUTPATIENT)
Dept: RADIOLOGY | Facility: HOSPITAL | Age: 79
DRG: 853 | End: 2024-08-12
Payer: MEDICARE

## 2024-08-12 LAB
ALBUMIN SERPL BCP-MCNC: 2.3 G/DL (ref 3.4–5)
ALBUMIN SERPL BCP-MCNC: 2.4 G/DL (ref 3.4–5)
ALP SERPL-CCNC: 44 U/L (ref 33–136)
ALT SERPL W P-5'-P-CCNC: 12 U/L (ref 10–52)
AMMONIA PLAS-SCNC: 25 UMOL/L (ref 16–53)
ANION GAP SERPL CALC-SCNC: 10 MMOL/L (ref 10–20)
ANION GAP SERPL CALC-SCNC: 8 MMOL/L (ref 10–20)
AST SERPL W P-5'-P-CCNC: 21 U/L (ref 9–39)
BILIRUB SERPL-MCNC: 1.1 MG/DL (ref 0–1.2)
BUN SERPL-MCNC: 28 MG/DL (ref 6–23)
BUN SERPL-MCNC: 38 MG/DL (ref 6–23)
CALCIUM SERPL-MCNC: 7.1 MG/DL (ref 8.6–10.3)
CALCIUM SERPL-MCNC: 7.6 MG/DL (ref 8.6–10.3)
CHLORIDE SERPL-SCNC: 108 MMOL/L (ref 98–107)
CHLORIDE SERPL-SCNC: 111 MMOL/L (ref 98–107)
CO2 SERPL-SCNC: 25 MMOL/L (ref 21–32)
CO2 SERPL-SCNC: 25 MMOL/L (ref 21–32)
CREAT SERPL-MCNC: 1.28 MG/DL (ref 0.5–1.3)
CREAT SERPL-MCNC: 1.55 MG/DL (ref 0.5–1.3)
EGFRCR SERPLBLD CKD-EPI 2021: 45 ML/MIN/1.73M*2
EGFRCR SERPLBLD CKD-EPI 2021: 56 ML/MIN/1.73M*2
ERYTHROCYTE [DISTWIDTH] IN BLOOD BY AUTOMATED COUNT: 14.3 % (ref 11.5–14.5)
GLUCOSE SERPL-MCNC: 126 MG/DL (ref 74–99)
GLUCOSE SERPL-MCNC: 95 MG/DL (ref 74–99)
HCT VFR BLD AUTO: 42.6 % (ref 41–52)
HGB BLD-MCNC: 13.7 G/DL (ref 13.5–17.5)
LACTATE SERPL-SCNC: 1.3 MMOL/L (ref 0.4–2)
LACTATE SERPL-SCNC: 2 MMOL/L (ref 0.4–2)
MCH RBC QN AUTO: 29 PG (ref 26–34)
MCHC RBC AUTO-ENTMCNC: 32.2 G/DL (ref 32–36)
MCV RBC AUTO: 90 FL (ref 80–100)
NRBC BLD-RTO: 0 /100 WBCS (ref 0–0)
PHOSPHATE SERPL-MCNC: 3.5 MG/DL (ref 2.5–4.9)
PLATELET # BLD AUTO: 183 X10*3/UL (ref 150–450)
POTASSIUM SERPL-SCNC: 3.1 MMOL/L (ref 3.5–5.3)
POTASSIUM SERPL-SCNC: 3.2 MMOL/L (ref 3.5–5.3)
PROT SERPL-MCNC: 5.1 G/DL (ref 6.4–8.2)
RBC # BLD AUTO: 4.72 X10*6/UL (ref 4.5–5.9)
SODIUM SERPL-SCNC: 138 MMOL/L (ref 136–145)
SODIUM SERPL-SCNC: 143 MMOL/L (ref 136–145)
TSH SERPL-ACNC: 3.3 MIU/L (ref 0.44–3.98)
VIT B12 SERPL-MCNC: 361 PG/ML (ref 211–911)
WBC # BLD AUTO: 12 X10*3/UL (ref 4.4–11.3)

## 2024-08-12 PROCEDURE — 36415 COLL VENOUS BLD VENIPUNCTURE: CPT | Performed by: INTERNAL MEDICINE

## 2024-08-12 PROCEDURE — 82607 VITAMIN B-12: CPT | Performed by: INTERNAL MEDICINE

## 2024-08-12 PROCEDURE — 80069 RENAL FUNCTION PANEL: CPT

## 2024-08-12 PROCEDURE — 97162 PT EVAL MOD COMPLEX 30 MIN: CPT | Mod: GP | Performed by: PHYSICAL THERAPIST

## 2024-08-12 PROCEDURE — 97166 OT EVAL MOD COMPLEX 45 MIN: CPT | Mod: GO

## 2024-08-12 PROCEDURE — C9113 INJ PANTOPRAZOLE SODIUM, VIA: HCPCS | Performed by: NURSE PRACTITIONER

## 2024-08-12 PROCEDURE — 82140 ASSAY OF AMMONIA: CPT | Performed by: INTERNAL MEDICINE

## 2024-08-12 PROCEDURE — 2500000004 HC RX 250 GENERAL PHARMACY W/ HCPCS (ALT 636 FOR OP/ED)

## 2024-08-12 PROCEDURE — 99233 SBSQ HOSP IP/OBS HIGH 50: CPT | Performed by: INTERNAL MEDICINE

## 2024-08-12 PROCEDURE — 83605 ASSAY OF LACTIC ACID: CPT | Performed by: INTERNAL MEDICINE

## 2024-08-12 PROCEDURE — 2060000001 HC INTERMEDIATE ICU ROOM DAILY

## 2024-08-12 PROCEDURE — 70450 CT HEAD/BRAIN W/O DYE: CPT

## 2024-08-12 PROCEDURE — 2500000004 HC RX 250 GENERAL PHARMACY W/ HCPCS (ALT 636 FOR OP/ED): Performed by: NURSE PRACTITIONER

## 2024-08-12 PROCEDURE — 2500000004 HC RX 250 GENERAL PHARMACY W/ HCPCS (ALT 636 FOR OP/ED): Performed by: SURGERY

## 2024-08-12 PROCEDURE — 70450 CT HEAD/BRAIN W/O DYE: CPT | Performed by: RADIOLOGY

## 2024-08-12 PROCEDURE — 2500000004 HC RX 250 GENERAL PHARMACY W/ HCPCS (ALT 636 FOR OP/ED): Performed by: INTERNAL MEDICINE

## 2024-08-12 PROCEDURE — 85027 COMPLETE CBC AUTOMATED: CPT | Performed by: SURGERY

## 2024-08-12 PROCEDURE — 84443 ASSAY THYROID STIM HORMONE: CPT | Performed by: INTERNAL MEDICINE

## 2024-08-12 RX ORDER — POTASSIUM CHLORIDE 14.9 MG/ML
20 INJECTION INTRAVENOUS
Status: COMPLETED | OUTPATIENT
Start: 2024-08-12 | End: 2024-08-13

## 2024-08-12 RX ORDER — DEXTROSE, SODIUM CHLORIDE, SODIUM LACTATE, POTASSIUM CHLORIDE, AND CALCIUM CHLORIDE 5; .6; .31; .03; .02 G/100ML; G/100ML; G/100ML; G/100ML; G/100ML
75 INJECTION, SOLUTION INTRAVENOUS CONTINUOUS
Status: ACTIVE | OUTPATIENT
Start: 2024-08-12 | End: 2024-08-13

## 2024-08-12 RX ORDER — POTASSIUM CHLORIDE 14.9 MG/ML
20 INJECTION INTRAVENOUS
Status: COMPLETED | OUTPATIENT
Start: 2024-08-12 | End: 2024-08-12

## 2024-08-12 RX ADMIN — ACETAMINOPHEN 1000 MG: 10 INJECTION INTRAVENOUS at 11:26

## 2024-08-12 RX ADMIN — HEPARIN SODIUM 5000 UNITS: 5000 INJECTION INTRAVENOUS; SUBCUTANEOUS at 05:07

## 2024-08-12 RX ADMIN — POTASSIUM CHLORIDE 20 MEQ: 14.9 INJECTION, SOLUTION INTRAVENOUS at 23:01

## 2024-08-12 RX ADMIN — MORPHINE SULFATE 2 MG: 2 INJECTION, SOLUTION INTRAMUSCULAR; INTRAVENOUS at 22:43

## 2024-08-12 RX ADMIN — HEPARIN SODIUM 5000 UNITS: 5000 INJECTION INTRAVENOUS; SUBCUTANEOUS at 13:47

## 2024-08-12 RX ADMIN — ACETAMINOPHEN 1000 MG: 10 INJECTION INTRAVENOUS at 01:59

## 2024-08-12 RX ADMIN — PIPERACILLIN SODIUM AND TAZOBACTAM SODIUM 3.38 G: 3; .375 INJECTION, SOLUTION INTRAVENOUS at 20:04

## 2024-08-12 RX ADMIN — PIPERACILLIN SODIUM AND TAZOBACTAM SODIUM 3.38 G: 3; .375 INJECTION, SOLUTION INTRAVENOUS at 08:43

## 2024-08-12 RX ADMIN — MORPHINE SULFATE 2 MG: 2 INJECTION, SOLUTION INTRAMUSCULAR; INTRAVENOUS at 08:43

## 2024-08-12 RX ADMIN — HEPARIN SODIUM 5000 UNITS: 5000 INJECTION INTRAVENOUS; SUBCUTANEOUS at 20:04

## 2024-08-12 RX ADMIN — POTASSIUM CHLORIDE 20 MEQ: 14.9 INJECTION, SOLUTION INTRAVENOUS at 06:18

## 2024-08-12 RX ADMIN — PIPERACILLIN SODIUM AND TAZOBACTAM SODIUM 3.38 G: 3; .375 INJECTION, SOLUTION INTRAVENOUS at 01:59

## 2024-08-12 RX ADMIN — POTASSIUM CHLORIDE 20 MEQ: 14.9 INJECTION, SOLUTION INTRAVENOUS at 08:43

## 2024-08-12 RX ADMIN — PIPERACILLIN SODIUM AND TAZOBACTAM SODIUM 3.38 G: 3; .375 INJECTION, SOLUTION INTRAVENOUS at 13:47

## 2024-08-12 RX ADMIN — POTASSIUM CHLORIDE 20 MEQ: 14.9 INJECTION, SOLUTION INTRAVENOUS at 21:05

## 2024-08-12 RX ADMIN — SODIUM CHLORIDE, SODIUM LACTATE, POTASSIUM CHLORIDE, CALCIUM CHLORIDE AND DEXTROSE MONOHYDRATE 75 ML/HR: 5; 600; 310; 30; 20 INJECTION, SOLUTION INTRAVENOUS at 23:05

## 2024-08-12 RX ADMIN — PANTOPRAZOLE SODIUM 40 MG: 40 INJECTION, POWDER, FOR SOLUTION INTRAVENOUS at 06:17

## 2024-08-12 RX ADMIN — ACETAMINOPHEN 1000 MG: 10 INJECTION INTRAVENOUS at 19:38

## 2024-08-12 ASSESSMENT — COGNITIVE AND FUNCTIONAL STATUS - GENERAL
WALKING IN HOSPITAL ROOM: TOTAL
DRESSING REGULAR LOWER BODY CLOTHING: TOTAL
MOVING TO AND FROM BED TO CHAIR: TOTAL
MOVING FROM LYING ON BACK TO SITTING ON SIDE OF FLAT BED WITH BEDRAILS: TOTAL
HELP NEEDED FOR BATHING: TOTAL
CLIMB 3 TO 5 STEPS WITH RAILING: TOTAL
STANDING UP FROM CHAIR USING ARMS: TOTAL
TOILETING: TOTAL
PERSONAL GROOMING: TOTAL
HELP NEEDED FOR BATHING: TOTAL
EATING MEALS: TOTAL
MOBILITY SCORE: 6
MOVING FROM LYING ON BACK TO SITTING ON SIDE OF FLAT BED WITH BEDRAILS: TOTAL
STANDING UP FROM CHAIR USING ARMS: TOTAL
MOBILITY SCORE: 6
TURNING FROM BACK TO SIDE WHILE IN FLAT BAD: TOTAL
PERSONAL GROOMING: TOTAL
CLIMB 3 TO 5 STEPS WITH RAILING: TOTAL
MOVING TO AND FROM BED TO CHAIR: TOTAL
DRESSING REGULAR UPPER BODY CLOTHING: TOTAL
DAILY ACTIVITIY SCORE: 6
TOILETING: TOTAL
DAILY ACTIVITIY SCORE: 6
DRESSING REGULAR UPPER BODY CLOTHING: TOTAL
EATING MEALS: TOTAL
WALKING IN HOSPITAL ROOM: TOTAL
TURNING FROM BACK TO SIDE WHILE IN FLAT BAD: TOTAL
DRESSING REGULAR LOWER BODY CLOTHING: TOTAL

## 2024-08-12 ASSESSMENT — ACTIVITIES OF DAILY LIVING (ADL)
BATHING_ASSISTANCE: TOTAL
ADL_ASSISTANCE: NEEDS ASSISTANCE

## 2024-08-12 ASSESSMENT — PAIN - FUNCTIONAL ASSESSMENT
PAIN_FUNCTIONAL_ASSESSMENT: CPOT (CRITICAL CARE PAIN OBSERVATION TOOL)
PAIN_FUNCTIONAL_ASSESSMENT: 0-10
PAIN_FUNCTIONAL_ASSESSMENT: CPOT (CRITICAL CARE PAIN OBSERVATION TOOL)

## 2024-08-12 ASSESSMENT — PAIN SCALES - WONG BAKER: WONGBAKER_NUMERICALRESPONSE: HURTS WHOLE LOT

## 2024-08-12 ASSESSMENT — PAIN SCALES - GENERAL: PAINLEVEL_OUTOF10: 6

## 2024-08-12 NOTE — PROGRESS NOTES
Physical Therapy    Physical Therapy Evaluation    Patient Name: Abdi Wilson  MRN: [id number]  Today's Date: 8/12/2024   Time Calculation  Start Time: 1253  Stop Time: 1316  Time Calculation (min): 23 min  05/05-A    Assessment/Plan   PT Assessment  PT Assessment Results: Decreased range of motion, Decreased strength, Decreased mobility, Decreased coordination, Decreased cognition, Impaired judgement, Impaired tone, Decreased skin integrity, Pain (postop ABD SURG)  Rehab Prognosis: Fair  Barriers to Discharge: altered mental status/lethargy  Evaluation/Treatment Tolerance: Patient limited by pain  Medical Staff Made Aware: Yes  Barriers to Participation:  (communication)  End of Session Communication: Bedside nurse, Physician  Assessment Comment: pt demos altered mental status and lethargy, abd pain postop--poor tolerance for therapy or any movement on 8/12. Recommend return [facility] undergoing stroke rehab  End of Session Patient Position: Bed, 3 rail up, Alarm on  IP OR SWING BED PT PLAN  Inpatient or Swing Bed: Inpatient  PT Plan  Treatment/Interventions: Bed mobility  PT Plan: Ongoing PT  PT Frequency: 4 times per week  PT Discharge Recommendations: Moderate intensity level of continued care  PT Recommended Transfer Status: Total assist  PT - OK to Discharge: Yes    Subjective     Current Problem:  1. PEG tube malfunction (Multi)  CANCELED: Case Request Operating Room: Exploration Laparotomy    CANCELED: Case Request Operating Room: Exploration Laparotomy      2. Sepsis, due to unspecified organism, unspecified whether acute organ dysfunction present (Multi)          Patient Active Problem List   Diagnosis    Stroke-like symptoms    Cerebral artery occlusion with cerebral infarction (Multi)    Cerebral infarction, unspecified (Multi)    Confusion    PEG tube malfunction (Multi)    Dementia without behavioral disturbance (Multi)    Peritonitis (Multi)    Aphasia       General Visit  Information:  General  Reason for Referral: transfer from SNF d/t PEG tube malfunction. ABD SURG 8/10: explor lap washout, replaced G tube.  Referred By: BENSON DARDEN. PT FOR IMPAIRED MOBILITY  Past Medical History Relevant to Rehab: old CVA, recent CVA June (was able toamb 65ft for PT), dysphagia and expressive aphasia  Family/Caregiver Present: Yes  Caregiver Feedback: son present on arrival and provided PLOF at SNF. son did not stay to observe session.  Co-Treatment: OT  Co-Treatment Reason: optimize pt safety in ICU setting  Prior to Session Communication: Bedside nurse (approved PT, RN states pt cognitive level observed (lethargic, reduced responsiveness) has been consistent during admission)  Patient Position Received: Bed, 3 rail up, Alarm off, caregiver present  General Comment: pt's eye open but poorly responsive, son needed to provide much stim to get pt to squeeze his had and wave. no eye contact.    Home Living:  Home Living  Home Living Comments: pt most recently at SNF for REHAB    Prior Level of Function:  Prior Function Per Pt/Caregiver Report  Level of [place]: Needs assistance with ADLs, Needs assistance with homemaking, Needs assistance with functional transfers  Ambulatory Assistance: Needs assistance  Gait: Assistive device, Minimal (per son, amb 45ft with FWW and assist at SNF)    Precautions:  Precautions  Medical Precautions: Fall precautions, Abdominal precautions  Precautions Comment: NG TUBE. NESSA DRAIN and ABD BINDER to abd.  NON-VERBAL (head nods Y/N), expressive aphasia       Objective     Pain:  Pain Assessment  Pain Assessment: CPOT (Critical Care Pain Observation Tool)  Mcgregor-Baker FACES Pain Rating: Hurts whole lot  Pain Location: Abdomen  Pain Interventions: Rest  Unable to Self-Report Pain Reason: Nonverbal  Activities/Procedures Causing Pain: Turning/repositioning (resistant to activity attempts to roll)  Patient Behaviors: Other (Comment), Facial grimacing (resistant with arms,  pushing therapist away)    Cognition:  Cognition  Overall Cognitive Status: Unable to assess (pt non-verbal)    General Assessments:  General Observation  General Observation: attempted to increase pt alertness with verbal/tactile stim   Activity Tolerance  Endurance: Decreased tolerance for upright activites  Activity Tolerance Comments: appears to have increased pain with movement     Postural Control  Postural Control: Impaired  Head Control: no head turn to right observed, resting head turn to left at end of session  Posture Comment: extensor hypertonus L>R LE          Functional Assessments:     Bed Mobility 1  Bed Mobility 1: Rolling right, Rolling left  Level of Assistance 1: Dependent  Bed Mobility Comments 1: resistant, pushing therapists away with LUE. unable to attempt dangling EOB.  Transfers  Transfer: No             Extremity/Trunk Assessments:        RLE   RLE : Exceptions to WFL  AROM RLE (degrees)  RLE AROM Comment: grossly 50% with rigidity  Strength RLE  RLE Overall Strength:  (NO ACTIVE MOVEMENT OBSERVED even with stim)  LLE   LLE : Exceptions to WFL  PROM LLE (degrees)  LLE PROM Comment: grossly 25% with rigidity vs ext hypertonus/pushing (exacerbated during bed mob attempts)  Strength LLE  LLE Overall Strength: Other (Comment) (NO ACTIVE movement observed despite stim)    Outcome Measures:     [facility] Basic Mobility  Turning from your back to your side while in a flat bed without using bedrails: Total  Moving from lying on your back to sitting on the side of a flat bed without using bedrails: Total  Moving to and from bed to chair (including a wheelchair): Total  Standing up from a chair using your arms (e.g. wheelchair or bedside chair): Total  To walk in hospital room: Total  Climbing 3-5 steps with railing: Total  Basic Mobility - Total Score: 6       Goals:  Encounter Problems       Encounter Problems (Active)       Balance       STG - Maintains static sitting balance with upper extremity  support       Start:  08/12/24    Expected End:  08/26/24       INTERVENTIONS:  1. Practice sitting on the edge of a bed/mat with minimal support.  2. Educate patient about maintining total hip precautions while maintaining balance.  3. Educate patient about pressure relief.  4. Educate patient about use of assistive device.            PT Transfers       STG - Patient to transfer to and from sit to supine with no greater than Min Ax2       Start:  08/12/24    Expected End:  08/26/24               Strengthening        Pt will perform 10+ reps AAROM/AROM/RROM BLE to improve functional strength needed for improved mobility.        Start:  08/12/24    Expected End:  08/26/24                 Education Documentation  Mobility Training, taught by Lindsay Dang, PT at 8/12/2024  3:20 PM.  Learner: Patient  Readiness: Nonacceptance  Method: Explanation, Demonstration  Response: No Evidence of Learning    Education Comments  No comments found.

## 2024-08-12 NOTE — PROGRESS NOTES
Abdi Wilson is a 79 y.o. male on day 2 of admission presenting with PEG tube malfunction (Multi).      Subjective   Abdi Wilson is a 79 y.o. male with PMHx s/f hypertension dyslipidemia old CVA mild cardiomyopathy ulcerative colitis GERD recent CVA in June of this year with dysphagia and expressive aphasia presenting with abdominal pain fever.  Patient had a PEG tube placed for nutritional purposes about 2 days ago.  Had presented to emergency department complaining of some pain in the abdominal area the patient was noted to have low blood pressure and fever in emergency department.  On presenting to emergency department patient had temperature 96.3 heart rate 102 respiratory rate 24 blood pressure 135/81 Javy panel shows sodium 148 potassium 4.2 chloride 112 bicarb 25 BUN 37 creatinine 1.38 glucose 144 liver enzymes within normal limits total bilirubin 1.5 initial lactate 2.5 repeat lactate 2.4 CBC showing WBC 13.6 hemoglobin 19.7 hematocrit 60.7 platelets 269 CT scan of the abdomen pelvis was done showing malposition PEG tube there is a small amount of free air, patient was seen by general surgery who felt patient had peritonitis plan is to take patient for emergent surgery.   8/11: Patient was seen and examined.  He is nonverbal at baseline and still looks lethargic this morning.  Blood pressure is improved and patient is off pressors.  Hypernatremia persistent and patient has been started on IV fluid D5 water.  Will get repeat BMP and trend.  Continue current IV antibiotics.  Blood cultures are still pending.  Gastrostomy replacement done 8/10/2024.  Continue n.p.o. by general surgery recommendation.  Repeat CBC and BMP in a.m.  8/12: Patient was seen and examined.  He remains nonverbal which is baseline however patient is not responsive or interactive.  Failed attempt at physical therapy.  Discussed with patient's son over the phone and granddaughter.  Patient was cooperating adequately with physical  therapy prior to this admission.  I will get a CT of the head to rule out any recent stroke.  Stat lactic and ammonia TSH and vitamin B12.  General surgery still recommending holding G-tube.  We will have to consider other modes of feeding within 24 hours.  Will continue IV fluids pending review of stat BMP.  Leukocytosis trending down.  Blood cultures are negative.  Continue to trend CBC and BMP daily.       Objective     Last Recorded Vitals  /87   Pulse 85   Temp 36.8 °C (98.2 °F) (Temporal)   Resp 17   Wt 94.3 kg (208 lb)   SpO2 97%   Intake/Output last 3 Shifts:    Intake/Output Summary (Last 24 hours) at 8/12/2024 1624  Last data filed at 8/12/2024 1347  Gross per 24 hour   Intake 2829.17 ml   Output 1500 ml   Net 1329.17 ml       Admission Weight  Weight: 94.3 kg (208 lb) (08/10/24 1113)    Daily Weight  08/10/24 : 94.3 kg (208 lb)    Image Results  ECG 12 Lead  Sinus tachycardia  Ventricular premature complex  Prolonged OR interval  LVH with secondary repolarization abnormality  Inferior infarct, old  Anterolateral infarct, age indeterminate      Physical Exam  Constitutional:       Appearance: He is acutely ill-appearing and toxic-appearing.  Nonverbal and noninteractive but localizes pain.  He is not diaphoretic.   HENT:      Head: Normocephalic.      Nose: Nose normal.      Mouth/Throat:      Mouth: Mucous membranes are dry.      Pharynx: No posterior oropharyngeal erythema.   Eyes:      General: No scleral icterus.     Conjunctiva/sclera: Conjunctivae normal.   Neck:      Vascular: No carotid bruit.   Cardiovascular:      Rate and Rhythm: Regular rhythm. Tachycardia present.      Heart sounds: No murmur heard.  Pulmonary:      Effort: Pulmonary effort is normal. No respiratory distress.      Breath sounds: No wheezing, rhonchi or rales.      Comments: Tachypneic  Abdominal:      General: There is distension.      Tenderness: There is abdominal tenderness. There is rebound.      Comments: No  drainage at G-tube site   Musculoskeletal:      Right lower leg: No edema.      Left lower leg: No edema.   Skin:     General: Skin is warm.      Capillary Refill: Capillary refill takes less than 2 seconds.   Neurological:      Mental Status: Mental status is at baseline.      Sensory: No sensory deficit.      Comments: Patient is lethargic and not interactive but localizes pain unable to move right upper extremities psychiatric:         Mood and Affect: Mood normal.      Relevant Results               Assessment/Plan                  Principal Problem:    PEG tube malfunction (Multi)  Active Problems:    Cerebral infarction, unspecified (Multi)    Peritonitis (Multi)    Aphasia    Acute abdominal pain, secondary to peritonitis and probable sepsis  Status post exploratory laparotomy and G-tube replacement  Continue IV antibiotics Zosyn  As needed IV fluids  Responded to multiple boluses  Blood cultures negative x 1 day  Intensivist on board and signed off.   Pt is DNR CCA    Altered mental status and aphasia  Likely related to metabolic encephalopathy  Recent history of stroke with associated aphasia however patient was ambulating  Now increasing lethargy and unresponsive  I will get a CT of the head  Vitamin B12 TSH and ammonia  Suggestive     Dehydration and hypernatremia  Discontinue IV fluids D5 water  Repeat BMP stat  Consider half-normal saline         G-tube dysfunction  G-tube replacement completed 8/10/2024  Continue n.p.o. for now per general surgery recommendation          HX CVA w S/L deficits, memory loss patient nonambulatory  Pt speaks some at baseline  Resume medications post surgery, is currently NPO, no GT access          Spent 35 minutes in follow-up management of this patient       Joe Lopez MD

## 2024-08-12 NOTE — PROGRESS NOTES
Social work consult placed for positive medical risk screen. SW reviewed pt's chart and communicated with TCC. No SW needs foreseen at this time. SW signing off; available upon request.    Vince Fitch, MSW, LSW ([id number])   Care Transitions

## 2024-08-12 NOTE — CARE PLAN
Problem: Skin  Goal: Decreased wound size/increased tissue granulation at next dressing change  Outcome: Progressing  Goal: Participates in plan/prevention/treatment measures  Outcome: Progressing  Goal: Prevent/manage excess moisture  Outcome: Progressing  Goal: Prevent/minimize sheer/friction injuries  Outcome: Progressing  Goal: Promote/optimize nutrition  Outcome: Progressing  Goal: Promote skin healing  Outcome: Progressing     Problem: Pain  Goal: Takes deep breaths with improved pain control throughout the shift  Outcome: Progressing  Goal: Turns in bed with improved pain control throughout the shift  Outcome: Progressing  Goal: Walks with improved pain control throughout the shift  Outcome: Progressing  Goal: Performs ADL's with improved pain control throughout shift  Outcome: Progressing  Goal: Participates in PT with improved pain control throughout the shift  Outcome: Progressing  Goal: Free from opioid side effects throughout the shift  Outcome: Progressing  Goal: Free from acute confusion related to pain meds throughout the shift  Outcome: Progressing     Problem: Nutrition  Goal: Less than 5 days NPO/clear liquids  Outcome: Progressing  Goal: Nutrition support goals are met within 48 hrs  Outcome: Progressing  Goal: Nutrition support is meeting 75% of nutrient needs  Outcome: Progressing  Goal: Tube feed tolerance  Outcome: Progressing  Goal: BG  mg/dL  Outcome: Progressing  Goal: Promote healing  Outcome: Progressing  Goal: Maintain stable weight  Outcome: Progressing

## 2024-08-12 NOTE — PROGRESS NOTES
Occupational Therapy  Evaluation    Patient Name: Abdi Wilson  MRN: [id number]  Today's Date: 8/12/2024  Time Calculation  Start Time: 1252  Stop Time: 1312  Time Calculation (min): 20 min    Current Problem:   1. PEG tube malfunction (Multi)    2. Sepsis, due to unspecified organism, unspecified whether acute organ dysfunction present (Multi)        OT order: OT eval and treat   Referred by: Nia  Reason for referral: ADLs, safety assessment  Past medical history related to rehab: old CVA, recent CVA June (was able toamb 65ft for PT), dysphagia and expressive aphasia    Precautions:   Hearing/Visual Limitations: unknown  Medical Precautions: Fall precautions  Post-Surgical Precautions: Abdominal surgery precautions  Precautions Comment: NF tube, NPO. NESSA drain and abdominal binder    ASSESSMENT  OT Assessment: OT eval completed. The patient is functioning below baseline for ADLs and mobility. can benefit from continued OT. Pt with Decreased ADL status, Decreased upper extremity strength, Decreased safe judgment during ADL, Decreased endurance, Decreased cognition, Decreased functional mobility, Decreased gross motor control, Decreased IADLs  Prognosis:    Barriers to discharge: Decreased caregiver support, Inaccessible home environment  Tolerance:      PLAN  Frequency: 3 times per week  Treatment Interventions: ADL retraining, Functional transfer training, UE strengthening/ROM, Endurance training, Cognitive reorientation, Patient/family training, Equipment evaluation/education, Neuromuscular reeducation  Discharge Recommendations: Moderate intensity level of continued care  OT OK to discharge: Yes    GENERAL VISIT INFORMATION   Start of session communication: Bedside nurse  End of session communication: Bedside nurse  Family/caregiver present: Yes  Caregiver feedback: son present but left before therapy initiated mobility  Co-Treatment: PT  Reason for co-treatment: to optimize safety and mobility, while focusing  on discipline specific goals   Position Pt Received:  Bed, 3 rail up, Alarm on (pt lying in bed. eyes open. not alert or attending well to therapists. pt needed repetition and repeat verbal/tactile stim to engage in activity.)  End of session position: Bed, 3 rail up, Alarm on    SUBJECTIVE  Home Living:  Home Living Comments: admitted from [facility] where hewas for PT, OT, SLP  post stroke     Prior Level of Function:  Receives Help From: Personal care attendant  ADL Assistance: Needs assistance  Homemaking Assistance: Needs assistance  Ambulatory Assistance: Needs assistance  Prior Function Comments: performing functional mobility ~45ft with assist and walker at rehab prior to readmission to hospital    IADL History:       Pain:  Assessment: 0-10  Score:  (no rating given. pt unable to verbalize. pt grimacing, moaning, grabbing stomach with movement and coughing.)  Type:    Location: Abdomen  Interventions: Repositioned, Rest  Response to pain interventions:      OBJECTIVE  Vital Signs:       Cognition:  Overall Cognitive Status: Impaired  Arousal/Alertness: Inconsistent responses to stimuli (poor responses)  Orientation Level:  (unable to assess. pt not answering questions. pt repsonded to name being called)  Following Commands:  (followed 25-50% commands)  Cognition Comments: per son, pt with significant language deficits post recent stroke. was working with SLP at rehab and making progress. able to nod yes/no and respond to close ended questions fairly well. pt's current presentation appears worse than his most recent baseline at [facility]             Current ADL function:   EATING:  Total     GROOMING: Total     BATHING: Total     UB DRESSING: Total     LB DRESSING: Total     TOILETING: Total    ADL comments:  current level of assist anticipated. pt unable to follow commands and minimally engaging with therapy today    Activity Tolerance:  Endurance: Decreased tolerance for upright activites  Activity Tolerance Comments:  increased pain    Bed Mobility/Transfers:   Bed Mobility  Bed Mobility: Yes  Bed Mobility 1  Bed Mobility 1: Rolling right, Rolling left  Level of Assistance 1: Dependent, +2  Bed Mobility Comments 1: pt resistant and pushing therapy away when attempting to roll him onto his side. pt unable to tolerate and not appropriate to progress activity further at this time  Transfers  Transfer: No    Ambulation/Gait Training:  Functional Mobility  Functional Mobility Performed: No    Sitting Balance:       Standing Balance:       Vision: Vision - Basic Assessment  Current Vision:  (unknown)   and Vision - Complex Assessment  Ocular Range of Motion: Restricted on the right  Head Position:  (head turned left. would track to midline and turn head to midline intermittently but unable to sustain. min to no head turn to R side)  Tracking: Regards caregivers (regarded son, regarded OT initially, engaged and tracking decreased as session progressed)  Visual Fields:  (detecting more stim on L side compared to R)    Sensation:  Light Touch: Not tested    Strength:  Strength Comments: BUE grossly 2/5 in shoulders, elbows    Perception:  Inattention/Neglect:  (cues to attend)  Initiation: Cues to initiate tasks    Coordination:  Movements are Fluid and Coordinated: No     Hand Function:  Hand Function  Gross Grasp: Functional (grasp was 4/5 MMT)    Extremities: RUE   RUE :  (50% AROM) and LUE   LUE:  (50% AROM)    Outcome Measures: [facility] Daily Activity  Putting on and taking off regular lower body clothing: Total  Bathing (including washing, rinsing, drying): Total  Putting on and taking off regular upper body clothing: Total  Toileting, which includes using toilet, bedpan or urinal: Total  Taking care of personal grooming such as brushing teeth: Total  Eating Meals: Total  Daily Activity - Total Score: 6                    EDUCATION:     Education Documentation  ADL Training, taught by Poonam Kumar OT at 8/12/2024  3:06 PM.  Learner:  Patient  Readiness: Acceptance  Method: Explanation  Response: Needs Reinforcement    Education Comments  No comments found.        Goals:   Encounter Problems       Encounter Problems (Active)       ADLs       Patient will complete daily grooming tasks brushing teeth and washing face/hair with minimal assist  level of assistance and PRN adaptive equipment while supine in bed and/or supported sitting. (Progressing)       Start:  08/12/24    Expected End:  08/26/24               COGNITION/SAFETY       Patient will follow 75% Simple commands to allow improved ADL performance. (Progressing)       Start:  08/12/24    Expected End:  08/26/24               TRANSFERS       Patient will perform bed mobility moderate assist level of assistance and bed rails in order to improve safety and independence with mobility (Progressing)       Start:  08/12/24    Expected End:  08/26/24               VISION       Patient will visually attend to Left/Right  side of Tray, Room, and Body using compensatory strategies and  minimal assist  level of assistance.  (Progressing)       Start:  08/12/24    Expected End:  08/26/24

## 2024-08-12 NOTE — PROGRESS NOTES
"Abdi Wilson is a 79 y.o. male  who presented on 8/10/2024 with PEG tube malfunction (Multi) after initially being placed on 8/8/2024    Subjective   Patient stable in intensive care unit overnight.  Pressures have improved without the need of any vasopressors.  NG tube remains in place.  PEG tube and abdominal drain in place under abdominal binder.    Review of systems unable to be obtained secondary to patient's mentation    Objective     Physical Exam  Constitutional:       General: He is not in acute distress.     Appearance: He is ill-appearing. He is not diaphoretic.   HENT:      Head: Normocephalic and atraumatic.      Comments: Nasogastric tube in place  Cardiovascular:      Rate and Rhythm: Normal rate and regular rhythm.      Pulses: No decreased pulses.           Radial pulses are 2+ on the right side and 2+ on the left side.        Dorsalis pedis pulses are 2+ on the right side and 2+ on the left side.   Pulmonary:      Effort: No tachypnea, accessory muscle usage or respiratory distress.   Abdominal:      Comments: Abdominal binder in place.  Tenderness to palpation is improved.  He does seem to slightly wince in pain upon deep palpation but not to the same extent as he did upon septic presentation    PEG tube remains in place approximately 4 cm from the skin.  Bumper able to spin freely.  No erythema purulence or discharge from site.    Drain with serosanguineous output   Musculoskeletal:      Right lower leg: No edema.      Left lower leg: No edema.   Skin:     General: Skin is cool and dry.   Neurological:      Mental Status: He is alert.      Motor: Motor function is intact.         Last Recorded Vitals  Blood pressure 129/76, pulse 82, temperature 36.8 °C (98.2 °F), temperature source Temporal, resp. rate 16, height 1.753 m (5' 9\"), weight 94.3 kg (208 lb), SpO2 98%.  Intake/Output last 3 Shifts:  I/O last 3 completed shifts:  In: 5721.4 (60.6 mL/kg) [I.V.:4704.7 (49.9 mL/kg); IV " Piggyback:1016.7]  Out: 2312 (24.5 mL/kg) [Urine:1822 (0.5 mL/kg/hr); Emesis/NG output:350; Drains:140]  Weight: 94.3 kg         Assessment/Plan   Principal Problem:    PEG tube malfunction (Multi)  Active Problems:    Cerebral infarction, unspecified (Multi)    Peritonitis (Multi)    Aphasia    Patient is a 79-year-old male who initially underwent PEG tube placement due to dysphagia on 8/8/2024.  Patient Krystal presented on 8/10/2024 due to PEG tube malfunction and found to be septic on presentation the emergency department    Patient underwent replacement of gastrostomy tube due to malfunction on 8/10/2024 with Dr. Velásquez    -Continue close monitoring of care in the intensive care unit  -Continue maintenance IV fluids with as needed fluid boluses for hypotension keeping aware patient has reduced ejection fraction (appears to be 46% on most previous echo)  -MAP goals ideally greater than 65   -Continue to trend lactate as needed  -Refrain from using PEG tube at this time for feeds or medications  -Blood cultures pending  -Continue Zosyn  -D5W given to replace free water in setting of hypernatremia  -DVT prophylaxis with subcutaneous heparin and SCDs  -Currently on GI prophylaxis with PPI  -Maintain NPO diet  -Nasogastric tube to low intermittent suction  -DVT prophylaxis with subcutaneous heparin  -Continue tracking strict I's and O's  -Maintain abdominal binder  -Patient is DNR CCA    Appreciate the assistance of the ICU team in the care of this patient    Case has been discussed with attending Dr. Christen Prather,  PGY-2

## 2024-08-12 NOTE — CONSULTS
Nutrition Initial Assessment:   Nutrition Assessment    Reason for Assessment: Admission nursing screening, Tube feeding recommendations    Medical history per chart:   79 y.o. male with PMHx s/f hypertension dyslipidemia old CVA mild cardiomyopathy ulcerative colitis GERD recent CVA in June of this year with dysphagia and expressive aphasia presenting with abdominal pain fever.  Patient had a PEG tube placed for nutritional purposes about 2 days ago.  Had presented to emergency department complaining of some pain in the abdominal area the patient was noted to have low blood pressure and fever in emergency department.     8/12:  Pt seen in ICU, asleep, and son at bedside.  Pt known from previous admission on 6/24/24 when he had a Dobhoff tube placed for nutrition, and discharged to nursing facility.  Noted Peg was then placed on 8/8, but then had to replaced on 8/10 due to malfunction.  Pt currently NPO with NG to suction during visit.  Will follow for TF initiation when able.    Nutrition History:  Food and Nutrient History: Per previous admit was on Jevity 1.5 @ goal of 70 ml/hr       Current Diet: NPO Diet; Effective now    Nutrition Related Findings:     Food allergies: NKFA. has No Known Allergies.  Meds/Labs reviewed.  acetaminophen, 1,000 mg, intravenous, q8h  pantoprazole, 40 mg, oral, Daily before breakfast   Or  esomeprazole, 40 mg, nasoduodenal tube, Daily before breakfast   Or  pantoprazole, 40 mg, intravenous, Daily before breakfast  heparin (porcine), 5,000 Units, subcutaneous, q8h  piperacillin-tazobactam, 3.375 g, intravenous, q6h             Nutrition Significant Labs:    Results from last 7 days   Lab Units 08/12/24  0507 08/11/24  1555 08/11/24  0356   GLUCOSE mg/dL 126* 119* 94   SODIUM mmol/L 143 148* 150*   POTASSIUM mmol/L 3.1* 3.1* 3.9   CHLORIDE mmol/L 111* 118* 118*   CO2 mmol/L 25 21 21   BUN mg/dL 38* 41* 42*   CREATININE mg/dL 1.55* 1.61* 1.50*   EGFR mL/min/1.73m*2 45* 43* 46*   CALCIUM  "mg/dL 7.1* 6.6* 8.2*   PHOSPHORUS mg/dL 3.5  --  4.3   MAGNESIUM mg/dL  --   --  1.98     Lab Results   Component Value Date    HGBA1C 5.7 (H) 06/21/2024    HGBA1C 5.9 (A) 07/18/2023           Anthropometrics:  Height: 175.3 cm (5' 9\")   Weight: 94.3 kg (208 lb)   BMI (Calculated): 30.7  IBW/kg (Dietitian Calculated): 72.7 kg          Weight History:   Wt Readings from Last 10 Encounters:   08/10/24 94.3 kg (208 lb)   08/08/24 94.3 kg (208 lb)   07/02/24 94.4 kg (208 lb 1.8 oz)   06/29/24 99.8 kg (220 lb)   06/27/24 91 kg (200 lb 9.9 oz)   09/16/22 90 kg (198 lb 6.6 oz)        Weight Change %:  Weight History / % Weight Change: Noted weights have been around 200#.  Question accuracy of weight on 6/29: 220#.  Significant Weight Loss: No          Nutrition Focused Physical Exam Findings:  defer: Asleep, and lethargic.  Family at bedside  Subcutaneous Fat Loss:      Muscle Wasting:     Edema:     Physical Findings:  Skin: Positive (ABD incision/peg site)    Estimated Needs:   Total Energy Estimated Needs (kCal): 2190 kCal  Method for Estimating Needs: 30 kcal/kg IBW     Method for Estimating Needs:  gm (1.2-1.4 gm/kg IBW)     Method for Estimating Needs: 1 ml/kcal        Nutrition Diagnosis   Nutrition Diagnosis:  Malnutrition Diagnosis  Patient has Malnutrition Diagnosis: No    Nutrition Diagnosis  Patient has Nutrition Diagnosis: Yes  Diagnosis Status (1): New  Nutrition Diagnosis 1: Inadequate oral intake  Related to (1): dysphagia s/p Peg tube  As Evidenced by (1): NPO status x 3 days with need of Enteral nutrition       Nutrition Interventions/Recommendations   Nutrition Interventions and Recommendations:        Nutrition Prescription:  Individualized Nutrition Prescription Provided for : Enteral nutrition        Nutrition Interventions:   Food and/or Nutrient Delivery Interventions  Interventions: Enteral intake  Enteral Intake: Modify concentration of enteral nutrition, Modify rate of enteral " nutrition  Goal: Recommend TF of Jevity 1.5 @ 10 ml/hr, and increase by 10 ml Q6H to goal rate of 60 ml/hr: 2160 kcals, 92 gm protein, 1094 ml free water.  Additional Interventions: Free water flush of 250 ml Q4H to provide with TF: 2594 ml free water.    Coordination of Nutrition Care by a Nutrition Professional  Collaboration and Referral of Nutrition Care: Collaboration by nutrition professional with other providers  Goal: Dr. Lopez    Nutrition Education:   Education Documentation  No documentation found.      Nutrition Counseling  Counseling Theoretical Approach: Other (Comment)  Goal: Reviewed NPO, and TF goals with Son during visit       Nutrition Monitoring and Evaluation   Monitoring/Evaluation:   Food/Nutrient Related History Monitoring  Monitoring and Evaluation Plan: Enteral and parenteral nutrition intake  Enteral and Parenteral Nutrition Intake: Enteral nutrition formula/solution, Enteral nutrition intake  Criteria: TF initiation, tolerance, and advancement    Body Composition/Growth/Weight History  Monitoring and Evaluation Plan: Weight  Weight: Measured weight  Criteria: Stable weight    Biochemical Data, Medical Tests and Procedures  Monitoring and Evaluation Plan: Electrolyte/renal panel, Glucose/endocrine profile  Electrolyte and Renal Panel: BUN, Sodium, Creatinine, Magnesium, Phosphorus, Potassium  Criteria: WNL  Glucose/Endocrine Profile: Glucose, casual, Hemoglobin A1c (HgbA1c)  Criteria: WNL    Nutrition Focused Physical Findings  Monitoring and Evaluation Plan: Skin  Skin: Impaired wound healing  Criteria: Promote healing            Time Spent/Follow-up Reminder:   Follow Up  Time Spent (min): 45 minutes  Last Date of Nutrition Visit: 08/12/24  Nutrition Follow-Up Needed?: Dietitian to reassess per policy  Follow up Comment: 8/14-8/15

## 2024-08-12 NOTE — PROGRESS NOTES
08/12/24 1311   Discharge Planning   Living Arrangements Other (Comment)   Support Systems Family members;Children   Assistance Needed ADL's,IADL's, LTC   Type of Residence Other (Comment)  ([facility])   Home or Post Acute Services Other (Comment)  (LTC)   Type of Post Acute Facility Services Long term care   Expected Discharge Disposition Other  (LTC)   Does the patient need discharge transport arranged? Yes   RoundTrip coordination needed? Yes   Patient Choice   Patient / Family choosing to utilize agency / facility established prior to hospitalization Yes     Discharge assessment complete. Spoke to patient's son Rafi. Rafi confirmed patient's PCP is [facility]. Rafi confirmed that patient is a LTC resident and will return to [facility] once patient is medically ready. Return referral sent. [facility] following.

## 2024-08-13 ENCOUNTER — APPOINTMENT (OUTPATIENT)
Dept: RADIOLOGY | Facility: HOSPITAL | Age: 79
DRG: 853 | End: 2024-08-13
Payer: MEDICARE

## 2024-08-13 LAB
ALBUMIN SERPL BCP-MCNC: 2.5 G/DL (ref 3.4–5)
ALP SERPL-CCNC: 49 U/L (ref 33–136)
ALT SERPL W P-5'-P-CCNC: 12 U/L (ref 10–52)
ANION GAP SERPL CALC-SCNC: 10 MMOL/L (ref 10–20)
AST SERPL W P-5'-P-CCNC: 19 U/L (ref 9–39)
BACTERIA UR CULT: NO GROWTH
BASOPHILS # BLD AUTO: 0.02 X10*3/UL (ref 0–0.1)
BASOPHILS NFR BLD AUTO: 0.2 %
BILIRUB SERPL-MCNC: 1.2 MG/DL (ref 0–1.2)
BUN SERPL-MCNC: 23 MG/DL (ref 6–23)
CALCIUM SERPL-MCNC: 7.6 MG/DL (ref 8.6–10.3)
CHLORIDE SERPL-SCNC: 109 MMOL/L (ref 98–107)
CO2 SERPL-SCNC: 25 MMOL/L (ref 21–32)
CREAT SERPL-MCNC: 1.16 MG/DL (ref 0.5–1.3)
EGFRCR SERPLBLD CKD-EPI 2021: 63 ML/MIN/1.73M*2
EOSINOPHIL # BLD AUTO: 0.07 X10*3/UL (ref 0–0.4)
EOSINOPHIL NFR BLD AUTO: 0.7 %
ERYTHROCYTE [DISTWIDTH] IN BLOOD BY AUTOMATED COUNT: 14.1 % (ref 11.5–14.5)
GLUCOSE BLD MANUAL STRIP-MCNC: 172 MG/DL (ref 74–99)
GLUCOSE BLD MANUAL STRIP-MCNC: 69 MG/DL (ref 74–99)
GLUCOSE SERPL-MCNC: 84 MG/DL (ref 74–99)
HCT VFR BLD AUTO: 41.5 % (ref 41–52)
HGB BLD-MCNC: 13.7 G/DL (ref 13.5–17.5)
IMM GRANULOCYTES # BLD AUTO: 0.05 X10*3/UL (ref 0–0.5)
IMM GRANULOCYTES NFR BLD AUTO: 0.5 % (ref 0–0.9)
LYMPHOCYTES # BLD AUTO: 1.03 X10*3/UL (ref 0.8–3)
LYMPHOCYTES NFR BLD AUTO: 10.1 %
MCH RBC QN AUTO: 29.2 PG (ref 26–34)
MCHC RBC AUTO-ENTMCNC: 33 G/DL (ref 32–36)
MCV RBC AUTO: 89 FL (ref 80–100)
MONOCYTES # BLD AUTO: 0.58 X10*3/UL (ref 0.05–0.8)
MONOCYTES NFR BLD AUTO: 5.7 %
NEUTROPHILS # BLD AUTO: 8.49 X10*3/UL (ref 1.6–5.5)
NEUTROPHILS NFR BLD AUTO: 82.8 %
NRBC BLD-RTO: 0 /100 WBCS (ref 0–0)
PHOSPHATE SERPL-MCNC: 2.1 MG/DL (ref 2.5–4.9)
PLATELET # BLD AUTO: 182 X10*3/UL (ref 150–450)
POTASSIUM SERPL-SCNC: 3.6 MMOL/L (ref 3.5–5.3)
PROT SERPL-MCNC: 5.4 G/DL (ref 6.4–8.2)
RBC # BLD AUTO: 4.69 X10*6/UL (ref 4.5–5.9)
RBC MORPH BLD: NORMAL
SODIUM SERPL-SCNC: 140 MMOL/L (ref 136–145)
WBC # BLD AUTO: 10.2 X10*3/UL (ref 4.4–11.3)

## 2024-08-13 PROCEDURE — 99233 SBSQ HOSP IP/OBS HIGH 50: CPT | Performed by: INTERNAL MEDICINE

## 2024-08-13 PROCEDURE — 82947 ASSAY GLUCOSE BLOOD QUANT: CPT

## 2024-08-13 PROCEDURE — 97530 THERAPEUTIC ACTIVITIES: CPT | Mod: GO,CO

## 2024-08-13 PROCEDURE — 74018 RADEX ABDOMEN 1 VIEW: CPT

## 2024-08-13 PROCEDURE — 84100 ASSAY OF PHOSPHORUS: CPT

## 2024-08-13 PROCEDURE — 85025 COMPLETE CBC W/AUTO DIFF WBC: CPT

## 2024-08-13 PROCEDURE — 36415 COLL VENOUS BLD VENIPUNCTURE: CPT

## 2024-08-13 PROCEDURE — 2500000004 HC RX 250 GENERAL PHARMACY W/ HCPCS (ALT 636 FOR OP/ED): Performed by: INTERNAL MEDICINE

## 2024-08-13 PROCEDURE — 84075 ASSAY ALKALINE PHOSPHATASE: CPT

## 2024-08-13 PROCEDURE — 2500000004 HC RX 250 GENERAL PHARMACY W/ HCPCS (ALT 636 FOR OP/ED)

## 2024-08-13 PROCEDURE — 2500000005 HC RX 250 GENERAL PHARMACY W/O HCPCS: Performed by: INTERNAL MEDICINE

## 2024-08-13 PROCEDURE — 97530 THERAPEUTIC ACTIVITIES: CPT | Mod: CQ,GP

## 2024-08-13 PROCEDURE — 2060000001 HC INTERMEDIATE ICU ROOM DAILY

## 2024-08-13 PROCEDURE — 71045 X-RAY EXAM CHEST 1 VIEW: CPT

## 2024-08-13 RX ORDER — DEXTROSE, SODIUM CHLORIDE, SODIUM LACTATE, POTASSIUM CHLORIDE, AND CALCIUM CHLORIDE 5; .6; .31; .03; .02 G/100ML; G/100ML; G/100ML; G/100ML; G/100ML
50 INJECTION, SOLUTION INTRAVENOUS CONTINUOUS
Status: ACTIVE | OUTPATIENT
Start: 2024-08-13 | End: 2024-08-14

## 2024-08-13 RX ORDER — DEXTROSE 50 % IN WATER (D50W) INTRAVENOUS SYRINGE
25 ONCE
Status: COMPLETED | OUTPATIENT
Start: 2024-08-13 | End: 2024-08-13

## 2024-08-13 RX ADMIN — HEPARIN SODIUM 5000 UNITS: 5000 INJECTION INTRAVENOUS; SUBCUTANEOUS at 03:46

## 2024-08-13 RX ADMIN — ACETAMINOPHEN 1000 MG: 10 INJECTION INTRAVENOUS at 17:54

## 2024-08-13 RX ADMIN — PIPERACILLIN SODIUM AND TAZOBACTAM SODIUM 3.38 G: 3; .375 INJECTION, SOLUTION INTRAVENOUS at 13:03

## 2024-08-13 RX ADMIN — HEPARIN SODIUM 5000 UNITS: 5000 INJECTION INTRAVENOUS; SUBCUTANEOUS at 13:03

## 2024-08-13 RX ADMIN — PIPERACILLIN SODIUM AND TAZOBACTAM SODIUM 3.38 G: 3; .375 INJECTION, SOLUTION INTRAVENOUS at 09:23

## 2024-08-13 RX ADMIN — MORPHINE SULFATE 2 MG: 2 INJECTION, SOLUTION INTRAMUSCULAR; INTRAVENOUS at 13:03

## 2024-08-13 RX ADMIN — DEXTROSE MONOHYDRATE 25 G: 25 INJECTION, SOLUTION INTRAVENOUS at 20:42

## 2024-08-13 RX ADMIN — SODIUM CHLORIDE, SODIUM LACTATE, POTASSIUM CHLORIDE, CALCIUM CHLORIDE AND DEXTROSE MONOHYDRATE 50 ML/HR: 5; 600; 310; 30; 20 INJECTION, SOLUTION INTRAVENOUS at 22:12

## 2024-08-13 RX ADMIN — HEPARIN SODIUM 5000 UNITS: 5000 INJECTION INTRAVENOUS; SUBCUTANEOUS at 19:46

## 2024-08-13 RX ADMIN — ACETAMINOPHEN 1000 MG: 10 INJECTION INTRAVENOUS at 10:22

## 2024-08-13 RX ADMIN — PIPERACILLIN SODIUM AND TAZOBACTAM SODIUM 3.38 G: 3; .375 INJECTION, SOLUTION INTRAVENOUS at 19:46

## 2024-08-13 RX ADMIN — ACETAMINOPHEN 1000 MG: 10 INJECTION INTRAVENOUS at 02:04

## 2024-08-13 RX ADMIN — PIPERACILLIN SODIUM AND TAZOBACTAM SODIUM 3.38 G: 3; .375 INJECTION, SOLUTION INTRAVENOUS at 01:29

## 2024-08-13 RX ADMIN — PANTOPRAZOLE SODIUM 40 MG: 40 INJECTION, POWDER, FOR SOLUTION INTRAVENOUS at 06:05

## 2024-08-13 RX ADMIN — MORPHINE SULFATE 2 MG: 2 INJECTION, SOLUTION INTRAMUSCULAR; INTRAVENOUS at 22:15

## 2024-08-13 ASSESSMENT — PAIN SCALES - PAIN ASSESSMENT IN ADVANCED DEMENTIA (PAINAD)
TOTALSCORE: MEDICATION (SEE MAR)
BREATHING: OCCASIONAL LABORED BREATHING, SHORT PERIOD OF HYPERVENTILATION
TOTALSCORE: 1
BREATHING: NORMAL
TOTALSCORE: MEDICATION (SEE MAR)
BODYLANGUAGE: RELAXED
FACIALEXPRESSION: SAD, FRIGHTENED, FROWN
CONSOLABILITY: DISTRACTED OR REASSURED BY VOICE/TOUCH
FACIALEXPRESSION: SMILING OR INEXPRESSIVE
FACIALEXPRESSION: SAD, FRIGHTENED, FROWN
NEGVOCALIZATION: OCCASIONAL MOAN/GROAN, LOW SPEECH, NEGATIVE/DISAPPROVING QUALITY
BODYLANGUAGE: TENSE, DISTRESSED PACING, FIDGETING
CONSOLABILITY: NO NEED TO CONSOLE
TOTALSCORE: MEDICATION (SEE MAR)
TOTALSCORE: 5
BREATHING: OCCASIONAL LABORED BREATHING, SHORT PERIOD OF HYPERVENTILATION
BODYLANGUAGE: TENSE, DISTRESSED PACING, FIDGETING
CONSOLABILITY: DISTRACTED OR REASSURED BY VOICE/TOUCH
CONSOLABILITY: NO NEED TO CONSOLE
TOTALSCORE: REPOSITIONED
TOTALSCORE: RESTING
BODYLANGUAGE: RELAXED
BODYLANGUAGE: TENSE, DISTRESSED PACING, FIDGETING
NEGVOCALIZATION: OCCASIONAL MOAN/GROAN, LOW SPEECH, NEGATIVE/DISAPPROVING QUALITY
FACIALEXPRESSION: SMILING OR INEXPRESSIVE
TOTALSCORE: 5
CONSOLABILITY: NO NEED TO CONSOLE
NEGVOCALIZATION: OCCASIONAL MOAN/GROAN, LOW SPEECH, NEGATIVE/DISAPPROVING QUALITY
TOTALSCORE: 1
FACIALEXPRESSION: SAD, FRIGHTENED, FROWN
TOTALSCORE: 2
BREATHING: NORMAL
NEGVOCALIZATION: OCCASIONAL MOAN/GROAN, LOW SPEECH, NEGATIVE/DISAPPROVING QUALITY
BREATHING: NORMAL

## 2024-08-13 ASSESSMENT — COGNITIVE AND FUNCTIONAL STATUS - GENERAL
DAILY ACTIVITIY SCORE: 6
MOVING TO AND FROM BED TO CHAIR: TOTAL
MOVING TO AND FROM BED TO CHAIR: TOTAL
MOBILITY SCORE: 6
DRESSING REGULAR UPPER BODY CLOTHING: TOTAL
HELP NEEDED FOR BATHING: TOTAL
WALKING IN HOSPITAL ROOM: TOTAL
PERSONAL GROOMING: TOTAL
MOVING FROM LYING ON BACK TO SITTING ON SIDE OF FLAT BED WITH BEDRAILS: TOTAL
MOVING FROM LYING ON BACK TO SITTING ON SIDE OF FLAT BED WITH BEDRAILS: A LOT
CLIMB 3 TO 5 STEPS WITH RAILING: TOTAL
DRESSING REGULAR UPPER BODY CLOTHING: TOTAL
HELP NEEDED FOR BATHING: TOTAL
WALKING IN HOSPITAL ROOM: TOTAL
MOBILITY SCORE: 8
DRESSING REGULAR LOWER BODY CLOTHING: TOTAL
DRESSING REGULAR UPPER BODY CLOTHING: TOTAL
TURNING FROM BACK TO SIDE WHILE IN FLAT BAD: TOTAL
DAILY ACTIVITIY SCORE: 6
CLIMB 3 TO 5 STEPS WITH RAILING: TOTAL
WALKING IN HOSPITAL ROOM: TOTAL
MOVING TO AND FROM BED TO CHAIR: TOTAL
STANDING UP FROM CHAIR USING ARMS: TOTAL
TURNING FROM BACK TO SIDE WHILE IN FLAT BAD: A LOT
TOILETING: TOTAL
MOVING FROM LYING ON BACK TO SITTING ON SIDE OF FLAT BED WITH BEDRAILS: TOTAL
DAILY ACTIVITIY SCORE: 6
PERSONAL GROOMING: TOTAL
EATING MEALS: TOTAL
TOILETING: TOTAL
EATING MEALS: TOTAL
DRESSING REGULAR LOWER BODY CLOTHING: TOTAL
DRESSING REGULAR LOWER BODY CLOTHING: TOTAL
MOBILITY SCORE: 6
STANDING UP FROM CHAIR USING ARMS: TOTAL
PERSONAL GROOMING: TOTAL
STANDING UP FROM CHAIR USING ARMS: TOTAL
EATING MEALS: TOTAL
HELP NEEDED FOR BATHING: TOTAL
CLIMB 3 TO 5 STEPS WITH RAILING: TOTAL
TOILETING: TOTAL
TURNING FROM BACK TO SIDE WHILE IN FLAT BAD: TOTAL

## 2024-08-13 ASSESSMENT — PAIN SCALES - GENERAL
PAINLEVEL_OUTOF10: 0 - NO PAIN
PAINLEVEL_OUTOF10: 4
PAINLEVEL_OUTOF10: 0 - NO PAIN

## 2024-08-13 ASSESSMENT — PAIN - FUNCTIONAL ASSESSMENT
PAIN_FUNCTIONAL_ASSESSMENT: PAINAD (PAIN ASSESSMENT IN ADVANCED DEMENTIA SCALE)
PAIN_FUNCTIONAL_ASSESSMENT: WONG-BAKER FACES
PAIN_FUNCTIONAL_ASSESSMENT: PAINAD (PAIN ASSESSMENT IN ADVANCED DEMENTIA SCALE)
PAIN_FUNCTIONAL_ASSESSMENT: WONG-BAKER FACES
PAIN_FUNCTIONAL_ASSESSMENT: CPOT (CRITICAL CARE PAIN OBSERVATION TOOL)
PAIN_FUNCTIONAL_ASSESSMENT: PAINAD (PAIN ASSESSMENT IN ADVANCED DEMENTIA SCALE)
PAIN_FUNCTIONAL_ASSESSMENT: PAINAD (PAIN ASSESSMENT IN ADVANCED DEMENTIA SCALE)

## 2024-08-13 ASSESSMENT — PAIN SCALES - WONG BAKER
WONGBAKER_NUMERICALRESPONSE: NO HURT
WONGBAKER_NUMERICALRESPONSE: NO HURT

## 2024-08-13 NOTE — NURSING NOTE
"1100:  Report received from Irena QUIÑONES    Patient is nonverbal but can nod and understand commands. 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 Had PEG tube placed prior to discharge to SNF on 8/8 for nutrition - later needed replaced this admission on 8/10. Surgery also placed NG in R nare at 65\" to low inter suction and NESSA drain to LUQ draining small amount serosanguinous fluid. He is currently NPO with no orders to start tube feed or use PEG at this time. [place] placed on 8/10 in ER for rentention. Patient has midline abd incision from surgery, also instructed to leave dressing in place until surgery changes.     1445:  Surgery in room, changed abdominal dressings. plan to keep NG and PEG until tomorrow morning when surgery will reassess and possibly pull NG and start using PEG.     1900:  Report given to Nirali QUIÑONES    "

## 2024-08-13 NOTE — PROGRESS NOTES
"Abdi Wilson is a 79 y.o. male  who presented on 8/10/2024 with PEG tube malfunction (Multi) after initially being placed on 8/8/2024    Subjective   Patient now floor status.  No acute events reported overnight    Review of systems unable to be obtained secondary to patient's mentation    Objective     Physical Exam  Constitutional:       General: He is not in acute distress.     Appearance: He is not diaphoretic.   HENT:      Head: Normocephalic and atraumatic.      Comments: Nasogastric tube in place draining bilious appearing fluid    Cardiovascular:      Rate and Rhythm: Normal rate and regular rhythm.      Pulses: No decreased pulses.           Radial pulses are 2+ on the right side and 2+ on the left side.        Dorsalis pedis pulses are 2+ on the right side and 2+ on the left side.   Pulmonary:      Effort: No tachypnea, accessory muscle usage or respiratory distress.   Abdominal:      Tenderness: There is generalized abdominal tenderness.      Comments: Abdominal binder in place.  Tenderness to palpation remains present. PEG tube remains in place approximately 4 cm from the skin.  Bumper able to spin freely.  No erythema purulence or discharge from site. Drain with serosanguineous output   Musculoskeletal:      Right lower leg: No edema.      Left lower leg: No edema.   Skin:     General: Skin is cool and dry.   Neurological:      Mental Status: He is alert.      Comments: Patient does not respond to questions but is able to follow commands such as hand squeezing upon instruction.         Last Recorded Vitals  Blood pressure 144/78, pulse 81, temperature 36.3 °C (97.3 °F), temperature source Temporal, resp. rate 18, height 1.753 m (5' 9\"), weight 90.3 kg (199 lb 1.2 oz), SpO2 90%.  Intake/Output last 3 Shifts:  I/O last 3 completed shifts:  In: 2566.3 (28.4 mL/kg) [I.V.:2166.3 (24 mL/kg); IV Piggyback:400]  Out: 1960 (21.7 mL/kg) [Urine:1700 (0.5 mL/kg/hr); Emesis/NG output:225; Drains:35]  Weight: 90.3 " kg         Assessment/Plan   Principal Problem:    PEG tube malfunction (Multi)  Active Problems:    Cerebral infarction, unspecified (Multi)    Peritonitis (Multi)    Aphasia    Patient is a 79-year-old male who initially underwent PEG tube placement due to dysphagia on 8/8/2024.  Patient re-presented on 8/10/2024 due to PEG tube malfunction and found to be septic on presentation the emergency department    Patient underwent replacement of gastrostomy tube due to malfunction on 8/10/2024 with Dr. Velásquez    -Maintain nasogastric tube at this time  -Maintain pelvic binder  -Contrast study Gutierrez 8/14/2024 through via PEG tube or NG tube  -Continue IV Zosyn  -Continue supportive care per primary team    Case has been discussed with attending Dr. Christen Prather,  PGY-2

## 2024-08-13 NOTE — PROGRESS NOTES
Abdi Wilson is a 79 y.o. male on day 3 of admission presenting with PEG tube malfunction (Multi).      Subjective   Abdi Wilson is a 79 y.o. male with PMHx s/f hypertension dyslipidemia old CVA mild cardiomyopathy ulcerative colitis GERD recent CVA in June of this year with dysphagia and expressive aphasia presenting with abdominal pain fever.  Patient had a PEG tube placed for nutritional purposes about 2 days ago.  Had presented to emergency department complaining of some pain in the abdominal area the patient was noted to have low blood pressure and fever in emergency department.  On presenting to emergency department patient had temperature 96.3 heart rate 102 respiratory rate 24 blood pressure 135/81 Javy panel shows sodium 148 potassium 4.2 chloride 112 bicarb 25 BUN 37 creatinine 1.38 glucose 144 liver enzymes within normal limits total bilirubin 1.5 initial lactate 2.5 repeat lactate 2.4 CBC showing WBC 13.6 hemoglobin 19.7 hematocrit 60.7 platelets 269 CT scan of the abdomen pelvis was done showing malposition PEG tube there is a small amount of free air, patient was seen by general surgery who felt patient had peritonitis plan is to take patient for emergent surgery.   8/11: Patient was seen and examined.  He is nonverbal at baseline and still looks lethargic this morning.  Blood pressure is improved and patient is off pressors.  Hypernatremia persistent and patient has been started on IV fluid D5 water.  Will get repeat BMP and trend.  Continue current IV antibiotics.  Blood cultures are still pending.  Gastrostomy replacement done 8/10/2024.  Continue n.p.o. by general surgery recommendation.  Repeat CBC and BMP in a.m.  8/12: Patient was seen and examined.  He remains nonverbal which is baseline however patient is not responsive or interactive.  Failed attempt at physical therapy.  Discussed with patient's son over the phone and granddaughter.  Patient was cooperating adequately with physical  therapy prior to this admission.  I will get a CT of the head to rule out any recent stroke.  Stat lactic and ammonia TSH and vitamin B12.  General surgery still recommending holding G-tube.  We will have to consider other modes of feeding within 24 hours.  Will continue IV fluids pending review of stat BMP.  Leukocytosis trending down.  Blood cultures are negative.  Continue to trend CBC and BMP daily.  8/13: Patient was seen and examined.  Patient is more awake today; still lethargic.  Discussed with general surgery.  Patient to get contrast study by tomorrow and if no further abnormalities NG to be discontinued and will start the patient on oral diet and advance as tolerated.  Continue to trend CBC and BMP daily.  Objective     Last Recorded Vitals  /79 (BP Location: Left arm, Patient Position: Lying)   Pulse 80   Temp 36.1 °C (96.9 °F) (Temporal)   Resp 16   Wt 90.3 kg (199 lb 1.2 oz)   SpO2 90%   Intake/Output last 3 Shifts:    Intake/Output Summary (Last 24 hours) at 8/13/2024 1602  Last data filed at 8/13/2024 1500  Gross per 24 hour   Intake 966.25 ml   Output 1450 ml   Net -483.75 ml       Admission Weight  Weight: 94.3 kg (208 lb) (08/10/24 1113)    Daily Weight  08/13/24 : 90.3 kg (199 lb 1.2 oz)    Image Results  XR chest abdomen for OG NG placement  Narrative: Interpreted By:  Mei Crum,   STUDY:  XR CHEST ABDOMEN FOR OG NG PLACEMENT; ;  8/13/2024 11:35 am      INDICATION:  Signs/Symptoms:NG tube slightly came out. want to check placement.      COMPARISON:  06/23/2024      ACCESSION NUMBER(S):  [id number]      ORDERING CLINICIAN:  LUCY NUNES      FINDINGS:  Supine images including the chest and upper abdomen show NG tube  projecting in the expected region of the body of the stomach. A  gastrostomy tube also projects in the stomach. A surgical drain is  present left side of the abdomen and skin staples projects slightly  left of midline in the mid abdomen.      Patchy bowel  gas is noted in the visualized portion of the abdomen  without dilatation. No gross free air is visible.      Cardiac silhouette is unchanged in size. Atelectasis is present at  both lung bases, left worse than right. Lung volumes are shallow.      No acute changes are noted in the osseous structures.      Impression: Tube projects in the stomach      Bilateral basilar atelectasis          MACRO:  None      Signed by: Mei Crum 8/13/2024 1:01 PM  Dictation workstation:   [id number]      Physical Exam  Constitutional:       Appearance: He is acutely ill-appearing and toxic-appearing.  Nonverbal and noninteractive but localizes pain.  He is not diaphoretic.   HENT:      Head: Normocephalic.      Nose: Nose normal.      Mouth/Throat:      Mouth: Mucous membranes are dry.      Pharynx: No posterior oropharyngeal erythema.   Eyes:      General: No scleral icterus.     Conjunctiva/sclera: Conjunctivae normal.   Neck:      Vascular: No carotid bruit.   Cardiovascular:      Rate and Rhythm: Regular rhythm. Tachycardia present.      Heart sounds: No murmur heard.  Pulmonary:      Effort: Pulmonary effort is normal. No respiratory distress.      Breath sounds: No wheezing, rhonchi or rales.      Comments: Tachypneic  Abdominal:      General: There is distension.      Tenderness: There is abdominal tenderness. There is rebound.      Comments: No drainage at G-tube site   Musculoskeletal:      Right lower leg: No edema.      Left lower leg: No edema.   Skin:     General: Skin is warm.      Capillary Refill: Capillary refill takes less than 2 seconds.   Neurological:      Mental Status: Mental status is at baseline.      Sensory: No sensory deficit.      Comments: Patient is lethargic and not interactive but localizes pain unable to move right upper extremities psychiatric:         Mood and Affect: Mood normal.      Relevant Results               Assessment/Plan                  Principal Problem:    PEG tube malfunction  (Multi)  Active Problems:    Cerebral infarction, unspecified (Multi)    Peritonitis (Multi)    Aphasia    Acute abdominal pain, secondary to peritonitis and probable sepsis  Status post exploratory laparotomy and G-tube replacement  Continue IV antibiotics Zosyn  As needed IV fluids  Responded to multiple boluses  Blood cultures negative x 2 days  Intensivist on board and signed off.   Pt is DNR CCA    Altered mental status and aphasia  Likely related to metabolic encephalopathy  Recent history of stroke with associated aphasia however patient was ambulating  Now increasing lethargy and unresponsive  CT of the head reviewed and showed no acute intracranial process  Vitamin B12 TSH and ammonia within settable limits       Dehydration and hypernatremia  Discontinue IV fluids D5 water  Repeat BMP stat  Consider half-normal saline         G-tube dysfunction  G-tube replacement completed 8/10/2024  Continue n.p.o. for now per general surgery recommendation          Hx of CVA w S/L deficits, memory loss patient nonambulatory  Pt speaks some at baseline  Resume medications post surgery, is currently NPO, no GT access          Spent 35 minutes in follow-up management of this patient       Joe Lopez MD

## 2024-08-13 NOTE — PROGRESS NOTES
Physical Therapy  Physical Therapy Treatment    Patient Name: Abdi Wilson  MRN: [id number]  Today's Date: 8/13/2024  Time Calculation  Start Time: 1223  Stop Time: 1241  Time Calculation (min): 18 min    Assessment/Plan   PT Plan  Treatment/Interventions: Bed mobility  PT Plan: Ongoing PT  PT Frequency: 4 times per week  PT Discharge Recommendations: Moderate intensity level of continued care  PT Recommended Transfer Status: Total assist  PT - OK to Discharge: Yes    General Visit Information:   PT  Visit  PT Received On: 08/13/24  Response to Previous Treatment: Patient with no complaints from previous session.    Reason for Referral: transfer from SNF d/t PEG tube malfunction. ABD SURG 8/10: explor lap washout, replaced G tube.  Room: 2022    Precautions:  NG tube  NESSA drain  ABD binder  Non-verbal (appropriately nods head nods Y/N)  expressive aphasia    Pain:  Pain Assessment: Mcgregor-Baker FACES  0-10 (Numeric) Pain Score: 4   Abdominal discomfort    Cognition:  Unable to assess    Activity Tolerance:  Activity Tolerance  Endurance: Tolerates 10 - 20 min exercise with multiple rests    Treatments:  Bed Mobility  Rolling left: Maximum assistance  Scooting: Maximum assistance  Positioning: Maximum assistance      Pt declined sitting up to EOB. Pt agreeable to bed mobility and positioning. MaxA+1-2 for mobility. Wedges alternated to pt L side. B heel floated. Alarm on and call light in reach.       Outcome Measures:  [facility] Basic Mobility  Turning from your back to your side while in a flat bed without using bedrails: A lot  Moving from lying on your back to sitting on the side of a flat bed without using bedrails: A lot  Moving to and from bed to chair (including a wheelchair): Total  Standing up from a chair using your arms (e.g. wheelchair or bedside chair): Total  To walk in hospital room: Total  Climbing 3-5 steps with railing: Total  Basic Mobility - Total Score: 8    Education Documentation  Mobility Training,  taught by Jaziel Guo PTA at 8/13/2024  1:25 PM.  Learner: Patient  Readiness: Acceptance  Method: Explanation, Demonstration  Response: Needs Reinforcement    Education Comments  No comments found.        OP EDUCATION:       Encounter Problems       Encounter Problems (Active)       Balance       STG - Maintains static sitting balance with upper extremity support (Progressing)       Start:  08/12/24    Expected End:  08/26/24       INTERVENTIONS:  1. Practice sitting on the edge of a bed/mat with minimal support.  2. Educate patient about maintining total hip precautions while maintaining balance.  3. Educate patient about pressure relief.  4. Educate patient about use of assistive device.            PT Transfers       STG - Patient to transfer to and from sit to supine with no greater than Min Ax2 (Progressing)       Start:  08/12/24    Expected End:  08/26/24               Pain - Adult          Strengthening        Pt will perform 10+ reps AAROM/AROM/RROM BLE to improve functional strength needed for improved mobility.  (Progressing)       Start:  08/12/24    Expected End:  08/26/24

## 2024-08-13 NOTE — PROGRESS NOTES
Occupational Therapy    OT Treatment    Patient Name: Abdi Wilson  MRN: [id number]  Today's Date: 8/13/2024  Time Calculation  Start Time: 1222  Stop Time: 1242  Time Calculation (min): 20 min        Assessment:  End of Session Communication: Bedside nurse, Physician  End of Session Patient Position: Bed, 3 rail up, Alarm on  OT Assessment Results: Decreased ADL status, Decreased upper extremity strength, Decreased safe judgment during ADL, Decreased endurance, Decreased cognition, Decreased functional mobility, Decreased gross motor control, Decreased IADLs  Plan:  Treatment Interventions: ADL retraining, Functional transfer training, UE strengthening/ROM, Endurance training  OT Frequency: 3 times per week  OT Discharge Recommendations: Moderate intensity level of continued care  OT - OK to Discharge: Yes  Treatment Interventions: ADL retraining, Functional transfer training, UE strengthening/ROM, Endurance training    Subjective   Previous Visit Info:  OT Last Visit  OT Received On: 08/13/24  General:  General  Co-Treatment: PT  Co-Treatment Reason: optimize pt safety and mobiltiy  Prior to Session Communication: Bedside nurse  Patient Position Received: Bed, 3 rail up, Alarm on  General Comment: Room: 2022  Precautions:  Medical Precautions: Fall precautions, Abdominal precautions  Post-Surgical Precautions: Abdominal surgery precautions  Precautions Comment: NG TUBE. NESSA DRAIN and ABD BINDER to abd.  NON-VERBAL (head nods Y/N), expressive aphasia  Vital Signs:     Pain:       Objective    Cognition:  Cognition  Overall Cognitive Status: Unable to assess  Arousal/Alertness: Inconsistent responses to stimuli  Orientation Level: Unable to assess       Bed Mobility/Transfers: Bed Mobility  Bed Mobility: Yes  Bed Mobility 1  Bed Mobility 1: Rolling left  Level of Assistance 1: Maximum assistance, +2, Moderate tactile cues, Moderate verbal cues  Bed Mobility 2  Bed Mobility  2: Scooting  Level of Assistance 2: Maximum  assistance, +2, Moderate tactile cues, Moderate verbal cues  Bed Mobility 3  Bed Mobility 3:  (positioning)  Level of Assistance 3: Maximum assistance, +2  Bed Mobility Comments 3: positioning and placed wedges to prevent pressure ulcers      Outcome Measures:[facility] Daily Activity  Putting on and taking off regular lower body clothing: Total  Bathing (including washing, rinsing, drying): Total  Putting on and taking off regular upper body clothing: Total  Toileting, which includes using toilet, bedpan or urinal: Total  Taking care of personal grooming such as brushing teeth: Total  Eating Meals: Total  Daily Activity - Total Score: 6        Education Documentation  ADL Training, taught by LUCRETIA Adams at 8/13/2024  2:03 PM.  Learner: Patient  Readiness: Acceptance  Method: Explanation  Response: Verbalizes Understanding, Needs Reinforcement    Education Comments  No comments found.        OP EDUCATION:       Goals:  Encounter Problems       Encounter Problems (Active)       ADLs       Patient will complete daily grooming tasks brushing teeth and washing face/hair with minimal assist  level of assistance and PRN adaptive equipment while supine in bed and/or supported sitting. (Progressing)       Start:  08/12/24    Expected End:  08/26/24               COGNITION/SAFETY       Patient will follow 75% Simple commands to allow improved ADL performance. (Progressing)       Start:  08/12/24    Expected End:  08/26/24               TRANSFERS       Patient will perform bed mobility moderate assist level of assistance and bed rails in order to improve safety and independence with mobility (Progressing)       Start:  08/12/24    Expected End:  08/26/24               VISION       Patient will visually attend to Left/Right  side of Tray, Room, and Body using compensatory strategies and  minimal assist  level of assistance.  (Progressing)       Start:  08/12/24    Expected End:  08/26/24

## 2024-08-14 ENCOUNTER — APPOINTMENT (OUTPATIENT)
Dept: RADIOLOGY | Facility: HOSPITAL | Age: 79
DRG: 853 | End: 2024-08-14
Payer: MEDICARE

## 2024-08-14 LAB
ALBUMIN SERPL BCP-MCNC: 2.4 G/DL (ref 3.4–5)
ALP SERPL-CCNC: 59 U/L (ref 33–136)
ALT SERPL W P-5'-P-CCNC: 12 U/L (ref 10–52)
ANION GAP SERPL CALC-SCNC: 10 MMOL/L (ref 10–20)
AST SERPL W P-5'-P-CCNC: 18 U/L (ref 9–39)
ATRIAL RATE: 99 BPM
BACTERIA BLD CULT: NORMAL
BACTERIA BLD CULT: NORMAL
BACTERIA SPEC CULT: ABNORMAL
BACTERIA SPEC CULT: ABNORMAL
BASOPHILS # BLD AUTO: 0.04 X10*3/UL (ref 0–0.1)
BASOPHILS NFR BLD AUTO: 0.4 %
BILIRUB SERPL-MCNC: 1 MG/DL (ref 0–1.2)
BUN SERPL-MCNC: 19 MG/DL (ref 6–23)
CALCIUM SERPL-MCNC: 7.6 MG/DL (ref 8.6–10.3)
CHLORIDE SERPL-SCNC: 111 MMOL/L (ref 98–107)
CO2 SERPL-SCNC: 25 MMOL/L (ref 21–32)
CREAT SERPL-MCNC: 0.95 MG/DL (ref 0.5–1.3)
EGFRCR SERPLBLD CKD-EPI 2021: 80 ML/MIN/1.73M*2
EOSINOPHIL # BLD AUTO: 0.17 X10*3/UL (ref 0–0.4)
EOSINOPHIL NFR BLD AUTO: 1.8 %
ERYTHROCYTE [DISTWIDTH] IN BLOOD BY AUTOMATED COUNT: 14.4 % (ref 11.5–14.5)
GLUCOSE BLD MANUAL STRIP-MCNC: 101 MG/DL (ref 74–99)
GLUCOSE BLD MANUAL STRIP-MCNC: 105 MG/DL (ref 74–99)
GLUCOSE BLD MANUAL STRIP-MCNC: 89 MG/DL (ref 74–99)
GLUCOSE BLD MANUAL STRIP-MCNC: 97 MG/DL (ref 74–99)
GLUCOSE SERPL-MCNC: 83 MG/DL (ref 74–99)
GRAM STN SPEC: ABNORMAL
HCT VFR BLD AUTO: 39.7 % (ref 41–52)
HGB BLD-MCNC: 13.3 G/DL (ref 13.5–17.5)
IMM GRANULOCYTES # BLD AUTO: 0.05 X10*3/UL (ref 0–0.5)
IMM GRANULOCYTES NFR BLD AUTO: 0.5 % (ref 0–0.9)
LYMPHOCYTES # BLD AUTO: 1.1 X10*3/UL (ref 0.8–3)
LYMPHOCYTES NFR BLD AUTO: 11.5 %
MCH RBC QN AUTO: 29.3 PG (ref 26–34)
MCHC RBC AUTO-ENTMCNC: 33.5 G/DL (ref 32–36)
MCV RBC AUTO: 87 FL (ref 80–100)
MONOCYTES # BLD AUTO: 0.9 X10*3/UL (ref 0.05–0.8)
MONOCYTES NFR BLD AUTO: 9.4 %
NEUTROPHILS # BLD AUTO: 7.27 X10*3/UL (ref 1.6–5.5)
NEUTROPHILS NFR BLD AUTO: 76.4 %
NRBC BLD-RTO: 0 /100 WBCS (ref 0–0)
P AXIS: 28 DEGREES
PHOSPHATE SERPL-MCNC: 2.2 MG/DL (ref 2.5–4.9)
PLATELET # BLD AUTO: 195 X10*3/UL (ref 150–450)
POTASSIUM SERPL-SCNC: 3.3 MMOL/L (ref 3.5–5.3)
PR INTERVAL: 217 MS
PROT SERPL-MCNC: 5.3 G/DL (ref 6.4–8.2)
Q ONSET: 253 MS
QRS COUNT: 16 BEATS
QRS DURATION: 122 MS
QT INTERVAL: 341 MS
QTC CALCULATION(BAZETT): 438 MS
QTC FREDERICIA: 402 MS
R AXIS: -26 DEGREES
RBC # BLD AUTO: 4.54 X10*6/UL (ref 4.5–5.9)
SODIUM SERPL-SCNC: 143 MMOL/L (ref 136–145)
T AXIS: 139 DEGREES
T OFFSET: 423 MS
VENTRICULAR RATE: 99 BPM
WBC # BLD AUTO: 9.5 X10*3/UL (ref 4.4–11.3)

## 2024-08-14 PROCEDURE — 97530 THERAPEUTIC ACTIVITIES: CPT | Mod: CQ,GP

## 2024-08-14 PROCEDURE — 2550000001 HC RX 255 CONTRASTS: Performed by: INTERNAL MEDICINE

## 2024-08-14 PROCEDURE — 71046 X-RAY EXAM CHEST 2 VIEWS: CPT

## 2024-08-14 PROCEDURE — 2500000004 HC RX 250 GENERAL PHARMACY W/ HCPCS (ALT 636 FOR OP/ED)

## 2024-08-14 PROCEDURE — 2060000001 HC INTERMEDIATE ICU ROOM DAILY

## 2024-08-14 PROCEDURE — 97110 THERAPEUTIC EXERCISES: CPT | Mod: CQ,GP

## 2024-08-14 PROCEDURE — 82947 ASSAY GLUCOSE BLOOD QUANT: CPT

## 2024-08-14 PROCEDURE — 80053 COMPREHEN METABOLIC PANEL: CPT

## 2024-08-14 PROCEDURE — 74240 X-RAY XM UPR GI TRC 1CNTRST: CPT

## 2024-08-14 PROCEDURE — 85025 COMPLETE CBC W/AUTO DIFF WBC: CPT

## 2024-08-14 PROCEDURE — 2500000004 HC RX 250 GENERAL PHARMACY W/ HCPCS (ALT 636 FOR OP/ED): Performed by: INTERNAL MEDICINE

## 2024-08-14 PROCEDURE — 99233 SBSQ HOSP IP/OBS HIGH 50: CPT | Performed by: INTERNAL MEDICINE

## 2024-08-14 PROCEDURE — 84100 ASSAY OF PHOSPHORUS: CPT

## 2024-08-14 PROCEDURE — 36415 COLL VENOUS BLD VENIPUNCTURE: CPT

## 2024-08-14 RX ORDER — POTASSIUM CHLORIDE 14.9 MG/ML
20 INJECTION INTRAVENOUS
Status: COMPLETED | OUTPATIENT
Start: 2024-08-14 | End: 2024-08-14

## 2024-08-14 RX ADMIN — HEPARIN SODIUM 5000 UNITS: 5000 INJECTION INTRAVENOUS; SUBCUTANEOUS at 13:24

## 2024-08-14 RX ADMIN — DIATRIZOATE MEGLUMINE AND DIATRIZOATE SODIUM 60 ML: 600; 100 SOLUTION ORAL; RECTAL at 10:00

## 2024-08-14 RX ADMIN — ACETAMINOPHEN 1000 MG: 10 INJECTION INTRAVENOUS at 11:02

## 2024-08-14 RX ADMIN — HEPARIN SODIUM 5000 UNITS: 5000 INJECTION INTRAVENOUS; SUBCUTANEOUS at 05:48

## 2024-08-14 RX ADMIN — PIPERACILLIN SODIUM AND TAZOBACTAM SODIUM 3.38 G: 3; .375 INJECTION, SOLUTION INTRAVENOUS at 01:32

## 2024-08-14 RX ADMIN — MORPHINE SULFATE 2 MG: 2 INJECTION, SOLUTION INTRAMUSCULAR; INTRAVENOUS at 01:40

## 2024-08-14 RX ADMIN — HEPARIN SODIUM 5000 UNITS: 5000 INJECTION INTRAVENOUS; SUBCUTANEOUS at 20:52

## 2024-08-14 RX ADMIN — POTASSIUM CHLORIDE 20 MEQ: 14.9 INJECTION, SOLUTION INTRAVENOUS at 13:24

## 2024-08-14 RX ADMIN — PIPERACILLIN SODIUM AND TAZOBACTAM SODIUM 3.38 G: 3; .375 INJECTION, SOLUTION INTRAVENOUS at 15:41

## 2024-08-14 RX ADMIN — PIPERACILLIN SODIUM AND TAZOBACTAM SODIUM 3.38 G: 3; .375 INJECTION, SOLUTION INTRAVENOUS at 08:35

## 2024-08-14 RX ADMIN — SODIUM CHLORIDE, SODIUM LACTATE, POTASSIUM CHLORIDE, CALCIUM CHLORIDE AND DEXTROSE MONOHYDRATE 50 ML/HR: 5; 600; 310; 30; 20 INJECTION, SOLUTION INTRAVENOUS at 01:57

## 2024-08-14 RX ADMIN — MORPHINE SULFATE 2 MG: 2 INJECTION, SOLUTION INTRAMUSCULAR; INTRAVENOUS at 05:48

## 2024-08-14 RX ADMIN — PANTOPRAZOLE SODIUM 40 MG: 40 INJECTION, POWDER, FOR SOLUTION INTRAVENOUS at 05:47

## 2024-08-14 RX ADMIN — ACETAMINOPHEN 1000 MG: 10 INJECTION INTRAVENOUS at 01:40

## 2024-08-14 RX ADMIN — ACETAMINOPHEN 1000 MG: 10 INJECTION INTRAVENOUS at 18:33

## 2024-08-14 RX ADMIN — PIPERACILLIN SODIUM AND TAZOBACTAM SODIUM 3.38 G: 3; .375 INJECTION, SOLUTION INTRAVENOUS at 20:52

## 2024-08-14 RX ADMIN — POTASSIUM CHLORIDE 20 MEQ: 14.9 INJECTION, SOLUTION INTRAVENOUS at 11:03

## 2024-08-14 ASSESSMENT — COGNITIVE AND FUNCTIONAL STATUS - GENERAL
MOVING FROM LYING ON BACK TO SITTING ON SIDE OF FLAT BED WITH BEDRAILS: TOTAL
MOVING FROM LYING ON BACK TO SITTING ON SIDE OF FLAT BED WITH BEDRAILS: TOTAL
CLIMB 3 TO 5 STEPS WITH RAILING: TOTAL
DRESSING REGULAR LOWER BODY CLOTHING: TOTAL
STANDING UP FROM CHAIR USING ARMS: TOTAL
DRESSING REGULAR LOWER BODY CLOTHING: TOTAL
STANDING UP FROM CHAIR USING ARMS: TOTAL
TURNING FROM BACK TO SIDE WHILE IN FLAT BAD: TOTAL
WALKING IN HOSPITAL ROOM: TOTAL
PERSONAL GROOMING: TOTAL
MOVING TO AND FROM BED TO CHAIR: TOTAL
MOVING FROM LYING ON BACK TO SITTING ON SIDE OF FLAT BED WITH BEDRAILS: TOTAL
STANDING UP FROM CHAIR USING ARMS: TOTAL
PERSONAL GROOMING: TOTAL
HELP NEEDED FOR BATHING: TOTAL
DRESSING REGULAR UPPER BODY CLOTHING: TOTAL
WALKING IN HOSPITAL ROOM: TOTAL
HELP NEEDED FOR BATHING: TOTAL
CLIMB 3 TO 5 STEPS WITH RAILING: TOTAL
EATING MEALS: TOTAL
STANDING UP FROM CHAIR USING ARMS: TOTAL
EATING MEALS: TOTAL
TOILETING: TOTAL
WALKING IN HOSPITAL ROOM: TOTAL
TURNING FROM BACK TO SIDE WHILE IN FLAT BAD: TOTAL
TOILETING: TOTAL
TOILETING: TOTAL
EATING MEALS: TOTAL
CLIMB 3 TO 5 STEPS WITH RAILING: TOTAL
MOBILITY SCORE: 6
HELP NEEDED FOR BATHING: TOTAL
TURNING FROM BACK TO SIDE WHILE IN FLAT BAD: TOTAL
DRESSING REGULAR LOWER BODY CLOTHING: TOTAL
DAILY ACTIVITIY SCORE: 6
TURNING FROM BACK TO SIDE WHILE IN FLAT BAD: TOTAL
MOVING TO AND FROM BED TO CHAIR: TOTAL
MOVING FROM LYING ON BACK TO SITTING ON SIDE OF FLAT BED WITH BEDRAILS: TOTAL
CLIMB 3 TO 5 STEPS WITH RAILING: TOTAL
WALKING IN HOSPITAL ROOM: TOTAL
MOVING TO AND FROM BED TO CHAIR: TOTAL
MOBILITY SCORE: 6
DRESSING REGULAR UPPER BODY CLOTHING: TOTAL
PERSONAL GROOMING: TOTAL
MOVING TO AND FROM BED TO CHAIR: TOTAL
DRESSING REGULAR UPPER BODY CLOTHING: TOTAL

## 2024-08-14 ASSESSMENT — PAIN SCALES - WONG BAKER
WONGBAKER_NUMERICALRESPONSE: NO HURT
WONGBAKER_NUMERICALRESPONSE: HURTS EVEN MORE
WONGBAKER_NUMERICALRESPONSE: HURTS LITTLE MORE
WONGBAKER_NUMERICALRESPONSE: HURTS LITTLE MORE

## 2024-08-14 ASSESSMENT — PAIN SCALES - PAIN ASSESSMENT IN ADVANCED DEMENTIA (PAINAD)
NEGVOCALIZATION: OCCASIONAL MOAN/GROAN, LOW SPEECH, NEGATIVE/DISAPPROVING QUALITY
FACIALEXPRESSION: SAD, FRIGHTENED, FROWN
TOTALSCORE: MEDICATION (SEE MAR)
FACIALEXPRESSION: SAD, FRIGHTENED, FROWN
BREATHING: OCCASIONAL LABORED BREATHING, SHORT PERIOD OF HYPERVENTILATION
BREATHING: OCCASIONAL LABORED BREATHING, SHORT PERIOD OF HYPERVENTILATION
BODYLANGUAGE: TENSE, DISTRESSED PACING, FIDGETING
TOTALSCORE: MEDICATION (SEE MAR)
NEGVOCALIZATION: OCCASIONAL MOAN/GROAN, LOW SPEECH, NEGATIVE/DISAPPROVING QUALITY
CONSOLABILITY: DISTRACTED OR REASSURED BY VOICE/TOUCH
TOTALSCORE: RESTING
CONSOLABILITY: DISTRACTED OR REASSURED BY VOICE/TOUCH
TOTALSCORE: 5
TOTALSCORE: 5
BODYLANGUAGE: TENSE, DISTRESSED PACING, FIDGETING

## 2024-08-14 ASSESSMENT — PAIN SCALES - GENERAL: PAINLEVEL_OUTOF10: 0 - NO PAIN

## 2024-08-14 ASSESSMENT — PAIN DESCRIPTION - ORIENTATION: ORIENTATION: MID

## 2024-08-14 ASSESSMENT — PAIN DESCRIPTION - LOCATION: LOCATION: ABDOMEN

## 2024-08-14 NOTE — CARE PLAN
Problem: Nutrition  Goal: BG  mg/dL  Outcome: Progressing  Goal: Promote healing  Outcome: Progressing   The patient's goals for the shift include      The clinical goals for the shift include Stable blood glucose

## 2024-08-14 NOTE — PROGRESS NOTES
Abdi Wilson is a 79 y.o. male on day 4 of admission presenting with PEG tube malfunction (Multi).      Subjective   Abdi Wilson is a 79 y.o. male with PMHx s/f hypertension dyslipidemia old CVA mild cardiomyopathy ulcerative colitis GERD recent CVA in June of this year with dysphagia and expressive aphasia presenting with abdominal pain fever.  Patient had a PEG tube placed for nutritional purposes about 2 days ago.  Had presented to emergency department complaining of some pain in the abdominal area the patient was noted to have low blood pressure and fever in emergency department.  On presenting to emergency department patient had temperature 96.3 heart rate 102 respiratory rate 24 blood pressure 135/81 Javy panel shows sodium 148 potassium 4.2 chloride 112 bicarb 25 BUN 37 creatinine 1.38 glucose 144 liver enzymes within normal limits total bilirubin 1.5 initial lactate 2.5 repeat lactate 2.4 CBC showing WBC 13.6 hemoglobin 19.7 hematocrit 60.7 platelets 269 CT scan of the abdomen pelvis was done showing malposition PEG tube there is a small amount of free air, patient was seen by general surgery who felt patient had peritonitis plan is to take patient for emergent surgery.   8/11: Patient was seen and examined.  He is nonverbal at baseline and still looks lethargic this morning.  Blood pressure is improved and patient is off pressors.  Hypernatremia persistent and patient has been started on IV fluid D5 water.  Will get repeat BMP and trend.  Continue current IV antibiotics.  Blood cultures are still pending.  Gastrostomy replacement done 8/10/2024.  Continue n.p.o. by general surgery recommendation.  Repeat CBC and BMP in a.m.  8/12: Patient was seen and examined.  He remains nonverbal which is baseline however patient is not responsive or interactive.  Failed attempt at physical therapy.  Discussed with patient's son over the phone and granddaughter.  Patient was cooperating adequately with physical  therapy prior to this admission.  I will get a CT of the head to rule out any recent stroke.  Stat lactic and ammonia TSH and vitamin B12.  General surgery still recommending holding G-tube.  We will have to consider other modes of feeding within 24 hours.  Will continue IV fluids pending review of stat BMP.  Leukocytosis trending down.  Blood cultures are negative.  Continue to trend CBC and BMP daily.  8/13: Patient was seen and examined.  Patient is more awake today; still lethargic.  Discussed with general surgery.  Patient to get contrast study by tomorrow and if no further abnormalities NG to be discontinued and will start the patient on oral diet and advance as tolerated.  Continue to trend CBC and BMP daily.  8/14: Patient was seen and examined.  Still lethargic, drowsy but arousable.  Fluoroscopic studies completed and reports pending.  Patient is now requiring 3 L nasal cannula oxygen to maintain saturation so I will get a stat chest x-ray.  General surgery to decide on NG status and possible tube feeding versus TPN.  Hypokalemia noted.  IV potassium given.  Continue to trend CBC and BMP daily.        Objective     Last Recorded Vitals  /73 (BP Location: Left arm, Patient Position: Lying)   Pulse 76   Temp 35.6 °C (96.1 °F) (Temporal)   Resp 16   Wt 90 kg (198 lb 6.6 oz)   SpO2 92%   Intake/Output last 3 Shifts:    Intake/Output Summary (Last 24 hours) at 8/14/2024 1019  Last data filed at 8/14/2024 0842  Gross per 24 hour   Intake 525 ml   Output 1225 ml   Net -700 ml       Admission Weight  Weight: 94.3 kg (208 lb) (08/10/24 1113)    Daily Weight  08/14/24 : 90 kg (198 lb 6.6 oz)    Image Results  FL upper GI w KUB  Narrative: Interpreted By:  Mei Crum,   STUDY:  FL UPPER GI W KUB;  8/14/2024 9:31 am      INDICATION:  Signs/Symptoms:Please please contrast through patient's PEG tube in  abdomen.      COMPARISON:  None.      ACCESSION NUMBER(S):  [id number]      ORDERING  CLINICIAN:  LUCY NUNES      TECHNIQUE:  Total fluoroscopy time was  1 minutes 7 seconds with 8 spot images  obtained. 60 mL of solution 50% Gastrografin and 50% water was  inserted through the PEG tube.      FINDINGS:  Peg tube, surgical drain and skin staples are noted in the upper  abdomen. An NG tube is also present in the stomach. With injection of  contrast the contrast flows freely into the gastric lumen. Contrast  promptly passes from stomach into duodenal. No extravasation of  contrast is noted outside of the stomach. There was no  gastroesophageal reflux identified.      Impression: 1.  PEG tube is positioned in the stomach with no extravasation.  2. Prompt passage of contrast into the duodenal and no  gastroesophageal reflux          MACRO:  None      Signed by: Mei Crum 8/14/2024 10:10 AM  Dictation workstation:   [id number]  XR chest 2 views  Narrative: Interpreted By:  Mei Crum,   STUDY:  XR CHEST 2 VIEWS;  8/14/2024 9:11 am      INDICATION:  Signs/Symptoms:Hypoxia.      COMPARISON:  08/13/2024      ACCESSION NUMBER(S):  [id number]      ORDERING CLINICIAN:  LUCY NUNES      FINDINGS:  AP upright and lateral views were obtained with the patient sitting.      NG tube is present with the tip below the diaphragm and beyond the  image.      CARDIOMEDIASTINAL SILHOUETTE:  Heart is enlarged, which is accentuated by the shallow inspiration.  Aorta is atherosclerotic.      LUNGS:  Bilateral pleural effusions and bilateral basilar atelectasis are  present, left worse than right. Appearance is similar to the prior  exam.      ABDOMEN:  No remarkable upper abdominal findings.      BONES:  No acute osseous changes.      Impression: 1.  Unchanged cardiomegaly  2. Pleural effusions and basilar atelectasis, left worse than right  and unchanged from the prior exam              MACRO:  None      Signed by: Mei Crum 8/14/2024 9:22 AM  Dictation workstation:    [id number]      Physical Exam  Constitutional:       Appearance: He is acutely ill-appearing and toxic-appearing.  Nonverbal and noninteractive but localizes pain.  He is not diaphoretic.   HENT:      Head: Normocephalic.      Nose: Nose normal.      Mouth/Throat:      Mouth: Mucous membranes are dry.      Pharynx: No posterior oropharyngeal erythema.   Eyes:      General: No scleral icterus.     Conjunctiva/sclera: Conjunctivae normal.   Neck:      Vascular: No carotid bruit.   Cardiovascular:      Rate and Rhythm: Regular rhythm. Tachycardia present.      Heart sounds: No murmur heard.  Pulmonary:      Effort: Pulmonary effort is normal. No respiratory distress.      Breath sounds: No wheezing, rhonchi or rales.      Comments: Tachypneic  Abdominal:      General: There is distension.      Tenderness: There is abdominal tenderness. There is rebound.      Comments: No drainage at G-tube site   Musculoskeletal:      Right lower leg: No edema.      Left lower leg: No edema.   Skin:     General: Skin is warm.      Capillary Refill: Capillary refill takes less than 2 seconds.   Neurological:      Mental Status: Mental status is at baseline.      Sensory: No sensory deficit.      Comments: Patient is lethargic and not interactive but localizes pain unable to move right upper extremities psychiatric:         Mood and Affect: Mood normal.      Relevant Results               Assessment/Plan                  Principal Problem:    PEG tube malfunction (Multi)  Active Problems:    Cerebral infarction, unspecified (Multi)    Peritonitis (Multi)    Aphasia    Acute abdominal pain, secondary to peritonitis and probable sepsis  Status post exploratory laparotomy and G-tube replacement  Continue IV antibiotics Zosyn  As needed IV fluids  Responded to multiple boluses  Blood cultures negative x 2 days  Contrast fluoroscopy studies completed pending report  NG tube still in situ and draining  General Surgery on board  Pt is DNR  CCA    Altered mental status and aphasia  Likely related to metabolic encephalopathy  Recent history of stroke with associated aphasia however patient was ambulating  Now increasing lethargy and unresponsive  CT of the head reviewed and showed no acute intracranial process  Vitamin B12 TSH and ammonia within acceptable limits       Dehydration and hypernatremia  Discontinue IV fluids D5 water  Repeat BMP stat  Resolved  Continue IV fluid D5 LR         G-tube dysfunction  G-tube replacement completed 8/10/2024  Continue n.p.o. for now per general surgery recommendation          Hx of CVA w S/L deficits, memory loss patient nonambulatory  Pt speaks some at baseline  Resume medications post surgery, is currently NPO, no GT access          Spent 35 minutes in follow-up management of this patient       Joe Lopez MD

## 2024-08-14 NOTE — PROGRESS NOTES
Physical Therapy  Physical Therapy Treatment    Patient Name: Abdi Wilson  MRN: [id number]  Today's Date: 8/14/2024  Time Calculation  Start Time: 0806  Stop Time: 0830  Time Calculation (min): 24 min    Assessment/Plan   PT Plan  Treatment/Interventions: Bed mobility  PT Plan: Ongoing PT  PT Frequency: 4 times per week  PT Discharge Recommendations: Moderate intensity level of continued care  PT Recommended Transfer Status: Total assist  PT - OK to Discharge: Yes    General Visit Information:   PT  Visit  PT Received On: 08/14/24  Response to Previous Treatment: Patient with no complaints from previous session.  General  Reason for Referral: transfer from SNF d/t PEG tube malfunction. ABD SURG 8/10: explor lap washout, replaced G tube.  General Comment: Room: 2022    Subjective   Precautions:  NG TUBE  NESSA DRAIN  ABD BINDER  NON-VERBAL (head nods Y/N)    Objective   Pain:  No pain    Cognition:  Unable to assess    Activity Tolerance:  Activity Tolerance  Endurance: Tolerates 10 - 20 min exercise with multiple rests    Treatments:  Therapeutic Exercise  1x10 reps BUE/BLE AAROM<>PROM in supine    Bed Mobility  Rolling: Dependent  Scooting: Dependent  Positioning: Dependent      Pt positioned for comfort and pressure relief. HOB elevated to 50 degrees. Alarm on and call light in reach.      Outcome Measures:  [facility] Basic Mobility  Turning from your back to your side while in a flat bed without using bedrails: Total  Moving from lying on your back to sitting on the side of a flat bed without using bedrails: Total  Moving to and from bed to chair (including a wheelchair): Total  Standing up from a chair using your arms (e.g. wheelchair or bedside chair): Total  To walk in hospital room: Total  Climbing 3-5 steps with railing: Total  Basic Mobility - Total Score: 6    Education Documentation  Mobility Training, taught by Jaziel Guo PTA at 8/14/2024  8:52 AM.  Learner: Patient  Readiness: Acceptance  Method:  Explanation, Demonstration  Response: Needs Reinforcement    Education Comments  No comments found.        OP EDUCATION:       Encounter Problems       Encounter Problems (Active)       Balance       STG - Maintains static sitting balance with upper extremity support (Progressing)       Start:  08/12/24    Expected End:  08/26/24       INTERVENTIONS:  1. Practice sitting on the edge of a bed/mat with minimal support.  2. Educate patient about maintining total hip precautions while maintaining balance.  3. Educate patient about pressure relief.  4. Educate patient about use of assistive device.            PT Transfers       STG - Patient to transfer to and from sit to supine with no greater than Min Ax2 (Progressing)       Start:  08/12/24    Expected End:  08/26/24               Pain - Adult          Strengthening        Pt will perform 10+ reps AAROM/AROM/RROM BLE to improve functional strength needed for improved mobility.  (Progressing)       Start:  08/12/24    Expected End:  08/26/24

## 2024-08-14 NOTE — CARE PLAN
The patient's goals for the shift include  will be HDS throughout shift    The clinical goals for the shift include Stable blood glucose    Over the shift, the patient did not make progress toward the following goals. Barriers to progression include understanding. Recommendations to address these barriers include education.

## 2024-08-14 NOTE — PROGRESS NOTES
Per Dr. Lopez during rounds patient will require a possible over weekend stay. Patient will participate in a contrast study for peg efficiency and will require tube feed, NG tube and drain removal depending on peg contrast study results and tolerance by patient. Patient will return to LTC- facility but under SNF referral per facilities request. TCC following.

## 2024-08-14 NOTE — PROGRESS NOTES
"Abdi Wilson is a 79 y.o. male  who presented on 8/10/2024 with PEG tube malfunction (Multi) after initially being placed on 8/8/2024    Subjective   No acute events overnight.  Patient mentation mildly improved this morning.  Able to give thumbs up when asked.  50 cc of NG output recorded.  No drain output recorded.    Objective     Physical Exam  Constitutional:       General: He is not in acute distress.     Appearance: He is not diaphoretic.   HENT:      Head: Normocephalic and atraumatic.      Comments: Nasogastric tube in place draining light brown fluid    Cardiovascular:      Rate and Rhythm: Normal rate and regular rhythm.      Pulses: No decreased pulses.           Radial pulses are 2+ on the right side and 2+ on the left side.        Dorsalis pedis pulses are 2+ on the right side and 2+ on the left side.   Pulmonary:      Effort: No tachypnea, accessory muscle usage or respiratory distress.   Abdominal:      Tenderness: There is no abdominal tenderness.      Comments: Abdominal binder in place.  Mildly tender around PEG tube. PEG tube remains in place approximately 4 cm from the skin.  Bumper able to spin freely.  No erythema purulence or discharge from site. Drain with serosanguineous output   Musculoskeletal:      Right lower leg: No edema.      Left lower leg: No edema.   Skin:     General: Skin is cool and dry.   Neurological:      Mental Status: He is alert.      Comments: Patient does verbally answer questions but can give a thumbs up.         Last Recorded Vitals  Blood pressure 130/77, pulse 68, temperature 35.9 °C (96.6 °F), temperature source Temporal, resp. rate 20, height 1.753 m (5' 9\"), weight 90 kg (198 lb 6.6 oz), SpO2 92%.  Intake/Output last 3 Shifts:  I/O last 3 completed shifts:  In: 899.6 (10 mL/kg) [I.V.:899.6 (10 mL/kg)]  Out: 1900 (21.1 mL/kg) [Urine:1660 (0.5 mL/kg/hr); Emesis/NG output:225; Drains:15]  Weight: 90 kg         Assessment/Plan   Principal Problem:    PEG tube " malfunction (Multi)  Active Problems:    Cerebral infarction, unspecified (Multi)    Peritonitis (Multi)    Aphasia    Patient is a 79-year-old male who initially underwent PEG tube placement due to dysphagia on 8/8/2024.  Patient re-presented on 8/10/2024 due to PEG tube malfunction and found to be septic on presentation the emergency department    Patient underwent replacement of gastrostomy tube due to malfunction on 8/10/2024 with Dr. Velásquez    -Contrast study through PEG tube this morning.  If no evidence of leak, will plan to start trickle feeds and DC drain and likely DC NG tube  - Continue IV Zosyn  - Other cares per primary team    Case has been discussed with attending Dr. Christen Massey,  - PGY4  General Surgery

## 2024-08-15 LAB
ALBUMIN SERPL BCP-MCNC: 2.4 G/DL (ref 3.4–5)
ALP SERPL-CCNC: 87 U/L (ref 33–136)
ALT SERPL W P-5'-P-CCNC: 18 U/L (ref 10–52)
ANION GAP SERPL CALC-SCNC: 10 MMOL/L (ref 10–20)
AST SERPL W P-5'-P-CCNC: 27 U/L (ref 9–39)
BASOPHILS # BLD AUTO: 0.03 X10*3/UL (ref 0–0.1)
BASOPHILS NFR BLD AUTO: 0.3 %
BILIRUB SERPL-MCNC: 0.7 MG/DL (ref 0–1.2)
BUN SERPL-MCNC: 17 MG/DL (ref 6–23)
CALCIUM SERPL-MCNC: 7.3 MG/DL (ref 8.6–10.3)
CHLORIDE SERPL-SCNC: 109 MMOL/L (ref 98–107)
CO2 SERPL-SCNC: 23 MMOL/L (ref 21–32)
CREAT SERPL-MCNC: 0.82 MG/DL (ref 0.5–1.3)
EGFRCR SERPLBLD CKD-EPI 2021: 88 ML/MIN/1.73M*2
EOSINOPHIL # BLD AUTO: 0.2 X10*3/UL (ref 0–0.4)
EOSINOPHIL NFR BLD AUTO: 1.9 %
ERYTHROCYTE [DISTWIDTH] IN BLOOD BY AUTOMATED COUNT: 14.5 % (ref 11.5–14.5)
GLUCOSE BLD MANUAL STRIP-MCNC: 114 MG/DL (ref 74–99)
GLUCOSE BLD MANUAL STRIP-MCNC: 87 MG/DL (ref 74–99)
GLUCOSE BLD MANUAL STRIP-MCNC: 90 MG/DL (ref 74–99)
GLUCOSE BLD MANUAL STRIP-MCNC: 93 MG/DL (ref 74–99)
GLUCOSE SERPL-MCNC: 87 MG/DL (ref 74–99)
HCT VFR BLD AUTO: 39.6 % (ref 41–52)
HGB BLD-MCNC: 13.1 G/DL (ref 13.5–17.5)
IMM GRANULOCYTES # BLD AUTO: 0.12 X10*3/UL (ref 0–0.5)
IMM GRANULOCYTES NFR BLD AUTO: 1.1 % (ref 0–0.9)
LYMPHOCYTES # BLD AUTO: 1.3 X10*3/UL (ref 0.8–3)
LYMPHOCYTES NFR BLD AUTO: 12.1 %
MCH RBC QN AUTO: 29.4 PG (ref 26–34)
MCHC RBC AUTO-ENTMCNC: 33.1 G/DL (ref 32–36)
MCV RBC AUTO: 89 FL (ref 80–100)
MONOCYTES # BLD AUTO: 1.23 X10*3/UL (ref 0.05–0.8)
MONOCYTES NFR BLD AUTO: 11.4 %
NEUTROPHILS # BLD AUTO: 7.88 X10*3/UL (ref 1.6–5.5)
NEUTROPHILS NFR BLD AUTO: 73.2 %
NRBC BLD-RTO: 0 /100 WBCS (ref 0–0)
PHOSPHATE SERPL-MCNC: 2.5 MG/DL (ref 2.5–4.9)
PLATELET # BLD AUTO: 178 X10*3/UL (ref 150–450)
POTASSIUM SERPL-SCNC: 3.7 MMOL/L (ref 3.5–5.3)
PROT SERPL-MCNC: 5.1 G/DL (ref 6.4–8.2)
RBC # BLD AUTO: 4.46 X10*6/UL (ref 4.5–5.9)
SODIUM SERPL-SCNC: 138 MMOL/L (ref 136–145)
WBC # BLD AUTO: 10.8 X10*3/UL (ref 4.4–11.3)

## 2024-08-15 PROCEDURE — 99233 SBSQ HOSP IP/OBS HIGH 50: CPT | Performed by: INTERNAL MEDICINE

## 2024-08-15 PROCEDURE — 85025 COMPLETE CBC W/AUTO DIFF WBC: CPT

## 2024-08-15 PROCEDURE — 36415 COLL VENOUS BLD VENIPUNCTURE: CPT

## 2024-08-15 PROCEDURE — 80053 COMPREHEN METABOLIC PANEL: CPT

## 2024-08-15 PROCEDURE — 2060000001 HC INTERMEDIATE ICU ROOM DAILY

## 2024-08-15 PROCEDURE — 84100 ASSAY OF PHOSPHORUS: CPT

## 2024-08-15 PROCEDURE — 2500000004 HC RX 250 GENERAL PHARMACY W/ HCPCS (ALT 636 FOR OP/ED)

## 2024-08-15 PROCEDURE — 82947 ASSAY GLUCOSE BLOOD QUANT: CPT

## 2024-08-15 RX ADMIN — PANTOPRAZOLE SODIUM 40 MG: 40 INJECTION, POWDER, FOR SOLUTION INTRAVENOUS at 06:34

## 2024-08-15 RX ADMIN — HEPARIN SODIUM 5000 UNITS: 5000 INJECTION INTRAVENOUS; SUBCUTANEOUS at 12:30

## 2024-08-15 RX ADMIN — MORPHINE SULFATE 2 MG: 2 INJECTION, SOLUTION INTRAMUSCULAR; INTRAVENOUS at 20:00

## 2024-08-15 RX ADMIN — ACETAMINOPHEN 1000 MG: 10 INJECTION INTRAVENOUS at 20:51

## 2024-08-15 RX ADMIN — HEPARIN SODIUM 5000 UNITS: 5000 INJECTION INTRAVENOUS; SUBCUTANEOUS at 04:13

## 2024-08-15 RX ADMIN — PIPERACILLIN SODIUM AND TAZOBACTAM SODIUM 3.38 G: 3; .375 INJECTION, SOLUTION INTRAVENOUS at 19:52

## 2024-08-15 RX ADMIN — PIPERACILLIN SODIUM AND TAZOBACTAM SODIUM 3.38 G: 3; .375 INJECTION, SOLUTION INTRAVENOUS at 14:19

## 2024-08-15 RX ADMIN — PIPERACILLIN SODIUM AND TAZOBACTAM SODIUM 3.38 G: 3; .375 INJECTION, SOLUTION INTRAVENOUS at 01:50

## 2024-08-15 RX ADMIN — ACETAMINOPHEN 1000 MG: 10 INJECTION INTRAVENOUS at 12:41

## 2024-08-15 RX ADMIN — PIPERACILLIN SODIUM AND TAZOBACTAM SODIUM 3.38 G: 3; .375 INJECTION, SOLUTION INTRAVENOUS at 08:49

## 2024-08-15 RX ADMIN — MORPHINE SULFATE 2 MG: 2 INJECTION, SOLUTION INTRAMUSCULAR; INTRAVENOUS at 06:59

## 2024-08-15 RX ADMIN — ACETAMINOPHEN 1000 MG: 10 INJECTION INTRAVENOUS at 01:50

## 2024-08-15 RX ADMIN — HEPARIN SODIUM 5000 UNITS: 5000 INJECTION INTRAVENOUS; SUBCUTANEOUS at 19:52

## 2024-08-15 ASSESSMENT — COGNITIVE AND FUNCTIONAL STATUS - GENERAL
EATING MEALS: TOTAL
DRESSING REGULAR LOWER BODY CLOTHING: TOTAL
STANDING UP FROM CHAIR USING ARMS: TOTAL
DRESSING REGULAR UPPER BODY CLOTHING: TOTAL
DRESSING REGULAR UPPER BODY CLOTHING: TOTAL
PERSONAL GROOMING: TOTAL
DRESSING REGULAR LOWER BODY CLOTHING: TOTAL
MOVING TO AND FROM BED TO CHAIR: TOTAL
EATING MEALS: TOTAL
STANDING UP FROM CHAIR USING ARMS: TOTAL
STANDING UP FROM CHAIR USING ARMS: TOTAL
MOVING TO AND FROM BED TO CHAIR: TOTAL
EATING MEALS: TOTAL
DRESSING REGULAR LOWER BODY CLOTHING: TOTAL
DRESSING REGULAR UPPER BODY CLOTHING: TOTAL
MOVING FROM LYING ON BACK TO SITTING ON SIDE OF FLAT BED WITH BEDRAILS: TOTAL
CLIMB 3 TO 5 STEPS WITH RAILING: TOTAL
PERSONAL GROOMING: TOTAL
EATING MEALS: TOTAL
MOVING TO AND FROM BED TO CHAIR: TOTAL
DRESSING REGULAR LOWER BODY CLOTHING: TOTAL
MOVING TO AND FROM BED TO CHAIR: TOTAL
TOILETING: TOTAL
MOVING TO AND FROM BED TO CHAIR: TOTAL
CLIMB 3 TO 5 STEPS WITH RAILING: TOTAL
MOVING FROM LYING ON BACK TO SITTING ON SIDE OF FLAT BED WITH BEDRAILS: TOTAL
TURNING FROM BACK TO SIDE WHILE IN FLAT BAD: TOTAL
HELP NEEDED FOR BATHING: TOTAL
TOILETING: TOTAL
MOVING FROM LYING ON BACK TO SITTING ON SIDE OF FLAT BED WITH BEDRAILS: TOTAL
CLIMB 3 TO 5 STEPS WITH RAILING: TOTAL
PERSONAL GROOMING: TOTAL
WALKING IN HOSPITAL ROOM: TOTAL
HELP NEEDED FOR BATHING: TOTAL
EATING MEALS: TOTAL
CLIMB 3 TO 5 STEPS WITH RAILING: TOTAL
TURNING FROM BACK TO SIDE WHILE IN FLAT BAD: TOTAL
TOILETING: TOTAL
TURNING FROM BACK TO SIDE WHILE IN FLAT BAD: TOTAL
CLIMB 3 TO 5 STEPS WITH RAILING: TOTAL
DAILY ACTIVITIY SCORE: 6
DRESSING REGULAR LOWER BODY CLOTHING: TOTAL
PERSONAL GROOMING: TOTAL
STANDING UP FROM CHAIR USING ARMS: TOTAL
WALKING IN HOSPITAL ROOM: TOTAL
WALKING IN HOSPITAL ROOM: TOTAL
MOBILITY SCORE: 6
TURNING FROM BACK TO SIDE WHILE IN FLAT BAD: TOTAL
TOILETING: TOTAL
MOVING FROM LYING ON BACK TO SITTING ON SIDE OF FLAT BED WITH BEDRAILS: TOTAL
WALKING IN HOSPITAL ROOM: TOTAL
DRESSING REGULAR UPPER BODY CLOTHING: TOTAL
MOVING FROM LYING ON BACK TO SITTING ON SIDE OF FLAT BED WITH BEDRAILS: TOTAL
TURNING FROM BACK TO SIDE WHILE IN FLAT BAD: TOTAL
TOILETING: TOTAL
STANDING UP FROM CHAIR USING ARMS: TOTAL
HELP NEEDED FOR BATHING: TOTAL
DRESSING REGULAR UPPER BODY CLOTHING: TOTAL
HELP NEEDED FOR BATHING: TOTAL
WALKING IN HOSPITAL ROOM: TOTAL
HELP NEEDED FOR BATHING: TOTAL
PERSONAL GROOMING: TOTAL

## 2024-08-15 ASSESSMENT — PAIN - FUNCTIONAL ASSESSMENT
PAIN_FUNCTIONAL_ASSESSMENT: WONG-BAKER FACES

## 2024-08-15 ASSESSMENT — PAIN SCALES - WONG BAKER
WONGBAKER_NUMERICALRESPONSE: HURTS WHOLE LOT
WONGBAKER_NUMERICALRESPONSE: HURTS WHOLE LOT
WONGBAKER_NUMERICALRESPONSE: HURTS LITTLE BIT
WONGBAKER_NUMERICALRESPONSE: NO HURT

## 2024-08-15 ASSESSMENT — PAIN DESCRIPTION - LOCATION: LOCATION: ABDOMEN

## 2024-08-15 ASSESSMENT — PAIN SCALES - GENERAL
PAINLEVEL_OUTOF10: 8
PAINLEVEL_OUTOF10: 0 - NO PAIN

## 2024-08-15 NOTE — PROGRESS NOTES
Nutrition Follow Up Assessment:   Nutrition Assessment         Medical history per chart:     79 y.o. male with PMHx s/f hypertension dyslipidemia old CVA mild cardiomyopathy ulcerative colitis GERD recent CVA in June of this year with dysphagia and expressive aphasia presenting with abdominal pain fever.  Patient had a PEG tube placed for nutritional purposes about 2 days ago.  Had presented to emergency department complaining of some pain in the abdominal area the patient was noted to have low blood pressure and fever in emergency department.     8/15:  Pt awake, TF infusing at 10 ml/hr, and per surgery note okay to increase to goal rate.  Per RN patient receiving 500 ml free water flush per order - will modify order.     8/12:  Pt seen in ICU, asleep, and son at bedside.  Pt known from previous admission on 6/24/24 when he had a Dobhoff tube placed for nutrition, and discharged to nursing facility.  Noted Peg was then placed on 8/8, but then had to replaced on 8/10 due to malfunction.  Pt currently NPO with NG to suction during visit.  Will follow for TF initiation when able.    Nutrition History:  Food and Nutrient History: Per previous admit was on Jevity 1.5 @ goal of 70 ml/hr       Current Diet: Enteral feeding with NPO GT (gastric tube); Trickle feeds at 10 ml/hr; 500 mL free water; Water; Sterile water; Every 4 hours  Jevity 1.5 @ 10 ml/hr  500 ml Q    Nutrition Related Findings:    Teeth: Missing teeth       Food allergies: NKFA. has No Known Allergies.  Meds/Labs reviewed.  acetaminophen, 1,000 mg, intravenous, q8h  pantoprazole, 40 mg, oral, Daily before breakfast   Or  esomeprazole, 40 mg, nasoduodenal tube, Daily before breakfast   Or  pantoprazole, 40 mg, intravenous, Daily before breakfast  heparin (porcine), 5,000 Units, subcutaneous, q8h  piperacillin-tazobactam, 3.375 g, intravenous, q6h             Nutrition Significant Labs:    Results from last 7 days   Lab Units 08/15/24  0442 08/14/24  2152  "08/13/24  0453 08/11/24  1555 08/11/24  0356   GLUCOSE mg/dL 87 83 84   < > 94   SODIUM mmol/L 138 143 140   < > 150*   POTASSIUM mmol/L 3.7 3.3* 3.6   < > 3.9   CHLORIDE mmol/L 109* 111* 109*   < > 118*   CO2 mmol/L 23 25 25   < > 21   BUN mg/dL 17 19 23   < > 42*   CREATININE mg/dL 0.82 0.95 1.16   < > 1.50*   EGFR mL/min/1.73m*2 88 80 63   < > 46*   CALCIUM mg/dL 7.3* 7.6* 7.6*   < > 8.2*   PHOSPHORUS mg/dL 2.5 2.2* 2.1*   < > 4.3   MAGNESIUM mg/dL  --   --   --   --  1.98    < > = values in this interval not displayed.     Lab Results   Component Value Date    HGBA1C 5.7 (H) 06/21/2024    HGBA1C 5.9 (A) 07/18/2023     Results from last 7 days   Lab Units 08/15/24  0812 08/14/24  1958 08/14/24  1613 08/14/24  1115 08/14/24  0734 08/13/24 2110 08/13/24 2026   POCT GLUCOSE mg/dL 93 101* 97 105* 89 172* 69*       Anthropometrics:  Height: 175.3 cm (5' 9\")   Weight: 89.5 kg (197 lb 5 oz)   BMI (Calculated): 29.12  IBW/kg (Dietitian Calculated): 72.7 kg          Weight History:   Wt Readings from Last 10 Encounters:   08/15/24 89.5 kg (197 lb 5 oz)   08/08/24 94.3 kg (208 lb)   07/02/24 94.4 kg (208 lb 1.8 oz)   06/29/24 99.8 kg (220 lb)   06/27/24 91 kg (200 lb 9.9 oz)   09/16/22 90 kg (198 lb 6.6 oz)        Weight Change %:  Weight History / % Weight Change: Noted weights have been around 200#.  Question accuracy of weight on 6/29: 220#.  Significant Weight Loss: No          Nutrition Focused Physical Exam Findings:  defer: confusion  Subcutaneous Fat Loss:      Muscle Wasting:     Edema:     Physical Findings:  Skin: Positive (ABD incision/peg site)    Estimated Needs:   Total Energy Estimated Needs (kCal): 2190 kCal  Method for Estimating Needs: 30 kcal/kg IBW     Method for Estimating Needs:  gm (1.2-1.4 gm/kg IBW)     Method for Estimating Needs: 1 ml/kcal        Nutrition Diagnosis   Nutrition Diagnosis:  Malnutrition Diagnosis  Patient has Malnutrition Diagnosis: No    Nutrition Diagnosis  Patient has " Nutrition Diagnosis: Yes  Diagnosis Status (1): Ongoing  Nutrition Diagnosis 1: Inadequate oral intake  Related to (1): dysphagia s/p Peg tube  As Evidenced by (1): trickle TF @ 10 ml/hr       Nutrition Interventions/Recommendations   Nutrition Interventions and Recommendations:        Nutrition Prescription:  Individualized Nutrition Prescription Provided for : Enteral nutrition        Nutrition Interventions:   Food and/or Nutrient Delivery Interventions  Interventions: Enteral intake  Enteral Intake: Modify concentration of enteral nutrition, Modify rate of enteral nutrition  Goal: Recommend TF of Jevity 1.5 @ 10 ml/hr, and increase by 10 ml Q6H to goal rate of 60 ml/hr: 2160 kcals, 92 gm protein, 1094 ml free water.  Additional Interventions: Free water flush of 250 ml Q4H to provide with TF: 2594 ml free water.    Coordination of Nutrition Care by a Nutrition Professional  Collaboration and Referral of Nutrition Care: Collaboration by nutrition professional with other providers  Goal: Dr. Lopez    Nutrition Education:   Education Documentation  No documentation found.      Nutrition Counseling  Counseling Theoretical Approach: Other (Comment)  Goal: n/a       Nutrition Monitoring and Evaluation   Monitoring/Evaluation:   Food/Nutrient Related History Monitoring  Monitoring and Evaluation Plan: Enteral and parenteral nutrition intake  Enteral and Parenteral Nutrition Intake: Enteral nutrition formula/solution, Enteral nutrition intake  Criteria: TF initiation, tolerance, and advancement    Body Composition/Growth/Weight History  Monitoring and Evaluation Plan: Weight  Weight: Measured weight  Criteria: Stable weight    Biochemical Data, Medical Tests and Procedures  Monitoring and Evaluation Plan: Electrolyte/renal panel, Glucose/endocrine profile  Electrolyte and Renal Panel: BUN, Sodium, Creatinine, Magnesium, Phosphorus, Potassium  Criteria: WNL  Glucose/Endocrine Profile: Glucose, casual, Hemoglobin A1c  (HgbA1c)  Criteria: WNL    Nutrition Focused Physical Findings  Monitoring and Evaluation Plan: Skin  Skin: Impaired wound healing  Criteria: Promote healing            Time Spent/Follow-up Reminder:   Follow Up  Time Spent (min): 35 minutes  Last Date of Nutrition Visit: 08/15/24  Nutrition Follow-Up Needed?: Dietitian to reassess per policy  Follow up Comment: 8/19-8/20

## 2024-08-15 NOTE — PROGRESS NOTES
"Abdi Wilson is a 79 y.o. male  who presented on 8/10/2024 with PEG tube malfunction (Multi) after initially being placed on 8/8/2024    Subjective   No acute events overnight.  Tolerating tube feeds at trickle rate.  Having bowel function.  Having some drainage from midline wound.    Objective     Physical Exam  Constitutional:       General: He is not in acute distress.     Appearance: He is not diaphoretic.   HENT:      Head: Normocephalic and atraumatic.   Cardiovascular:      Rate and Rhythm: Normal rate and regular rhythm.      Pulses: No decreased pulses.           Radial pulses are 2+ on the right side and 2+ on the left side.        Dorsalis pedis pulses are 2+ on the right side and 2+ on the left side.   Pulmonary:      Effort: No tachypnea, accessory muscle usage or respiratory distress.   Abdominal:      Tenderness: There is no abdominal tenderness.      Comments: Abdominal binder in place.  Mildly tender around PEG tube. PEG tube remains in place approximately 4 cm from the skin.  Bumper able to spin freely.  Murky drainage coming from midline wound.  Some staples were removed and wound interrogated, fascia feels intact.   Musculoskeletal:      Right lower leg: No edema.      Left lower leg: No edema.   Skin:     General: Skin is cool and dry.   Neurological:      Mental Status: He is alert.      Comments: Patient does verbally answer questions but can give a thumbs up.         Last Recorded Vitals  Blood pressure (!) 167/97, pulse 77, temperature 36.4 °C (97.6 °F), temperature source Temporal, resp. rate 18, height 1.753 m (5' 9\"), weight 89.5 kg (197 lb 5 oz), SpO2 96%.  Intake/Output last 3 Shifts:  I/O last 3 completed shifts:  In: 670 (7.5 mL/kg) [I.V.:670 (7.5 mL/kg)]  Out: 1625 (18.2 mL/kg) [Urine:1625 (0.5 mL/kg/hr)]  Weight: 89.5 kg         Assessment/Plan   Principal Problem:    PEG tube malfunction (Multi)  Active Problems:    Cerebral infarction, unspecified (Multi)    Peritonitis (Multi)   "  Aphasia    Patient is a 79-year-old male who initially underwent PEG tube placement due to dysphagia on 8/8/2024.  Patient re-presented on 8/10/2024 due to PEG tube malfunction and found to be septic on presentation the emergency department    Patient underwent replacement of gastrostomy tube due to malfunction on 8/10/2024 with Dr. Velásquez    She asked me to mention patient had purulent drainage from wound this morning.  Staples opened and wound packed with gauze.  Will place wound care orders.  Tolerating tube feeds, can advance as tolerated to goal.    Plan:  - Advance tube feeds to goal as tolerated  - Continue midline dressing changes.  Wet-to-dry packing twice daily, will continue to monitor.  - Biotics per primary team    Seen and discussed with attending Dr. Christen Massey DO - PGY4  General Surgery

## 2024-08-15 NOTE — CARE PLAN
The clinical goals for the shift include patient will be free of skin breakdown during shift        Problem: Skin  Goal: Prevent/manage excess moisture  Outcome: Progressing     Problem: Skin  Goal: Prevent/minimize sheer/friction injuries  Outcome: Progressing

## 2024-08-15 NOTE — PROGRESS NOTES
Abdi Wilson is a 79 y.o. male on day 5 of admission presenting with PEG tube malfunction (Multi).      Subjective   Abdi Wilson is a 79 y.o. male with PMHx s/f hypertension dyslipidemia old CVA mild cardiomyopathy ulcerative colitis GERD recent CVA in June of this year with dysphagia and expressive aphasia presenting with abdominal pain fever.  Patient had a PEG tube placed for nutritional purposes about 2 days ago.  Had presented to emergency department complaining of some pain in the abdominal area the patient was noted to have low blood pressure and fever in emergency department.  On presenting to emergency department patient had temperature 96.3 heart rate 102 respiratory rate 24 blood pressure 135/81 Javy panel shows sodium 148 potassium 4.2 chloride 112 bicarb 25 BUN 37 creatinine 1.38 glucose 144 liver enzymes within normal limits total bilirubin 1.5 initial lactate 2.5 repeat lactate 2.4 CBC showing WBC 13.6 hemoglobin 19.7 hematocrit 60.7 platelets 269 CT scan of the abdomen pelvis was done showing malposition PEG tube there is a small amount of free air, patient was seen by general surgery who felt patient had peritonitis plan is to take patient for emergent surgery.   8/11: Patient was seen and examined.  He is nonverbal at baseline and still looks lethargic this morning.  Blood pressure is improved and patient is off pressors.  Hypernatremia persistent and patient has been started on IV fluid D5 water.  Will get repeat BMP and trend.  Continue current IV antibiotics.  Blood cultures are still pending.  Gastrostomy replacement done 8/10/2024.  Continue n.p.o. by general surgery recommendation.  Repeat CBC and BMP in a.m.  8/12: Patient was seen and examined.  He remains nonverbal which is baseline however patient is not responsive or interactive.  Failed attempt at physical therapy.  Discussed with patient's son over the phone and granddaughter.  Patient was cooperating adequately with physical  therapy prior to this admission.  I will get a CT of the head to rule out any recent stroke.  Stat lactic and ammonia TSH and vitamin B12.  General surgery still recommending holding G-tube.  We will have to consider other modes of feeding within 24 hours.  Will continue IV fluids pending review of stat BMP.  Leukocytosis trending down.  Blood cultures are negative.  Continue to trend CBC and BMP daily.  8/13: Patient was seen and examined.  Patient is more awake today; still lethargic.  Discussed with general surgery.  Patient to get contrast study by tomorrow and if no further abnormalities NG to be discontinued and will start the patient on oral diet and advance as tolerated.  Continue to trend CBC and BMP daily.  8/14: Patient was seen and examined.  Still lethargic, drowsy but arousable.  Fluoroscopic studies completed and reports pending.  Patient is now requiring 3 L nasal cannula oxygen to maintain saturation so I will get a stat chest x-ray.  General surgery to decide on NG status and possible tube feeding versus TPN.  Hypokalemia noted.  IV potassium given.  Continue to trend CBC and BMP daily.  8/15: Patient was seen and examined.  Looks clinically better today still although still lethargic.  NG tube discontinued and diet advanced per general surgery recommendation.  Repeat chest x-ray yesterday shows no changes compared to previous.  Wound culture is growing Pseudomonas which is pansensitive.  Continue IV Zosyn.  Blood cultures have remained negative x 4 days.  Potential discharge back to extended-care facility once patient is tolerating goal rate of PEG tube feeding.  Repeat CBC and BMP in a.m.        Objective     Last Recorded Vitals  /71 (BP Location: Left arm, Patient Position: Lying)   Pulse 80   Temp 36.6 °C (97.8 °F) (Tympanic)   Resp 18   Wt 89.5 kg (197 lb 5 oz)   SpO2 96%   Intake/Output last 3 Shifts:    Intake/Output Summary (Last 24 hours) at 8/15/2024 1532  Last data filed at  8/15/2024 1446  Gross per 24 hour   Intake 900 ml   Output 900 ml   Net 0 ml       Admission Weight  Weight: 94.3 kg (208 lb) (08/10/24 1113)    Daily Weight  08/15/24 : 89.5 kg (197 lb 5 oz)    Image Results  ECG 12 Lead  Sinus tachycardia  Ventricular premature complex  Prolonged OR interval  LVH with secondary repolarization abnormality  Inferior infarct, old  Anterolateral infarct, age indeterminate    See ED provider note for full interpretation and clinical correlation  Confirmed by Alexandria Cullen ([id number]) on 8/14/2024 6:23:12 PM  FL upper GI w KUB  Narrative: Interpreted By:  Mei Crum,   STUDY:  FL UPPER GI W KUB;  8/14/2024 9:31 am      INDICATION:  Signs/Symptoms:Please please contrast through patient's PEG tube in  abdomen.      COMPARISON:  None.      ACCESSION NUMBER(S):  [id number]      ORDERING CLINICIAN:  LUCY NUNES      TECHNIQUE:  Total fluoroscopy time was  1 minutes 7 seconds with 8 spot images  obtained. 60 mL of solution 50% Gastrografin and 50% water was  inserted through the PEG tube.      FINDINGS:  Peg tube, surgical drain and skin staples are noted in the upper  abdomen. An NG tube is also present in the stomach. With injection of  contrast the contrast flows freely into the gastric lumen. Contrast  promptly passes from stomach into duodenal. No extravasation of  contrast is noted outside of the stomach. There was no  gastroesophageal reflux identified.      Impression: 1.  PEG tube is positioned in the stomach with no extravasation.  2. Prompt passage of contrast into the duodenal and no  gastroesophageal reflux          MACRO:  None      Signed by: Mei Crum 8/14/2024 10:10 AM  Dictation workstation:   [id number]  XR chest 2 views  Narrative: Interpreted By:  Mei Crum,   STUDY:  XR CHEST 2 VIEWS;  8/14/2024 9:11 am      INDICATION:  Signs/Symptoms:Hypoxia.      COMPARISON:  08/13/2024      ACCESSION NUMBER(S):  [id number]      ORDERING  CLINICIAN:  LUCY NUNES      FINDINGS:  AP upright and lateral views were obtained with the patient sitting.      NG tube is present with the tip below the diaphragm and beyond the  image.      CARDIOMEDIASTINAL SILHOUETTE:  Heart is enlarged, which is accentuated by the shallow inspiration.  Aorta is atherosclerotic.      LUNGS:  Bilateral pleural effusions and bilateral basilar atelectasis are  present, left worse than right. Appearance is similar to the prior  exam.      ABDOMEN:  No remarkable upper abdominal findings.      BONES:  No acute osseous changes.      Impression: 1.  Unchanged cardiomegaly  2. Pleural effusions and basilar atelectasis, left worse than right  and unchanged from the prior exam              MACRO:  None      Signed by: Mei Crum 8/14/2024 9:22 AM  Dictation workstation:   [id number]      Physical Exam  Constitutional:       Appearance: He is acutely ill-appearing and toxic-appearing.  Nonverbal and noninteractive but localizes pain.  He is not diaphoretic.   HENT:      Head: Normocephalic.      Nose: Nose normal.      Mouth/Throat:      Mouth: Mucous membranes are dry.      Pharynx: No posterior oropharyngeal erythema.   Eyes:      General: No scleral icterus.     Conjunctiva/sclera: Conjunctivae normal.   Neck:      Vascular: No carotid bruit.   Cardiovascular:      Rate and Rhythm: Regular rhythm. Tachycardia present.      Heart sounds: No murmur heard.  Pulmonary:      Effort: Pulmonary effort is normal. No respiratory distress.      Breath sounds: No wheezing, rhonchi or rales.     Abdominal:      General: There is distension.      Tenderness: There is abdominal tenderness. There is rebound.      Comments:   Musculoskeletal:      Right lower leg: No edema.      Left lower leg: No edema.   Skin:     General: Skin is warm.      Capillary Refill: Capillary refill takes less than 2 seconds.   Neurological:      Mental Status: Mental status is at baseline.      Sensory:  No sensory deficit.      Comments: Patient is lethargic and not interactive but localizes pain unable to move right upper extremities   psychiatric:         Mood and Affect: Mood normal.      Relevant Results               Assessment/Plan                  Principal Problem:    PEG tube malfunction (Multi)  Active Problems:    Cerebral infarction, unspecified (Multi)    Peritonitis (Multi)    Aphasia    Acute abdominal pain, secondary to peritonitis and probable sepsis  Status post exploratory laparotomy and G-tube replacement  Continue IV antibiotics Zosyn  As needed IV fluids  Responded to multiple boluses  Blood cultures negative x 4 days  Contrast fluoroscopy studies completed and diet advanced  NG tube discontinued  General Surgery on board  Pt is DNR [facility]    Altered mental status and aphasia  Likely related to metabolic encephalopathy  Recent history of stroke with associated aphasia however patient was ambulating  Now increasing lethargy and unresponsive  CT of the head reviewed and showed no acute intracranial process  Vitamin B12 TSH and ammonia within acceptable limits       Dehydration and hypernatremia  Hypernatremia resolved  Tolerating tube feeds           G-tube dysfunction  G-tube replacement completed 8/10/2024  Diet advanced          Hx of CVA w S/L deficits, memory loss patient nonambulatory  Pt speaks some at baseline  Resume medications post surgery,           Spent 35 minutes in follow-up management of this patient       Joe Lopez MD

## 2024-08-15 NOTE — CARE PLAN
Problem: Skin  Goal: Prevent/manage excess moisture  Flowsheets (Taken 8/15/2024 0602)  Prevent/manage excess moisture:   Cleanse incontinence/protect with barrier cream   Moisturize dry skin   Monitor for/manage infection if present   The patient's goals for the shift include      The clinical goals for the shift include monitor tube feed for tolarability

## 2024-08-16 LAB
ALBUMIN SERPL BCP-MCNC: 2.6 G/DL (ref 3.4–5)
ALP SERPL-CCNC: 114 U/L (ref 33–136)
ALT SERPL W P-5'-P-CCNC: 19 U/L (ref 10–52)
ANION GAP SERPL CALC-SCNC: 10 MMOL/L (ref 10–20)
AST SERPL W P-5'-P-CCNC: 23 U/L (ref 9–39)
BASOPHILS # BLD AUTO: 0.03 X10*3/UL (ref 0–0.1)
BASOPHILS NFR BLD AUTO: 0.3 %
BILIRUB SERPL-MCNC: 0.6 MG/DL (ref 0–1.2)
BUN SERPL-MCNC: 15 MG/DL (ref 6–23)
CALCIUM SERPL-MCNC: 7.5 MG/DL (ref 8.6–10.3)
CHLORIDE SERPL-SCNC: 108 MMOL/L (ref 98–107)
CO2 SERPL-SCNC: 23 MMOL/L (ref 21–32)
CREAT SERPL-MCNC: 0.81 MG/DL (ref 0.5–1.3)
EGFRCR SERPLBLD CKD-EPI 2021: 89 ML/MIN/1.73M*2
EOSINOPHIL # BLD AUTO: 0.14 X10*3/UL (ref 0–0.4)
EOSINOPHIL NFR BLD AUTO: 1.3 %
ERYTHROCYTE [DISTWIDTH] IN BLOOD BY AUTOMATED COUNT: 14.5 % (ref 11.5–14.5)
GLUCOSE BLD MANUAL STRIP-MCNC: 127 MG/DL (ref 74–99)
GLUCOSE BLD MANUAL STRIP-MCNC: 131 MG/DL (ref 74–99)
GLUCOSE BLD MANUAL STRIP-MCNC: 133 MG/DL (ref 74–99)
GLUCOSE BLD MANUAL STRIP-MCNC: 134 MG/DL (ref 74–99)
GLUCOSE BLD MANUAL STRIP-MCNC: 134 MG/DL (ref 74–99)
GLUCOSE BLD MANUAL STRIP-MCNC: 152 MG/DL (ref 74–99)
GLUCOSE SERPL-MCNC: 122 MG/DL (ref 74–99)
HCT VFR BLD AUTO: 42.7 % (ref 41–52)
HGB BLD-MCNC: 14.1 G/DL (ref 13.5–17.5)
IMM GRANULOCYTES # BLD AUTO: 0.14 X10*3/UL (ref 0–0.5)
IMM GRANULOCYTES NFR BLD AUTO: 1.3 % (ref 0–0.9)
LYMPHOCYTES # BLD AUTO: 1.13 X10*3/UL (ref 0.8–3)
LYMPHOCYTES NFR BLD AUTO: 10.6 %
MCH RBC QN AUTO: 29.2 PG (ref 26–34)
MCHC RBC AUTO-ENTMCNC: 33 G/DL (ref 32–36)
MCV RBC AUTO: 88 FL (ref 80–100)
MONOCYTES # BLD AUTO: 1.04 X10*3/UL (ref 0.05–0.8)
MONOCYTES NFR BLD AUTO: 9.7 %
NEUTROPHILS # BLD AUTO: 8.23 X10*3/UL (ref 1.6–5.5)
NEUTROPHILS NFR BLD AUTO: 76.8 %
NRBC BLD-RTO: 0 /100 WBCS (ref 0–0)
PLATELET # BLD AUTO: 191 X10*3/UL (ref 150–450)
POTASSIUM SERPL-SCNC: 3.6 MMOL/L (ref 3.5–5.3)
PROT SERPL-MCNC: 5.6 G/DL (ref 6.4–8.2)
RBC # BLD AUTO: 4.83 X10*6/UL (ref 4.5–5.9)
SODIUM SERPL-SCNC: 137 MMOL/L (ref 136–145)
WBC # BLD AUTO: 10.7 X10*3/UL (ref 4.4–11.3)

## 2024-08-16 PROCEDURE — 82947 ASSAY GLUCOSE BLOOD QUANT: CPT

## 2024-08-16 PROCEDURE — 84075 ASSAY ALKALINE PHOSPHATASE: CPT

## 2024-08-16 PROCEDURE — 85025 COMPLETE CBC W/AUTO DIFF WBC: CPT

## 2024-08-16 PROCEDURE — 36415 COLL VENOUS BLD VENIPUNCTURE: CPT

## 2024-08-16 PROCEDURE — 2500000004 HC RX 250 GENERAL PHARMACY W/ HCPCS (ALT 636 FOR OP/ED)

## 2024-08-16 PROCEDURE — 2500000001 HC RX 250 WO HCPCS SELF ADMINISTERED DRUGS (ALT 637 FOR MEDICARE OP): Performed by: INTERNAL MEDICINE

## 2024-08-16 PROCEDURE — 97530 THERAPEUTIC ACTIVITIES: CPT | Mod: GO,CO

## 2024-08-16 PROCEDURE — 97530 THERAPEUTIC ACTIVITIES: CPT | Mod: CQ,GP

## 2024-08-16 PROCEDURE — 2060000001 HC INTERMEDIATE ICU ROOM DAILY

## 2024-08-16 PROCEDURE — 99232 SBSQ HOSP IP/OBS MODERATE 35: CPT | Performed by: INTERNAL MEDICINE

## 2024-08-16 RX ORDER — LOSARTAN POTASSIUM 50 MG/1
50 TABLET ORAL DAILY
Status: DISCONTINUED | OUTPATIENT
Start: 2024-08-16 | End: 2024-08-23 | Stop reason: HOSPADM

## 2024-08-16 RX ORDER — HYDRALAZINE HYDROCHLORIDE 25 MG/1
25 TABLET, FILM COATED ORAL 3 TIMES DAILY
Status: DISCONTINUED | OUTPATIENT
Start: 2024-08-16 | End: 2024-08-23 | Stop reason: HOSPADM

## 2024-08-16 RX ORDER — CARVEDILOL 6.25 MG/1
6.25 TABLET ORAL 2 TIMES DAILY
Status: DISCONTINUED | OUTPATIENT
Start: 2024-08-16 | End: 2024-08-23 | Stop reason: HOSPADM

## 2024-08-16 RX ORDER — ATORVASTATIN CALCIUM 40 MG/1
40 TABLET, FILM COATED ORAL NIGHTLY
Status: DISCONTINUED | OUTPATIENT
Start: 2024-08-16 | End: 2024-08-23 | Stop reason: HOSPADM

## 2024-08-16 RX ADMIN — HEPARIN SODIUM 5000 UNITS: 5000 INJECTION INTRAVENOUS; SUBCUTANEOUS at 19:55

## 2024-08-16 RX ADMIN — CARVEDILOL 6.25 MG: 6.25 TABLET, FILM COATED ORAL at 19:51

## 2024-08-16 RX ADMIN — PIPERACILLIN SODIUM AND TAZOBACTAM SODIUM 3.38 G: 3; .375 INJECTION, SOLUTION INTRAVENOUS at 09:44

## 2024-08-16 RX ADMIN — PANTOPRAZOLE SODIUM 40 MG: 40 INJECTION, POWDER, FOR SOLUTION INTRAVENOUS at 06:24

## 2024-08-16 RX ADMIN — ACETAMINOPHEN 1000 MG: 10 INJECTION INTRAVENOUS at 12:12

## 2024-08-16 RX ADMIN — HYDRALAZINE HYDROCHLORIDE 25 MG: 25 TABLET ORAL at 19:51

## 2024-08-16 RX ADMIN — CARVEDILOL 6.25 MG: 6.25 TABLET, FILM COATED ORAL at 12:13

## 2024-08-16 RX ADMIN — PIPERACILLIN SODIUM AND TAZOBACTAM SODIUM 3.38 G: 3; .375 INJECTION, SOLUTION INTRAVENOUS at 02:22

## 2024-08-16 RX ADMIN — ATORVASTATIN CALCIUM 40 MG: 40 TABLET, FILM COATED ORAL at 19:51

## 2024-08-16 RX ADMIN — ACETAMINOPHEN 1000 MG: 10 INJECTION INTRAVENOUS at 19:50

## 2024-08-16 RX ADMIN — MORPHINE SULFATE 2 MG: 2 INJECTION, SOLUTION INTRAMUSCULAR; INTRAVENOUS at 03:43

## 2024-08-16 RX ADMIN — PIPERACILLIN SODIUM AND TAZOBACTAM SODIUM 3.38 G: 3; .375 INJECTION, SOLUTION INTRAVENOUS at 16:57

## 2024-08-16 RX ADMIN — HYDRALAZINE HYDROCHLORIDE 25 MG: 25 TABLET ORAL at 16:57

## 2024-08-16 RX ADMIN — LOSARTAN POTASSIUM 50 MG: 50 TABLET, FILM COATED ORAL at 12:13

## 2024-08-16 RX ADMIN — HEPARIN SODIUM 5000 UNITS: 5000 INJECTION INTRAVENOUS; SUBCUTANEOUS at 03:19

## 2024-08-16 RX ADMIN — PIPERACILLIN SODIUM AND TAZOBACTAM SODIUM 3.38 G: 3; .375 INJECTION, SOLUTION INTRAVENOUS at 19:49

## 2024-08-16 RX ADMIN — HYDRALAZINE HYDROCHLORIDE 25 MG: 25 TABLET ORAL at 12:13

## 2024-08-16 RX ADMIN — HEPARIN SODIUM 5000 UNITS: 5000 INJECTION INTRAVENOUS; SUBCUTANEOUS at 12:14

## 2024-08-16 RX ADMIN — ACETAMINOPHEN 1000 MG: 10 INJECTION INTRAVENOUS at 03:19

## 2024-08-16 RX ADMIN — MORPHINE SULFATE 2 MG: 2 INJECTION, SOLUTION INTRAMUSCULAR; INTRAVENOUS at 22:44

## 2024-08-16 ASSESSMENT — PAIN - FUNCTIONAL ASSESSMENT
PAIN_FUNCTIONAL_ASSESSMENT: 0-10
PAIN_FUNCTIONAL_ASSESSMENT: WONG-BAKER FACES

## 2024-08-16 ASSESSMENT — COGNITIVE AND FUNCTIONAL STATUS - GENERAL
WALKING IN HOSPITAL ROOM: TOTAL
TOILETING: TOTAL
HELP NEEDED FOR BATHING: TOTAL
CLIMB 3 TO 5 STEPS WITH RAILING: TOTAL
EATING MEALS: TOTAL
MOBILITY SCORE: 10
WALKING IN HOSPITAL ROOM: TOTAL
MOBILITY SCORE: 6
PERSONAL GROOMING: TOTAL
TURNING FROM BACK TO SIDE WHILE IN FLAT BAD: TOTAL
DRESSING REGULAR UPPER BODY CLOTHING: TOTAL
TOILETING: TOTAL
EATING MEALS: TOTAL
MOVING FROM LYING ON BACK TO SITTING ON SIDE OF FLAT BED WITH BEDRAILS: A LOT
MOVING TO AND FROM BED TO CHAIR: TOTAL
DRESSING REGULAR UPPER BODY CLOTHING: TOTAL
HELP NEEDED FOR BATHING: TOTAL
CLIMB 3 TO 5 STEPS WITH RAILING: TOTAL
DAILY ACTIVITIY SCORE: 6
STANDING UP FROM CHAIR USING ARMS: A LOT
MOVING FROM LYING ON BACK TO SITTING ON SIDE OF FLAT BED WITH BEDRAILS: TOTAL
DRESSING REGULAR LOWER BODY CLOTHING: TOTAL
MOVING TO AND FROM BED TO CHAIR: A LOT
DAILY ACTIVITIY SCORE: 6
PERSONAL GROOMING: TOTAL
DRESSING REGULAR LOWER BODY CLOTHING: TOTAL
TURNING FROM BACK TO SIDE WHILE IN FLAT BAD: A LOT
STANDING UP FROM CHAIR USING ARMS: TOTAL

## 2024-08-16 ASSESSMENT — PAIN SCALES - GENERAL
PAINLEVEL_OUTOF10: 0 - NO PAIN

## 2024-08-16 ASSESSMENT — PAIN SCALES - WONG BAKER
WONGBAKER_NUMERICALRESPONSE: NO HURT
WONGBAKER_NUMERICALRESPONSE: NO HURT
WONGBAKER_NUMERICALRESPONSE: HURTS LITTLE BIT

## 2024-08-16 ASSESSMENT — PAIN DESCRIPTION - LOCATION: LOCATION: ABDOMEN

## 2024-08-16 NOTE — PROGRESS NOTES
"Abdi Wilson is a 79 y.o. male  who presented on 8/10/2024 with PEG tube malfunction (Multi) after initially being placed on 8/8/2024    Subjective   No acute events overnight.  Patient more interactive today.  Has been tolerating tube feeds and being advanced to goal without issue.  Midline wound with packing in place.    Objective     Physical Exam  Constitutional:       General: He is not in acute distress.     Appearance: He is not diaphoretic.   HENT:      Head: Normocephalic and atraumatic.   Cardiovascular:      Rate and Rhythm: Normal rate and regular rhythm.      Pulses: No decreased pulses.           Radial pulses are 2+ on the right side and 2+ on the left side.        Dorsalis pedis pulses are 2+ on the right side and 2+ on the left side.   Pulmonary:      Effort: No tachypnea, accessory muscle usage or respiratory distress.   Abdominal:      Tenderness: There is no abdominal tenderness.      Comments: Abdominal binder in place.  Mildly tender around PEG tube. PEG tube remains in place approximately 4 cm from the skin.  Bumper able to spin freely.  Midline wound with packing in place.  Fascia remains intact.   Musculoskeletal:      Right lower leg: No edema.      Left lower leg: No edema.   Skin:     General: Skin is cool and dry.   Neurological:      Mental Status: He is alert.      Comments: Patient does verbally answer questions but can give a thumbs up and waved.         Last Recorded Vitals  Blood pressure 166/88, pulse 88, temperature 35.8 °C (96.4 °F), temperature source Temporal, resp. rate 18, height 1.753 m (5' 9\"), weight 90.7 kg (199 lb 15.3 oz), SpO2 97%.  Intake/Output last 3 Shifts:  I/O last 3 completed shifts:  In: 4722 (52.1 mL/kg) [NG/GT:3622; IV Piggyback:1100]  Out: 1700 (18.7 mL/kg) [Urine:1700 (0.5 mL/kg/hr)]  Weight: 90.7 kg         Assessment/Plan   Principal Problem:    PEG tube malfunction (Multi)  Active Problems:    Cerebral infarction, unspecified (Multi)    Peritonitis " (Multi)    Aphasia    Patient is a 79-year-old male who initially underwent PEG tube placement due to dysphagia on 8/8/2024.  Patient re-presented on 8/10/2024 due to PEG tube malfunction and found to be septic on presentation the emergency department    Patient underwent replacement of gastrostomy tube due to malfunction on 8/10/2024 with Dr. Velásquez    She asked me to mention patient had purulent drainage from wound this morning.  Staples opened and wound packed with gauze.  Will place wound care orders.  Tolerating tube feeds, can advance as tolerated to goal.    Plan:  - Continue tube feeds through PEG tube.  Maintain abdominal binder.  - Continue midline dressing changes.  Wet-to-dry packing twice daily  - Antibiotics per primary team  - Surgery will sign off at this time.  Please contact for any questions or concerns.  - Patient can follow-up with Dr. Velásquez in 2 weeks for wound care and PEG tube follow-up    Seen and discussed with attending Dr. Christen Massey DO - PGY4  General Surgery

## 2024-08-16 NOTE — CARE PLAN
The patient's goals for the shift include    The clinical goals for the shift include pt will tolerate tube feeding

## 2024-08-16 NOTE — PROGRESS NOTES
Occupational Therapy    OT Treatment    Patient Name: Abdi Wilson  MRN: [id number]  Today's Date: 8/16/2024  Time Calculation  Start Time: 1056  Stop Time: 1110  Time Calculation (min): 14 min        Assessment:  End of Session Communication: Bedside nurse, Physician  End of Session Patient Position: Bed, 3 rail up, Alarm on  OT Assessment Results: Decreased ADL status, Decreased upper extremity strength, Decreased safe judgment during ADL, Decreased endurance, Decreased cognition, Decreased functional mobility, Decreased gross motor control, Decreased IADLs  Plan:  Treatment Interventions: ADL retraining, Functional transfer training, UE strengthening/ROM, Endurance training  OT Frequency: 3 times per week  OT Discharge Recommendations: Moderate intensity level of continued care  OT - OK to Discharge: Yes  Treatment Interventions: ADL retraining, Functional transfer training, UE strengthening/ROM, Endurance training    Subjective   Previous Visit Info:  OT Last Visit  OT Received On: 08/16/24  General:  General  Co-Treatment: PT  Co-Treatment Reason: optimize pt safety and mobiltiy  Prior to Session Communication: Bedside nurse  Patient Position Received: Bed, 3 rail up, Alarm on  General Comment: Pt agreeable to OT tx sessionn  Precautions:  Medical Precautions: Fall precautions, Abdominal precautions  Post-Surgical Precautions: Abdominal surgery precautions  Precautions Comment: NG TUBE. NESSA DRAIN and ABD BINDER to abd.  NON-VERBAL (head nods Y/N), expressive aphasia  Vital Signs:     Pain:  Pain Assessment  Pain Assessment: 0-10  0-10 (Numeric) Pain Score:  (pain in abdomen, grimacing with movements)    Objective    Cognition:  Cognition  Arousal/Alertness: Inconsistent responses to stimuli  Orientation Level: Unable to assess    Bed Mobility/Transfers: Bed Mobility  Bed Mobility: Yes  Bed Mobility 1  Bed Mobility 1: Rolling right  Level of Assistance 1: Maximum assistance, +2  Bed Mobility 2  Bed Mobility  2:  Supine to sitting, Sitting to supine, Scooting  Level of Assistance 2: Moderate verbal cues, Maximum assistance, +2    Transfers  Transfer: Yes  Transfer 1  Transfer From 1: Bed to  Transfer to 1: Stand  Technique 1: Sit to stand, Stand to sit  Transfer Level of Assistance 1: Arm in arm assistance, Maximum assistance, +2, Maximum verbal cues, Maximum tactile cues  Trials/Comments 1: x2 STS from EOB      Functional Mobility:     Sitting Balance:  Static Sitting Balance  Static Sitting-Balance Support: Feet supported, Bilateral upper extremity supported  Static Sitting-Level of Assistance: Moderate assistance, Minimum assistance  Static Sitting-Comment/Number of Minutes: Initially modA and progressed to Nimisha  Standing Balance:  Static Standing Balance  Static Standing-Balance Support: Bilateral upper extremity supported  Static Standing-Level of Assistance: Maximum assistance  Static Standing-Comment/Number of Minutes: x2       Therapy/Activity:    Balance/Neuromuscular Re-Education  Balance/Neuromuscular Re-Education Activity Performed: Yes  Balance/Neuromuscular Re-Education Activity 1: pt achieved sitting EOB for 6-7 mins with min-modA      Outcome Measures:[facility] Daily Activity  Putting on and taking off regular lower body clothing: Total  Bathing (including washing, rinsing, drying): Total  Putting on and taking off regular upper body clothing: Total  Toileting, which includes using toilet, bedpan or urinal: Total  Taking care of personal grooming such as brushing teeth: Total  Eating Meals: Total  Daily Activity - Total Score: 6        Education Documentation  ADL Training, taught by LUCRETIA Adams at 8/16/2024 12:19 PM.  Learner: Patient  Readiness: Acceptance  Method: Explanation  Response: Verbalizes Understanding    Education Comments  No comments found.        OP EDUCATION:       Goals:  Encounter Problems       Encounter Problems (Active)       ADLs       Patient will complete daily grooming tasks  brushing teeth and washing face/hair with minimal assist  level of assistance and PRN adaptive equipment while supine in bed and/or supported sitting. (Progressing)       Start:  08/12/24    Expected End:  08/26/24               COGNITION/SAFETY       Patient will follow 75% Simple commands to allow improved ADL performance. (Progressing)       Start:  08/12/24    Expected End:  08/26/24               TRANSFERS       Patient will perform bed mobility moderate assist level of assistance and bed rails in order to improve safety and independence with mobility (Progressing)       Start:  08/12/24    Expected End:  08/26/24               VISION       Patient will visually attend to Left/Right  side of Tray, Room, and Body using compensatory strategies and  minimal assist  level of assistance.  (Progressing)       Start:  08/12/24    Expected End:  08/26/24

## 2024-08-16 NOTE — CARE PLAN
The clinical goals for the shift include Patient will tolerate tube feeding        Problem: Nutrition  Goal: Nutrition support is meeting 75% of nutrient needs  Outcome: Progressing     Problem: Nutrition  Goal: Tube feed tolerance  Outcome: Progressing     Problem: Nutrition  Goal: BG  mg/dL  Outcome: Progressing

## 2024-08-16 NOTE — PROGRESS NOTES
Spoke to patients son Rafi in regards to possible discharge over the weekend and provided IMM update. Rafi requested that the MD either call him or that [facility] advise MD that he would be at hospital 1-3:30 pm to meet with him if possible. Rafi stated that he had some questions and some concerns that he would like to go over with MD today. Dr. Washburn updated and provided contact information for Rafi. [facility] following.

## 2024-08-16 NOTE — PROGRESS NOTES
Abdi Wilson is a 79 y.o. male on day 6 of admission presenting with PEG tube malfunction (Multi).      Dietary Orders (From admission, onward)               Enteral feeding with NPO PEG (percutaneous endoscopic gastric); 10; Increase by 10 ml Q6H to goal rate of 60 ml/hr; 250; Water; Sterile water; Every 4 hours  Diet effective now        Question Answer Comment   Tube feeding formula: Jevity 1.5    Feeding route: PEG (percutaneous endoscopic gastric)    Tube feeding continuous rate (mL/hr): 10    Tube feeding cyclic (start / stop time): Increase by 10 ml Q6H to goal rate of 60 ml/hr    Tube feeding flush (mL): 250    Flush type: Water    Water type: Sterile water    Flush frequency: Every 4 hours                          Objective     Vitals  Temp:  [35.9 °C (96.7 °F)-36.6 °C (97.8 °F)] 36 °C (96.8 °F)  Heart Rate:  [76-81] 76  Resp:  [18-20] 18  BP: (145-172)/(71-89) 172/89       0-10 (Numeric) Pain Score: 0 - No pain  Mcgregor-Baker FACES Pain Rating: No hurt         Peripheral IV 08/15/24 22 G Right Forearm (Active)   Number of days: 1       Gastrostomy/Enterostomy Gastrostomy LUQ (Active)   Number of days: 6       Urethral Catheter Non-latex 16 Fr. (Active)   Number of days:        Vent Settings       Intake/Output Summary (Last 24 hours) at 8/16/2024 1004  Last data filed at 8/16/2024 0922  Gross per 24 hour   Intake 3972 ml   Output 1600 ml   Net 2372 ml       Relevant Results  Scheduled medications  acetaminophen, 1,000 mg, intravenous, q8h  pantoprazole, 40 mg, oral, Daily before breakfast   Or  esomeprazole, 40 mg, nasoduodenal tube, Daily before breakfast   Or  pantoprazole, 40 mg, intravenous, Daily before breakfast  heparin (porcine), 5,000 Units, subcutaneous, q8h  piperacillin-tazobactam, 3.375 g, intravenous, q6h      Continuous medications     PRN medications  PRN medications: morphine  Results for orders placed or performed during the hospital encounter of 08/10/24 (from the past 24 hour(s))   POCT  GLUCOSE   Result Value Ref Range    POCT Glucose 87 74 - 99 mg/dL   POCT GLUCOSE   Result Value Ref Range    POCT Glucose 90 74 - 99 mg/dL   POCT GLUCOSE   Result Value Ref Range    POCT Glucose 114 (H) 74 - 99 mg/dL   POCT GLUCOSE   Result Value Ref Range    POCT Glucose 134 (H) 74 - 99 mg/dL   Comprehensive Metabolic Panel   Result Value Ref Range    Glucose 122 (H) 74 - 99 mg/dL    Sodium 137 136 - 145 mmol/L    Potassium 3.6 3.5 - 5.3 mmol/L    Chloride 108 (H) 98 - 107 mmol/L    Bicarbonate 23 21 - 32 mmol/L    Anion Gap 10 10 - 20 mmol/L    Urea Nitrogen 15 6 - 23 mg/dL    Creatinine 0.81 0.50 - 1.30 mg/dL    eGFR 89 >60 mL/min/1.73m*2    Calcium 7.5 (L) 8.6 - 10.3 mg/dL    Albumin 2.6 (L) 3.4 - 5.0 g/dL    Alkaline Phosphatase 114 33 - 136 U/L    Total Protein 5.6 (L) 6.4 - 8.2 g/dL    AST 23 9 - 39 U/L    Bilirubin, Total 0.6 0.0 - 1.2 mg/dL    ALT 19 10 - 52 U/L   CBC and Auto Differential   Result Value Ref Range    WBC 10.7 4.4 - 11.3 x10*3/uL    nRBC 0.0 0.0 - 0.0 /100 WBCs    RBC 4.83 4.50 - 5.90 x10*6/uL    Hemoglobin 14.1 13.5 - 17.5 g/dL    Hematocrit 42.7 41.0 - 52.0 %    MCV 88 80 - 100 fL    MCH 29.2 26.0 - 34.0 pg    MCHC 33.0 32.0 - 36.0 g/dL    RDW 14.5 11.5 - 14.5 %    Platelets 191 150 - 450 x10*3/uL    Neutrophils % 76.8 40.0 - 80.0 %    Immature Granulocytes %, Automated 1.3 (H) 0.0 - 0.9 %    Lymphocytes % 10.6 13.0 - 44.0 %    Monocytes % 9.7 2.0 - 10.0 %    Eosinophils % 1.3 0.0 - 6.0 %    Basophils % 0.3 0.0 - 2.0 %    Neutrophils Absolute 8.23 (H) 1.60 - 5.50 x10*3/uL    Immature Granulocytes Absolute, Automated 0.14 0.00 - 0.50 x10*3/uL    Lymphocytes Absolute 1.13 0.80 - 3.00 x10*3/uL    Monocytes Absolute 1.04 (H) 0.05 - 0.80 x10*3/uL    Eosinophils Absolute 0.14 0.00 - 0.40 x10*3/uL    Basophils Absolute 0.03 0.00 - 0.10 x10*3/uL   POCT GLUCOSE   Result Value Ref Range    POCT Glucose 131 (H) 74 - 99 mg/dL          Assessment/Plan                    S/  See and examined  No new  complaints no new event over the night   ROS: Negative    O/   General Appearance: difficult due to hx of stroke Alert, Oriented X3, Cooperative, Not in Acute Distress  HEENT: non verbal, no eye redness, not pale, no jaundice, Throat: not congested, no ulcers    Chest: Good AE bilateral, No crackles, no ronchies, no wheezes.   Heart: RHR, Normal heart sounds S1, S2, no murmur or gallop,   Abdomen: Soft, lax, no hepatosplenomegaly, intact hernia orifices, negative fuller sign, no tenderness,   Genitalia: no abnormal discharge, no ulcers, no swelling.  Lymphatics: no lymphadenopathy, supraclavicular, inguinal trochlear, or axilla.   Neurology:  difficult due to hx of stroke , stable previous quadriplegia, no changes.   Extremities: no signs of PAD, no swelling no ulcers   Back: no deformity, no SI tenderness, no ulcers.   Skin: no signs of dehydration, no Rash, Bruises, or purpura.      AS:  Mr. Abdi Wilson is a 79 y.o. male, with a past medical history of CVA hemiplegia dysphagia dysarthria, status post PEG tube admitted for PEG tube malfunction status post replaced given also IV antibiotic Zosyn for negative blood culture, +ve wound culture is negative, pending tube feed tolerance.  Plan to discharge him to SNF once tolerated feeding tube. Target to 60-65 ml/h. Now at 50cc.         Code Status: DNR DNI  DVT ppx:  heparin  Disp: PT/ot         Spoke to my patient, Discussed the medical, social conditions, the reason for this admission explained our plan and recommendations.  Time spent on the assessment of patient, gathering and interpreting data, review of medical record/patient history, personally reviewing radiographic imaging. With greater than 50% spent in personal discussion with patient.  Time > 45 min         Frankie Washburn MD

## 2024-08-16 NOTE — PROGRESS NOTES
Physical Therapy  Physical Therapy Treatment    Patient Name: Abdi Wilson  MRN: [id number]  Today's Date: 8/16/2024  Time Calculation  Start Time: 1055  Stop Time: 1110  Time Calculation (min): 15 min    Assessment/Plan   PT Plan  Treatment/Interventions: Bed mobility  PT Plan: Ongoing PT  PT Frequency: 4 times per week  PT Discharge Recommendations: Moderate intensity level of continued care  PT Recommended Transfer Status: Total assist  PT - OK to Discharge: Yes    General Visit Information:   PT  Visit  PT Received On: 08/16/24  Response to Previous Treatment: Patient with no complaints from previous session.    Reason for Referral: transfer from SNF d/t PEG tube malfunction. ABD SURG 8/10: explor lap washout, replaced G tube.  Room: 2022    Precautions:  NG TUBE  NESSA DRAIN  ABD BINDER  NON-VERBAL (head nods Y/N)  expressive aphasia    Pain:  No pain    Cognition:  Unable to assess    Activity Tolerance:  Activity Tolerance  Endurance: Tolerates 10 - 20 min exercise with multiple rests    Treatments:  Therapeutic Exercise  7 minute sit EOB, Nimisha    Bed Mobility  Supine to sitting: Maximum assistance+2  Sitting to supine: Maximum assistance+2  Scooting: Maximum assistance+2    Transfers  Sit to stand: Maximum assistance  Stand to sit: Maximum assistance    Pt RTB following tx. Positioned for comfort and pressure relief. Alarm on and call light in reach. HOB elevated to 50 degrees.      Outcome Measures:  [facility] Basic Mobility  Turning from your back to your side while in a flat bed without using bedrails: A lot  Moving from lying on your back to sitting on the side of a flat bed without using bedrails: A lot  Moving to and from bed to chair (including a wheelchair): A lot  Standing up from a chair using your arms (e.g. wheelchair or bedside chair): A lot  To walk in hospital room: Total  Climbing 3-5 steps with railing: Total  Basic Mobility - Total Score: 10    Education Documentation  Mobility Training, taught by  Jaziel Guo, PTA at 8/16/2024 12:57 PM.  Learner: Patient  Readiness: Acceptance  Method: Explanation, Demonstration  Response: Needs Reinforcement    Education Comments  No comments found.        OP EDUCATION:       Encounter Problems       Encounter Problems (Active)       Balance       STG - Maintains static sitting balance with upper extremity support (Progressing)       Start:  08/12/24    Expected End:  08/26/24       INTERVENTIONS:  1. Practice sitting on the edge of a bed/mat with minimal support.  2. Educate patient about maintining total hip precautions while maintaining balance.  3. Educate patient about pressure relief.  4. Educate patient about use of assistive device.            PT Transfers       STG - Patient to transfer to and from sit to supine with no greater than Min Ax2 (Progressing)       Start:  08/12/24    Expected End:  08/26/24               Pain - Adult          Strengthening        Pt will perform 10+ reps AAROM/AROM/RROM BLE to improve functional strength needed for improved mobility.  (Progressing)       Start:  08/12/24    Expected End:  08/26/24

## 2024-08-17 ENCOUNTER — APPOINTMENT (OUTPATIENT)
Dept: RADIOLOGY | Facility: HOSPITAL | Age: 79
DRG: 853 | End: 2024-08-17
Payer: MEDICARE

## 2024-08-17 LAB
ALBUMIN SERPL BCP-MCNC: 2.3 G/DL (ref 3.4–5)
ALP SERPL-CCNC: 129 U/L (ref 33–136)
ALT SERPL W P-5'-P-CCNC: 16 U/L (ref 10–52)
ANION GAP SERPL CALC-SCNC: 10 MMOL/L (ref 10–20)
AST SERPL W P-5'-P-CCNC: 16 U/L (ref 9–39)
BASOPHILS # BLD AUTO: 0.02 X10*3/UL (ref 0–0.1)
BASOPHILS NFR BLD AUTO: 0.2 %
BILIRUB SERPL-MCNC: 0.4 MG/DL (ref 0–1.2)
BUN SERPL-MCNC: 14 MG/DL (ref 6–23)
CALCIUM SERPL-MCNC: 7.6 MG/DL (ref 8.6–10.3)
CHLORIDE SERPL-SCNC: 108 MMOL/L (ref 98–107)
CO2 SERPL-SCNC: 23 MMOL/L (ref 21–32)
CREAT SERPL-MCNC: 0.73 MG/DL (ref 0.5–1.3)
EGFRCR SERPLBLD CKD-EPI 2021: >90 ML/MIN/1.73M*2
EOSINOPHIL # BLD AUTO: 0.11 X10*3/UL (ref 0–0.4)
EOSINOPHIL NFR BLD AUTO: 0.9 %
ERYTHROCYTE [DISTWIDTH] IN BLOOD BY AUTOMATED COUNT: 14.6 % (ref 11.5–14.5)
GLUCOSE BLD MANUAL STRIP-MCNC: 123 MG/DL (ref 74–99)
GLUCOSE BLD MANUAL STRIP-MCNC: 135 MG/DL (ref 74–99)
GLUCOSE BLD MANUAL STRIP-MCNC: 136 MG/DL (ref 74–99)
GLUCOSE BLD MANUAL STRIP-MCNC: 178 MG/DL (ref 74–99)
GLUCOSE SERPL-MCNC: 145 MG/DL (ref 74–99)
HCT VFR BLD AUTO: 39.5 % (ref 41–52)
HGB BLD-MCNC: 13.1 G/DL (ref 13.5–17.5)
IMM GRANULOCYTES # BLD AUTO: 0.18 X10*3/UL (ref 0–0.5)
IMM GRANULOCYTES NFR BLD AUTO: 1.5 % (ref 0–0.9)
LYMPHOCYTES # BLD AUTO: 1.43 X10*3/UL (ref 0.8–3)
LYMPHOCYTES NFR BLD AUTO: 11.7 %
MCH RBC QN AUTO: 28.9 PG (ref 26–34)
MCHC RBC AUTO-ENTMCNC: 33.2 G/DL (ref 32–36)
MCV RBC AUTO: 87 FL (ref 80–100)
MONOCYTES # BLD AUTO: 1.06 X10*3/UL (ref 0.05–0.8)
MONOCYTES NFR BLD AUTO: 8.7 %
NEUTROPHILS # BLD AUTO: 9.45 X10*3/UL (ref 1.6–5.5)
NEUTROPHILS NFR BLD AUTO: 77 %
NRBC BLD-RTO: 0 /100 WBCS (ref 0–0)
PLATELET # BLD AUTO: 227 X10*3/UL (ref 150–450)
POTASSIUM SERPL-SCNC: 3.3 MMOL/L (ref 3.5–5.3)
PROT SERPL-MCNC: 5.2 G/DL (ref 6.4–8.2)
RBC # BLD AUTO: 4.53 X10*6/UL (ref 4.5–5.9)
SODIUM SERPL-SCNC: 138 MMOL/L (ref 136–145)
WBC # BLD AUTO: 12.3 X10*3/UL (ref 4.4–11.3)

## 2024-08-17 PROCEDURE — 2500000001 HC RX 250 WO HCPCS SELF ADMINISTERED DRUGS (ALT 637 FOR MEDICARE OP): Performed by: INTERNAL MEDICINE

## 2024-08-17 PROCEDURE — 85025 COMPLETE CBC W/AUTO DIFF WBC: CPT

## 2024-08-17 PROCEDURE — 74177 CT ABD & PELVIS W/CONTRAST: CPT

## 2024-08-17 PROCEDURE — 36415 COLL VENOUS BLD VENIPUNCTURE: CPT

## 2024-08-17 PROCEDURE — 51702 INSERT TEMP BLADDER CATH: CPT

## 2024-08-17 PROCEDURE — 2500000004 HC RX 250 GENERAL PHARMACY W/ HCPCS (ALT 636 FOR OP/ED)

## 2024-08-17 PROCEDURE — 84132 ASSAY OF SERUM POTASSIUM: CPT

## 2024-08-17 PROCEDURE — 99233 SBSQ HOSP IP/OBS HIGH 50: CPT | Performed by: INTERNAL MEDICINE

## 2024-08-17 PROCEDURE — 2550000001 HC RX 255 CONTRASTS: Performed by: INTERNAL MEDICINE

## 2024-08-17 PROCEDURE — 2500000004 HC RX 250 GENERAL PHARMACY W/ HCPCS (ALT 636 FOR OP/ED): Performed by: INTERNAL MEDICINE

## 2024-08-17 PROCEDURE — 74177 CT ABD & PELVIS W/CONTRAST: CPT | Performed by: RADIOLOGY

## 2024-08-17 PROCEDURE — 2060000001 HC INTERMEDIATE ICU ROOM DAILY

## 2024-08-17 PROCEDURE — 82947 ASSAY GLUCOSE BLOOD QUANT: CPT

## 2024-08-17 RX ORDER — DEXTROSE MONOHYDRATE AND SODIUM CHLORIDE 5; .45 G/100ML; G/100ML
500 INJECTION, SOLUTION INTRAVENOUS ONCE
Status: COMPLETED | OUTPATIENT
Start: 2024-08-17 | End: 2024-08-17

## 2024-08-17 RX ORDER — IPRATROPIUM BROMIDE AND ALBUTEROL SULFATE 2.5; .5 MG/3ML; MG/3ML
3 SOLUTION RESPIRATORY (INHALATION) EVERY 4 HOURS PRN
Status: DISCONTINUED | OUTPATIENT
Start: 2024-08-17 | End: 2024-08-23 | Stop reason: HOSPADM

## 2024-08-17 RX ORDER — MAGNESIUM SULFATE HEPTAHYDRATE 40 MG/ML
2 INJECTION, SOLUTION INTRAVENOUS ONCE
Status: COMPLETED | OUTPATIENT
Start: 2024-08-17 | End: 2024-08-17

## 2024-08-17 RX ORDER — POTASSIUM CHLORIDE 1.5 G/1.58G
40 POWDER, FOR SOLUTION ORAL 2 TIMES DAILY
Status: COMPLETED | OUTPATIENT
Start: 2024-08-17 | End: 2024-08-18

## 2024-08-17 RX ADMIN — HEPARIN SODIUM 5000 UNITS: 5000 INJECTION INTRAVENOUS; SUBCUTANEOUS at 19:45

## 2024-08-17 RX ADMIN — HYDRALAZINE HYDROCHLORIDE 25 MG: 25 TABLET ORAL at 14:58

## 2024-08-17 RX ADMIN — ACETAMINOPHEN 1000 MG: 10 INJECTION INTRAVENOUS at 17:49

## 2024-08-17 RX ADMIN — PIPERACILLIN SODIUM AND TAZOBACTAM SODIUM 3.38 G: 3; .375 INJECTION, SOLUTION INTRAVENOUS at 01:27

## 2024-08-17 RX ADMIN — HEPARIN SODIUM 5000 UNITS: 5000 INJECTION INTRAVENOUS; SUBCUTANEOUS at 05:10

## 2024-08-17 RX ADMIN — HYDRALAZINE HYDROCHLORIDE 25 MG: 25 TABLET ORAL at 19:45

## 2024-08-17 RX ADMIN — ACETAMINOPHEN 1000 MG: 10 INJECTION INTRAVENOUS at 09:49

## 2024-08-17 RX ADMIN — PIPERACILLIN SODIUM AND TAZOBACTAM SODIUM 3.38 G: 3; .375 INJECTION, SOLUTION INTRAVENOUS at 14:57

## 2024-08-17 RX ADMIN — CARVEDILOL 6.25 MG: 6.25 TABLET, FILM COATED ORAL at 19:44

## 2024-08-17 RX ADMIN — PIPERACILLIN SODIUM AND TAZOBACTAM SODIUM 3.38 G: 3; .375 INJECTION, SOLUTION INTRAVENOUS at 19:46

## 2024-08-17 RX ADMIN — PIPERACILLIN SODIUM AND TAZOBACTAM SODIUM 3.38 G: 3; .375 INJECTION, SOLUTION INTRAVENOUS at 08:41

## 2024-08-17 RX ADMIN — ACETAMINOPHEN 1000 MG: 10 INJECTION INTRAVENOUS at 02:30

## 2024-08-17 RX ADMIN — POTASSIUM CHLORIDE 40 MEQ: 1.5 POWDER, FOR SOLUTION ORAL at 19:45

## 2024-08-17 RX ADMIN — CARVEDILOL 6.25 MG: 6.25 TABLET, FILM COATED ORAL at 08:41

## 2024-08-17 RX ADMIN — PANTOPRAZOLE SODIUM 40 MG: 40 INJECTION, POWDER, FOR SOLUTION INTRAVENOUS at 05:10

## 2024-08-17 RX ADMIN — LOSARTAN POTASSIUM 50 MG: 50 TABLET, FILM COATED ORAL at 08:41

## 2024-08-17 RX ADMIN — DEXTROSE AND SODIUM CHLORIDE 500 ML: 5; 450 INJECTION, SOLUTION INTRAVENOUS at 16:40

## 2024-08-17 RX ADMIN — HYDRALAZINE HYDROCHLORIDE 25 MG: 25 TABLET ORAL at 08:41

## 2024-08-17 RX ADMIN — IOHEXOL 75 ML: 350 INJECTION, SOLUTION INTRAVENOUS at 17:11

## 2024-08-17 RX ADMIN — HEPARIN SODIUM 5000 UNITS: 5000 INJECTION INTRAVENOUS; SUBCUTANEOUS at 11:55

## 2024-08-17 RX ADMIN — MAGNESIUM SULFATE HEPTAHYDRATE 2 G: 40 INJECTION, SOLUTION INTRAVENOUS at 16:40

## 2024-08-17 RX ADMIN — ATORVASTATIN CALCIUM 40 MG: 40 TABLET, FILM COATED ORAL at 19:44

## 2024-08-17 ASSESSMENT — PAIN SCALES - GENERAL
PAINLEVEL_OUTOF10: 0 - NO PAIN
PAINLEVEL_OUTOF10: 0 - NO PAIN

## 2024-08-17 NOTE — CARE PLAN
Problem: Skin  Goal: Promote/optimize nutrition  Outcome: Progressing  Goal: Promote skin healing  Outcome: Progressing     Problem: Pain  Goal: Takes deep breaths with improved pain control throughout the shift  Outcome: Progressing  Goal: Turns in bed with improved pain control throughout the shift  Outcome: Progressing   The patient's goals for the shift include      The clinical goals for the shift include Pain Control

## 2024-08-17 NOTE — NURSING NOTE
Pt returned from ct scan resting in bed. 2 loose brown stools today. Carse provided. Tf cont. As ordered and miriam. Well. <5 ml residuals. Free water increased to 300ml Q 4 hrs. Wound tx done as ordered at 18:00. Binder in place

## 2024-08-17 NOTE — NURSING NOTE
Abd incision dressing change done at 9am today. Several staples intact. Small opening, beefy red. Cleansed with ns and w/d packing completed. Dry dressing over incision. Small pin hole opening with small sero/sang drg from removed earlene also cleansed with ns and dry drsg applied. Abd. Binder reapplied. Hob elevated. Aquiles. Tf at goal rate of 60ml/hr. Aquiles 250ml h2o flushes q 4 hrs. <5 ml residuals this am.

## 2024-08-17 NOTE — PROGRESS NOTES
Abdi Wilson is a 79 y.o. male on day 7 of admission presenting with PEG tube malfunction (Multi).      Subjective   Abdi Wilson is a 79 y.o. male with PMHx s/f hypertension dyslipidemia old CVA mild cardiomyopathy ulcerative colitis GERD recent CVA in June of this year with dysphagia and expressive aphasia presenting with abdominal pain fever.  Patient had a PEG tube placed for nutritional purposes about 2 days ago.  Had presented to emergency department complaining of some pain in the abdominal area the patient was noted to have low blood pressure and fever in emergency department.  On presenting to emergency department patient had temperature 96.3 heart rate 102 respiratory rate 24 blood pressure 135/81 Javy panel shows sodium 148 potassium 4.2 chloride 112 bicarb 25 BUN 37 creatinine 1.38 glucose 144 liver enzymes within normal limits total bilirubin 1.5 initial lactate 2.5 repeat lactate 2.4 CBC showing WBC 13.6 hemoglobin 19.7 hematocrit 60.7 platelets 269 CT scan of the abdomen pelvis was done showing malposition PEG tube there is a small amount of free air, patient was seen by general surgery who felt patient had peritonitis plan is to take patient for emergent surgery.   8/11: Patient was seen and examined.  He is nonverbal at baseline and still looks lethargic this morning.  Blood pressure is improved and patient is off pressors.  Hypernatremia persistent and patient has been started on IV fluid D5 water.  Will get repeat BMP and trend.  Continue current IV antibiotics.  Blood cultures are still pending.  Gastrostomy replacement done 8/10/2024.  Continue n.p.o. by general surgery recommendation.  Repeat CBC and BMP in a.m.  8/12: Patient was seen and examined.  He remains nonverbal which is baseline however patient is not responsive or interactive.  Failed attempt at physical therapy.  Discussed with patient's son over the phone and granddaughter.  Patient was cooperating adequately with physical  therapy prior to this admission.  I will get a CT of the head to rule out any recent stroke.  Stat lactic and ammonia TSH and vitamin B12.  General surgery still recommending holding G-tube.  We will have to consider other modes of feeding within 24 hours.  Will continue IV fluids pending review of stat BMP.  Leukocytosis trending down.  Blood cultures are negative.  Continue to trend CBC and BMP daily.  8/13: Patient was seen and examined.  Patient is more awake today; still lethargic.  Discussed with general surgery.  Patient to get contrast study by tomorrow and if no further abnormalities NG to be discontinued and will start the patient on oral diet and advance as tolerated.  Continue to trend CBC and BMP daily.  8/14: Patient was seen and examined.  Still lethargic, drowsy but arousable.  Fluoroscopic studies completed and reports pending.  Patient is now requiring 3 L nasal cannula oxygen to maintain saturation so I will get a stat chest x-ray.  General surgery to decide on NG status and possible tube feeding versus TPN.  Hypokalemia noted.  IV potassium given.  Continue to trend CBC and BMP daily.  8/15: Patient was seen and examined.  Looks clinically better today still although still lethargic.  NG tube discontinued and diet advanced per general surgery recommendation.  Repeat chest x-ray yesterday shows no changes compared to previous.  Wound culture is growing Pseudomonas which is pansensitive.  Continue IV Zosyn.  Blood cultures have remained negative x 4 days.  Potential discharge back to extended-care facility once patient is tolerating goal rate of PEG tube feeding.  Repeat CBC and BMP in a.m.  8/16:Mr. Abdi Wilson is a 79 y.o. male, with a past medical history of CVA hemiplegia dysphagia dysarthria, status post PEG tube admitted for PEG tube malfunction status post replaced given also IV antibiotic Zosyn for negative blood culture, +ve wound culture is negative, pending tube feed tolerance.  Plan  to discharge him to SNF once tolerated feeding tube. Target to 60-65 ml/h. Now at 50cc.   8/17: Patient with increasing abdominal pain a lot of drainage from the wound as well.  Growing out abundant Pseudomonas from the wound currently and is susceptible to Zosyn.  Will get a CT of abdomen and pelvis since it has been over a week and patient has significant pain and white count is up a little bit.  Difficult historian because of previous stroke.  Also note increasing sodium will give a fluid bolus and increase free water.  May have to reinvolve surgery await CT results.  I believe tube feeds are at goal currently.  Once doing better will be returned back [facility].      Objective   EXAM:    Constitutional: Patient with expressive aphasia appears uncomfortable    Head and facial: Dry mucosa    Neck: Neck is not rigid    Lungs: Some crackles and rales bilaterally    Heart: Tachycardia    Abdomen: Abdominal pain PEG tube is in place drainage from acting from wound    Pelvis/: No flank tenderness    Extremities: No edema    Neurologic: Patient with expressive aphasia unable to move right upper extremity appears uncomfortable    Dermatologic: Drainage from abdominal wound    Psychiatric/behavioral: Patient uncomfortable      Last Recorded Vitals  /72 (BP Location: Left arm, Patient Position: Lying)   Pulse 85   Temp 36.6 °C (97.9 °F) (Temporal)   Resp 18   Wt 90.3 kg (199 lb 1.2 oz)   SpO2 94%   Intake/Output last 3 Shifts:    Intake/Output Summary (Last 24 hours) at 8/17/2024 1629  Last data filed at 8/17/2024 1009  Gross per 24 hour   Intake 2966 ml   Output 2600 ml   Net 366 ml       Admission Weight  Weight: 94.3 kg (208 lb) (08/10/24 1113)    Daily Weight  08/17/24 : 90.3 kg (199 lb 1.2 oz)    I    Labs, x-rays and in chart reviewed r.       Scheduled medications  acetaminophen, 1,000 mg, intravenous, q8h  atorvastatin, 40 mg, oral, Nightly  carvedilol, 6.25 mg, oral, BID  dextrose 5%-0.45 % sodium  chloride, 500 mL, intravenous, Once  pantoprazole, 40 mg, oral, Daily before breakfast   Or  esomeprazole, 40 mg, nasoduodenal tube, Daily before breakfast   Or  pantoprazole, 40 mg, intravenous, Daily before breakfast  heparin (porcine), 5,000 Units, subcutaneous, q8h  hydrALAZINE, 25 mg, oral, TID  losartan, 50 mg, oral, Daily  magnesium sulfate, 2 g, intravenous, Once  piperacillin-tazobactam, 3.375 g, intravenous, q6h  potassium chloride, 40 mEq, oral, BID      Continuous medications     PRN medications  PRN medications: morphine    Assessment/Plan      Peritonitis &sepsis s/p exploratory laparotomy and G-tube placement 8/10  Draining abdominal wound with Pseudomonas  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 twice daily  Hydralazine 25 mg 3 times daily  Losartan 50 mg a day  Protonix 40 mg a day  PT and OT  Pulmonary toilet  DuoNeb as needed  500 cc fluid bolus IV  Increase free water to 300 every 4 hours per PEG  Discharge planning [facility]  Check labs in a.m.  See orders for complete plan                        Spent 35 minutes in follow-up management of this patient       Ezekiel Cintron MD

## 2024-08-17 NOTE — CARE PLAN
The patient's goals for the shift include      The clinical goals for the shift include Pain Control    Over the shift, the patient did not make progress toward the following goals.

## 2024-08-18 VITALS
HEART RATE: 86 BPM | RESPIRATION RATE: 20 BRPM | TEMPERATURE: 96.1 F | SYSTOLIC BLOOD PRESSURE: 157 MMHG | DIASTOLIC BLOOD PRESSURE: 83 MMHG | OXYGEN SATURATION: 93 % | WEIGHT: 199.08 LBS | BODY MASS INDEX: 29.49 KG/M2 | HEIGHT: 69 IN

## 2024-08-18 LAB
ANION GAP SERPL CALC-SCNC: 11 MMOL/L (ref 10–20)
BASOPHILS # BLD AUTO: 0.05 X10*3/UL (ref 0–0.1)
BASOPHILS NFR BLD AUTO: 0.3 %
BUN SERPL-MCNC: 11 MG/DL (ref 6–23)
CALCIUM SERPL-MCNC: 7.5 MG/DL (ref 8.6–10.3)
CHLORIDE SERPL-SCNC: 107 MMOL/L (ref 98–107)
CO2 SERPL-SCNC: 21 MMOL/L (ref 21–32)
CREAT SERPL-MCNC: 0.69 MG/DL (ref 0.5–1.3)
EGFRCR SERPLBLD CKD-EPI 2021: >90 ML/MIN/1.73M*2
EOSINOPHIL # BLD AUTO: 0.26 X10*3/UL (ref 0–0.4)
EOSINOPHIL NFR BLD AUTO: 1.5 %
ERYTHROCYTE [DISTWIDTH] IN BLOOD BY AUTOMATED COUNT: 14.7 % (ref 11.5–14.5)
GLUCOSE BLD MANUAL STRIP-MCNC: 109 MG/DL (ref 74–99)
GLUCOSE BLD MANUAL STRIP-MCNC: 114 MG/DL (ref 74–99)
GLUCOSE BLD MANUAL STRIP-MCNC: 126 MG/DL (ref 74–99)
GLUCOSE BLD MANUAL STRIP-MCNC: 132 MG/DL (ref 74–99)
GLUCOSE BLD MANUAL STRIP-MCNC: 133 MG/DL (ref 74–99)
GLUCOSE BLD MANUAL STRIP-MCNC: 133 MG/DL (ref 74–99)
GLUCOSE BLD MANUAL STRIP-MCNC: 140 MG/DL (ref 74–99)
GLUCOSE SERPL-MCNC: 130 MG/DL (ref 74–99)
HCT VFR BLD AUTO: 39.3 % (ref 41–52)
HGB BLD-MCNC: 13.3 G/DL (ref 13.5–17.5)
IMM GRANULOCYTES # BLD AUTO: 0.17 X10*3/UL (ref 0–0.5)
IMM GRANULOCYTES NFR BLD AUTO: 1 % (ref 0–0.9)
LYMPHOCYTES # BLD AUTO: 1.35 X10*3/UL (ref 0.8–3)
LYMPHOCYTES NFR BLD AUTO: 7.9 %
MAGNESIUM SERPL-MCNC: 2.12 MG/DL (ref 1.6–2.4)
MCH RBC QN AUTO: 29.2 PG (ref 26–34)
MCHC RBC AUTO-ENTMCNC: 33.8 G/DL (ref 32–36)
MCV RBC AUTO: 86 FL (ref 80–100)
MONOCYTES # BLD AUTO: 1.16 X10*3/UL (ref 0.05–0.8)
MONOCYTES NFR BLD AUTO: 6.8 %
NEUTROPHILS # BLD AUTO: 14.05 X10*3/UL (ref 1.6–5.5)
NEUTROPHILS NFR BLD AUTO: 82.5 %
NRBC BLD-RTO: 0 /100 WBCS (ref 0–0)
PLATELET # BLD AUTO: 283 X10*3/UL (ref 150–450)
POTASSIUM SERPL-SCNC: 3.9 MMOL/L (ref 3.5–5.3)
RBC # BLD AUTO: 4.56 X10*6/UL (ref 4.5–5.9)
SODIUM SERPL-SCNC: 135 MMOL/L (ref 136–145)
WBC # BLD AUTO: 17 X10*3/UL (ref 4.4–11.3)

## 2024-08-18 PROCEDURE — 80048 BASIC METABOLIC PNL TOTAL CA: CPT | Performed by: INTERNAL MEDICINE

## 2024-08-18 PROCEDURE — 2500000001 HC RX 250 WO HCPCS SELF ADMINISTERED DRUGS (ALT 637 FOR MEDICARE OP): Performed by: INTERNAL MEDICINE

## 2024-08-18 PROCEDURE — 85025 COMPLETE CBC W/AUTO DIFF WBC: CPT | Performed by: INTERNAL MEDICINE

## 2024-08-18 PROCEDURE — 36415 COLL VENOUS BLD VENIPUNCTURE: CPT | Performed by: INTERNAL MEDICINE

## 2024-08-18 PROCEDURE — 82947 ASSAY GLUCOSE BLOOD QUANT: CPT

## 2024-08-18 PROCEDURE — 2060000001 HC INTERMEDIATE ICU ROOM DAILY

## 2024-08-18 PROCEDURE — 2500000004 HC RX 250 GENERAL PHARMACY W/ HCPCS (ALT 636 FOR OP/ED)

## 2024-08-18 PROCEDURE — 2500000004 HC RX 250 GENERAL PHARMACY W/ HCPCS (ALT 636 FOR OP/ED): Performed by: INTERNAL MEDICINE

## 2024-08-18 PROCEDURE — 83735 ASSAY OF MAGNESIUM: CPT | Performed by: INTERNAL MEDICINE

## 2024-08-18 PROCEDURE — 99233 SBSQ HOSP IP/OBS HIGH 50: CPT | Performed by: INTERNAL MEDICINE

## 2024-08-18 RX ORDER — HYDRALAZINE HYDROCHLORIDE 20 MG/ML
5 INJECTION INTRAMUSCULAR; INTRAVENOUS EVERY 6 HOURS PRN
Status: DISCONTINUED | OUTPATIENT
Start: 2024-08-18 | End: 2024-08-23 | Stop reason: HOSPADM

## 2024-08-18 RX ORDER — ACETAMINOPHEN 325 MG/1
975 TABLET ORAL EVERY 8 HOURS
Status: DISCONTINUED | OUTPATIENT
Start: 2024-08-18 | End: 2024-08-21

## 2024-08-18 RX ORDER — FUROSEMIDE 10 MG/ML
10 INJECTION INTRAMUSCULAR; INTRAVENOUS ONCE
Status: COMPLETED | OUTPATIENT
Start: 2024-08-18 | End: 2024-08-18

## 2024-08-18 RX ADMIN — PIPERACILLIN SODIUM AND TAZOBACTAM SODIUM 3.38 G: 3; .375 INJECTION, SOLUTION INTRAVENOUS at 14:21

## 2024-08-18 RX ADMIN — HEPARIN SODIUM 5000 UNITS: 5000 INJECTION INTRAVENOUS; SUBCUTANEOUS at 14:34

## 2024-08-18 RX ADMIN — HEPARIN SODIUM 5000 UNITS: 5000 INJECTION INTRAVENOUS; SUBCUTANEOUS at 20:43

## 2024-08-18 RX ADMIN — ACETAMINOPHEN 975 MG: 325 TABLET ORAL at 18:41

## 2024-08-18 RX ADMIN — PANTOPRAZOLE SODIUM 40 MG: 40 INJECTION, POWDER, FOR SOLUTION INTRAVENOUS at 05:20

## 2024-08-18 RX ADMIN — CARVEDILOL 6.25 MG: 6.25 TABLET, FILM COATED ORAL at 20:43

## 2024-08-18 RX ADMIN — MORPHINE SULFATE 2 MG: 2 INJECTION, SOLUTION INTRAMUSCULAR; INTRAVENOUS at 08:44

## 2024-08-18 RX ADMIN — PIPERACILLIN SODIUM AND TAZOBACTAM SODIUM 3.38 G: 3; .375 INJECTION, SOLUTION INTRAVENOUS at 20:44

## 2024-08-18 RX ADMIN — HEPARIN SODIUM 5000 UNITS: 5000 INJECTION INTRAVENOUS; SUBCUTANEOUS at 05:20

## 2024-08-18 RX ADMIN — MORPHINE SULFATE 2 MG: 2 INJECTION, SOLUTION INTRAMUSCULAR; INTRAVENOUS at 14:21

## 2024-08-18 RX ADMIN — MORPHINE SULFATE 2 MG: 2 INJECTION, SOLUTION INTRAMUSCULAR; INTRAVENOUS at 23:36

## 2024-08-18 RX ADMIN — CARVEDILOL 6.25 MG: 6.25 TABLET, FILM COATED ORAL at 08:44

## 2024-08-18 RX ADMIN — ACETAMINOPHEN 1000 MG: 10 INJECTION INTRAVENOUS at 01:40

## 2024-08-18 RX ADMIN — POTASSIUM CHLORIDE 40 MEQ: 1.5 POWDER, FOR SOLUTION ORAL at 08:44

## 2024-08-18 RX ADMIN — PIPERACILLIN SODIUM AND TAZOBACTAM SODIUM 3.38 G: 3; .375 INJECTION, SOLUTION INTRAVENOUS at 08:44

## 2024-08-18 RX ADMIN — FUROSEMIDE 10 MG: 10 INJECTION, SOLUTION INTRAMUSCULAR; INTRAVENOUS at 17:12

## 2024-08-18 RX ADMIN — HYDRALAZINE HYDROCHLORIDE 25 MG: 25 TABLET ORAL at 20:43

## 2024-08-18 RX ADMIN — ATORVASTATIN CALCIUM 40 MG: 40 TABLET, FILM COATED ORAL at 20:43

## 2024-08-18 RX ADMIN — MORPHINE SULFATE 2 MG: 2 INJECTION, SOLUTION INTRAMUSCULAR; INTRAVENOUS at 18:41

## 2024-08-18 RX ADMIN — PIPERACILLIN SODIUM AND TAZOBACTAM SODIUM 3.38 G: 3; .375 INJECTION, SOLUTION INTRAVENOUS at 01:40

## 2024-08-18 RX ADMIN — POTASSIUM CHLORIDE 40 MEQ: 1.5 POWDER, FOR SOLUTION ORAL at 20:43

## 2024-08-18 RX ADMIN — HYDRALAZINE HYDROCHLORIDE 25 MG: 25 TABLET ORAL at 08:44

## 2024-08-18 RX ADMIN — HYDRALAZINE HYDROCHLORIDE 25 MG: 25 TABLET ORAL at 14:21

## 2024-08-18 RX ADMIN — LOSARTAN POTASSIUM 50 MG: 50 TABLET, FILM COATED ORAL at 09:01

## 2024-08-18 RX ADMIN — MORPHINE SULFATE 2 MG: 2 INJECTION, SOLUTION INTRAMUSCULAR; INTRAVENOUS at 01:39

## 2024-08-18 RX ADMIN — ACETAMINOPHEN 1000 MG: 10 INJECTION INTRAVENOUS at 10:30

## 2024-08-18 ASSESSMENT — PAIN SCALES - GENERAL
PAINLEVEL_OUTOF10: 3
PAINLEVEL_OUTOF10: 6

## 2024-08-18 ASSESSMENT — PAIN SCALES - PAIN ASSESSMENT IN ADVANCED DEMENTIA (PAINAD)
FACIALEXPRESSION: FACIAL GRIMACING
CONSOLABILITY: DISTRACTED OR REASSURED BY VOICE/TOUCH
BREATHING: OCCASIONAL LABORED BREATHING, SHORT PERIOD OF HYPERVENTILATION
TOTALSCORE: 2
BODYLANGUAGE: TENSE, DISTRESSED PACING, FIDGETING
NEGVOCALIZATION: OCCASIONAL MOAN/GROAN, LOW SPEECH, NEGATIVE/DISAPPROVING QUALITY
BODYLANGUAGE: TENSE, DISTRESSED PACING, FIDGETING
NEGVOCALIZATION: OCCASIONAL MOAN/GROAN, LOW SPEECH, NEGATIVE/DISAPPROVING QUALITY
TOTALSCORE: 6
FACIALEXPRESSION: SAD, FRIGHTENED, FROWN
FACIALEXPRESSION: SMILING OR INEXPRESSIVE
NEGVOCALIZATION: OCCASIONAL MOAN/GROAN, LOW SPEECH, NEGATIVE/DISAPPROVING QUALITY
BODYLANGUAGE: RELAXED
BREATHING: OCCASIONAL LABORED BREATHING, SHORT PERIOD OF HYPERVENTILATION
BREATHING: OCCASIONAL LABORED BREATHING, SHORT PERIOD OF HYPERVENTILATION
CONSOLABILITY: DISTRACTED OR REASSURED BY VOICE/TOUCH
TOTALSCORE: MEDICATION (SEE MAR)
TOTALSCORE: 5
CONSOLABILITY: NO NEED TO CONSOLE

## 2024-08-18 ASSESSMENT — COGNITIVE AND FUNCTIONAL STATUS - GENERAL
EATING MEALS: TOTAL
MOBILITY SCORE: 10
DAILY ACTIVITIY SCORE: 6
DRESSING REGULAR LOWER BODY CLOTHING: TOTAL
DRESSING REGULAR UPPER BODY CLOTHING: TOTAL
WALKING IN HOSPITAL ROOM: TOTAL
HELP NEEDED FOR BATHING: TOTAL
PERSONAL GROOMING: TOTAL
CLIMB 3 TO 5 STEPS WITH RAILING: TOTAL
TURNING FROM BACK TO SIDE WHILE IN FLAT BAD: A LOT
MOVING FROM LYING ON BACK TO SITTING ON SIDE OF FLAT BED WITH BEDRAILS: A LOT
STANDING UP FROM CHAIR USING ARMS: A LOT
MOVING TO AND FROM BED TO CHAIR: A LOT
TOILETING: TOTAL

## 2024-08-18 ASSESSMENT — PAIN - FUNCTIONAL ASSESSMENT
PAIN_FUNCTIONAL_ASSESSMENT: 0-10
PAIN_FUNCTIONAL_ASSESSMENT: 0-10

## 2024-08-18 ASSESSMENT — PAIN DESCRIPTION - LOCATION: LOCATION: ABDOMEN

## 2024-08-18 NOTE — SIGNIFICANT EVENT
Patient had CT of the abdomen and pelvis yesterday which demonstrated a large casandra-splenic abscess and therefore surgery has been re-engaged. Patient has been hemodynamically normal and afebrile. Has had rising leukocytosis, otherwise labs have been stable. Patient has been tolerating tube feeds and having bowel function. Recommend IR consult for drain placement. Will likely be placed 8/19 when IR is available. IR drain consult placed. Hold TF at midnight and AM subQ heparin. Surgery will follow.    Discussed with attending Dr. Velásquez.    Nadia Massey, DO - PGY4  General Surgery

## 2024-08-18 NOTE — PROGRESS NOTES
Abdi Wilson is a 79 y.o. male on day 8 of admission presenting with PEG tube malfunction (Multi).      Subjective   Abdi Wilson is a 79 y.o. male with PMHx s/f hypertension dyslipidemia old CVA mild cardiomyopathy ulcerative colitis GERD recent CVA in June of this year with dysphagia and expressive aphasia presenting with abdominal pain fever.  Patient had a PEG tube placed for nutritional purposes about 2 days ago.  Had presented to emergency department complaining of some pain in the abdominal area the patient was noted to have low blood pressure and fever in emergency department.  On presenting to emergency department patient had temperature 96.3 heart rate 102 respiratory rate 24 blood pressure 135/81 Javy panel shows sodium 148 potassium 4.2 chloride 112 bicarb 25 BUN 37 creatinine 1.38 glucose 144 liver enzymes within normal limits total bilirubin 1.5 initial lactate 2.5 repeat lactate 2.4 CBC showing WBC 13.6 hemoglobin 19.7 hematocrit 60.7 platelets 269 CT scan of the abdomen pelvis was done showing malposition PEG tube there is a small amount of free air, patient was seen by general surgery who felt patient had peritonitis plan is to take patient for emergent surgery.   8/11: Patient was seen and examined.  He is nonverbal at baseline and still looks lethargic this morning.  Blood pressure is improved and patient is off pressors.  Hypernatremia persistent and patient has been started on IV fluid D5 water.  Will get repeat BMP and trend.  Continue current IV antibiotics.  Blood cultures are still pending.  Gastrostomy replacement done 8/10/2024.  Continue n.p.o. by general surgery recommendation.  Repeat CBC and BMP in a.m.  8/12: Patient was seen and examined.  He remains nonverbal which is baseline however patient is not responsive or interactive.  Failed attempt at physical therapy.  Discussed with patient's son over the phone and granddaughter.  Patient was cooperating adequately with physical  therapy prior to this admission.  I will get a CT of the head to rule out any recent stroke.  Stat lactic and ammonia TSH and vitamin B12.  General surgery still recommending holding G-tube.  We will have to consider other modes of feeding within 24 hours.  Will continue IV fluids pending review of stat BMP.  Leukocytosis trending down.  Blood cultures are negative.  Continue to trend CBC and BMP daily.  8/13: Patient was seen and examined.  Patient is more awake today; still lethargic.  Discussed with general surgery.  Patient to get contrast study by tomorrow and if no further abnormalities NG to be discontinued and will start the patient on oral diet and advance as tolerated.  Continue to trend CBC and BMP daily.  8/14: Patient was seen and examined.  Still lethargic, drowsy but arousable.  Fluoroscopic studies completed and reports pending.  Patient is now requiring 3 L nasal cannula oxygen to maintain saturation so I will get a stat chest x-ray.  General surgery to decide on NG status and possible tube feeding versus TPN.  Hypokalemia noted.  IV potassium given.  Continue to trend CBC and BMP daily.  8/15: Patient was seen and examined.  Looks clinically better today still although still lethargic.  NG tube discontinued and diet advanced per general surgery recommendation.  Repeat chest x-ray yesterday shows no changes compared to previous.  Wound culture is growing Pseudomonas which is pansensitive.  Continue IV Zosyn.  Blood cultures have remained negative x 4 days.  Potential discharge back to extended-care facility once patient is tolerating goal rate of PEG tube feeding.  Repeat CBC and BMP in a.m.  8/16:Mr. Abdi Wilson is a 79 y.o. male, with a past medical history of CVA hemiplegia dysphagia dysarthria, status post PEG tube admitted for PEG tube malfunction status post replaced given also IV antibiotic Zosyn for negative blood culture, +ve wound culture is negative, pending tube feed tolerance.  Plan  to discharge him to SNF once tolerated feeding tube. Target to 60-65 ml/h. Now at 50cc.   8/17: Patient with increasing abdominal pain a lot of drainage from the wound as well.  Growing out abundant Pseudomonas from the wound currently and is susceptible to Zosyn.  Will get a CT of abdomen and pelvis since it has been over a week and patient has significant pain and white count is up a little bit.  Difficult historian because of previous stroke.  Also note increasing sodium will give a fluid bolus and increase free water.  May have to reinvolve surgery await CT results.  I believe tube feeds are at goal currently.  Once doing better will be returned back [facility].  8/18: Very large abscess noted on CAT scan 15.7 x 9.7 x 11.1 cm possibly subcapsular abscess by the spleen.  ID agrees with continuing Zosyn IR consulted for placement of drain.  Will need surgery to reengage on this patient.  Will reduce free water to 250 flushes.  Continue pulmonary toilet.  Patient's white count is going up which is concerning to discussed with ID.  Clinically appears stable right now.  Will check labs in AM.  Patient's expressive and receptive aphasia continue to be a barrier.    Objective   EXAM:    Constitutional: Patient with expressive aphasia appears uncomfortable    Head and facial: Dry mucosa    Neck: Neck is not rigid    Lungs: Some crackles and rales bilaterally    Heart: Tachycardia    Abdomen: Abdominal pain PEG tube is in place drainage from acting from wound    Pelvis/: No flank tenderness    Extremities: No edema    Neurologic: Patient with expressive aphasia unable to move right upper extremity appears uncomfortable    Dermatologic: Drainage from abdominal wound    Psychiatric/behavioral: Patient uncomfortable      Last Recorded Vitals  /71 (BP Location: Left arm, Patient Position: Lying)   Pulse 77   Temp 36.7 °C (98.1 °F) (Temporal)   Resp 18   Wt 90.3 kg (199 lb 1.2 oz)   SpO2 93%   Intake/Output  last 3 Shifts:    Intake/Output Summary (Last 24 hours) at 8/18/2024 1507  Last data filed at 8/18/2024 1059  Gross per 24 hour   Intake 2122.83 ml   Output 1750 ml   Net 372.83 ml       Admission Weight  Weight: 94.3 kg (208 lb) (08/10/24 1113)    Daily Weight  08/17/24 : 90.3 kg (199 lb 1.2 oz)    I    Labs, x-rays and in chart reviewed r.       Scheduled medications  acetaminophen, 1,000 mg, intravenous, q8h  atorvastatin, 40 mg, oral, Nightly  carvedilol, 6.25 mg, oral, BID  pantoprazole, 40 mg, oral, Daily before breakfast   Or  esomeprazole, 40 mg, nasoduodenal tube, Daily before breakfast   Or  pantoprazole, 40 mg, intravenous, Daily before breakfast  heparin (porcine), 5,000 Units, subcutaneous, q8h  hydrALAZINE, 25 mg, oral, TID  losartan, 50 mg, oral, Daily  piperacillin-tazobactam, 3.375 g, intravenous, q6h  potassium chloride, 40 mEq, oral, BID      Continuous medications     PRN medications  PRN medications: hydrALAZINE, ipratropium-albuteroL, morphine    Assessment/Plan      Peritonitis &sepsis s/p exploratory laparotomy and G-tube placement 8/10  Draining abdominal wound with Pseudomonas  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 twice daily  Hydralazine 25 mg 3 times daily  Losartan 50 mg a day  Protonix 40 mg a day  PT and OT  Pulmonary toilet  DuoNeb as needed  Change free water to 250 mL every 4 hours per PEG  Suction as needed  Discharge planning [facility]  Check labs in a.m.  See orders for complete plan                        Spent 35 minutes in follow-up  management of this patient       Ezekiel Cintron MD

## 2024-08-18 NOTE — PROGRESS NOTES
Informed Dr Doss of critical findings on the abdomen pelvis CT. Orders for strict NPO. TF turned off at this time.     620  Okay to restart TF.

## 2024-08-18 NOTE — CONSULTS
Infectious Disease Inpatient Consult    Consults  Referred by Dr Saida Osullivan MD: Tobin Hawley MD    Reason For Consult  Intra-abdominal abscess      Assessment/Plan:    #Perisplenic, possibly subcapsular abscess  #Pseudomonas intra-abdominal abscess s/p washout 08/10  15.7 x 9.7 x 11.1 fluid and gas collection most compatible with abscess seen on the CT abdomen..  Patient is currently on appropriate antibiotics and is pending source control.  IR consulted for drain placement.  Patient might need surgical evaluation as well given the size of the abscess.  Likely Pseudomonas as patient recently had Pseudomonas infection and abscess was seen in the all the 4 quadrants.    Recommendations:    -Continue Zosyn 3.375 every 6 hours  -Pending evaluation by IR for drain placement  -Patient might need surgical intervention given the size of the abscess  -Obtain blood cultures if patient completely unstable  -Thank you for consult.  Will continue to follow        Helio Weinberg MD  Date of service: 8/18/2024  Time of service: 12:33 PM      History Of Present Illness    Abdi Wilson is a 79 y.o. male with PMHx of CVA, HTN, HLD, GERD who had PEG tube placed on August 8 and then presented to the hospital again on August 10 for abdominal pain.  CT abdomen pelvis done showed malpositioned PEG tube with concern for peritonitis and patient was taken to the OR emergently.  Pus was noted in all 4 quadrants and was irrigated and cultures were taken which grew pansensitive Pseudomonas.    Patient was doing well up until August 16 when his WBC count slowly started worsening along with worsening abdominal pain and yesterday CT abdomen pelvis was done which showed 15.7 x 9.7 x 11.1 cm perisplenic, possibly subcapsular, collection of   fluid and gas most compatible with abscess.     Infectious disease consulted for further antibiotic recommendation     Past Medical History  He has a past medical history of Stroke  (Multi).    Surgical History  He has no past surgical history on file.     Social History     Occupational History    Not on file   Tobacco Use    Smoking status: Never     Passive exposure: Never    Smokeless tobacco: Never   Vaping Use    Vaping status: Not on file   Substance and Sexual Activity    Alcohol use: Not on file    Drug use: Not on file    Sexual activity: Not on file     Travel History   Travel since 07/18/24    No documented travel since 07/18/24        Service:  Branch Years Served Period          Comments:      Family History  No family history on file.  Allergies  Patient has no known allergies.     Immunization History   Administered Date(s) Administered    [employer] SARS-CoV-2 Vaccination 05/21/2021    [employer] Purple Cap SARS-CoV-2 08/30/2022     Medications  Home medications:  Medications Prior to Admission   Medication Sig Dispense Refill Last Dose    acetaminophen (Tylenol) 325 mg tablet Take 2 tablets (650 mg) by mouth every 4 hours if needed for mild pain (1 - 3) or fever (temp greater than 38.0 C).       amoxicillin-pot clavulanate (Augmentin) 875-125 mg tablet Take 1 tablet by mouth 2 times a day. 1 twice a day for 10 doses per NG. 10 tablet 0     aspirin 81 mg EC tablet Take 1 tablet (81 mg) by mouth once daily. Take for 21 days and stop 21 tablet 0     atorvastatin (Lipitor) 80 mg tablet Take 1 tablet (80 mg) by mouth once daily at bedtime. 90 tablet 1     bisacodyl (Dulcolax, bisacodyl,) 10 mg suppository Insert 1 suppository (10 mg) into the rectum if needed for constipation.       carvedilol (Coreg) 6.25 mg tablet Take 1 tablet (6.25 mg) by mouth 2 times a day.       clopidogrel (Plavix) 75 mg tablet Take 1 tablet (75 mg) by mouth once daily. N-G TUBE       ergocalciferol (Vitamin D2) 1.25 MG (47036 UT) capsule Take 1 capsule (1,250 mcg) by mouth.       hydrALAZINE (Apresoline) 25 mg tablet Take 1 tablet (25 mg) by mouth 3 times a day. VIA G-TUBE       hyoscyamine (Anaspaz,  "Levsin) 0.125 mg tablet Take 1 tablet (0.125 mg) by mouth every 4 hours if needed for cramping. VIA G-TUBE       lidocaine (LMX) 4 % cream Apply topically once daily as needed for mild pain (1 - 3).       loperamide (Imodium A-D) 2 mg tablet Take 1 tablet (2 mg) by mouth if needed for diarrhea. VIA N-G TUBE       losartan (Cozaar) 100 mg tablet Take 1 tablet (100 mg) by mouth once daily. VIA N-G TUBE       magnesium hydroxide (Milk of Magnesia) 2,400 mg/10 mL suspension suspension Take 30 mL by mouth if needed for constipation. VIA-G-TUBE        Current medications:  Scheduled medications  acetaminophen, 1,000 mg, intravenous, q8h  atorvastatin, 40 mg, oral, Nightly  carvedilol, 6.25 mg, oral, BID  pantoprazole, 40 mg, oral, Daily before breakfast   Or  esomeprazole, 40 mg, nasoduodenal tube, Daily before breakfast   Or  pantoprazole, 40 mg, intravenous, Daily before breakfast  heparin (porcine), 5,000 Units, subcutaneous, q8h  hydrALAZINE, 25 mg, oral, TID  losartan, 50 mg, oral, Daily  piperacillin-tazobactam, 3.375 g, intravenous, q6h  potassium chloride, 40 mEq, oral, BID      Continuous medications     PRN medications  PRN medications: hydrALAZINE, ipratropium-albuteroL, morphine    Review of Systems    Unable to obtain    Objective  Range of Vitals (last 24 hours)  Heart Rate:  [77-85]   Temp:  [35.7 °C (96.2 °F)-36.6 °C (97.9 °F)]   Resp:  [18-20]   BP: (145-174)/(72-89)   SpO2:  [92 %-94 %]   Daily Weight  24 : 90.3 kg (199 lb 1.2 oz)    Body mass index is 29.4 kg/m².     Physical Exam  /89 (BP Location: Left arm, Patient Position: Lying)   Pulse 82   Temp 36.3 °C (97.4 °F) (Temporal)   Resp 19   Ht 1.753 m (5' 9\")   Wt 90.3 kg (199 lb 1.2 oz)   SpO2 94%   BMI 29.40 kg/m²   Temp (24hrs), Av.3 °C (97.3 °F), Min:35.7 °C (96.2 °F), Max:36.6 °C (97.9 °F)      General: Sleeping but open eyes on verbal command  HEENT: No conjunctival pallor, no scleral icterus  Lungs: bilaterally clear to " "auscultation, no wheezing  Heart: regular rate and rhythm, no murmur  Abdomen: abdominal binder, midline surgical incision with staples, PEG tube  Neuro: AAO x 0, open eyes on verbal command  Skin: As above  MSK: No joint inflammation     Relevant Results    Labs  Results from last 72 hours   Lab Units 08/18/24  0324 08/17/24  0408 08/16/24  0503   WBC AUTO x10*3/uL 17.0* 12.3* 10.7   HEMOGLOBIN g/dL 13.3* 13.1* 14.1   HEMATOCRIT % 39.3* 39.5* 42.7   PLATELETS AUTO x10*3/uL 283 227 191   NEUTROS PCT AUTO % 82.5 77.0 76.8   LYMPHS PCT AUTO % 7.9 11.7 10.6   MONOS PCT AUTO % 6.8 8.7 9.7   EOS PCT AUTO % 1.5 0.9 1.3     Results from last 72 hours   Lab Units 08/18/24  0324 08/17/24  0408 08/16/24  0503   SODIUM mmol/L 135* 138 137   POTASSIUM mmol/L 3.9 3.3* 3.6   CHLORIDE mmol/L 107 108* 108*   CO2 mmol/L 21 23 23   BUN mg/dL 11 14 15   CREATININE mg/dL 0.69 0.73 0.81   GLUCOSE mg/dL 130* 145* 122*   CALCIUM mg/dL 7.5* 7.6* 7.5*   ANION GAP mmol/L 11 10 10   EGFR mL/min/1.73m*2 >90 >90 89     Results from last 72 hours   Lab Units 08/17/24  0408 08/16/24  0503   ALK PHOS U/L 129 114   BILIRUBIN TOTAL mg/dL 0.4 0.6   PROTEIN TOTAL g/dL 5.2* 5.6*   ALT U/L 16 19   AST U/L 16 23   ALBUMIN g/dL 2.3* 2.6*     Estimated Creatinine Clearance: 93.2 mL/min (by C-G formula based on SCr of 0.69 mg/dL).  CRP   Date Value Ref Range Status   07/28/2023 3.33 (A) mg/dL Final     Comment:     REF VALUE  < 1.00     07/26/2023 7.32 (A) mg/dL Final     Comment:     REF VALUE  < 1.00     07/24/2023 2.15 (A) mg/dL Final     Comment:     REF VALUE  < 1.00       Sedimentation Rate   Date Value Ref Range Status   09/15/2022 18 0 - 20 mm/h Final   11/28/2021 36 (H) 0 - 20 mm/h Final     No results found for: \"HIV1X2\", \"HIVCONF\", \"CJBUBA3DH\"  No results found for: \"HEPCABINIT\", \"HEPCAB\", \"HCVPCRQUANT\"    Microbiology  Susceptibility data from last 14 days.  Collected Specimen Info Organism Aztreonam Cefepime Ceftazidime Ciprofloxacin " Levofloxacin Piperacillin/Tazobactam Tobramycin   08/10/24 Tissue/Biopsy from Wound/Tissue Pseudomonas aeruginosa  S  S  S  S  S  S  S     Mixed Gram-Positive and Gram-Negative Bacteria              Imaging  CT abdomen pelvis w IV contrast    Result Date: 8/18/2024  15.7 x 9.7 x 11.1 cm perisplenic, possibly subcapsular, collection of fluid and gas most compatible with abscess.   Percutaneous gastrostomy tube is present and appears appropriately positioned. Free air seen on prior imaging 08/10/2024 adjacent to the malpositioned gastrostomy tube at that time is now near resolved with a small persistent punctate focus of free air adjacent to the stomach.   Defect in the anterior abdominal wall with overlying staples new from prior imaging most compatible with interval surgery. Mild stranding and fluid within this defect without evidence of a well-defined collection..   The bladder is decompressed with a Cleveland catheter, which limits evaluation, however, there is bladder wall thickening and mild adjacent stranding. Findings may be seen with cystitis. Correlate with urinalysis.   Partially visualized moderate left and small right pleural effusions with superimposed atelectasis.   3 cm simple appearing cyst in the left kidney. Additional indeterminate hypodensities in the left kidney measure up to approximately 1.6 cm. Recommend nonemergent MRI to further characterize.   Prominence of submucosal fat in the distal colon which may be seen with chronic inflammatory processes.   MACRO: Critical Finding:  See findings. Notification was initiated on 8/18/2024 at 4:08 am by  Evan Finkelstein.  (**-YCF-**) Instructions:   Signed by: Evan Finkelstein 8/18/2024 4:08 AM Dictation workstation:   [id number]    FL upper GI w KUB    Result Date: 8/14/2024  1.  PEG tube is positioned in the stomach with no extravasation. 2. Prompt passage of contrast into the duodenal and no gastroesophageal reflux     MACRO: None   Signed by: Mei  Pretorius 8/14/2024 10:10 AM Dictation workstation:   [id number]    XR chest 2 views    Result Date: 8/14/2024  1.  Unchanged cardiomegaly 2. Pleural effusions and basilar atelectasis, left worse than right and unchanged from the prior exam       MACRO: None   Signed by: Mei Crum 8/14/2024 9:22 AM Dictation workstation:   [id number]    XR chest abdomen for OG NG placement    Result Date: 8/13/2024  Tube projects in the stomach   Bilateral basilar atelectasis     MACRO: None   Signed by: Mei Crum 8/13/2024 1:01 PM Dictation workstation:   [id number]    CT head wo IV contrast    Result Date: 8/12/2024  1.  No acute intracranial hemorrhage or acute territorial infarct. 2.  Diffuse parenchymal volume loss. Chronic changes.     MACRO: None.   Signed by: Judith Velarde 8/12/2024 6:34 PM Dictation workstation:   [id number]    CT abdomen pelvis w IV contrast    Result Date: 8/10/2024  1. There is a malpositioned PEG tube insufflated in the fatty anterior to the stomach. There is a small amount of free air in this location. This could be secondary to malpositioned PEG tube rather than a ruptured viscus but should be correlated clinically. 2. Nonspecific thickening of loops of small bowel in the left side of the abdomen with induration of the mesentery and a small amount of ascites. This could represent Crohn's disease or enteritis. 3. Fibrofatty infiltration of the colon from the transverse colon to the rectum. This can be seen with Crohn's disease, inactive. 4. Benign bilateral simple renal cysts. 5. The urinary bladder with a volume of 559 cc. 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   MACRO: None   Signed by: Yamilet Heck 8/10/2024 2:22 PM Dictation workstation:   [id number]    XR chest 1 view    Result Date: 8/10/2024  No acute cardiopulmonary process.   MACRO: None   Signed by: Yamilet Heck 8/10/2024 1:46 PM Dictation  workstation:   [id number]    XR ABDOMEN FOR NG/OG/NE TUBE PLACEMENT    Result Date: 7/30/2024  Feeding tube tip projects over the region of the gastric antrum/duodenal bulb.    XR ABDOMEN FOR NG/OG/NE TUBE PLACEMENT    Result Date: 7/30/2024  Findings suggestive of NG/OG into the proximal stomach. RECOMMENDATION: Clinical correlation.

## 2024-08-18 NOTE — CARE PLAN
Problem: Skin  Goal: Promote/optimize nutrition  Outcome: Progressing  Goal: Promote skin healing  Outcome: Progressing     Problem: Pain  Goal: Takes deep breaths with improved pain control throughout the shift  Outcome: Progressing  Goal: Turns in bed with improved pain control throughout the shift  Outcome: Progressing   The patient's goals for the shift include      The clinical goals for the shift include Voices pain

## 2024-08-18 NOTE — SIGNIFICANT EVENT
Please see initial H&P, subsequent progress note and consult notes with regards to presentation and management thus far  Heart Rate:  [77-80]   Temp:  [35.7 °C (96.2 °F)-36.3 °C (97.4 °F)]   Resp:  [19-20]   BP: (153-165)/(76-83)   SpO2:  [92 %-93 %]   Imaging result received and nursing staff notified me.  There is a notably sized abscess close to the spleen.  I did discuss the case with the senior surgical resident on call with regards to the concerning findings.  It was recommended that the patient could continue on tube feedings and given the patient remains hemodynamically stable despite slightly persistent abdominal pain could have the patient drained by interventional radiology today or if he remains hemodynamically stable tomorrow.  Should the patient have any worsening hemodynamics then to please contact them promptly.    CT abdomen pelvis w IV contrast    Result Date: 8/18/2024  Interpreted By:  Finkelstein, Evan, STUDY: CT ABDOMEN PELVIS W IV CONTRAST;  8/17/2024 5:38 pm   INDICATION: Signs/Symptoms:Minimal pain/abdominal drainage/previous malposition PEG tube.   COMPARISON: CT abdomen pelvis 08/10/2024   ACCESSION NUMBER(S): [id number]   ORDERING CLINICIAN: TATIANNA MILLS   TECHNIQUE: Axial CT images of the abdomen and pelvis with coronal and sagittal reconstructed images obtained after intravenous administration of 75 mL Omnipaque 350   FINDINGS: LOWER CHEST: There are coronary artery calcifications. Partially visualized moderate left and small right pleural effusions with superimposed atelectasis.   ABDOMEN:   LIVER: Normal attenuation and contour. BILE DUCTS: Normal caliber. GALLBLADDER: No calcified gallstones. No wall thickening. PORTAL VEIN: Patent SPLEEN: Perisplenic, possibly subcapsular collection of fluid and gas measuring 15.7 x 9.7 x 11.1 cm (maximum AP by transverse by craniocaudal dimensions). There is associated mass effect on the spleen. Remainder of the spleen demonstrates a homogeneous  attenuation. PANCREAS: Unremarkable. ADRENALS: Unremarkable. KIDNEYS, URETERS and URINARY BLADDER: Symmetric renal enhancement. No hydronephrosis or perinephric fluid collection. 3 cm hypodensity in the inferior pole of the left kidney measures simple fluid attenuation and is most compatible with a simple cyst. There are additional indeterminate hypodensities in the left kidney measuring approximately 1.5 cm and 1.6 cm in size which measuring intermediate density. The bladder is decompressed with a Cleveland catheter. There is bladder wall thickening and mild adjacent stranding. REPRODUCTIVE ORGANS: No pelvic masses.   ABDOMINAL WALL: A percutaneous gastrostomy tube is present. Defect in the anterior abdominal wall soft tissues inferior to the gastrostomy tube likely relates to prior surgery with overlying skin staples. Fat containing right inguinal hernia. PERITONEUM: Punctate focus of free air adjacent to the stomach.   BOWEL: A percutaneous gastrostomy tube is present and appears to be appropriately positioned within the stomach. Free air seen on prior imaging 08/10/2024 adjacent to the malpositioned gastrostomy tube is near resolved with a small punctate focus of air remaining best seen on image 71 of series 2. Prominence of the submucosal fat in the distal colon. The appendix is not definitively visualized, without focal pericecal inflammatory stranding.   VESSELS: Moderate aortoiliac calcifications. RETROPERITONEUM: No pathologically enlarged retroperitoneal lymph nodes.   BONES: No acute osseous abnormality.       15.7 x 9.7 x 11.1 cm perisplenic, possibly subcapsular, collection of fluid and gas most compatible with abscess.   Percutaneous gastrostomy tube is present and appears appropriately positioned. Free air seen on prior imaging 08/10/2024 adjacent to the malpositioned gastrostomy tube at that time is now near resolved with a small persistent punctate focus of free air adjacent to the stomach.   Defect in  the anterior abdominal wall with overlying staples new from prior imaging most compatible with interval surgery. Mild stranding and fluid within this defect without evidence of a well-defined collection..   The bladder is decompressed with a Cleveland catheter, which limits evaluation, however, there is bladder wall thickening and mild adjacent stranding. Findings may be seen with cystitis. Correlate with urinalysis.   Partially visualized moderate left and small right pleural effusions with superimposed atelectasis.   3 cm simple appearing cyst in the left kidney. Additional indeterminate hypodensities in the left kidney measure up to approximately 1.6 cm. Recommend nonemergent MRI to further characterize.   Prominence of submucosal fat in the distal colon which may be seen with chronic inflammatory processes.   MACRO: Critical Finding:  See findings. Notification was initiated on 8/18/2024 at 4:08 am by  Evan Finkelstein.  (**-YCF-**) Instructions:   Signed by: Evan Finkelstein 8/18/2024 4:08 AM Dictation workstation:   [id number]    ECG 12 Lead    Result Date: 8/14/2024  Sinus tachycardia Ventricular premature complex Prolonged MN interval LVH with secondary repolarization abnormality Inferior infarct, old Anterolateral infarct, age indeterminate See ED provider note for full interpretation and clinical correlation Confirmed by Alexandria Cullen ([id number]) on 8/14/2024 6:23:12 PM    FL upper GI w KUB    Result Date: 8/14/2024  Interpreted By:  Mei Crum, STUDY: FL UPPER GI W KUB;  8/14/2024 9:31 am   INDICATION: Signs/Symptoms:Please please contrast through patient's PEG tube in abdomen.   COMPARISON: None.   ACCESSION NUMBER(S): [id number]   ORDERING CLINICIAN: LUCY NUNES   TECHNIQUE: Total fluoroscopy time was  1 minutes 7 seconds with 8 spot images obtained. 60 mL of solution 50% Gastrografin and 50% water was inserted through the PEG tube.   FINDINGS: Peg tube, surgical drain and skin staples  are noted in the upper abdomen. An NG tube is also present in the stomach. With injection of contrast the contrast flows freely into the gastric lumen. Contrast promptly passes from stomach into duodenal. No extravasation of contrast is noted outside of the stomach. There was no gastroesophageal reflux identified.       1.  PEG tube is positioned in the stomach with no extravasation. 2. Prompt passage of contrast into the duodenal and no gastroesophageal reflux     MACRO: None   Signed by: Mei Crum 8/14/2024 10:10 AM Dictation workstation:   [id number]    XR chest 2 views    Result Date: 8/14/2024  Interpreted By:  Mei Crum, STUDY: XR CHEST 2 VIEWS;  8/14/2024 9:11 am   INDICATION: Signs/Symptoms:Hypoxia.   COMPARISON: 08/13/2024   ACCESSION NUMBER(S): [id number]   ORDERING CLINICIAN: LUCY NUNES   FINDINGS: AP upright and lateral views were obtained with the patient sitting.   NG tube is present with the tip below the diaphragm and beyond the image.   CARDIOMEDIASTINAL SILHOUETTE: Heart is enlarged, which is accentuated by the shallow inspiration. Aorta is atherosclerotic.   LUNGS: Bilateral pleural effusions and bilateral basilar atelectasis are present, left worse than right. Appearance is similar to the prior exam.   ABDOMEN: No remarkable upper abdominal findings.   BONES: No acute osseous changes.       1.  Unchanged cardiomegaly 2. Pleural effusions and basilar atelectasis, left worse than right and unchanged from the prior exam       MACRO: None   Signed by: Mei Crum 8/14/2024 9:22 AM Dictation workstation:   [id number]    XR chest abdomen for OG NG placement    Result Date: 8/13/2024  Interpreted By:  Mei Crum, STUDY: XR CHEST ABDOMEN FOR OG NG PLACEMENT; ;  8/13/2024 11:35 am   INDICATION: Signs/Symptoms:NG tube slightly came out. want to check placement.   COMPARISON: 06/23/2024   ACCESSION NUMBER(S): [id number]   ORDERING CLINICIAN: LUCY NUNES    FINDINGS: Supine images including the chest and upper abdomen show NG tube projecting in the expected region of the body of the stomach. A gastrostomy tube also projects in the stomach. A surgical drain is present left side of the abdomen and skin staples projects slightly left of midline in the mid abdomen.   Patchy bowel gas is noted in the visualized portion of the abdomen without dilatation. No gross free air is visible.   Cardiac silhouette is unchanged in size. Atelectasis is present at both lung bases, left worse than right. Lung volumes are shallow.   No acute changes are noted in the osseous structures.       Tube projects in the stomach   Bilateral basilar atelectasis     MACRO: None   Signed by: Mei Crum 8/13/2024 1:01 PM Dictation workstation:   [id number]    CT head wo IV contrast    Result Date: 8/12/2024  Interpreted By:  Judith Velarde, STUDY: CT HEAD WO IV CONTRAST  8/12/2024 5:32 pm   INDICATION: Signs/Symptoms:AMS   COMPARISON: CT head 06/21/2024, 07/17/2023.   ACCESSION NUMBER(S): [id number]   ORDERING CLINICIAN: LUCY NUNES   TECHNIQUE: Serial, axial CT images of the brain were obtained without IV contrast. Coronal and sagittal reformatted images were performed.   FINDINGS: The ventricles, cisterns and sulci are prominent, consistent with diffuse volume loss.  There are areas of nonspecific white matter hypodensity, which are probably age-related or microvascular in nature. Remote infarcts at the bilateral basal ganglia. The gray-white matter differentiation is intact and there is no evidence of acute territorial infarct.  No mass effect or midline shift is seen.  There is no hemorrhage.  No extraaxial fluid collection. No air-fluid levels at the visualized paranasal sinuses. Mucous retention cyst/polyp at the left maxillary sinus. The mastoid air cells are clear. No depressed calvarial fracture.   Partially visualized nasogastric tube inserted through the right nostril.        1.  No acute intracranial hemorrhage or acute territorial infarct. 2.  Diffuse parenchymal volume loss. Chronic changes.     MACRO: None.   Signed by: Judith Velarde 8/12/2024 6:34 PM Dictation workstation:   [id number]    CT abdomen pelvis w IV contrast    Result Date: 8/10/2024  Interpreted By:  Yamilet Heck, STUDY: CT ABDOMEN PELVIS W IV CONTRAST;  8/10/2024 2:08 pm   INDICATION: Signs/Symptoms:abbdominal pain.   COMPARISON: None.   ACCESSION NUMBER(S): [id number]   ORDERING CLINICIAN: DONITA ROBLES   TECHNIQUE: CT of the abdomen and pelvis was performed.  75 mL Omnipaque 350   FINDINGS: LOWER CHEST: There is mild bibasilar atelectasis.   ABDOMEN:   LIVER: There is no hepatic mass.   BILE DUCTS: There is no intrahepatic, common hepatic or common bile ductal dilatation.   GALLBLADDER: The gallbladder is unremarkable.   PANCREAS: The pancreas is unremarkable.   SPLEEN: The spleen is unremarkable. There is no splenic mass or splenomegaly.   ADRENAL GLANDS: The adrenal glands are unremarkable.   KIDNEYS AND URETERS: The kidneys function symmetrically. There are benign bilateral simple renal cysts. There is no intrarenal calculus or hydronephrosis. The bladder is distended measuring 13.6 x 11.3 x 7.0 cm with a volume of 559 cc. There is bladder wall trabeculation, likely secondary to postobstructive neuropathic change from prostate enlargement.   BOWEL: There is free intraperitoneal air. There is a feeding tube insufflated in the fatty anterior to the stomach. This is where the free air is located. This could be iatrogenic from malpositioned PEG tube rather than a ruptured viscus. There is thickening of loops of small bowel in the left side of the abdomen. There is a small amount of ascites in the left side of the abdomen interdigitated between the thickened loops of small bowel. There is induration of the mesentery. This could represent Crohn's disease or enteritis. There is diffuse fatty  infiltration of the colon from the mid transverse colon to the rectum. Fibrofatty infiltration can be seen with Crohn's disease, inactive.   VESSELS: There is atherosclerotic calcification of the abdominal aorta, iliac and femoral arteries.   PERITONEUM/RETROPERITONEUM/LYMPH NODES: There is no retroperitoneal or pelvic adenopathy.   ABDOMINAL WALL: The abdominal wall is unremarkable.   BONE AND SOFT TISSUE: There is no acute osseous finding.   The prostate gland is enlarged measuring 6.2 x 4.8 cm.       1. There is a malpositioned PEG tube insufflated in the fatty anterior to the stomach. There is a small amount of free air in this location. This could be secondary to malpositioned PEG tube rather than a ruptured viscus but should be correlated clinically. 2. Nonspecific thickening of loops of small bowel in the left side of the abdomen with induration of the mesentery and a small amount of ascites. This could represent Crohn's disease or enteritis. 3. Fibrofatty infiltration of the colon from the transverse colon to the rectum. This can be seen with Crohn's disease, inactive. 4. Benign bilateral simple renal cysts. 5. The urinary bladder with a volume of 559 cc. 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   MACRO: None   Signed by: Yamilet Heck 8/10/2024 2:22 PM Dictation workstation:   [id number]    XR chest 1 view    Result Date: 8/10/2024  Interpreted By:  Yamilet Heck, STUDY: XR CHEST 1 VIEW;  8/10/2024 1:44 pm   INDICATION: Signs/Symptoms:cough.   COMPARISON: 06/24/2024   ACCESSION NUMBER(S): [id number]   ORDERING CLINICIAN: DONITA ROBLES   FINDINGS: CARDIOMEDIASTINAL SILHOUETTE: The heart is enlarged in a left ventricular configuration, unchanged.     LUNGS: Lungs are clear.   ABDOMEN: No remarkable upper abdominal findings.     BONES: No acute osseous changes.       No acute cardiopulmonary process.   MACRO: None   Signed by:  Yamilet Heck 8/10/2024 1:46 PM Dictation workstation:   [id number]    XR ABDOMEN FOR NG/OG/NE TUBE PLACEMENT    Result Date: 7/30/2024  EXAMINATION: ONE SUPINE XRAY VIEW(S) OF THE ABDOMEN 7/30/2024 9:50 pm COMPARISON: 7:22 p.m. HISTORY: ORDERING SYSTEM PROVIDED HISTORY: Confirmation of course of NG tube and location of tip of tube post advancement TECHNOLOGIST PROVIDED HISTORY: Reason for exam:->Confirmation of course of NG tube and location of tip of tube post advancement Portable?->Yes FINDINGS: Feeding tube advanced.  Tip now projects over the region of the gastric antrum/duodenal bulb.  Consider advancement 10-15 cm if post pyloric positioning is desired.  No ileus or obstruction lung bases clear.  Osseous and body wall soft tissues unremarkable.    Feeding tube tip projects over the region of the gastric antrum/duodenal bulb.    XR ABDOMEN FOR NG/OG/NE TUBE PLACEMENT    Result Date: 7/30/2024  EXAMINATION: ONE SUPINE XRAY VIEW(S) OF THE ABDOMEN 7/30/2024 7:21 pm COMPARISON: None. HISTORY: ORDERING SYSTEM PROVIDED HISTORY: G-tube Placement / Confirmation TECHNOLOGIST PROVIDED HISTORY: Reason for exam:->G-tube Placement / Confirmation FINDINGS: Single image shows esophageal route catheter into the left upper quadrant expected proximal stomach region.  No dilated bowels evident.    Findings suggestive of NG/OG into the proximal stomach. RECOMMENDATION: Clinical correlation.

## 2024-08-19 ENCOUNTER — APPOINTMENT (OUTPATIENT)
Dept: CARDIOLOGY | Facility: HOSPITAL | Age: 79
DRG: 853 | End: 2024-08-19
Payer: MEDICARE

## 2024-08-19 LAB
ANION GAP SERPL CALC-SCNC: 14 MMOL/L (ref 10–20)
BASOPHILS # BLD AUTO: 0.09 X10*3/UL (ref 0–0.1)
BASOPHILS NFR BLD AUTO: 0.4 %
BUN SERPL-MCNC: 13 MG/DL (ref 6–23)
CALCIUM SERPL-MCNC: 8 MG/DL (ref 8.6–10.3)
CHLORIDE SERPL-SCNC: 105 MMOL/L (ref 98–107)
CO2 SERPL-SCNC: 20 MMOL/L (ref 21–32)
CREAT SERPL-MCNC: 0.77 MG/DL (ref 0.5–1.3)
EGFRCR SERPLBLD CKD-EPI 2021: 90 ML/MIN/1.73M*2
EOSINOPHIL # BLD AUTO: 0.4 X10*3/UL (ref 0–0.4)
EOSINOPHIL NFR BLD AUTO: 1.7 %
ERYTHROCYTE [DISTWIDTH] IN BLOOD BY AUTOMATED COUNT: 14.9 % (ref 11.5–14.5)
GLUCOSE BLD MANUAL STRIP-MCNC: 100 MG/DL (ref 74–99)
GLUCOSE BLD MANUAL STRIP-MCNC: 130 MG/DL (ref 74–99)
GLUCOSE BLD MANUAL STRIP-MCNC: 92 MG/DL (ref 74–99)
GLUCOSE BLD MANUAL STRIP-MCNC: 92 MG/DL (ref 74–99)
GLUCOSE BLD MANUAL STRIP-MCNC: 97 MG/DL (ref 74–99)
GLUCOSE SERPL-MCNC: 91 MG/DL (ref 74–99)
HCT VFR BLD AUTO: 42.4 % (ref 41–52)
HGB BLD-MCNC: 14.2 G/DL (ref 13.5–17.5)
IMM GRANULOCYTES # BLD AUTO: 0.24 X10*3/UL (ref 0–0.5)
IMM GRANULOCYTES NFR BLD AUTO: 1 % (ref 0–0.9)
INR PPP: 1.2 (ref 0.9–1.1)
LYMPHOCYTES # BLD AUTO: 1.52 X10*3/UL (ref 0.8–3)
LYMPHOCYTES NFR BLD AUTO: 6.5 %
MAGNESIUM SERPL-MCNC: 2.03 MG/DL (ref 1.6–2.4)
MCH RBC QN AUTO: 28.9 PG (ref 26–34)
MCHC RBC AUTO-ENTMCNC: 33.5 G/DL (ref 32–36)
MCV RBC AUTO: 86 FL (ref 80–100)
MONOCYTES # BLD AUTO: 1.64 X10*3/UL (ref 0.05–0.8)
MONOCYTES NFR BLD AUTO: 7 %
NEUTROPHILS # BLD AUTO: 19.49 X10*3/UL (ref 1.6–5.5)
NEUTROPHILS NFR BLD AUTO: 83.4 %
NRBC BLD-RTO: 0 /100 WBCS (ref 0–0)
PLATELET # BLD AUTO: 370 X10*3/UL (ref 150–450)
POTASSIUM SERPL-SCNC: 4.8 MMOL/L (ref 3.5–5.3)
PROTHROMBIN TIME: 13 SECONDS (ref 9.8–12.8)
RBC # BLD AUTO: 4.91 X10*6/UL (ref 4.5–5.9)
SODIUM SERPL-SCNC: 134 MMOL/L (ref 136–145)
WBC # BLD AUTO: 23.4 X10*3/UL (ref 4.4–11.3)

## 2024-08-19 PROCEDURE — 2720000007 HC OR 272 NO HCPCS

## 2024-08-19 PROCEDURE — C1729 CATH, DRAINAGE: HCPCS

## 2024-08-19 PROCEDURE — 36415 COLL VENOUS BLD VENIPUNCTURE: CPT | Performed by: SURGERY

## 2024-08-19 PROCEDURE — 87077 CULTURE AEROBIC IDENTIFY: CPT | Mod: PORLAB | Performed by: INTERNAL MEDICINE

## 2024-08-19 PROCEDURE — 2500000004 HC RX 250 GENERAL PHARMACY W/ HCPCS (ALT 636 FOR OP/ED): Performed by: INTERNAL MEDICINE

## 2024-08-19 PROCEDURE — 80048 BASIC METABOLIC PNL TOTAL CA: CPT | Performed by: SURGERY

## 2024-08-19 PROCEDURE — 2500000001 HC RX 250 WO HCPCS SELF ADMINISTERED DRUGS (ALT 637 FOR MEDICARE OP): Performed by: INTERNAL MEDICINE

## 2024-08-19 PROCEDURE — 97530 THERAPEUTIC ACTIVITIES: CPT | Mod: GO,CO

## 2024-08-19 PROCEDURE — 2500000005 HC RX 250 GENERAL PHARMACY W/O HCPCS: Performed by: RADIOLOGY

## 2024-08-19 PROCEDURE — 49406 IMAGE CATH FLUID PERI/RETRO: CPT | Performed by: RADIOLOGY

## 2024-08-19 PROCEDURE — 51702 INSERT TEMP BLADDER CATH: CPT

## 2024-08-19 PROCEDURE — 2060000001 HC INTERMEDIATE ICU ROOM DAILY

## 2024-08-19 PROCEDURE — 99153 MOD SED SAME PHYS/QHP EA: CPT | Performed by: RADIOLOGY

## 2024-08-19 PROCEDURE — 85610 PROTHROMBIN TIME: CPT | Performed by: SURGERY

## 2024-08-19 PROCEDURE — 97112 NEUROMUSCULAR REEDUCATION: CPT | Mod: GP,CQ | Performed by: PHYSICAL THERAPY ASSISTANT

## 2024-08-19 PROCEDURE — 97530 THERAPEUTIC ACTIVITIES: CPT | Mod: GP,CQ | Performed by: PHYSICAL THERAPY ASSISTANT

## 2024-08-19 PROCEDURE — 0W9G30Z DRAINAGE OF PERITONEAL CAVITY WITH DRAINAGE DEVICE, PERCUTANEOUS APPROACH: ICD-10-PCS | Performed by: RADIOLOGY

## 2024-08-19 PROCEDURE — 83735 ASSAY OF MAGNESIUM: CPT | Performed by: SURGERY

## 2024-08-19 PROCEDURE — 85025 COMPLETE CBC W/AUTO DIFF WBC: CPT | Performed by: INTERNAL MEDICINE

## 2024-08-19 PROCEDURE — 49405 IMAGE CATH FLUID COLXN VISC: CPT | Performed by: RADIOLOGY

## 2024-08-19 PROCEDURE — 82947 ASSAY GLUCOSE BLOOD QUANT: CPT

## 2024-08-19 PROCEDURE — 2500000004 HC RX 250 GENERAL PHARMACY W/ HCPCS (ALT 636 FOR OP/ED)

## 2024-08-19 PROCEDURE — 2500000004 HC RX 250 GENERAL PHARMACY W/ HCPCS (ALT 636 FOR OP/ED): Performed by: RADIOLOGY

## 2024-08-19 PROCEDURE — 99233 SBSQ HOSP IP/OBS HIGH 50: CPT | Performed by: INTERNAL MEDICINE

## 2024-08-19 PROCEDURE — 99152 MOD SED SAME PHYS/QHP 5/>YRS: CPT | Performed by: RADIOLOGY

## 2024-08-19 RX ORDER — LIDOCAINE HYDROCHLORIDE 20 MG/ML
INJECTION, SOLUTION EPIDURAL; INFILTRATION; INTRACAUDAL; PERINEURAL
Status: COMPLETED | OUTPATIENT
Start: 2024-08-19 | End: 2024-08-19

## 2024-08-19 RX ORDER — MIDAZOLAM HYDROCHLORIDE 1 MG/ML
INJECTION, SOLUTION INTRAMUSCULAR; INTRAVENOUS
Status: COMPLETED | OUTPATIENT
Start: 2024-08-19 | End: 2024-08-19

## 2024-08-19 RX ORDER — FENTANYL CITRATE 50 UG/ML
INJECTION, SOLUTION INTRAMUSCULAR; INTRAVENOUS
Status: COMPLETED | OUTPATIENT
Start: 2024-08-19 | End: 2024-08-19

## 2024-08-19 RX ADMIN — LIDOCAINE HYDROCHLORIDE 10 ML: 20 INJECTION, SOLUTION EPIDURAL; INFILTRATION; INTRACAUDAL; PERINEURAL at 12:17

## 2024-08-19 RX ADMIN — HYDRALAZINE HYDROCHLORIDE 25 MG: 25 TABLET ORAL at 08:41

## 2024-08-19 RX ADMIN — ACETAMINOPHEN 975 MG: 325 TABLET ORAL at 09:54

## 2024-08-19 RX ADMIN — PIPERACILLIN SODIUM AND TAZOBACTAM SODIUM 3.38 G: 3; .375 INJECTION, SOLUTION INTRAVENOUS at 21:06

## 2024-08-19 RX ADMIN — LOSARTAN POTASSIUM 50 MG: 50 TABLET, FILM COATED ORAL at 08:41

## 2024-08-19 RX ADMIN — PANTOPRAZOLE SODIUM 40 MG: 40 INJECTION, POWDER, FOR SOLUTION INTRAVENOUS at 05:03

## 2024-08-19 RX ADMIN — ATORVASTATIN CALCIUM 40 MG: 40 TABLET, FILM COATED ORAL at 21:06

## 2024-08-19 RX ADMIN — ACETAMINOPHEN 975 MG: 325 TABLET ORAL at 17:38

## 2024-08-19 RX ADMIN — PIPERACILLIN SODIUM AND TAZOBACTAM SODIUM 3.38 G: 3; .375 INJECTION, SOLUTION INTRAVENOUS at 02:00

## 2024-08-19 RX ADMIN — MIDAZOLAM 1 MG: 1 INJECTION INTRAMUSCULAR; INTRAVENOUS at 12:16

## 2024-08-19 RX ADMIN — HYDRALAZINE HYDROCHLORIDE 25 MG: 25 TABLET ORAL at 14:22

## 2024-08-19 RX ADMIN — CARVEDILOL 6.25 MG: 6.25 TABLET, FILM COATED ORAL at 08:41

## 2024-08-19 RX ADMIN — MORPHINE SULFATE 2 MG: 2 INJECTION, SOLUTION INTRAMUSCULAR; INTRAVENOUS at 21:06

## 2024-08-19 RX ADMIN — FENTANYL CITRATE 50 MCG: 0.05 INJECTION, SOLUTION INTRAMUSCULAR; INTRAVENOUS at 12:16

## 2024-08-19 RX ADMIN — CARVEDILOL 6.25 MG: 6.25 TABLET, FILM COATED ORAL at 21:06

## 2024-08-19 RX ADMIN — HYDRALAZINE HYDROCHLORIDE 5 MG: 20 INJECTION INTRAMUSCULAR; INTRAVENOUS at 05:03

## 2024-08-19 RX ADMIN — PIPERACILLIN SODIUM AND TAZOBACTAM SODIUM 3.38 G: 3; .375 INJECTION, SOLUTION INTRAVENOUS at 14:22

## 2024-08-19 RX ADMIN — PIPERACILLIN SODIUM AND TAZOBACTAM SODIUM 3.38 G: 3; .375 INJECTION, SOLUTION INTRAVENOUS at 08:41

## 2024-08-19 RX ADMIN — HYDRALAZINE HYDROCHLORIDE 25 MG: 25 TABLET ORAL at 21:06

## 2024-08-19 ASSESSMENT — COGNITIVE AND FUNCTIONAL STATUS - GENERAL
PERSONAL GROOMING: TOTAL
EATING MEALS: TOTAL
TURNING FROM BACK TO SIDE WHILE IN FLAT BAD: TOTAL
WALKING IN HOSPITAL ROOM: TOTAL
PERSONAL GROOMING: TOTAL
MOVING TO AND FROM BED TO CHAIR: TOTAL
DRESSING REGULAR LOWER BODY CLOTHING: TOTAL
CLIMB 3 TO 5 STEPS WITH RAILING: TOTAL
CLIMB 3 TO 5 STEPS WITH RAILING: TOTAL
HELP NEEDED FOR BATHING: TOTAL
MOVING TO AND FROM BED TO CHAIR: TOTAL
TOILETING: TOTAL
DAILY ACTIVITIY SCORE: 6
STANDING UP FROM CHAIR USING ARMS: TOTAL
MOVING FROM LYING ON BACK TO SITTING ON SIDE OF FLAT BED WITH BEDRAILS: TOTAL
DRESSING REGULAR UPPER BODY CLOTHING: TOTAL
HELP NEEDED FOR BATHING: TOTAL
MOBILITY SCORE: 6
EATING MEALS: TOTAL
WALKING IN HOSPITAL ROOM: TOTAL
DRESSING REGULAR UPPER BODY CLOTHING: TOTAL
STANDING UP FROM CHAIR USING ARMS: TOTAL
DAILY ACTIVITIY SCORE: 6
DRESSING REGULAR LOWER BODY CLOTHING: TOTAL
MOBILITY SCORE: 6
MOVING FROM LYING ON BACK TO SITTING ON SIDE OF FLAT BED WITH BEDRAILS: TOTAL
TOILETING: TOTAL
TURNING FROM BACK TO SIDE WHILE IN FLAT BAD: TOTAL

## 2024-08-19 ASSESSMENT — PAIN SCALES - PAIN ASSESSMENT IN ADVANCED DEMENTIA (PAINAD)
BODYLANGUAGE: RELAXED
FACIALEXPRESSION: SMILING OR INEXPRESSIVE
NEGVOCALIZATION: OCCASIONAL MOAN/GROAN, LOW SPEECH, NEGATIVE/DISAPPROVING QUALITY
BREATHING: NORMAL
TOTALSCORE: PT ASLEEP
TOTALSCORE: 1
TOTALSCORE: 0
CONSOLABILITY: NO NEED TO CONSOLE
TOTALSCORE: 7
BODYLANGUAGE: RELAXED
TOTALSCORE: MEDICATION (SEE MAR)
BREATHING: NORMAL
NEGVOCALIZATION: OCCASIONAL MOAN/GROAN, LOW SPEECH, NEGATIVE/DISAPPROVING QUALITY
BREATHING: NORMAL
TOTALSCORE: 3
BREATHING: NORMAL
FACIALEXPRESSION: SMILING OR INEXPRESSIVE
NEGVOCALIZATION: OCCASIONAL MOAN/GROAN, LOW SPEECH, NEGATIVE/DISAPPROVING QUALITY
BODYLANGUAGE: RIGID, FISTS CLENCHED, KNEES UP, PUSHING/PULLING AWAY, STRIKES OUT
FACIALEXPRESSION: FACIAL GRIMACING
NEGVOCALIZATION: OCCASIONAL MOAN/GROAN, LOW SPEECH, NEGATIVE/DISAPPROVING QUALITY
FACIALEXPRESSION: SMILING OR INEXPRESSIVE
CONSOLABILITY: UNABLE TO CONSOLE, DISTRACT OR REASSURE
CONSOLABILITY: NO NEED TO CONSOLE
BODYLANGUAGE: RELAXED
BREATHING: NORMAL
CONSOLABILITY: NO NEED TO CONSOLE
BODYLANGUAGE: RELAXED
CONSOLABILITY: NO NEED TO CONSOLE
FACIALEXPRESSION: FACIAL GRIMACING
BREATHING: NORMAL
TOTALSCORE: 0
FACIALEXPRESSION: SMILING OR INEXPRESSIVE
FACIALEXPRESSION: SMILING OR INEXPRESSIVE
BREATHING: NORMAL
TOTALSCORE: MEDICATION (SEE MAR)
BODYLANGUAGE: RELAXED
CONSOLABILITY: NO NEED TO CONSOLE
CONSOLABILITY: NO NEED TO CONSOLE
BODYLANGUAGE: RELAXED
TOTALSCORE: 0
TOTALSCORE: 1

## 2024-08-19 ASSESSMENT — PAIN - FUNCTIONAL ASSESSMENT
PAIN_FUNCTIONAL_ASSESSMENT: 0-10
PAIN_FUNCTIONAL_ASSESSMENT: CPOT (CRITICAL CARE PAIN OBSERVATION TOOL)
PAIN_FUNCTIONAL_ASSESSMENT: PAINAD (PAIN ASSESSMENT IN ADVANCED DEMENTIA SCALE)
PAIN_FUNCTIONAL_ASSESSMENT: 0-10

## 2024-08-19 NOTE — NURSING NOTE
Bilat.  buttocks and coccyx red this am. + blanching. Has been repositioned q 2 hrs and prn. Pt was off unit in IR today for drain placement. At repositioning and care at end of shift, noted 2 areas on buttocks now dark red/purplish. Wedge cushions used to position side to side. Foam applied. And wound care consulted.

## 2024-08-19 NOTE — PROGRESS NOTES
"Abdi Wilson is a 79 y.o. male  who presented on 8/10/2024 with PEG tube malfunction (Multi) after initially being placed on 8/8/2024    Subjective   No acute events overnight.  Patient received IR placed drain today without issue.  Drain in place and draining purulent output.  PEG tube in place without issue.  Patient awake and interactive.    Objective     Physical Exam  Constitutional:       General: He is not in acute distress.     Appearance: He is not diaphoretic.   HENT:      Head: Normocephalic and atraumatic.   Cardiovascular:      Rate and Rhythm: Normal rate and regular rhythm.      Pulses: No decreased pulses.           Radial pulses are 2+ on the right side and 2+ on the left side.        Dorsalis pedis pulses are 2+ on the right side and 2+ on the left side.   Pulmonary:      Effort: No tachypnea, accessory muscle usage or respiratory distress.   Abdominal:      Tenderness: There is no abdominal tenderness.      Comments: Abdominal binder in place.  PEG tube remains in place approximately 4 cm from the skin.  Bumper able to spin freely.  Midline wound with packing in place.  NESSA drain in place draining purulent drainage   Musculoskeletal:      Right lower leg: No edema.      Left lower leg: No edema.   Skin:     General: Skin is cool and dry.   Neurological:      Mental Status: He is alert.      Comments: Patient does verbally answer questions but can give a thumbs up and waved.         Last Recorded Vitals  Blood pressure 154/88, pulse 82, temperature 36 °C (96.8 °F), temperature source Temporal, resp. rate 16, height 1.753 m (5' 9\"), weight 88.6 kg (195 lb 5.2 oz), SpO2 95%.  Intake/Output last 3 Shifts:  I/O last 3 completed shifts:  In: 1702 (19.2 mL/kg) [I.V.:50 (0.6 mL/kg); NG/GT:1652]  Out: 4300 (48.5 mL/kg) [Urine:4300 (1.3 mL/kg/hr)]  Weight: 88.6 kg         Assessment/Plan   Principal Problem:    PEG tube malfunction (Multi)  Active Problems:    Cerebral infarction, unspecified (Multi)    " Peritonitis (Multi)    Aphasia    Patient is a 79-year-old male who initially underwent PEG tube placement due to dysphagia on 8/8/2024.  Patient re-presented on 8/10/2024 due to PEG tube malfunction and found to be septic on presentation the emergency department    Patient underwent replacement of gastrostomy tube due to malfunction on 8/10/2024 with Dr. Velásquez    Patient had worsening leukocytosis and CT of the abdomen and pelvis was obtained which demonstrated a perisplenic abscess now status post IR drainage.    Plan:  - May resume tube feeds through PEG tube, maintain abdominal binder  - Continue midline dressing changes.  Wet-to-dry packing twice daily  - Continue NESSA drain to self suction, monitor output  - Antibiotics per primary team  - Will continue to monitor    Discussed with attending Dr. Christen Massey DO - PGY4  General Surgery

## 2024-08-19 NOTE — CARE PLAN
The patient's goals for the shift include      The clinical goals for the shift include Pain stays less than 5/10    Over the shift, the patient did not make progress toward the following goals.

## 2024-08-19 NOTE — NURSING NOTE
Tf restarted at 40ml/hr and to increase by 10ml q 4 hrs to goal 60ml/hr per Dr. Cintron. Called dietary for new tf bottle

## 2024-08-19 NOTE — CARE PLAN
Problem: Pain  Goal: Takes deep breaths with improved pain control throughout the shift  Outcome: Progressing  Goal: Turns in bed with improved pain control throughout the shift  Outcome: Progressing  Goal: Walks with improved pain control throughout the shift  Outcome: Progressing   The patient's goals for the shift include      The clinical goals for the shift include Pain stays less than 5/10

## 2024-08-19 NOTE — PROGRESS NOTES
08/19/24 1536   Discharge Planning   Type of Post Acute Facility Services Long term care     Per notes pt had drain placed today for abscess, tube feeds have been resumed and pt continues to receive IV zosyn. Plan remains for pt to return to [facility]. Attempt made to update family, left VM leaving call back number. CT will follow.   1620- Son returns call, confirms discharge plan to remain same, pt return to [facility]. CT will follow.     [facility]    [address]    [place] [address]    Phone:  [phone]                                       Luis Hernandez   MRN: [id number]    Department:  [facility]   Date of Visit:  3/1/2017           After Visit Summary Signature Page     I have received my discharge instructions, and my questions have been answered. I have discussed any challenges I see with this plan with the nurse or doctor.    ..........................................................................................................................................  Patient/Patient Representative Signature      ..........................................................................................................................................  Patient Representative Print Name and Relationship to Patient    ..................................................               ................................................  Date                                            Time    ..........................................................................................................................................  Reviewed by Signature/Title    ...................................................              ..............................................  Date                                                            Time

## 2024-08-19 NOTE — NURSING NOTE
Correction new order written by MD , start tf at 30ml/hr and increase q 4 hrs to goal of 60ml/hr. Tf has been started. No change in fwf,

## 2024-08-19 NOTE — PROGRESS NOTES
Occupational Therapy    OT Treatment    Patient Name: Abdi Wilson  MRN: [id number]  Today's Date: 8/19/2024  Time Calculation  Start Time: 1010  Stop Time: 1033  Time Calculation (min): 23 min        Assessment:  End of Session Communication: Bedside nurse, PCT/NA/CTA  End of Session Patient Position: Bed, 3 rail up, Alarm on  OT Assessment Results: Decreased ADL status, Decreased upper extremity strength, Decreased safe judgment during ADL, Decreased endurance, Decreased cognition, Decreased functional mobility, Decreased gross motor control, Decreased IADLs  Plan:  Treatment Interventions: ADL retraining, Functional transfer training, UE strengthening/ROM, Endurance training, Cognitive reorientation, Neuromuscular reeducation  OT Frequency: 3 times per week  OT Discharge Recommendations: Moderate intensity level of continued care  OT - OK to Discharge: Yes  Treatment Interventions: ADL retraining, Functional transfer training, UE strengthening/ROM, Endurance training, Cognitive reorientation, Neuromuscular reeducation    Subjective   Previous Visit Info:  OT Last Visit  OT Received On: 08/19/24  General:  General  Co-Treatment: PT  Co-Treatment Reason: optimize pt safety and mobiltiy  Prior to Session Communication: Bedside nurse  Patient Position Received: Bed, 3 rail up, Alarm on  General Comment: pt agreeable  to OT tx sessioon with mod vc's  Precautions:  Medical Precautions: Fall precautions, Abdominal precautions  Post-Surgical Precautions: Abdominal surgery precautions  Precautions Comment: NG TUBE. NESSA DRAIN and ABD BINDER to abd.  NON-VERBAL (head nods Y/N), expressive aphasia  Vital Signs:     Pain:  Pain Assessment  Pain Assessment: 0-10  0-10 (Numeric) Pain Score:  (c/o pain, did not rate)  Pain Type: Surgical pain  Pain Location: Abdomen    Objective    Cognition:  Cognition  Overall Cognitive Status: Unable to assess  Orientation Level: Unable to assess    Bed Mobility/Transfers: Bed Mobility  Bed  Mobility: Yes  Bed Mobility 1  Bed Mobility 1: Rolling right  Level of Assistance 1: Maximum assistance, +2  Bed Mobility 2  Bed Mobility  2: Supine to sitting, Sitting to supine, Scooting  Level of Assistance 2: Moderate verbal cues, Maximum assistance, +2  Bed Mobility 3  Bed Mobility 3: Scooting  Level of Assistance 3: Maximum assistance, +2    Transfers  Transfer: No    Sitting Balance:  Static Sitting Balance  Static Sitting-Balance Support: Feet supported, Bilateral upper extremity supported  Static Sitting-Level of Assistance: Moderate assistance, Maximum assistance  Static Sitting-Comment/Number of Minutes: EOB 7-8 MINS       Therapy/Activity:           Balance/Neuromuscular Re-Education  Balance/Neuromuscular Re-Education Activity Performed: Yes  Balance/Neuromuscular Re-Education Activity 1: pt achieved 7-8 mins sitting EOB with mod-maxA      Outcome Measures:[facility] Daily Activity  Putting on and taking off regular lower body clothing: Total  Bathing (including washing, rinsing, drying): Total  Putting on and taking off regular upper body clothing: Total  Toileting, which includes using toilet, bedpan or urinal: Total  Taking care of personal grooming such as brushing teeth: Total  Eating Meals: Total  Daily Activity - Total Score: 6        Education Documentation  ADL Training, taught by LUCRETIA Adams at 8/19/2024 11:45 AM.  Learner: Patient  Readiness: Acceptance  Method: Explanation  Response: Verbalizes Understanding, Needs Reinforcement    Education Comments  No comments found.        OP EDUCATION:       Goals:  Encounter Problems       Encounter Problems (Active)       ADLs       Patient will complete daily grooming tasks brushing teeth and washing face/hair with minimal assist  level of assistance and PRN adaptive equipment while supine in bed and/or supported sitting. (Progressing)       Start:  08/12/24    Expected End:  08/26/24               COGNITION/SAFETY       Patient will follow 75%  Simple commands to allow improved ADL performance. (Progressing)       Start:  08/12/24    Expected End:  08/26/24               TRANSFERS       Patient will perform bed mobility moderate assist level of assistance and bed rails in order to improve safety and independence with mobility (Progressing)       Start:  08/12/24    Expected End:  08/26/24               VISION       Patient will visually attend to Left/Right  side of Tray, Room, and Body using compensatory strategies and  minimal assist  level of assistance.  (Progressing)       Start:  08/12/24    Expected End:  08/26/24

## 2024-08-19 NOTE — PROGRESS NOTES
Physical Therapy    Physical Therapy Treatment    Patient Name: Abdi Wilson  MRN: [id number]  Today's Date: 8/19/2024  Time Calculation  Start Time: 1011  Stop Time: 1034  Time Calculation (min): 23 min    Assessment/Plan   PT Assessment  End of Session Communication: Bedside nurse, PCT/NA/CTA  Assessment Comment: pt would benefit from further skilled PT services to improve mobility and safety.  End of Session Patient Position: Bed, 3 rail up, Alarm on     PT Plan  Treatment/Interventions: Bed mobility  PT Plan: Ongoing PT  PT Frequency: 4 times per week  PT Discharge Recommendations: Moderate intensity level of continued care  PT Recommended Transfer Status: Total assist  PT - OK to Discharge: Yes      General Visit Information:   PT  Visit  PT Received On: 08/19/24  Response to Previous Treatment: Patient with no complaints from previous session.  General  Co-Treatment: OT  Co-Treatment Reason: optimize pt safety and mobility  Prior to Session Communication: Bedside nurse  Patient Position Received: Bed, 3 rail up, Alarm on  Preferred Learning Style: verbal  General Comment: pt agreeable to PT and cleared by RN.  pt declining to stand this session. (resistive)    Subjective   Precautions:  Precautions  Medical Precautions: Fall precautions, Abdominal precautions  Post-Surgical Precautions: Abdominal surgery precautions  Precautions Comment: NG TUBE. NESSA DRAIN and ABD BINDER to and.  NON-VERBAL (head nods Y/N), expressive aphasia      Pain:  Pain Assessment  Pain Assessment: 0-10  0-10 (Numeric) Pain Score:  (pt verbalized pain with bed mobilty but did not rate)  Pain Type: Surgical pain  Pain Location: Abdomen  Cognition:  Cognition  Overall Cognitive Status: Unable to assess  Orientation Level: Unable to assess            Treatments:  Balance/Neuromuscular Re-Education  Balance/Neuromuscular Re-Education Activity Performed: Yes  Balance/Neuromuscular Re-Education Activity 1: pt sat EOB * min with MOD>MAX A with  max cues for technique and to correct LOb as needed. pt with lean to L and posterior .    Bed Mobility  Bed Mobility: Yes  Bed Mobility 1  Bed Mobility 1: Rolling left, Rolling right  Level of Assistance 1: Maximum assistance, +2  Bed Mobility Comments 1: cues for use of rails and extremities to assist.  Bed Mobility 2  Bed Mobility  2: Supine to sitting, Sitting to supine, Scooting  Level of Assistance 2: Maximum assistance, +2, Moderate verbal cues  Bed Mobility Comments 2: cues for improved technique.    Outcome Measures:  [facility] Basic Mobility  Turning from your back to your side while in a flat bed without using bedrails: Total  Moving from lying on your back to sitting on the side of a flat bed without using bedrails: Total  Moving to and from bed to chair (including a wheelchair): Total  Standing up from a chair using your arms (e.g. wheelchair or bedside chair): Total  To walk in hospital room: Total  Climbing 3-5 steps with railing: Total  Basic Mobility - Total Score: 6    Education Documentation  Mobility Training, taught by Chantale Cardenas PTA at 8/19/2024 11:41 AM.  Learner: Patient  Readiness: Acceptance  Method: Explanation  Response: Verbalizes Understanding, Needs Reinforcement    Education Comments  No comments found.        OP EDUCATION:       Encounter Problems       Encounter Problems (Active)       Balance       STG - Maintains static sitting balance with upper extremity support (Progressing)       Start:  08/12/24    Expected End:  08/26/24       INTERVENTIONS:  1. Practice sitting on the edge of a bed/mat with minimal support.  2. Educate patient about maintining total hip precautions while maintaining balance.  3. Educate patient about pressure relief.  4. Educate patient about use of assistive device.            PT Transfers       STG - Patient to transfer to and from sit to supine with no greater than Min Ax2 (Progressing)       Start:  08/12/24    Expected End:  08/26/24               Pain -  Adult          Strengthening        Pt will perform 10+ reps AAROM/AROM/RROM BLE to improve functional strength needed for improved mobility.  (Progressing)       Start:  08/12/24    Expected End:  08/26/24

## 2024-08-19 NOTE — POST-PROCEDURE NOTE
Interventional Radiology Brief Postprocedure Note    Attending: Adria Wynn MD      Assistant: none    Diagnosis: abscess    Description of procedure: drain placement     Anesthesia:  Local, mod sed    Complications: None    Estimated Blood Loss: minimal    specimens collected      See detailed result report with images in PACS.    The patient tolerated the procedure well without incident or complication.

## 2024-08-19 NOTE — PROGRESS NOTES
Abdi Wilson is a 79 y.o. male on day 9 of admission presenting with PEG tube malfunction (Multi).      Subjective   Abdi Wilson is a 79 y.o. male with PMHx s/f hypertension dyslipidemia old CVA mild cardiomyopathy ulcerative colitis GERD recent CVA in June of this year with dysphagia and expressive aphasia presenting with abdominal pain fever.  Patient had a PEG tube placed for nutritional purposes about 2 days ago.  Had presented to emergency department complaining of some pain in the abdominal area the patient was noted to have low blood pressure and fever in emergency department.  On presenting to emergency department patient had temperature 96.3 heart rate 102 respiratory rate 24 blood pressure 135/81 Javy panel shows sodium 148 potassium 4.2 chloride 112 bicarb 25 BUN 37 creatinine 1.38 glucose 144 liver enzymes within normal limits total bilirubin 1.5 initial lactate 2.5 repeat lactate 2.4 CBC showing WBC 13.6 hemoglobin 19.7 hematocrit 60.7 platelets 269 CT scan of the abdomen pelvis was done showing malposition PEG tube there is a small amount of free air, patient was seen by general surgery who felt patient had peritonitis plan is to take patient for emergent surgery.   8/11: Patient was seen and examined.  He is nonverbal at baseline and still looks lethargic this morning.  Blood pressure is improved and patient is off pressors.  Hypernatremia persistent and patient has been started on IV fluid D5 water.  Will get repeat BMP and trend.  Continue current IV antibiotics.  Blood cultures are still pending.  Gastrostomy replacement done 8/10/2024.  Continue n.p.o. by general surgery recommendation.  Repeat CBC and BMP in a.m.  8/12: Patient was seen and examined.  He remains nonverbal which is baseline however patient is not responsive or interactive.  Failed attempt at physical therapy.  Discussed with patient's son over the phone and granddaughter.  Patient was cooperating adequately with physical  therapy prior to this admission.  I will get a CT of the head to rule out any recent stroke.  Stat lactic and ammonia TSH and vitamin B12.  General surgery still recommending holding G-tube.  We will have to consider other modes of feeding within 24 hours.  Will continue IV fluids pending review of stat BMP.  Leukocytosis trending down.  Blood cultures are negative.  Continue to trend CBC and BMP daily.  8/13: Patient was seen and examined.  Patient is more awake today; still lethargic.  Discussed with general surgery.  Patient to get contrast study by tomorrow and if no further abnormalities NG to be discontinued and will start the patient on oral diet and advance as tolerated.  Continue to trend CBC and BMP daily.  8/14: Patient was seen and examined.  Still lethargic, drowsy but arousable.  Fluoroscopic studies completed and reports pending.  Patient is now requiring 3 L nasal cannula oxygen to maintain saturation so I will get a stat chest x-ray.  General surgery to decide on NG status and possible tube feeding versus TPN.  Hypokalemia noted.  IV potassium given.  Continue to trend CBC and BMP daily.  8/15: Patient was seen and examined.  Looks clinically better today still although still lethargic.  NG tube discontinued and diet advanced per general surgery recommendation.  Repeat chest x-ray yesterday shows no changes compared to previous.  Wound culture is growing Pseudomonas which is pansensitive.  Continue IV Zosyn.  Blood cultures have remained negative x 4 days.  Potential discharge back to extended-care facility once patient is tolerating goal rate of PEG tube feeding.  Repeat CBC and BMP in a.m.  8/16:Mr. Abdi Wilson is a 79 y.o. male, with a past medical history of CVA hemiplegia dysphagia dysarthria, status post PEG tube admitted for PEG tube malfunction status post replaced given also IV antibiotic Zosyn for negative blood culture, +ve wound culture is negative, pending tube feed tolerance.  Plan  to discharge him to SNF once tolerated feeding tube. Target to 60-65 ml/h. Now at 50cc.   8/17: Patient with increasing abdominal pain a lot of drainage from the wound as well.  Growing out abundant Pseudomonas from the wound currently and is susceptible to Zosyn.  Will get a CT of abdomen and pelvis since it has been over a week and patient has significant pain and white count is up a little bit.  Difficult historian because of previous stroke.  Also note increasing sodium will give a fluid bolus and increase free water.  May have to reinvolve surgery await CT results.  I believe tube feeds are at goal currently.  Once doing better will be returned back [facility].  8/18: Very large abscess noted on CAT scan 15.7 x 9.7 x 11.1 cm possibly subcapsular abscess by the spleen.  ID agrees with continuing Zosyn IR consulted for placement of drain.  Will need surgery to reengage on this patient.  Will reduce free water to 250 flushes.  Continue pulmonary toilet.  Patient's white count is going up which is concerning to discussed with ID. Clinically appears stable right now.  Will check labs in AM.  Patient's expressive and receptive aphasia continue to be a barrier.  8/19: Patient was evaluated this morning. Not in acute distress however patient appears uncomfortable. No acute changes overnight. Patient is resting in bed. Patient's white count is 23.4 this morning, continue on zosyn. Patient's expressive and receptive aphasia is a barrier to completing ROS this morning. Large abscess noted on CAT scan, subscapular abscess by the spleen. Went to IR this morning for drain placement. Clinically stable this morning. Patient is NPO effective this morning.   Addendum: Patient did have drain placed more serosanguineous pus draining.  Will restart patient's tube feeds.  Appears to be relatively comfortable when seen in the interventional laboratory.  Continue with aggressive pulmonary toilet continue antibiotics.  ID continues  to follow as well Case discussed at length with surgery and IR today.    Objective   EXAM:    Constitutional: Patient with expressive aphasia appears uncomfortable    Head and facial: Dry mucosa    Neck: Neck is not rigid    Lungs: Some crackles and rales bilaterally    Heart: Regular rate and mike     Abdomen: Abdominal pain PEG tube is in place drainage from acting from wound.    Pelvis/: No flank tenderness    Extremities: No edema    Neurologic: Patient with expressive aphasia unable to move right upper extremity appears uncomfortable    Dermatologic: Drainage from abdominal wound    Psychiatric/behavioral: Patient uncomfortable      Last Recorded Vitals  /89 (BP Location: Left arm, Patient Position: Lying)   Pulse 92   Temp 36.1 °C (96.9 °F) (Temporal)   Resp 20   Wt 88.6 kg (195 lb 5.2 oz)   SpO2 94%   Intake/Output last 3 Shifts:    Intake/Output Summary (Last 24 hours) at 8/19/2024 0926  Last data filed at 8/19/2024 0500  Gross per 24 hour   Intake 50 ml   Output 3350 ml   Net -3300 ml       Admission Weight  Weight: 94.3 kg (208 lb) (08/10/24 1113)    Daily Weight  08/19/24 : 88.6 kg (195 lb 5.2 oz)    I    Labs, x-rays and in chart reviewed r.       Scheduled medications  acetaminophen, 975 mg, g-tube, q8h  atorvastatin, 40 mg, oral, Nightly  carvedilol, 6.25 mg, oral, BID  pantoprazole, 40 mg, oral, Daily before breakfast   Or  esomeprazole, 40 mg, nasoduodenal tube, Daily before breakfast   Or  pantoprazole, 40 mg, intravenous, Daily before breakfast  [Held by provider] heparin (porcine), 5,000 Units, subcutaneous, q8h  hydrALAZINE, 25 mg, oral, TID  losartan, 50 mg, oral, Daily  piperacillin-tazobactam, 3.375 g, intravenous, q6h      Continuous medications     PRN medications  PRN medications: hydrALAZINE, ipratropium-albuteroL, morphine    Assessment/Plan      Peritonitis &sepsis s/p exploratory laparotomy and G-tube placement 8/10  Large perisplenic abscess status post drain placement  8/19/2024  Draining abdominal wound with Pseudomonas  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  History of stroke with expressive aphasia  and dysphagia can speak some at baseline  A component of receptive aphasia as well  History of aspiration pneumonia  Difficulty with secretions  Dementia  Hypertension  Hyperlipidemia   CODE STATUS DNR no intubation  Hypokalemia  Hypernatremia   DVT prophylaxis-subcu heparin    acetaminophen, 975 mg, g-tube, q8h  atorvastatin, 40 mg, oral, Nightly  carvedilol, 6.25 mg, oral, BID  pantoprazole, 40 mg, intravenous, Daily before breakfast  heparin 5,000 Units, subcutaneous, q8h  hydrALAZINE, 25 mg, oral, TID  losartan, 50 mg, oral, Daily  piperacillin-tazobactam, 3.375 g, intravenous, q6h  PT and OT recommend moderate intensity level of continued care  As needed morphine  DuoNeb as needed  Pulmonary toilet  Change free water to 250 mL every 4 hours per PEG  Drain for abscess placed-monitor drainage  Suction as needed  Restart tube feeds  Free water 250 mL every 4 hours PEG  Discharge planning [facility]  Check labs in a.m.  See orders for complete plan         Spent 35 minutes in follow-up management of this patient       BRIDGETT AVELAR    Shared visit with student  Patient seen and examined  Note amended and addended  See my orders for complete plan.

## 2024-08-20 ENCOUNTER — APPOINTMENT (OUTPATIENT)
Dept: CARDIOLOGY | Facility: HOSPITAL | Age: 79
DRG: 853 | End: 2024-08-20
Payer: MEDICARE

## 2024-08-20 LAB
ANION GAP SERPL CALC-SCNC: 13 MMOL/L (ref 10–20)
BASOPHILS # BLD AUTO: 0.03 X10*3/UL (ref 0–0.1)
BASOPHILS NFR BLD AUTO: 0.2 %
BUN SERPL-MCNC: 17 MG/DL (ref 6–23)
CALCIUM SERPL-MCNC: 7.8 MG/DL (ref 8.6–10.3)
CHLORIDE SERPL-SCNC: 104 MMOL/L (ref 98–107)
CO2 SERPL-SCNC: 20 MMOL/L (ref 21–32)
CREAT SERPL-MCNC: 0.8 MG/DL (ref 0.5–1.3)
EGFRCR SERPLBLD CKD-EPI 2021: 89 ML/MIN/1.73M*2
EOSINOPHIL # BLD AUTO: 0.23 X10*3/UL (ref 0–0.4)
EOSINOPHIL NFR BLD AUTO: 1.6 %
ERYTHROCYTE [DISTWIDTH] IN BLOOD BY AUTOMATED COUNT: 14.7 % (ref 11.5–14.5)
GLUCOSE BLD MANUAL STRIP-MCNC: 114 MG/DL (ref 74–99)
GLUCOSE BLD MANUAL STRIP-MCNC: 117 MG/DL (ref 74–99)
GLUCOSE BLD MANUAL STRIP-MCNC: 130 MG/DL (ref 74–99)
GLUCOSE BLD MANUAL STRIP-MCNC: 159 MG/DL (ref 74–99)
GLUCOSE BLD MANUAL STRIP-MCNC: 167 MG/DL (ref 74–99)
GLUCOSE SERPL-MCNC: 149 MG/DL (ref 74–99)
HCT VFR BLD AUTO: 39.6 % (ref 41–52)
HGB BLD-MCNC: 13.1 G/DL (ref 13.5–17.5)
IMM GRANULOCYTES # BLD AUTO: 0.11 X10*3/UL (ref 0–0.5)
IMM GRANULOCYTES NFR BLD AUTO: 0.8 % (ref 0–0.9)
LYMPHOCYTES # BLD AUTO: 1.29 X10*3/UL (ref 0.8–3)
LYMPHOCYTES NFR BLD AUTO: 9.2 %
MAGNESIUM SERPL-MCNC: 2 MG/DL (ref 1.6–2.4)
MCH RBC QN AUTO: 28.9 PG (ref 26–34)
MCHC RBC AUTO-ENTMCNC: 33.1 G/DL (ref 32–36)
MCV RBC AUTO: 87 FL (ref 80–100)
MONOCYTES # BLD AUTO: 1.1 X10*3/UL (ref 0.05–0.8)
MONOCYTES NFR BLD AUTO: 7.8 %
NEUTROPHILS # BLD AUTO: 11.27 X10*3/UL (ref 1.6–5.5)
NEUTROPHILS NFR BLD AUTO: 80.4 %
NRBC BLD-RTO: 0 /100 WBCS (ref 0–0)
PLATELET # BLD AUTO: 379 X10*3/UL (ref 150–450)
POTASSIUM SERPL-SCNC: 4.3 MMOL/L (ref 3.5–5.3)
RBC # BLD AUTO: 4.53 X10*6/UL (ref 4.5–5.9)
SODIUM SERPL-SCNC: 133 MMOL/L (ref 136–145)
WBC # BLD AUTO: 14 X10*3/UL (ref 4.4–11.3)

## 2024-08-20 PROCEDURE — 83735 ASSAY OF MAGNESIUM: CPT | Performed by: INTERNAL MEDICINE

## 2024-08-20 PROCEDURE — 2500000004 HC RX 250 GENERAL PHARMACY W/ HCPCS (ALT 636 FOR OP/ED)

## 2024-08-20 PROCEDURE — 82947 ASSAY GLUCOSE BLOOD QUANT: CPT

## 2024-08-20 PROCEDURE — 36415 COLL VENOUS BLD VENIPUNCTURE: CPT | Performed by: INTERNAL MEDICINE

## 2024-08-20 PROCEDURE — 97530 THERAPEUTIC ACTIVITIES: CPT | Mod: GO,CO

## 2024-08-20 PROCEDURE — 99233 SBSQ HOSP IP/OBS HIGH 50: CPT | Performed by: INTERNAL MEDICINE

## 2024-08-20 PROCEDURE — 2060000001 HC INTERMEDIATE ICU ROOM DAILY

## 2024-08-20 PROCEDURE — 82374 ASSAY BLOOD CARBON DIOXIDE: CPT | Performed by: INTERNAL MEDICINE

## 2024-08-20 PROCEDURE — 2500000001 HC RX 250 WO HCPCS SELF ADMINISTERED DRUGS (ALT 637 FOR MEDICARE OP): Performed by: INTERNAL MEDICINE

## 2024-08-20 PROCEDURE — 85025 COMPLETE CBC W/AUTO DIFF WBC: CPT | Performed by: INTERNAL MEDICINE

## 2024-08-20 PROCEDURE — 93005 ELECTROCARDIOGRAM TRACING: CPT

## 2024-08-20 PROCEDURE — 97530 THERAPEUTIC ACTIVITIES: CPT | Mod: KX,GP,CQ | Performed by: PHYSICAL THERAPY ASSISTANT

## 2024-08-20 RX ADMIN — PANTOPRAZOLE SODIUM 40 MG: 40 INJECTION, POWDER, FOR SOLUTION INTRAVENOUS at 04:37

## 2024-08-20 RX ADMIN — CARVEDILOL 6.25 MG: 6.25 TABLET, FILM COATED ORAL at 20:23

## 2024-08-20 RX ADMIN — ACETAMINOPHEN 975 MG: 325 TABLET ORAL at 04:36

## 2024-08-20 RX ADMIN — MORPHINE SULFATE 2 MG: 2 INJECTION, SOLUTION INTRAMUSCULAR; INTRAVENOUS at 20:23

## 2024-08-20 RX ADMIN — PIPERACILLIN SODIUM AND TAZOBACTAM SODIUM 3.38 G: 3; .375 INJECTION, SOLUTION INTRAVENOUS at 04:36

## 2024-08-20 RX ADMIN — PIPERACILLIN SODIUM AND TAZOBACTAM SODIUM 3.38 G: 3; .375 INJECTION, SOLUTION INTRAVENOUS at 10:20

## 2024-08-20 RX ADMIN — LOSARTAN POTASSIUM 50 MG: 50 TABLET, FILM COATED ORAL at 10:21

## 2024-08-20 RX ADMIN — ATORVASTATIN CALCIUM 40 MG: 40 TABLET, FILM COATED ORAL at 20:23

## 2024-08-20 RX ADMIN — MORPHINE SULFATE 2 MG: 2 INJECTION, SOLUTION INTRAMUSCULAR; INTRAVENOUS at 10:21

## 2024-08-20 RX ADMIN — MORPHINE SULFATE 2 MG: 2 INJECTION, SOLUTION INTRAMUSCULAR; INTRAVENOUS at 04:36

## 2024-08-20 RX ADMIN — HYDRALAZINE HYDROCHLORIDE 25 MG: 25 TABLET ORAL at 10:21

## 2024-08-20 RX ADMIN — ACETAMINOPHEN 975 MG: 325 TABLET ORAL at 17:33

## 2024-08-20 RX ADMIN — PIPERACILLIN SODIUM AND TAZOBACTAM SODIUM 3.38 G: 3; .375 INJECTION, SOLUTION INTRAVENOUS at 15:56

## 2024-08-20 RX ADMIN — PIPERACILLIN SODIUM AND TAZOBACTAM SODIUM 3.38 G: 3; .375 INJECTION, SOLUTION INTRAVENOUS at 22:29

## 2024-08-20 RX ADMIN — HYDRALAZINE HYDROCHLORIDE 25 MG: 25 TABLET ORAL at 20:23

## 2024-08-20 RX ADMIN — CARVEDILOL 6.25 MG: 6.25 TABLET, FILM COATED ORAL at 10:21

## 2024-08-20 RX ADMIN — ACETAMINOPHEN 975 MG: 325 TABLET ORAL at 10:20

## 2024-08-20 RX ADMIN — HYDRALAZINE HYDROCHLORIDE 25 MG: 25 TABLET ORAL at 15:56

## 2024-08-20 ASSESSMENT — PAIN SCALES - PAIN ASSESSMENT IN ADVANCED DEMENTIA (PAINAD)
TOTALSCORE: 7
TOTALSCORE: MEDICATION (SEE MAR)
TOTALSCORE: 1
FACIALEXPRESSION: SAD, FRIGHTENED, FROWN
TOTALSCORE: 5
BODYLANGUAGE: RELAXED
FACIALEXPRESSION: FACIAL GRIMACING
BREATHING: OCCASIONAL LABORED BREATHING, SHORT PERIOD OF HYPERVENTILATION
BREATHING: OCCASIONAL LABORED BREATHING, SHORT PERIOD OF HYPERVENTILATION
NEGVOCALIZATION: OCCASIONAL MOAN/GROAN, LOW SPEECH, NEGATIVE/DISAPPROVING QUALITY
FACIALEXPRESSION: FACIAL GRIMACING
BODYLANGUAGE: TENSE, DISTRESSED PACING, FIDGETING
CONSOLABILITY: DISTRACTED OR REASSURED BY VOICE/TOUCH
CONSOLABILITY: UNABLE TO CONSOLE, DISTRACT OR REASSURE
NEGVOCALIZATION: OCCASIONAL MOAN/GROAN, LOW SPEECH, NEGATIVE/DISAPPROVING QUALITY
BREATHING: NORMAL
FACIALEXPRESSION: SMILING OR INEXPRESSIVE
TOTALSCORE: PT ASLEEP
BREATHING: NORMAL
TOTALSCORE: 0
CONSOLABILITY: NO NEED TO CONSOLE
FACIALEXPRESSION: SMILING OR INEXPRESSIVE
NEGVOCALIZATION: OCCASIONAL MOAN/GROAN, LOW SPEECH, NEGATIVE/DISAPPROVING QUALITY
CONSOLABILITY: UNABLE TO CONSOLE, DISTRACT OR REASSURE
TOTALSCORE: MEDICATION (SEE MAR)
BREATHING: NORMAL
BODYLANGUAGE: RIGID, FISTS CLENCHED, KNEES UP, PUSHING/PULLING AWAY, STRIKES OUT
TOTALSCORE: 8
CONSOLABILITY: NO NEED TO CONSOLE
NEGVOCALIZATION: REPEATED TROUBLED CALLING OUT, LOUD MOANING/GROANING, CRYING
BODYLANGUAGE: TENSE, DISTRESSED PACING, FIDGETING
TOTALSCORE: MEDICATION (SEE MAR)
BODYLANGUAGE: RELAXED

## 2024-08-20 ASSESSMENT — COGNITIVE AND FUNCTIONAL STATUS - GENERAL
MOBILITY SCORE: 6
EATING MEALS: TOTAL
STANDING UP FROM CHAIR USING ARMS: TOTAL
HELP NEEDED FOR BATHING: TOTAL
MOVING TO AND FROM BED TO CHAIR: TOTAL
DAILY ACTIVITIY SCORE: 6
DRESSING REGULAR UPPER BODY CLOTHING: TOTAL
WALKING IN HOSPITAL ROOM: TOTAL
CLIMB 3 TO 5 STEPS WITH RAILING: TOTAL
TURNING FROM BACK TO SIDE WHILE IN FLAT BAD: TOTAL
MOBILITY SCORE: 6
WALKING IN HOSPITAL ROOM: TOTAL
TOILETING: TOTAL
TOILETING: TOTAL
EATING MEALS: TOTAL
TURNING FROM BACK TO SIDE WHILE IN FLAT BAD: TOTAL
MOVING FROM LYING ON BACK TO SITTING ON SIDE OF FLAT BED WITH BEDRAILS: TOTAL
CLIMB 3 TO 5 STEPS WITH RAILING: TOTAL
MOVING FROM LYING ON BACK TO SITTING ON SIDE OF FLAT BED WITH BEDRAILS: TOTAL
STANDING UP FROM CHAIR USING ARMS: TOTAL
PERSONAL GROOMING: TOTAL
DRESSING REGULAR LOWER BODY CLOTHING: TOTAL
DRESSING REGULAR LOWER BODY CLOTHING: TOTAL
HELP NEEDED FOR BATHING: TOTAL
MOVING TO AND FROM BED TO CHAIR: TOTAL
DAILY ACTIVITIY SCORE: 6
PERSONAL GROOMING: TOTAL
DRESSING REGULAR UPPER BODY CLOTHING: TOTAL

## 2024-08-20 ASSESSMENT — PAIN - FUNCTIONAL ASSESSMENT
PAIN_FUNCTIONAL_ASSESSMENT: 0-10
PAIN_FUNCTIONAL_ASSESSMENT: 0-10

## 2024-08-20 NOTE — PROGRESS NOTES
Occupational Therapy    OT Treatment    Patient Name: Abdi Wilson  MRN: [id number]  Today's Date: 8/20/2024  Time Calculation  Start Time: 0933  Stop Time: 0946  Time Calculation (min): 13 min        Assessment:  End of Session Communication: Bedside nurse, PCT/NA/CTA  End of Session Patient Position: Bed, 3 rail up, Alarm on  OT Assessment Results: Decreased ADL status, Decreased upper extremity strength, Decreased safe judgment during ADL, Decreased endurance, Decreased cognition, Decreased functional mobility, Decreased gross motor control, Decreased IADLs  Plan:  Treatment Interventions: ADL retraining, UE strengthening/ROM, Functional transfer training, Endurance training  OT Frequency: 3 times per week  OT Discharge Recommendations: Moderate intensity level of continued care  OT - OK to Discharge: Yes  Treatment Interventions: ADL retraining, UE strengthening/ROM, Functional transfer training, Endurance training    Subjective   Previous Visit Info:  OT Last Visit  OT Received On: 08/20/24  General:  General  Co-Treatment: PT  Co-Treatment Reason: optimize pt safety and mobiltiy  Prior to Session Communication: Bedside nurse  Patient Position Received: Bed, 3 rail up, Alarm on  Preferred Learning Style: verbal  General Comment: pt agreeable to OT tx session  Precautions:  Medical Precautions: Fall precautions, Abdominal precautions  Post-Surgical Precautions: Abdominal surgery precautions  Precautions Comment: NG TUBE. NESSA DRAIN and ABD BINDER to abd.  NON-VERBAL (head nods Y/N), expressive aphasia  Vital Signs:     Pain:  Pain Assessment  Pain Assessment: 0-10  0-10 (Numeric) Pain Score:  (moaning in pain with various bed mobility movements and rolling. Did not verbalize rating of pain)    Objective    Cognition:  Cognition  Orientation Level: Unable to assess  Coordination:     Activities of Daily Living: Grooming  Grooming Level of Assistance: Maximum assistance, Maximum verbal cues  Grooming Where Assessed:  Bed level  Grooming Comments: face washing task completed with maxA, max vc's to initiate and attempt participation in task.  Functional Standing Tolerance:     Bed Mobility/Transfers: Bed Mobility  Bed Mobility: Yes  Bed Mobility 1  Bed Mobility 1: Rolling left, Rolling right  Level of Assistance 1: Maximum assistance, +2        Outcome Measures:  [facility] Daily Activity  Putting on and taking off regular lower body clothing: Total  Bathing (including washing, rinsing, drying): Total  Putting on and taking off regular upper body clothing: Total  Toileting, which includes using toilet, bedpan or urinal: Total  Taking care of personal grooming such as brushing teeth: Total  Eating Meals: Total  Daily Activity - Total Score: 6        Education Documentation  ADL Training, taught by LUCRETIA Adams at 8/20/2024 10:54 AM.  Learner: Patient  Readiness: Acceptance  Method: Explanation  Response: Verbalizes Understanding, Needs Reinforcement    ADL Training, taught by LUCRETIA Adams at 8/19/2024 11:45 AM.  Learner: Patient  Readiness: Acceptance  Method: Explanation  Response: Verbalizes Understanding, Needs Reinforcement    Education Comments  No comments found.        OP EDUCATION:       Goals:  Encounter Problems       Encounter Problems (Active)       ADLs       Patient will complete daily grooming tasks brushing teeth and washing face/hair with minimal assist  level of assistance and PRN adaptive equipment while supine in bed and/or supported sitting. (Progressing)       Start:  08/12/24    Expected End:  08/26/24               COGNITION/SAFETY       Patient will follow 75% Simple commands to allow improved ADL performance. (Progressing)       Start:  08/12/24    Expected End:  08/26/24               TRANSFERS       Patient will perform bed mobility moderate assist level of assistance and bed rails in order to improve safety and independence with mobility (Progressing)       Start:  08/12/24    Expected End:   08/26/24               VISION       Patient will visually attend to Left/Right  side of Tray, Room, and Body using compensatory strategies and  minimal assist  level of assistance.  (Progressing)       Start:  08/12/24    Expected End:  08/26/24

## 2024-08-20 NOTE — CARE PLAN
The patient's goals for the shift include  stay informed    The clinical goals for the shift include patient will remain hemodynamically stable throughout the shift    Over the shift, the patient did make progress toward the following goals. Barriers to progression include physically and cognitive barriers. Recommendations to address these barriers include education.

## 2024-08-20 NOTE — PROGRESS NOTES
Abdi Wilson is a 79 y.o. male on day 10 of admission presenting with PEG tube malfunction (Multi).      Subjective   Abdi Wilson is a 79 y.o. male with PMHx s/f hypertension dyslipidemia old CVA mild cardiomyopathy ulcerative colitis GERD recent CVA in June of this year with dysphagia and expressive aphasia presenting with abdominal pain fever.  Patient had a PEG tube placed for nutritional purposes about 2 days ago.  Had presented to emergency department complaining of some pain in the abdominal area the patient was noted to have low blood pressure and fever in emergency department.  On presenting to emergency department patient had temperature 96.3 heart rate 102 respiratory rate 24 blood pressure 135/81 Javy panel shows sodium 148 potassium 4.2 chloride 112 bicarb 25 BUN 37 creatinine 1.38 glucose 144 liver enzymes within normal limits total bilirubin 1.5 initial lactate 2.5 repeat lactate 2.4 CBC showing WBC 13.6 hemoglobin 19.7 hematocrit 60.7 platelets 269 CT scan of the abdomen pelvis was done showing malposition PEG tube there is a small amount of free air, patient was seen by general surgery who felt patient had peritonitis plan is to take patient for emergent surgery.   8/11: Patient was seen and examined.  He is nonverbal at baseline and still looks lethargic this morning.  Blood pressure is improved and patient is off pressors.  Hypernatremia persistent and patient has been started on IV fluid D5 water.  Will get repeat BMP and trend.  Continue current IV antibiotics.  Blood cultures are still pending.  Gastrostomy replacement done 8/10/2024.  Continue n.p.o. by general surgery recommendation.  Repeat CBC and BMP in a.m.  8/12: Patient was seen and examined.  He remains nonverbal which is baseline however patient is not responsive or interactive.  Failed attempt at physical therapy.  Discussed with patient's son over the phone and granddaughter.  Patient was cooperating adequately with physical  therapy prior to this admission.  I will get a CT of the head to rule out any recent stroke.  Stat lactic and ammonia TSH and vitamin B12.  General surgery still recommending holding G-tube.  We will have to consider other modes of feeding within 24 hours.  Will continue IV fluids pending review of stat BMP.  Leukocytosis trending down.  Blood cultures are negative.  Continue to trend CBC and BMP daily.  8/13: Patient was seen and examined.  Patient is more awake today; still lethargic.  Discussed with general surgery.  Patient to get contrast study by tomorrow and if no further abnormalities NG to be discontinued and will start the patient on oral diet and advance as tolerated.  Continue to trend CBC and BMP daily.  8/14: Patient was seen and examined.  Still lethargic, drowsy but arousable.  Fluoroscopic studies completed and reports pending.  Patient is now requiring 3 L nasal cannula oxygen to maintain saturation so I will get a stat chest x-ray.  General surgery to decide on NG status and possible tube feeding versus TPN.  Hypokalemia noted.  IV potassium given.  Continue to trend CBC and BMP daily.  8/15: Patient was seen and examined.  Looks clinically better today still although still lethargic.  NG tube discontinued and diet advanced per general surgery recommendation.  Repeat chest x-ray yesterday shows no changes compared to previous.  Wound culture is growing Pseudomonas which is pansensitive.  Continue IV Zosyn.  Blood cultures have remained negative x 4 days.  Potential discharge back to extended-care facility once patient is tolerating goal rate of PEG tube feeding.  Repeat CBC and BMP in a.m.  8/16:Mr. Abdi Wilson is a 79 y.o. male, with a past medical history of CVA hemiplegia dysphagia dysarthria, status post PEG tube admitted for PEG tube malfunction status post replaced given also IV antibiotic Zosyn for negative blood culture, +ve wound culture is negative, pending tube feed tolerance.  Plan  to discharge him to SNF once tolerated feeding tube. Target to 60-65 ml/h. Now at 50cc.   8/17: Patient with increasing abdominal pain a lot of drainage from the wound as well.  Growing out abundant Pseudomonas from the wound currently and is susceptible to Zosyn.  Will get a CT of abdomen and pelvis since it has been over a week and patient has significant pain and white count is up a little bit.  Difficult historian because of previous stroke.  Also note increasing sodium will give a fluid bolus and increase free water.  May have to reinvolve surgery await CT results.  I believe tube feeds are at goal currently.  Once doing better will be returned back [facility].  8/18: Very large abscess noted on CAT scan 15.7 x 9.7 x 11.1 cm possibly subcapsular abscess by the spleen.  ID agrees with continuing Zosyn IR consulted for placement of drain.  Will need surgery to reengage on this patient.  Will reduce free water to 250 flushes.  Continue pulmonary toilet.  Patient's white count is going up which is concerning to discussed with ID. Clinically appears stable right now.  Will check labs in AM.  Patient's expressive and receptive aphasia continue to be a barrier.  8/19: Patient was evaluated this morning. Not in acute distress however patient appears uncomfortable. No acute changes overnight. Patient is resting in bed. Patient's white count is 23.4 this morning, continue on zosyn. Patient's expressive and receptive aphasia is a barrier to completing ROS this morning. Large abscess noted on CAT scan, subscapular abscess by the spleen. Went to IR this morning for drain placement. Clinically stable this morning. Patient is NPO effective this morning.   Addendum: Patient did have drain placed more serosanguineous pus draining.  Will restart patient's tube feeds.  Appears to be relatively comfortable when seen in the interventional laboratory.  Continue with aggressive pulmonary toilet continue antibiotics.  ID continues  to follow as well Case discussed at length with surgery and IR today.  8/20: Patient was evaluated this morning. Not in acute distress. No acute changes overnight. Patient is resting in bed. Patient is currently NPO with PEG tube running feed. Drain placed yesterday at left lateral abdomen with serosanguineous pus draining. ID continues to follow patient. Continue Zosyn 3.375 every 6 hours. Follow culture from IR drain placement. Patient's white count is 14 this morning. Patient is definitely clinically improving today.     Objective   EXAM:    Constitutional: Patient with expressive aphasia appears uncomfortable    Head and facial: Dry mucosa    Neck: Neck is not rigid. No gross JVD.     Lungs: Some crackles and rales bilaterally    Heart: Regular rate and mike     Abdomen: Abdominal pain PEG tube is in place drainage from acting from wound.    Pelvis/: No flank tenderness    Extremities: No edema    Neurologic: Patient with expressive aphasia unable to move right upper extremity appears uncomfortable    Dermatologic: Drainage from abdominal wound    Psychiatric/behavioral: Patient uncomfortable      Last Recorded Vitals  /68 (BP Location: Left arm, Patient Position: Lying)   Pulse 80   Temp 36.3 °C (97.4 °F) (Temporal)   Resp 18   Wt 88.6 kg (195 lb 5.2 oz)   SpO2 92%   Intake/Output last 3 Shifts:    Intake/Output Summary (Last 24 hours) at 8/20/2024 0839  Last data filed at 8/20/2024 0728  Gross per 24 hour   Intake 1973 ml   Output 2360 ml   Net -387 ml       Admission Weight  Weight: 94.3 kg (208 lb) (08/10/24 1113)    Daily Weight  08/19/24 : 88.6 kg (195 lb 5.2 oz)    I    Labs, x-rays and in chart reviewed r.       Scheduled medications  acetaminophen, 975 mg, g-tube, q8h  atorvastatin, 40 mg, oral, Nightly  carvedilol, 6.25 mg, oral, BID  pantoprazole, 40 mg, oral, Daily before breakfast   Or  esomeprazole, 40 mg, nasoduodenal tube, Daily before breakfast   Or  pantoprazole, 40 mg,  intravenous, Daily before breakfast  [Held by provider] heparin (porcine), 5,000 Units, subcutaneous, q8h  hydrALAZINE, 25 mg, oral, TID  losartan, 50 mg, oral, Daily  piperacillin-tazobactam, 3.375 g, intravenous, q6h      Continuous medications     PRN medications  PRN medications: hydrALAZINE, ipratropium-albuteroL, morphine    Assessment/Plan      Peritonitis &sepsis s/p exploratory laparotomy and G-tube placement 8/10  Large perisplenic abscess status post drain placement 8/19/2024  Draining abdominal wound with Pseudomonas  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  History of stroke with expressive aphasia  and dysphagia can speak some at baseline  A component of receptive aphasia as well  History of aspiration pneumonia  Difficulty with secretions  Dementia  Hypertension  Hyperlipidemia   CODE STATUS DNR no intubation  Hypokalemia  Hypernatremia   DVT prophylaxis-subcu heparin    acetaminophen, 975 mg, g-tube, q8h  atorvastatin, 40 mg, oral, Nightly  carvedilol, 6.25 mg, oral, BID  pantoprazole, 40 mg, intravenous, Daily before breakfast  heparin 5,000 Units, subcutaneous, q8h (held)  hydrALAZINE, 25 mg, oral, TID  losartan, 50 mg, oral, Daily  piperacillin-tazobactam, 3.375 g, intravenous, q6h  PT and OT recommend moderate intensity level of continued care  As needed morphine  DuoNeb as needed  Pulmonary toilet  Change free water to 250 mL every 4 hours per PEG  Drain for abscess placed-monitor drainage  Suction as needed  tube feeds restarted  Free water 250 mL every 4 hours PEG  Discharge planning [facility]  Check labs in a.m.  See orders for complete plan         Spent 35 minutes in follow-up management of this patient       BRIDGETT AVELAR    Shared visit with student  Patient seen and examined  Note amended and addended  See my orders for complete plan.     No

## 2024-08-20 NOTE — PROGRESS NOTES
[facility] INFECTIOUS DISEASE PROGRESS NOTE    Patient Name: Abdi Wilson  MRN: [id number]    INTERVAL HISTORY:   S/p splenic abscess/hematoma drain placement.  Culture sent.  No fevers or chills noted over the past 24 hours.    Patient Active Problem List   Diagnosis    Stroke-like symptoms    Cerebral artery occlusion with cerebral infarction (Multi)    Cerebral infarction, unspecified (Multi)    Confusion    PEG tube malfunction (Multi)    Dementia without behavioral disturbance (Multi)    Peritonitis (Multi)    Aphasia        ASSESSMENT:   #Perisplenic, possibly subcapsular abscess  #Pseudomonas intra-abdominal abscess s/p washout 08/10  15.7 x 9.7 x 11.1 fluid and gas collection most compatible with abscess seen on the CT abdomen..  Patient is currently on appropriate antibiotics and is pending source control.  IR consulted for drain placement.  Patient might need surgical evaluation as well given the size of the abscess.  Likely Pseudomonas as patient recently had Pseudomonas infection and abscess was seen in the all the 4 quadrants.     Recommendations:     -Continue Zosyn 3.375 every 6 hours  -Follow-up cultures from IR drain placement  -Obtain blood cultures if patient has fevers or becomes unstable    MEDICATIONS: reviewed.    Current Facility-Administered Medications:     acetaminophen (Tylenol) tablet 975 mg, 975 mg, g-tube, q8h, Nadia Massey DO, 975 mg at 08/20/24 0436    atorvastatin (Lipitor) tablet 40 mg, 40 mg, oral, Nightly, Frankie Washburn MD, 40 mg at 08/19/24 2106    carvedilol (Coreg) tablet 6.25 mg, 6.25 mg, oral, BID, Frankie Washburn MD, 6.25 mg at 08/19/24 2106    pantoprazole (ProtoNix) EC tablet 40 mg, 40 mg, oral, Daily before breakfast **OR** esomeprazole (NexIUM) suspension 40 mg, 40 mg, nasoduodenal tube, Daily before breakfast **OR** pantoprazole (ProtoNix) injection 40 mg, 40 mg, intravenous, Daily before breakfast, Ibeth Blankenship, APRN-CNP, 40 mg at 08/20/24 0437     "[Held by provider] heparin (porcine) injection 5,000 Units, 5,000 Units, subcutaneous, q8h, Ibeth Hernandezdic, APRN-CNP, 5,000 Units at 24 2043    hydrALAZINE (Apresoline) injection 5 mg, 5 mg, intravenous, q6h PRN, Cesar Doss DO, 5 mg at 24 0503    hydrALAZINE (Apresoline) tablet 25 mg, 25 mg, oral, TID, Frankie Washburn MD, 25 mg at 24    ipratropium-albuteroL (Duo-Neb) 0.5-2.5 mg/3 mL nebulizer solution 3 mL, 3 mL, nebulization, q4h PRN, Ezekiel Cintron MD    losartan (Cozaar) tablet 50 mg, 50 mg, oral, Daily, Frankie Washburn MD, 50 mg at 24 0841    morphine injection 2 mg, 2 mg, intravenous, q4h PRN, Ibeth Blankenship APRN-CNP, 2 mg at 24 0436    piperacillin-tazobactam (Zosyn) 3.375 g in dextrose (iso) IV 50 mL, 3.375 g, intravenous, q6h, Ibeth Blankenship APRN-CNP, Stopped at 24 0506     PHYSICAL EXAM:  Vital signs: /68 (BP Location: Left arm, Patient Position: Lying)   Pulse 80   Temp 36.3 °C (97.4 °F) (Temporal)   Resp 18   Ht 1.753 m (5' 9\")   Wt 88.6 kg (195 lb 5.2 oz)   SpO2 92%   BMI 28.84 kg/m²   Temp (24hrs), Av.9 °C (96.7 °F), Min:35.5 °C (95.9 °F), Max:36.3 °C (97.4 °F)    General: alert, oriented, NAD  Lungs: bilaterally clear to auscultation  Heart: regular rate and rhythm  Abdomen: soft, non tender, non distended, BS+  Extremities: no edema  No rashes  No joint inflammation  Neck supple  Lines ok  No CVAT    Labs:    Results for orders placed or performed during the hospital encounter of 08/10/24 (from the past 96 hour(s))   POCT GLUCOSE   Result Value Ref Range    POCT Glucose 152 (H) 74 - 99 mg/dL   POCT GLUCOSE   Result Value Ref Range    POCT Glucose 133 (H) 74 - 99 mg/dL   POCT GLUCOSE   Result Value Ref Range    POCT Glucose 134 (H) 74 - 99 mg/dL   POCT GLUCOSE   Result Value Ref Range    POCT Glucose 127 (H) 74 - 99 mg/dL   Comprehensive Metabolic Panel   Result Value Ref Range    Glucose 145 (H) 74 - 99 mg/dL    Sodium 138 " 136 - 145 mmol/L    Potassium 3.3 (L) 3.5 - 5.3 mmol/L    Chloride 108 (H) 98 - 107 mmol/L    Bicarbonate 23 21 - 32 mmol/L    Anion Gap 10 10 - 20 mmol/L    Urea Nitrogen 14 6 - 23 mg/dL    Creatinine 0.73 0.50 - 1.30 mg/dL    eGFR >90 >60 mL/min/1.73m*2    Calcium 7.6 (L) 8.6 - 10.3 mg/dL    Albumin 2.3 (L) 3.4 - 5.0 g/dL    Alkaline Phosphatase 129 33 - 136 U/L    Total Protein 5.2 (L) 6.4 - 8.2 g/dL    AST 16 9 - 39 U/L    Bilirubin, Total 0.4 0.0 - 1.2 mg/dL    ALT 16 10 - 52 U/L   CBC and Auto Differential   Result Value Ref Range    WBC 12.3 (H) 4.4 - 11.3 x10*3/uL    nRBC 0.0 0.0 - 0.0 /100 WBCs    RBC 4.53 4.50 - 5.90 x10*6/uL    Hemoglobin 13.1 (L) 13.5 - 17.5 g/dL    Hematocrit 39.5 (L) 41.0 - 52.0 %    MCV 87 80 - 100 fL    MCH 28.9 26.0 - 34.0 pg    MCHC 33.2 32.0 - 36.0 g/dL    RDW 14.6 (H) 11.5 - 14.5 %    Platelets 227 150 - 450 x10*3/uL    Neutrophils % 77.0 40.0 - 80.0 %    Immature Granulocytes %, Automated 1.5 (H) 0.0 - 0.9 %    Lymphocytes % 11.7 13.0 - 44.0 %    Monocytes % 8.7 2.0 - 10.0 %    Eosinophils % 0.9 0.0 - 6.0 %    Basophils % 0.2 0.0 - 2.0 %    Neutrophils Absolute 9.45 (H) 1.60 - 5.50 x10*3/uL    Immature Granulocytes Absolute, Automated 0.18 0.00 - 0.50 x10*3/uL    Lymphocytes Absolute 1.43 0.80 - 3.00 x10*3/uL    Monocytes Absolute 1.06 (H) 0.05 - 0.80 x10*3/uL    Eosinophils Absolute 0.11 0.00 - 0.40 x10*3/uL    Basophils Absolute 0.02 0.00 - 0.10 x10*3/uL   POCT GLUCOSE   Result Value Ref Range    POCT Glucose 123 (H) 74 - 99 mg/dL   POCT GLUCOSE   Result Value Ref Range    POCT Glucose 136 (H) 74 - 99 mg/dL   POCT GLUCOSE   Result Value Ref Range    POCT Glucose 135 (H) 74 - 99 mg/dL   POCT GLUCOSE   Result Value Ref Range    POCT Glucose 178 (H) 74 - 99 mg/dL   POCT GLUCOSE   Result Value Ref Range    POCT Glucose 132 (H) 74 - 99 mg/dL   CBC and Auto Differential   Result Value Ref Range    WBC 17.0 (H) 4.4 - 11.3 x10*3/uL    nRBC 0.0 0.0 - 0.0 /100 WBCs    RBC 4.56 4.50 -  5.90 x10*6/uL    Hemoglobin 13.3 (L) 13.5 - 17.5 g/dL    Hematocrit 39.3 (L) 41.0 - 52.0 %    MCV 86 80 - 100 fL    MCH 29.2 26.0 - 34.0 pg    MCHC 33.8 32.0 - 36.0 g/dL    RDW 14.7 (H) 11.5 - 14.5 %    Platelets 283 150 - 450 x10*3/uL    Neutrophils % 82.5 40.0 - 80.0 %    Immature Granulocytes %, Automated 1.0 (H) 0.0 - 0.9 %    Lymphocytes % 7.9 13.0 - 44.0 %    Monocytes % 6.8 2.0 - 10.0 %    Eosinophils % 1.5 0.0 - 6.0 %    Basophils % 0.3 0.0 - 2.0 %    Neutrophils Absolute 14.05 (H) 1.60 - 5.50 x10*3/uL    Immature Granulocytes Absolute, Automated 0.17 0.00 - 0.50 x10*3/uL    Lymphocytes Absolute 1.35 0.80 - 3.00 x10*3/uL    Monocytes Absolute 1.16 (H) 0.05 - 0.80 x10*3/uL    Eosinophils Absolute 0.26 0.00 - 0.40 x10*3/uL    Basophils Absolute 0.05 0.00 - 0.10 x10*3/uL   Basic Metabolic Panel   Result Value Ref Range    Glucose 130 (H) 74 - 99 mg/dL    Sodium 135 (L) 136 - 145 mmol/L    Potassium 3.9 3.5 - 5.3 mmol/L    Chloride 107 98 - 107 mmol/L    Bicarbonate 21 21 - 32 mmol/L    Anion Gap 11 10 - 20 mmol/L    Urea Nitrogen 11 6 - 23 mg/dL    Creatinine 0.69 0.50 - 1.30 mg/dL    eGFR >90 >60 mL/min/1.73m*2    Calcium 7.5 (L) 8.6 - 10.3 mg/dL   Magnesium   Result Value Ref Range    Magnesium 2.12 1.60 - 2.40 mg/dL   POCT GLUCOSE   Result Value Ref Range    POCT Glucose 114 (H) 74 - 99 mg/dL   POCT GLUCOSE   Result Value Ref Range    POCT Glucose 133 (H) 74 - 99 mg/dL   POCT GLUCOSE   Result Value Ref Range    POCT Glucose 133 (H) 74 - 99 mg/dL   POCT GLUCOSE   Result Value Ref Range    POCT Glucose 109 (H) 74 - 99 mg/dL   POCT GLUCOSE   Result Value Ref Range    POCT Glucose 140 (H) 74 - 99 mg/dL   POCT GLUCOSE   Result Value Ref Range    POCT Glucose 126 (H) 74 - 99 mg/dL   POCT GLUCOSE   Result Value Ref Range    POCT Glucose 97 74 - 99 mg/dL   Basic metabolic panel   Result Value Ref Range    Glucose 91 74 - 99 mg/dL    Sodium 134 (L) 136 - 145 mmol/L    Potassium 4.8 3.5 - 5.3 mmol/L    Chloride 105 98  - 107 mmol/L    Bicarbonate 20 (L) 21 - 32 mmol/L    Anion Gap 14 10 - 20 mmol/L    Urea Nitrogen 13 6 - 23 mg/dL    Creatinine 0.77 0.50 - 1.30 mg/dL    eGFR 90 >60 mL/min/1.73m*2    Calcium 8.0 (L) 8.6 - 10.3 mg/dL   Magnesium   Result Value Ref Range    Magnesium 2.03 1.60 - 2.40 mg/dL   Protime-INR   Result Value Ref Range    Protime 13.0 (H) 9.8 - 12.8 seconds    INR 1.2 (H) 0.9 - 1.1   CBC and Auto Differential   Result Value Ref Range    WBC 23.4 (H) 4.4 - 11.3 x10*3/uL    nRBC 0.0 0.0 - 0.0 /100 WBCs    RBC 4.91 4.50 - 5.90 x10*6/uL    Hemoglobin 14.2 13.5 - 17.5 g/dL    Hematocrit 42.4 41.0 - 52.0 %    MCV 86 80 - 100 fL    MCH 28.9 26.0 - 34.0 pg    MCHC 33.5 32.0 - 36.0 g/dL    RDW 14.9 (H) 11.5 - 14.5 %    Platelets 370 150 - 450 x10*3/uL    Neutrophils % 83.4 40.0 - 80.0 %    Immature Granulocytes %, Automated 1.0 (H) 0.0 - 0.9 %    Lymphocytes % 6.5 13.0 - 44.0 %    Monocytes % 7.0 2.0 - 10.0 %    Eosinophils % 1.7 0.0 - 6.0 %    Basophils % 0.4 0.0 - 2.0 %    Neutrophils Absolute 19.49 (H) 1.60 - 5.50 x10*3/uL    Immature Granulocytes Absolute, Automated 0.24 0.00 - 0.50 x10*3/uL    Lymphocytes Absolute 1.52 0.80 - 3.00 x10*3/uL    Monocytes Absolute 1.64 (H) 0.05 - 0.80 x10*3/uL    Eosinophils Absolute 0.40 0.00 - 0.40 x10*3/uL    Basophils Absolute 0.09 0.00 - 0.10 x10*3/uL   POCT GLUCOSE   Result Value Ref Range    POCT Glucose 100 (H) 74 - 99 mg/dL   Sterile Fluid Culture/Smear    Specimen: Aspirate; Fluid   Result Value Ref Range    Gram Stain (2+) Few Polymorphonuclear leukocytes     Gram Stain No organisms seen    POCT GLUCOSE   Result Value Ref Range    POCT Glucose 92 74 - 99 mg/dL   POCT GLUCOSE   Result Value Ref Range    POCT Glucose 92 74 - 99 mg/dL   POCT GLUCOSE   Result Value Ref Range    POCT Glucose 130 (H) 74 - 99 mg/dL   POCT GLUCOSE   Result Value Ref Range    POCT Glucose 117 (H) 74 - 99 mg/dL   POCT GLUCOSE   Result Value Ref Range    POCT Glucose 159 (H) 74 - 99 mg/dL   CBC and  Auto Differential   Result Value Ref Range    WBC 14.0 (H) 4.4 - 11.3 x10*3/uL    nRBC 0.0 0.0 - 0.0 /100 WBCs    RBC 4.53 4.50 - 5.90 x10*6/uL    Hemoglobin 13.1 (L) 13.5 - 17.5 g/dL    Hematocrit 39.6 (L) 41.0 - 52.0 %    MCV 87 80 - 100 fL    MCH 28.9 26.0 - 34.0 pg    MCHC 33.1 32.0 - 36.0 g/dL    RDW 14.7 (H) 11.5 - 14.5 %    Platelets 379 150 - 450 x10*3/uL    Neutrophils % 80.4 40.0 - 80.0 %    Immature Granulocytes %, Automated 0.8 0.0 - 0.9 %    Lymphocytes % 9.2 13.0 - 44.0 %    Monocytes % 7.8 2.0 - 10.0 %    Eosinophils % 1.6 0.0 - 6.0 %    Basophils % 0.2 0.0 - 2.0 %    Neutrophils Absolute 11.27 (H) 1.60 - 5.50 x10*3/uL    Immature Granulocytes Absolute, Automated 0.11 0.00 - 0.50 x10*3/uL    Lymphocytes Absolute 1.29 0.80 - 3.00 x10*3/uL    Monocytes Absolute 1.10 (H) 0.05 - 0.80 x10*3/uL    Eosinophils Absolute 0.23 0.00 - 0.40 x10*3/uL    Basophils Absolute 0.03 0.00 - 0.10 x10*3/uL   Basic Metabolic Panel   Result Value Ref Range    Glucose 149 (H) 74 - 99 mg/dL    Sodium 133 (L) 136 - 145 mmol/L    Potassium 4.3 3.5 - 5.3 mmol/L    Chloride 104 98 - 107 mmol/L    Bicarbonate 20 (L) 21 - 32 mmol/L    Anion Gap 13 10 - 20 mmol/L    Urea Nitrogen 17 6 - 23 mg/dL    Creatinine 0.80 0.50 - 1.30 mg/dL    eGFR 89 >60 mL/min/1.73m*2    Calcium 7.8 (L) 8.6 - 10.3 mg/dL   Magnesium   Result Value Ref Range    Magnesium 2.00 1.60 - 2.40 mg/dL   POCT GLUCOSE   Result Value Ref Range    POCT Glucose 167 (H) 74 - 99 mg/dL        Microbiology data: reviewed    Imaging data: reviewed      Orion Cruz  Pager:[phone]

## 2024-08-20 NOTE — CONSULTS
Wound Care Consult     Visit Date: 8/20/2024      Patient Name: Abdi Wilson         MRN: [id number]           YOB: 1945       Pertinent Labs:   Albumin   Date Value Ref Range Status   08/17/2024 2.3 (L) 3.4 - 5.0 g/dL Final   Nutrition on consult.     Wound Assessment:  Wound Incision Abdomen Medial;Upper (Active)   Site Assessment Unable to assess 08/20/24 0442   Nasreen-Wound Assessment Clean;Dry 08/20/24 0442   Shape liner 08/19/24 1830   Closure Staples 08/20/24 0442   Sutures/Staple Line Approximated 08/19/24 1830   Drainage Description None 08/20/24 0442   Drainage Amount None 08/20/24 0442   Dressing Moist to dry 08/20/24 0442   Dressing Changed New 08/19/24 1830   Dressing Status Clean;Dry 08/20/24 0442       Wound 08/19/24 Pressure Injury Buttocks Bilateral (Active)   Wound Image   08/20/24 1532   Site Assessment Purple 08/20/24 1532   Nasreen-Wound Assessment Blanchable erythema 08/20/24 1532   Pressure Injury Stage DTPI 08/20/24 1532   Shape cluster of deep tissue injuries 08/20/24 1532   Wound Length (cm) 5 cm 08/20/24 1532   Wound Width (cm) 7 cm 08/20/24 1532   Wound Surface Area (cm^2) 35 cm^2 08/20/24 1532   Drainage Description None 08/20/24 1532   Dressing Foam 08/20/24 1532   Dressing Changed Changed 08/20/24 1532   Dressing Status Clean;Dry 08/20/24 0442     Patient seen for report of wound to bilateral buttocks complicated by PMH: hypertension dyslipidemia old CVA mild cardiomyopathy ulcerative colitis GERD recent CVA in June of this year with dysphagia and expressive aphasia per chart. Exam conducted with PCA Deysi to assist with positioning. Suspected deep tissue pressure injury notes to bilateral buttocks. Skin hygiene and dressing care provided. See detailed assessment above from flowsheet. Recommendations below, reviewed with Dr. Cintron.     Patient has abdominal wound, dressing dry/intact, surgery following.      Treatment protocol recommended:  Apply mepilex foam border every 3  days/prn.   Continue to off load, turning at least every 2 hours. Offload heels.    Therapeutic surface: Patient on [place] [employer] pressure relieving mattress with turn and reposition system in place. Patient positioned onto right side after exam. Staff to continue to turn/reposition patient atleast every 2 hours. Offload heels. Preventative foams laced to bilateral heels and offloaded on pillow. PCA Joyce to obtain heel boots from stores and place on patient.     Nursing updated, continue pressure injury preventions, wound care to be completed by nursing per orders. and re-consult wound RN if needed.    See above recommendations for treatment. Patient will likely continue to require skilled assistance with skin hygiene and wound care upon discharge.    Please contact me with questions or changes in patient condition.  Olivia Maguire RN  [facility]   [phone]

## 2024-08-20 NOTE — PROGRESS NOTES
"Abdi Wilson is a 79 y.o. male  who presented on 8/10/2024 with PEG tube malfunction (Multi) after initially being placed on 8/8/2024    Subjective   No acute events overnight.  Patient seen and examined at bedside this morning    Drain in place with purulent output.  PEG tube in place without issue.  Patient appears more awake and lively since initial representation.     Objective     Physical Exam  Constitutional:       General: He is not in acute distress.     Appearance: He is not ill-appearing, toxic-appearing or diaphoretic.   HENT:      Head: Normocephalic and atraumatic.   Cardiovascular:      Rate and Rhythm: Normal rate and regular rhythm.      Pulses: No decreased pulses.           Radial pulses are 2+ on the right side and 2+ on the left side.        Dorsalis pedis pulses are 2+ on the right side and 2+ on the left side.   Pulmonary:      Effort: No tachypnea, accessory muscle usage or respiratory distress.   Abdominal:      General: There is no distension.      Tenderness: There is no abdominal tenderness. There is no guarding or rebound.      Comments: Abdominal binder in place.  PEG tube remains in place approximately 4 cm from the skin.  Bumper able to spin freely.  Midline wound with packing in place.  NESSA drain in place draining purulent drainage   Musculoskeletal:      Right lower leg: No edema.      Left lower leg: No edema.   Skin:     General: Skin is cool and dry.      Coloration: Skin is not cyanotic, jaundiced, mottled or pale.   Neurological:      Mental Status: He is alert.      Comments: Patient not verbally responding to questions but appears to track when spoken to.  Patient is able to give thumbs up and responds to questions and answers.         Last Recorded Vitals  Blood pressure 126/65, pulse 76, temperature 35.7 °C (96.3 °F), temperature source Temporal, resp. rate 18, height 1.753 m (5' 9\"), weight 88.3 kg (194 lb 10.7 oz), SpO2 95%.  Intake/Output last 3 Shifts:  I/O last 3 " completed shifts:  In: 1973 (22.3 mL/kg) [NG/GT:1273; IV Piggyback:700]  Out: 4110 (46.5 mL/kg) [Urine:3625 (1.1 mL/kg/hr); Drains:485]  Weight: 88.3 kg         Assessment/Plan   Principal Problem:    PEG tube malfunction (Multi)  Active Problems:    Cerebral infarction, unspecified (Multi)    Peritonitis (Multi)    Aphasia    Patient is a 79-year-old male who initially underwent PEG tube placement due to dysphagia on 8/8/2024.  Patient re-presented on 8/10/2024 due to PEG tube malfunction and found to be septic on presentation the emergency department    Patient underwent replacement of gastrostomy tube due to malfunction on 8/10/2024 with Dr. Velásquez    Patient had worsening leukocytosis in the postoperative period and CT of the abdomen and pelvis was obtained which demonstrated a perisplenic abscess now status post IR drainage     Plan:  - Leukocytosis improving  - Patient making appropriate urine - approximately 1625 cc over last 24 hours  - May resume tube feeds through PEG tube, maintain abdominal binder  - Continue midline dressing changes.  Wet-to-dry packing twice daily  - Continue NESSA drain to self suction, monitor output - approximately 485 out overnight  - Antibiotics per primary team  - Will continue to closely monitor vital signs, labs as well as drain output.    Discussed with attending Dr. Christen Prather PGY-2  General Surgery

## 2024-08-20 NOTE — NURSING NOTE
0700:  Report received from [place] QUIÑONES    Patient currently is NPO with Peg tube running tube feed of jevity 1.5 at 60ml/hr with 250cc water flush q4 hrs. Patient hasa a chronic seo, EARLENE drain placed 8/19 left lateral abd with purulent drainage and a midline abd wound incision dressing was last changed this morning at 4am.     0830:  New order for ECG to check Qtc  45cc out of EARLENE drain    0930:  ECG complete. Therapy working with patient.     1030:  Patient appears to be in pain after therapy. PRN morphine given    1600:  Wound nurse applied foam to coccyx q3 day dressing. Continue to q2 turn patient    1700:  Changed abd wound dressing per orders, applied waffle boots    1900:  New tube feed bag hung  30cc out of earlene drain    Report given to [place] QUIÑONES

## 2024-08-20 NOTE — PROGRESS NOTES
[facility] INFECTIOUS DISEASE PROGRESS NOTE    Patient Name: Abdi Wilson  MRN: [id number]    INTERVAL HISTORY:   S/p splenic abscess/hematoma drain placement yesterday.  Culture sent.  No fevers or chills noted over the past 24 hours.    Patient Active Problem List   Diagnosis    Stroke-like symptoms    Cerebral artery occlusion with cerebral infarction (Multi)    Cerebral infarction, unspecified (Multi)    Confusion    PEG tube malfunction (Multi)    Dementia without behavioral disturbance (Multi)    Peritonitis (Multi)    Aphasia        ASSESSMENT:   #Perisplenic, possibly subcapsular abscess  #Pseudomonas intra-abdominal abscess s/p washout 08/10  15.7 x 9.7 x 11.1 fluid and gas collection most compatible with abscess seen on the CT abdomen..  Patient is currently on appropriate antibiotics and is pending source control.  IR consulted for drain placement.  Patient might need surgical evaluation as well given the size of the abscess.  Likely Pseudomonas as patient recently had Pseudomonas infection and abscess was seen in the all the 4 quadrants.     Recommendations:  Follow-up WBC count that was elevated at 14.  Watch for diarrhea and C. difficile  -Continue Zosyn 3.375 every 6 hours  -Follow-up cultures from IR drain placement  -Obtain blood cultures if patient has fevers or becomes unstable  Discharge planning on ciprofloxacin and Flagyl till 8/30/2024.  May need follow-up imaging at the end of therapy  Check ECG to look for QT interval    MEDICATIONS: reviewed.    Current Facility-Administered Medications:     acetaminophen (Tylenol) tablet 975 mg, 975 mg, g-tube, q8h, Nadia Massey DO, 975 mg at 08/20/24 0436    atorvastatin (Lipitor) tablet 40 mg, 40 mg, oral, Nightly, Frankie Washburn MD, 40 mg at 08/19/24 2106    carvedilol (Coreg) tablet 6.25 mg, 6.25 mg, oral, BID, Frankie Washburn MD, 6.25 mg at 08/19/24 2106    pantoprazole (ProtoNix) EC tablet 40 mg, 40 mg, oral, Daily before breakfast **OR**  "esomeprazole (NexIUM) suspension 40 mg, 40 mg, nasoduodenal tube, Daily before breakfast **OR** pantoprazole (ProtoNix) injection 40 mg, 40 mg, intravenous, Daily before breakfast, Ibeth Blankenship, APRN-CNP, 40 mg at 24 0437    [Held by provider] heparin (porcine) injection 5,000 Units, 5,000 Units, subcutaneous, q8h, Ibeth REECE Lydic, APRN-CNP, 5,000 Units at 24 2043    hydrALAZINE (Apresoline) injection 5 mg, 5 mg, intravenous, q6h PRN, Cesar Doss DO, 5 mg at 24 0503    hydrALAZINE (Apresoline) tablet 25 mg, 25 mg, oral, TID, Frankie Washburn MD, 25 mg at 24    ipratropium-albuteroL (Duo-Neb) 0.5-2.5 mg/3 mL nebulizer solution 3 mL, 3 mL, nebulization, q4h PRN, Ezekiel Cintron MD    losartan (Cozaar) tablet 50 mg, 50 mg, oral, Daily, Frankie Washburn MD, 50 mg at 24 0841    morphine injection 2 mg, 2 mg, intravenous, q4h PRN, Ibeth Hernandezdic, APRN-CNP, 2 mg at 24 0436    piperacillin-tazobactam (Zosyn) 3.375 g in dextrose (iso) IV 50 mL, 3.375 g, intravenous, q6h, Ibeth Hernandezdic, APRN-CNP, Stopped at 24 0506     PHYSICAL EXAM:  Vital signs: /68 (BP Location: Left arm, Patient Position: Lying)   Pulse 80   Temp 36.3 °C (97.4 °F) (Temporal)   Resp 18   Ht 1.753 m (5' 9\")   Wt 88.6 kg (195 lb 5.2 oz)   SpO2 92%   BMI 28.84 kg/m²   Temp (24hrs), Av.9 °C (96.7 °F), Min:35.5 °C (95.9 °F), Max:36.3 °C (97.4 °F)    General: alert, oriented, NAD  Lungs: bilaterally clear to auscultation  Heart: regular rate and rhythm  Abdomen: soft, non tender, non distended, BS+  Extremities: no edema  No rashes  No joint inflammation  Neck supple  Lines ok  No CVAT    Labs:    Results for orders placed or performed during the hospital encounter of 08/10/24 (from the past 96 hour(s))   POCT GLUCOSE   Result Value Ref Range    POCT Glucose 152 (H) 74 - 99 mg/dL   POCT GLUCOSE   Result Value Ref Range    POCT Glucose 133 (H) 74 - 99 mg/dL   POCT GLUCOSE   Result " Value Ref Range    POCT Glucose 134 (H) 74 - 99 mg/dL   POCT GLUCOSE   Result Value Ref Range    POCT Glucose 127 (H) 74 - 99 mg/dL   Comprehensive Metabolic Panel   Result Value Ref Range    Glucose 145 (H) 74 - 99 mg/dL    Sodium 138 136 - 145 mmol/L    Potassium 3.3 (L) 3.5 - 5.3 mmol/L    Chloride 108 (H) 98 - 107 mmol/L    Bicarbonate 23 21 - 32 mmol/L    Anion Gap 10 10 - 20 mmol/L    Urea Nitrogen 14 6 - 23 mg/dL    Creatinine 0.73 0.50 - 1.30 mg/dL    eGFR >90 >60 mL/min/1.73m*2    Calcium 7.6 (L) 8.6 - 10.3 mg/dL    Albumin 2.3 (L) 3.4 - 5.0 g/dL    Alkaline Phosphatase 129 33 - 136 U/L    Total Protein 5.2 (L) 6.4 - 8.2 g/dL    AST 16 9 - 39 U/L    Bilirubin, Total 0.4 0.0 - 1.2 mg/dL    ALT 16 10 - 52 U/L   CBC and Auto Differential   Result Value Ref Range    WBC 12.3 (H) 4.4 - 11.3 x10*3/uL    nRBC 0.0 0.0 - 0.0 /100 WBCs    RBC 4.53 4.50 - 5.90 x10*6/uL    Hemoglobin 13.1 (L) 13.5 - 17.5 g/dL    Hematocrit 39.5 (L) 41.0 - 52.0 %    MCV 87 80 - 100 fL    MCH 28.9 26.0 - 34.0 pg    MCHC 33.2 32.0 - 36.0 g/dL    RDW 14.6 (H) 11.5 - 14.5 %    Platelets 227 150 - 450 x10*3/uL    Neutrophils % 77.0 40.0 - 80.0 %    Immature Granulocytes %, Automated 1.5 (H) 0.0 - 0.9 %    Lymphocytes % 11.7 13.0 - 44.0 %    Monocytes % 8.7 2.0 - 10.0 %    Eosinophils % 0.9 0.0 - 6.0 %    Basophils % 0.2 0.0 - 2.0 %    Neutrophils Absolute 9.45 (H) 1.60 - 5.50 x10*3/uL    Immature Granulocytes Absolute, Automated 0.18 0.00 - 0.50 x10*3/uL    Lymphocytes Absolute 1.43 0.80 - 3.00 x10*3/uL    Monocytes Absolute 1.06 (H) 0.05 - 0.80 x10*3/uL    Eosinophils Absolute 0.11 0.00 - 0.40 x10*3/uL    Basophils Absolute 0.02 0.00 - 0.10 x10*3/uL   POCT GLUCOSE   Result Value Ref Range    POCT Glucose 123 (H) 74 - 99 mg/dL   POCT GLUCOSE   Result Value Ref Range    POCT Glucose 136 (H) 74 - 99 mg/dL   POCT GLUCOSE   Result Value Ref Range    POCT Glucose 135 (H) 74 - 99 mg/dL   POCT GLUCOSE   Result Value Ref Range    POCT Glucose 178  (H) 74 - 99 mg/dL   POCT GLUCOSE   Result Value Ref Range    POCT Glucose 132 (H) 74 - 99 mg/dL   CBC and Auto Differential   Result Value Ref Range    WBC 17.0 (H) 4.4 - 11.3 x10*3/uL    nRBC 0.0 0.0 - 0.0 /100 WBCs    RBC 4.56 4.50 - 5.90 x10*6/uL    Hemoglobin 13.3 (L) 13.5 - 17.5 g/dL    Hematocrit 39.3 (L) 41.0 - 52.0 %    MCV 86 80 - 100 fL    MCH 29.2 26.0 - 34.0 pg    MCHC 33.8 32.0 - 36.0 g/dL    RDW 14.7 (H) 11.5 - 14.5 %    Platelets 283 150 - 450 x10*3/uL    Neutrophils % 82.5 40.0 - 80.0 %    Immature Granulocytes %, Automated 1.0 (H) 0.0 - 0.9 %    Lymphocytes % 7.9 13.0 - 44.0 %    Monocytes % 6.8 2.0 - 10.0 %    Eosinophils % 1.5 0.0 - 6.0 %    Basophils % 0.3 0.0 - 2.0 %    Neutrophils Absolute 14.05 (H) 1.60 - 5.50 x10*3/uL    Immature Granulocytes Absolute, Automated 0.17 0.00 - 0.50 x10*3/uL    Lymphocytes Absolute 1.35 0.80 - 3.00 x10*3/uL    Monocytes Absolute 1.16 (H) 0.05 - 0.80 x10*3/uL    Eosinophils Absolute 0.26 0.00 - 0.40 x10*3/uL    Basophils Absolute 0.05 0.00 - 0.10 x10*3/uL   Basic Metabolic Panel   Result Value Ref Range    Glucose 130 (H) 74 - 99 mg/dL    Sodium 135 (L) 136 - 145 mmol/L    Potassium 3.9 3.5 - 5.3 mmol/L    Chloride 107 98 - 107 mmol/L    Bicarbonate 21 21 - 32 mmol/L    Anion Gap 11 10 - 20 mmol/L    Urea Nitrogen 11 6 - 23 mg/dL    Creatinine 0.69 0.50 - 1.30 mg/dL    eGFR >90 >60 mL/min/1.73m*2    Calcium 7.5 (L) 8.6 - 10.3 mg/dL   Magnesium   Result Value Ref Range    Magnesium 2.12 1.60 - 2.40 mg/dL   POCT GLUCOSE   Result Value Ref Range    POCT Glucose 114 (H) 74 - 99 mg/dL   POCT GLUCOSE   Result Value Ref Range    POCT Glucose 133 (H) 74 - 99 mg/dL   POCT GLUCOSE   Result Value Ref Range    POCT Glucose 133 (H) 74 - 99 mg/dL   POCT GLUCOSE   Result Value Ref Range    POCT Glucose 109 (H) 74 - 99 mg/dL   POCT GLUCOSE   Result Value Ref Range    POCT Glucose 140 (H) 74 - 99 mg/dL   POCT GLUCOSE   Result Value Ref Range    POCT Glucose 126 (H) 74 - 99 mg/dL    POCT GLUCOSE   Result Value Ref Range    POCT Glucose 97 74 - 99 mg/dL   Basic metabolic panel   Result Value Ref Range    Glucose 91 74 - 99 mg/dL    Sodium 134 (L) 136 - 145 mmol/L    Potassium 4.8 3.5 - 5.3 mmol/L    Chloride 105 98 - 107 mmol/L    Bicarbonate 20 (L) 21 - 32 mmol/L    Anion Gap 14 10 - 20 mmol/L    Urea Nitrogen 13 6 - 23 mg/dL    Creatinine 0.77 0.50 - 1.30 mg/dL    eGFR 90 >60 mL/min/1.73m*2    Calcium 8.0 (L) 8.6 - 10.3 mg/dL   Magnesium   Result Value Ref Range    Magnesium 2.03 1.60 - 2.40 mg/dL   Protime-INR   Result Value Ref Range    Protime 13.0 (H) 9.8 - 12.8 seconds    INR 1.2 (H) 0.9 - 1.1   CBC and Auto Differential   Result Value Ref Range    WBC 23.4 (H) 4.4 - 11.3 x10*3/uL    nRBC 0.0 0.0 - 0.0 /100 WBCs    RBC 4.91 4.50 - 5.90 x10*6/uL    Hemoglobin 14.2 13.5 - 17.5 g/dL    Hematocrit 42.4 41.0 - 52.0 %    MCV 86 80 - 100 fL    MCH 28.9 26.0 - 34.0 pg    MCHC 33.5 32.0 - 36.0 g/dL    RDW 14.9 (H) 11.5 - 14.5 %    Platelets 370 150 - 450 x10*3/uL    Neutrophils % 83.4 40.0 - 80.0 %    Immature Granulocytes %, Automated 1.0 (H) 0.0 - 0.9 %    Lymphocytes % 6.5 13.0 - 44.0 %    Monocytes % 7.0 2.0 - 10.0 %    Eosinophils % 1.7 0.0 - 6.0 %    Basophils % 0.4 0.0 - 2.0 %    Neutrophils Absolute 19.49 (H) 1.60 - 5.50 x10*3/uL    Immature Granulocytes Absolute, Automated 0.24 0.00 - 0.50 x10*3/uL    Lymphocytes Absolute 1.52 0.80 - 3.00 x10*3/uL    Monocytes Absolute 1.64 (H) 0.05 - 0.80 x10*3/uL    Eosinophils Absolute 0.40 0.00 - 0.40 x10*3/uL    Basophils Absolute 0.09 0.00 - 0.10 x10*3/uL   POCT GLUCOSE   Result Value Ref Range    POCT Glucose 100 (H) 74 - 99 mg/dL   Sterile Fluid Culture/Smear    Specimen: Aspirate; Fluid   Result Value Ref Range    Gram Stain (2+) Few Polymorphonuclear leukocytes     Gram Stain No organisms seen    POCT GLUCOSE   Result Value Ref Range    POCT Glucose 92 74 - 99 mg/dL   POCT GLUCOSE   Result Value Ref Range    POCT Glucose 92 74 - 99 mg/dL    POCT GLUCOSE   Result Value Ref Range    POCT Glucose 130 (H) 74 - 99 mg/dL   POCT GLUCOSE   Result Value Ref Range    POCT Glucose 117 (H) 74 - 99 mg/dL   POCT GLUCOSE   Result Value Ref Range    POCT Glucose 159 (H) 74 - 99 mg/dL   CBC and Auto Differential   Result Value Ref Range    WBC 14.0 (H) 4.4 - 11.3 x10*3/uL    nRBC 0.0 0.0 - 0.0 /100 WBCs    RBC 4.53 4.50 - 5.90 x10*6/uL    Hemoglobin 13.1 (L) 13.5 - 17.5 g/dL    Hematocrit 39.6 (L) 41.0 - 52.0 %    MCV 87 80 - 100 fL    MCH 28.9 26.0 - 34.0 pg    MCHC 33.1 32.0 - 36.0 g/dL    RDW 14.7 (H) 11.5 - 14.5 %    Platelets 379 150 - 450 x10*3/uL    Neutrophils % 80.4 40.0 - 80.0 %    Immature Granulocytes %, Automated 0.8 0.0 - 0.9 %    Lymphocytes % 9.2 13.0 - 44.0 %    Monocytes % 7.8 2.0 - 10.0 %    Eosinophils % 1.6 0.0 - 6.0 %    Basophils % 0.2 0.0 - 2.0 %    Neutrophils Absolute 11.27 (H) 1.60 - 5.50 x10*3/uL    Immature Granulocytes Absolute, Automated 0.11 0.00 - 0.50 x10*3/uL    Lymphocytes Absolute 1.29 0.80 - 3.00 x10*3/uL    Monocytes Absolute 1.10 (H) 0.05 - 0.80 x10*3/uL    Eosinophils Absolute 0.23 0.00 - 0.40 x10*3/uL    Basophils Absolute 0.03 0.00 - 0.10 x10*3/uL   Basic Metabolic Panel   Result Value Ref Range    Glucose 149 (H) 74 - 99 mg/dL    Sodium 133 (L) 136 - 145 mmol/L    Potassium 4.3 3.5 - 5.3 mmol/L    Chloride 104 98 - 107 mmol/L    Bicarbonate 20 (L) 21 - 32 mmol/L    Anion Gap 13 10 - 20 mmol/L    Urea Nitrogen 17 6 - 23 mg/dL    Creatinine 0.80 0.50 - 1.30 mg/dL    eGFR 89 >60 mL/min/1.73m*2    Calcium 7.8 (L) 8.6 - 10.3 mg/dL   Magnesium   Result Value Ref Range    Magnesium 2.00 1.60 - 2.40 mg/dL   POCT GLUCOSE   Result Value Ref Range    POCT Glucose 167 (H) 74 - 99 mg/dL        Microbiology data: reviewed    Imaging data: reviewed      Orion Cruz  Pager:[phone]

## 2024-08-20 NOTE — PROGRESS NOTES
Patient requires 48-72 hours inpatient care. Patients discharge plan remains to return to [facility] under Skilled, No Auth Needed.

## 2024-08-20 NOTE — CARE PLAN
Problem: Skin  Goal: Decreased wound size/increased tissue granulation at next dressing change  Outcome: Progressing  Flowsheets (Taken 8/19/2024 1227 by Apurva Wan, RN)  Decreased wound size/increased tissue granulation at next dressing change:   Protective dressings over bony prominences   Promote sleep for wound healing  Goal: Participates in plan/prevention/treatment measures  Outcome: Progressing  Flowsheets (Taken 8/19/2024 1227 by Apurva Wan, RN)  Participates in plan/prevention/treatment measures: Elevate heels  Goal: Prevent/manage excess moisture  Outcome: Progressing  Flowsheets (Taken 8/19/2024 1227 by Apurva Wan, RN)  Prevent/manage excess moisture:   Cleanse incontinence/protect with barrier cream   Moisturize dry skin  Goal: Prevent/minimize sheer/friction injuries  Outcome: Progressing  Flowsheets (Taken 8/19/2024 1227 by Apurva Wan, RN)  Prevent/minimize sheer/friction injuries:   HOB 30 degrees or less   Turn/reposition every 2 hours/use positioning/transfer devices  Goal: Promote/optimize nutrition  Outcome: Progressing  Flowsheets (Taken 8/19/2024 1227 by Apurva Wan, RN)  Promote/optimize nutrition: Monitor/record intake including meals  Goal: Promote skin healing  Outcome: Progressing  Flowsheets (Taken 8/19/2024 1227 by Apurva Wan, RN)  Promote skin healing:   Protective dressings over bony prominences   Turn/reposition every 2 hours/use positioning/transfer devices     Problem: Pain  Goal: Takes deep breaths with improved pain control throughout the shift  Outcome: Progressing

## 2024-08-20 NOTE — PROGRESS NOTES
Nutrition Follow Up Assessment:   Nutrition Assessment         Medical history per chart:   79 y.o. male with PMHx s/f hypertension dyslipidemia old CVA mild cardiomyopathy ulcerative colitis GERD recent CVA in June of this year with dysphagia and expressive aphasia presenting with abdominal pain fever.  Patient had a PEG tube placed for nutritional purposes about 2 days ago.  Had presented to emergency department complaining of some pain in the abdominal area the patient was noted to have low blood pressure and fever in emergency department.     8/20:  Patient sleeping comfortably at time of visit, TF infusing at goal rate, appears to be tolerating.  Patient with perisplenic abscess now status post IR drainage, NESSA drain present.  Will continue to monitor and follow per protocol.       8/15:  Pt awake, TF infusing at 10 ml/hr, and per surgery note okay to increase to goal rate.  Per RN patient receiving 500 ml free water flush per order - will modify order.     8/12:  Pt seen in ICU, asleep, and son at bedside.  Pt known from previous admission on 6/24/24 when he had a Dobhoff tube placed for nutrition, and discharged to nursing facility.  Noted Peg was then placed on 8/8, but then had to replaced on 8/10 due to malfunction.  Pt currently NPO with NG to suction during visit.  Will follow for TF initiation when able.        Nutrition History:  Food and Nutrient History: Per previous admit was on Jevity 1.5 @ goal of 70 ml/hr       Current Diet: Jevity 1.5 Enteral feeding with NPO 30 (Increase by 10 mL an hour every 4 hours to goal of 60 mL an hour); 250; Water; Every 4 hours  TF at goal rate meeting estimated needs     Nutrition Related Findings:   Oral Symptoms:  Teeth: Missing teeth   GI symptoms: RD unable to assess GI symptoms at this time.   BM: Last BM Date: 08/18/24  Food allergies: NKFA. has No Known Allergies.  Meds/Labs reviewed.  acetaminophen, 975 mg, g-tube, q8h  atorvastatin, 40 mg, oral,  "Nightly  carvedilol, 6.25 mg, oral, BID  pantoprazole, 40 mg, oral, Daily before breakfast   Or  esomeprazole, 40 mg, nasoduodenal tube, Daily before breakfast   Or  pantoprazole, 40 mg, intravenous, Daily before breakfast  [Held by provider] heparin (porcine), 5,000 Units, subcutaneous, q8h  hydrALAZINE, 25 mg, oral, TID  losartan, 50 mg, oral, Daily  piperacillin-tazobactam, 3.375 g, intravenous, q6h             Nutrition Significant Labs:    Results from last 7 days   Lab Units 08/20/24  0435 08/19/24  0419 08/18/24  0324 08/16/24  0503 08/15/24  0442 08/14/24  0349   GLUCOSE mg/dL 149* 91 130*   < > 87 83   SODIUM mmol/L 133* 134* 135*   < > 138 143   POTASSIUM mmol/L 4.3 4.8 3.9   < > 3.7 3.3*   CHLORIDE mmol/L 104 105 107   < > 109* 111*   CO2 mmol/L 20* 20* 21   < > 23 25   BUN mg/dL 17 13 11   < > 17 19   CREATININE mg/dL 0.80 0.77 0.69   < > 0.82 0.95   EGFR mL/min/1.73m*2 89 90 >90   < > 88 80   CALCIUM mg/dL 7.8* 8.0* 7.5*   < > 7.3* 7.6*   PHOSPHORUS mg/dL  --   --   --   --  2.5 2.2*   MAGNESIUM mg/dL 2.00 2.03 2.12  --   --   --     < > = values in this interval not displayed.     Lab Results   Component Value Date    HGBA1C 5.7 (H) 06/21/2024    HGBA1C 5.9 (A) 07/18/2023     Results from last 7 days   Lab Units 08/20/24  1411 08/20/24  0756 08/20/24  0422 08/20/24  0035 08/19/24  2052 08/19/24  1532 08/19/24  1307 08/19/24  0814   POCT GLUCOSE mg/dL 130* 167* 159* 117* 130* 92 92 100*       Anthropometrics:  Height: 175.3 cm (5' 9\")   Weight: 88.3 kg (194 lb 10.7 oz)   BMI (Calculated): 28.73  IBW/kg (Dietitian Calculated): 72.7 kg          Weight History:   Wt Readings from Last 10 Encounters:   08/20/24 88.3 kg (194 lb 10.7 oz)   08/08/24 94.3 kg (208 lb)   07/02/24 94.4 kg (208 lb 1.8 oz)   06/29/24 99.8 kg (220 lb)   06/27/24 91 kg (200 lb 9.9 oz)   09/16/22 90 kg (198 lb 6.6 oz)        Weight Change %:  Weight History / % Weight Change: Noted weights have been around 200#.  Question accuracy of " weight on 6/29: 220#.  Significant Weight Loss: No          Nutrition Focused Physical Exam Findings:       Physical Findings:  Skin: Positive (ABD incision/peg site)    Estimated Needs:   Total Energy Estimated Needs (kCal): 2190 kCal  Method for Estimating Needs: 30 kcal/kg IBW     Method for Estimating Needs:  gm (1.2-1.4 gm/kg IBW)     Method for Estimating Needs: 1 ml/kcal        Nutrition Diagnosis   Nutrition Diagnosis:  Malnutrition Diagnosis  Patient has Malnutrition Diagnosis: No    Nutrition Diagnosis  Patient has Nutrition Diagnosis: Yes  Diagnosis Status (1): Resolved  Nutrition Diagnosis 1: Inadequate oral intake  Related to (1): dysphagia s/p Peg tube  As Evidenced by (1): trickle TF @ 10 ml/hr       Nutrition Interventions/Recommendations   Nutrition Interventions and Recommendations:        Nutrition Prescription:  Individualized Nutrition Prescription Provided for : Enteral nutrition support        Nutrition Interventions:   Food and/or Nutrient Delivery Interventions  Interventions: Enteral intake  Enteral Intake: Other (Comment)  Goal: Continue Jevity 1.5 with goal rate of 60 mL/hr to provide a total of 1440 mL, 2160 kcals, 92 gm protein, 1094 mL free water  Additional Interventions: Free water flush of 250 mL every 4 hours to provide a total with TF of 2594 mL free water.   Monitor labs.         Nutrition Education:   Education Documentation  No documentation found.      Nutrition Counseling  Counseling Theoretical Approach: Other (Comment)  Goal: N/A       Nutrition Monitoring and Evaluation   Monitoring/Evaluation:   Food/Nutrient Related History Monitoring  Monitoring and Evaluation Plan: Enteral and parenteral nutrition intake  Enteral and Parenteral Nutrition Intake: Enteral nutrition intake  Criteria: meet >75% of estimated needs from TF with good tolerance    Body Composition/Growth/Weight History  Monitoring and Evaluation Plan: Weight  Weight: Weight change  Criteria: Maintain  stable weight    Biochemical Data, Medical Tests and Procedures  Monitoring and Evaluation Plan: Glucose/endocrine profile, Electrolyte/renal panel  Electrolyte and Renal Panel: Sodium  Criteria: WNL  Glucose/Endocrine Profile: Glucose, casual  Criteria: WNL    Nutrition Focused Physical Findings  Monitoring and Evaluation Plan: Skin  Criteria: Promote healing            Time Spent/Follow-up Reminder:   Follow Up  Time Spent (min): 35 minutes  Last Date of Nutrition Visit: 08/20/24  Nutrition Follow-Up Needed?: Dietitian to reassess per policy  Follow up Comment: 8/26-8/27

## 2024-08-21 LAB
ANION GAP SERPL CALC-SCNC: 12 MMOL/L (ref 10–20)
BASOPHILS # BLD AUTO: 0.04 X10*3/UL (ref 0–0.1)
BASOPHILS NFR BLD AUTO: 0.3 %
BUN SERPL-MCNC: 16 MG/DL (ref 6–23)
CALCIUM SERPL-MCNC: 8 MG/DL (ref 8.6–10.3)
CHLORIDE SERPL-SCNC: 103 MMOL/L (ref 98–107)
CO2 SERPL-SCNC: 21 MMOL/L (ref 21–32)
CREAT SERPL-MCNC: 0.77 MG/DL (ref 0.5–1.3)
EGFRCR SERPLBLD CKD-EPI 2021: 90 ML/MIN/1.73M*2
EOSINOPHIL # BLD AUTO: 0.21 X10*3/UL (ref 0–0.4)
EOSINOPHIL NFR BLD AUTO: 1.6 %
ERYTHROCYTE [DISTWIDTH] IN BLOOD BY AUTOMATED COUNT: 14.6 % (ref 11.5–14.5)
GLUCOSE BLD MANUAL STRIP-MCNC: 107 MG/DL (ref 74–99)
GLUCOSE BLD MANUAL STRIP-MCNC: 115 MG/DL (ref 74–99)
GLUCOSE BLD MANUAL STRIP-MCNC: 118 MG/DL (ref 74–99)
GLUCOSE BLD MANUAL STRIP-MCNC: 122 MG/DL (ref 74–99)
GLUCOSE SERPL-MCNC: 142 MG/DL (ref 74–99)
HCT VFR BLD AUTO: 41.2 % (ref 41–52)
HGB BLD-MCNC: 13.6 G/DL (ref 13.5–17.5)
IMM GRANULOCYTES # BLD AUTO: 0.09 X10*3/UL (ref 0–0.5)
IMM GRANULOCYTES NFR BLD AUTO: 0.7 % (ref 0–0.9)
LYMPHOCYTES # BLD AUTO: 1.2 X10*3/UL (ref 0.8–3)
LYMPHOCYTES NFR BLD AUTO: 9 %
MAGNESIUM SERPL-MCNC: 1.95 MG/DL (ref 1.6–2.4)
MCH RBC QN AUTO: 28.9 PG (ref 26–34)
MCHC RBC AUTO-ENTMCNC: 33 G/DL (ref 32–36)
MCV RBC AUTO: 88 FL (ref 80–100)
MONOCYTES # BLD AUTO: 1.15 X10*3/UL (ref 0.05–0.8)
MONOCYTES NFR BLD AUTO: 8.6 %
NEUTROPHILS # BLD AUTO: 10.69 X10*3/UL (ref 1.6–5.5)
NEUTROPHILS NFR BLD AUTO: 79.8 %
NRBC BLD-RTO: 0 /100 WBCS (ref 0–0)
PLATELET # BLD AUTO: 442 X10*3/UL (ref 150–450)
POTASSIUM SERPL-SCNC: 4.4 MMOL/L (ref 3.5–5.3)
RBC # BLD AUTO: 4.71 X10*6/UL (ref 4.5–5.9)
SODIUM SERPL-SCNC: 132 MMOL/L (ref 136–145)
WBC # BLD AUTO: 13.4 X10*3/UL (ref 4.4–11.3)

## 2024-08-21 PROCEDURE — 2500000004 HC RX 250 GENERAL PHARMACY W/ HCPCS (ALT 636 FOR OP/ED)

## 2024-08-21 PROCEDURE — 82947 ASSAY GLUCOSE BLOOD QUANT: CPT

## 2024-08-21 PROCEDURE — 2500000004 HC RX 250 GENERAL PHARMACY W/ HCPCS (ALT 636 FOR OP/ED): Performed by: INTERNAL MEDICINE

## 2024-08-21 PROCEDURE — 1210000001 HC SEMI-PRIVATE ROOM DAILY

## 2024-08-21 PROCEDURE — 2500000001 HC RX 250 WO HCPCS SELF ADMINISTERED DRUGS (ALT 637 FOR MEDICARE OP): Performed by: INTERNAL MEDICINE

## 2024-08-21 PROCEDURE — 36415 COLL VENOUS BLD VENIPUNCTURE: CPT | Performed by: INTERNAL MEDICINE

## 2024-08-21 PROCEDURE — 85025 COMPLETE CBC W/AUTO DIFF WBC: CPT | Performed by: INTERNAL MEDICINE

## 2024-08-21 PROCEDURE — 2500000001 HC RX 250 WO HCPCS SELF ADMINISTERED DRUGS (ALT 637 FOR MEDICARE OP): Performed by: SURGERY

## 2024-08-21 PROCEDURE — 97110 THERAPEUTIC EXERCISES: CPT | Mod: GP,CQ

## 2024-08-21 PROCEDURE — 83735 ASSAY OF MAGNESIUM: CPT | Performed by: INTERNAL MEDICINE

## 2024-08-21 PROCEDURE — 99232 SBSQ HOSP IP/OBS MODERATE 35: CPT | Performed by: INTERNAL MEDICINE

## 2024-08-21 PROCEDURE — 80048 BASIC METABOLIC PNL TOTAL CA: CPT | Performed by: INTERNAL MEDICINE

## 2024-08-21 RX ORDER — METOPROLOL SUCCINATE 100 MG/1
100 TABLET, EXTENDED RELEASE ORAL DAILY
COMMUNITY
Start: 2024-05-29 | End: 2024-08-23 | Stop reason: HOSPADM

## 2024-08-21 RX ORDER — COLESTIPOL HYDROCHLORIDE 5 G/5G
5 GRANULE, FOR SUSPENSION ORAL DAILY
COMMUNITY

## 2024-08-21 RX ORDER — LEVETIRACETAM 500 MG/1
500 TABLET ORAL
COMMUNITY
Start: 2022-09-19 | End: 2024-08-23 | Stop reason: HOSPADM

## 2024-08-21 RX ORDER — ACETAMINOPHEN 160 MG/5ML
975 SOLUTION ORAL EVERY 8 HOURS
Status: DISCONTINUED | OUTPATIENT
Start: 2024-08-21 | End: 2024-08-23 | Stop reason: HOSPADM

## 2024-08-21 RX ORDER — QUETIAPINE FUMARATE 25 MG/1
1 TABLET, FILM COATED ORAL
COMMUNITY
End: 2024-08-23 | Stop reason: HOSPADM

## 2024-08-21 RX ADMIN — MORPHINE SULFATE 2 MG: 2 INJECTION, SOLUTION INTRAMUSCULAR; INTRAVENOUS at 10:55

## 2024-08-21 RX ADMIN — PIPERACILLIN SODIUM AND TAZOBACTAM SODIUM 3.38 G: 3; .375 INJECTION, SOLUTION INTRAVENOUS at 04:01

## 2024-08-21 RX ADMIN — PIPERACILLIN SODIUM AND TAZOBACTAM SODIUM 3.38 G: 3; .375 INJECTION, SOLUTION INTRAVENOUS at 15:12

## 2024-08-21 RX ADMIN — ATORVASTATIN CALCIUM 40 MG: 40 TABLET, FILM COATED ORAL at 22:04

## 2024-08-21 RX ADMIN — MORPHINE SULFATE 2 MG: 2 INJECTION, SOLUTION INTRAMUSCULAR; INTRAVENOUS at 06:09

## 2024-08-21 RX ADMIN — HYDRALAZINE HYDROCHLORIDE 25 MG: 25 TABLET ORAL at 08:59

## 2024-08-21 RX ADMIN — PIPERACILLIN SODIUM AND TAZOBACTAM SODIUM 3.38 G: 3; .375 INJECTION, SOLUTION INTRAVENOUS at 22:04

## 2024-08-21 RX ADMIN — ACETAMINOPHEN 975 MG: 325 TABLET ORAL at 10:55

## 2024-08-21 RX ADMIN — ACETAMINOPHEN 1000 MG: 160 SOLUTION ORAL at 22:02

## 2024-08-21 RX ADMIN — PIPERACILLIN SODIUM AND TAZOBACTAM SODIUM 3.38 G: 3; .375 INJECTION, SOLUTION INTRAVENOUS at 10:55

## 2024-08-21 RX ADMIN — HEPARIN SODIUM 5000 UNITS: 5000 INJECTION INTRAVENOUS; SUBCUTANEOUS at 22:04

## 2024-08-21 RX ADMIN — HEPARIN SODIUM 5000 UNITS: 5000 INJECTION INTRAVENOUS; SUBCUTANEOUS at 12:38

## 2024-08-21 RX ADMIN — CARVEDILOL 6.25 MG: 6.25 TABLET, FILM COATED ORAL at 08:59

## 2024-08-21 RX ADMIN — LOSARTAN POTASSIUM 50 MG: 50 TABLET, FILM COATED ORAL at 08:59

## 2024-08-21 RX ADMIN — PANTOPRAZOLE SODIUM 40 MG: 40 INJECTION, POWDER, FOR SOLUTION INTRAVENOUS at 04:01

## 2024-08-21 RX ADMIN — ACETAMINOPHEN 975 MG: 325 TABLET ORAL at 04:01

## 2024-08-21 RX ADMIN — CARVEDILOL 6.25 MG: 6.25 TABLET, FILM COATED ORAL at 22:04

## 2024-08-21 RX ADMIN — HYDRALAZINE HYDROCHLORIDE 25 MG: 25 TABLET ORAL at 22:04

## 2024-08-21 ASSESSMENT — COGNITIVE AND FUNCTIONAL STATUS - GENERAL
TOILETING: TOTAL
EATING MEALS: A LOT
DAILY ACTIVITIY SCORE: 6
TURNING FROM BACK TO SIDE WHILE IN FLAT BAD: TOTAL
MOVING FROM LYING ON BACK TO SITTING ON SIDE OF FLAT BED WITH BEDRAILS: A LOT
HELP NEEDED FOR BATHING: TOTAL
STANDING UP FROM CHAIR USING ARMS: TOTAL
MOVING FROM LYING ON BACK TO SITTING ON SIDE OF FLAT BED WITH BEDRAILS: A LOT
CLIMB 3 TO 5 STEPS WITH RAILING: TOTAL
DRESSING REGULAR LOWER BODY CLOTHING: TOTAL
WALKING IN HOSPITAL ROOM: TOTAL
MOVING TO AND FROM BED TO CHAIR: TOTAL
STANDING UP FROM CHAIR USING ARMS: TOTAL
CLIMB 3 TO 5 STEPS WITH RAILING: TOTAL
TURNING FROM BACK TO SIDE WHILE IN FLAT BAD: A LOT
TURNING FROM BACK TO SIDE WHILE IN FLAT BAD: TOTAL
PERSONAL GROOMING: TOTAL
MOVING TO AND FROM BED TO CHAIR: TOTAL
MOVING TO AND FROM BED TO CHAIR: TOTAL
DAILY ACTIVITIY SCORE: 8
MOBILITY SCORE: 7
CLIMB 3 TO 5 STEPS WITH RAILING: TOTAL
MOBILITY SCORE: 8
DRESSING REGULAR UPPER BODY CLOTHING: TOTAL
WALKING IN HOSPITAL ROOM: TOTAL
TOILETING: TOTAL
PERSONAL GROOMING: TOTAL
STANDING UP FROM CHAIR USING ARMS: TOTAL
EATING MEALS: TOTAL
MOVING FROM LYING ON BACK TO SITTING ON SIDE OF FLAT BED WITH BEDRAILS: A LOT
HELP NEEDED FOR BATHING: TOTAL
DRESSING REGULAR UPPER BODY CLOTHING: A LOT
WALKING IN HOSPITAL ROOM: TOTAL
MOBILITY SCORE: 7
DRESSING REGULAR LOWER BODY CLOTHING: TOTAL

## 2024-08-21 ASSESSMENT — PAIN SCALES - PAIN ASSESSMENT IN ADVANCED DEMENTIA (PAINAD)
NEGVOCALIZATION: REPEATED TROUBLED CALLING OUT, LOUD MOANING/GROANING, CRYING
BREATHING: OCCASIONAL LABORED BREATHING, SHORT PERIOD OF HYPERVENTILATION
CONSOLABILITY: DISTRACTED OR REASSURED BY VOICE/TOUCH
BODYLANGUAGE: RELAXED
FACIALEXPRESSION: SAD, FRIGHTENED, FROWN
TOTALSCORE: MEDICATION (SEE MAR)
BODYLANGUAGE: RIGID, FISTS CLENCHED, KNEES UP, PUSHING/PULLING AWAY, STRIKES OUT
NEGVOCALIZATION: OCCASIONAL MOAN/GROAN, LOW SPEECH, NEGATIVE/DISAPPROVING QUALITY
NEGVOCALIZATION: OCCASIONAL MOAN/GROAN, LOW SPEECH, NEGATIVE/DISAPPROVING QUALITY
TOTALSCORE: 4
BREATHING: NORMAL
BODYLANGUAGE: RELAXED
BREATHING: NORMAL
TOTALSCORE: 1
FACIALEXPRESSION: FACIAL GRIMACING
TOTALSCORE: MEDICATION (SEE MAR)
FACIALEXPRESSION: SMILING OR INEXPRESSIVE
CONSOLABILITY: DISTRACTED OR REASSURED BY VOICE/TOUCH
CONSOLABILITY: NO NEED TO CONSOLE
CONSOLABILITY: NO NEED TO CONSOLE
TOTALSCORE: 8
BREATHING: NORMAL
BODYLANGUAGE: TENSE, DISTRESSED PACING, FIDGETING
FACIALEXPRESSION: SMILING OR INEXPRESSIVE
TOTALSCORE: 0

## 2024-08-21 ASSESSMENT — PAIN SCALES - GENERAL: PAINLEVEL_OUTOF10: 4

## 2024-08-21 ASSESSMENT — PAIN - FUNCTIONAL ASSESSMENT
PAIN_FUNCTIONAL_ASSESSMENT: PAINAD (PAIN ASSESSMENT IN ADVANCED DEMENTIA SCALE)
PAIN_FUNCTIONAL_ASSESSMENT: 0-10

## 2024-08-21 NOTE — PROGRESS NOTES
Patient requires 48-72 hours inpatient care and awaiting final culture results. Patient to return to [facility] under Skilled, no auth when medically ready. TCC to follow.

## 2024-08-21 NOTE — PROGRESS NOTES
"GENERAL SURGERY PROGRESS NOTE    Abdi Wilson   1945   [id number]     Abdi Wilson is a 79 y.o. male on day 11 of admission presenting with PEG tube malfunction (Multi).    Subjective  No acute events overnight.  Patient tolerating tube feeds without issue.  IR placed drain still draining purulent output.  Patient awake and alert, does not answer questions but responds.    Review of Systems:  Review of Systems   Reason unable to perform ROS: Nonverbal.       Objective    Last Recorded Vitals  Blood pressure 136/75, pulse 82, temperature 36.1 °C (97 °F), temperature source Temporal, resp. rate 22, height 1.753 m (5' 9\"), weight 87.8 kg (193 lb 9 oz), SpO2 93%.    Intake/Output last 3 Shifts:  I/O last 3 completed shifts:  In: 2886 (32.9 mL/kg) [NG/GT:1986; IV Piggyback:900]  Out: 4375 (49.8 mL/kg) [Urine:4200 (1.3 mL/kg/hr); Emesis/NG output:5; Drains:170]  Weight: 87.8 kg     Intake/Output Summary (Last 24 hours) at 8/21/2024 0742  Last data filed at 8/21/2024 0431  Gross per 24 hour   Intake 1283 ml   Output 3360 ml   Net -2077 ml       Physical Exam  Constitutional:       General: He is not in acute distress.     Appearance: He is not toxic-appearing.   HENT:      Head: Normocephalic and atraumatic.   Eyes:      Conjunctiva/sclera: Conjunctivae normal.   Cardiovascular:      Rate and Rhythm: Normal rate and regular rhythm.      Pulses: Normal pulses.   Pulmonary:      Effort: Pulmonary effort is normal. No respiratory distress.   Abdominal:      Palpations: Abdomen is soft.      Comments: Nondistended.  Nontender.  Midline incision with packing in place, no erythema or purulence.  IR placed drain intact, purulent output.   Musculoskeletal:         General: No swelling.      Cervical back: Neck supple.   Skin:     General: Skin is warm and dry.   Neurological:      Mental Status: He is alert. Mental status is at baseline.         Relevant Results  Labs:  Results for orders placed or performed during the " hospital encounter of 08/10/24 (from the past 24 hour(s))   POCT GLUCOSE   Result Value Ref Range    POCT Glucose 167 (H) 74 - 99 mg/dL   POCT GLUCOSE   Result Value Ref Range    POCT Glucose 130 (H) 74 - 99 mg/dL   POCT GLUCOSE   Result Value Ref Range    POCT Glucose 114 (H) 74 - 99 mg/dL   POCT GLUCOSE   Result Value Ref Range    POCT Glucose 118 (H) 74 - 99 mg/dL   CBC and Auto Differential   Result Value Ref Range    WBC 13.4 (H) 4.4 - 11.3 x10*3/uL    nRBC 0.0 0.0 - 0.0 /100 WBCs    RBC 4.71 4.50 - 5.90 x10*6/uL    Hemoglobin 13.6 13.5 - 17.5 g/dL    Hematocrit 41.2 41.0 - 52.0 %    MCV 88 80 - 100 fL    MCH 28.9 26.0 - 34.0 pg    MCHC 33.0 32.0 - 36.0 g/dL    RDW 14.6 (H) 11.5 - 14.5 %    Platelets 442 150 - 450 x10*3/uL    Neutrophils % 79.8 40.0 - 80.0 %    Immature Granulocytes %, Automated 0.7 0.0 - 0.9 %    Lymphocytes % 9.0 13.0 - 44.0 %    Monocytes % 8.6 2.0 - 10.0 %    Eosinophils % 1.6 0.0 - 6.0 %    Basophils % 0.3 0.0 - 2.0 %    Neutrophils Absolute 10.69 (H) 1.60 - 5.50 x10*3/uL    Immature Granulocytes Absolute, Automated 0.09 0.00 - 0.50 x10*3/uL    Lymphocytes Absolute 1.20 0.80 - 3.00 x10*3/uL    Monocytes Absolute 1.15 (H) 0.05 - 0.80 x10*3/uL    Eosinophils Absolute 0.21 0.00 - 0.40 x10*3/uL    Basophils Absolute 0.04 0.00 - 0.10 x10*3/uL   Basic Metabolic Panel   Result Value Ref Range    Glucose 142 (H) 74 - 99 mg/dL    Sodium 132 (L) 136 - 145 mmol/L    Potassium 4.4 3.5 - 5.3 mmol/L    Chloride 103 98 - 107 mmol/L    Bicarbonate 21 21 - 32 mmol/L    Anion Gap 12 10 - 20 mmol/L    Urea Nitrogen 16 6 - 23 mg/dL    Creatinine 0.77 0.50 - 1.30 mg/dL    eGFR 90 >60 mL/min/1.73m*2    Calcium 8.0 (L) 8.6 - 10.3 mg/dL   Magnesium   Result Value Ref Range    Magnesium 1.95 1.60 - 2.40 mg/dL   POCT GLUCOSE   Result Value Ref Range    POCT Glucose 115 (H) 74 - 99 mg/dL       Images:  IR body drain placement   Final Result   Successful ultrasound-guided placement of a 10 Iranian drain into the    left upper quadrant abscess as outlined above.             Performed and dictated at [facility].        MACRO:   None        Signed by: Adria Wynn 8/19/2024 12:38 PM   Dictation workstation:   [id number]      CT abdomen pelvis w IV contrast   Final Result   15.7 x 9.7 x 11.1 cm perisplenic, possibly subcapsular, collection of   fluid and gas most compatible with abscess.        Percutaneous gastrostomy tube is present and appears appropriately   positioned. Free air seen on prior imaging 08/10/2024 adjacent to the   malpositioned gastrostomy tube at that time is now near resolved with   a small persistent punctate focus of free air adjacent to the stomach.        Defect in the anterior abdominal wall with overlying staples new from   prior imaging most compatible with interval surgery. Mild stranding   and fluid within this defect without evidence of a well-defined   collection..        The bladder is decompressed with a Cleveland catheter, which limits   evaluation, however, there is bladder wall thickening and mild   adjacent stranding. Findings may be seen with cystitis. Correlate   with urinalysis.        Partially visualized moderate left and small right pleural effusions   with superimposed atelectasis.        3 cm simple appearing cyst in the left kidney. Additional   indeterminate hypodensities in the left kidney measure up to   approximately 1.6 cm. Recommend nonemergent MRI to further   characterize.        Prominence of submucosal fat in the distal colon which may be seen   with chronic inflammatory processes.        MACRO:   Critical Finding:  See findings. Notification was initiated on   8/18/2024 at 4:08 am by  Evan Finkelstein.  (**-YCF-**) Instructions:        Signed by: Evan Finkelstein 8/18/2024 4:08 AM   Dictation workstation:   [id number]      FL upper GI w KUB   Final Result   1.  PEG tube is positioned in the stomach with no extravasation.   2. Prompt  passage of contrast into the duodenal and no   gastroesophageal reflux             MACRO:   None        Signed by: Mei Crum 8/14/2024 10:10 AM   Dictation workstation:   [id number]      XR chest 2 views   Final Result   1.  Unchanged cardiomegaly   2. Pleural effusions and basilar atelectasis, left worse than right   and unchanged from the prior exam                  MACRO:   None        Signed by: Mei Crum 8/14/2024 9:22 AM   Dictation workstation:   [id number]      XR chest abdomen for OG NG placement   Final Result   Tube projects in the stomach        Bilateral basilar atelectasis             MACRO:   None        Signed by: Mei Crum 8/13/2024 1:01 PM   Dictation workstation:   [id number]      CT head wo IV contrast   Final Result   1.  No acute intracranial hemorrhage or acute territorial infarct.   2.  Diffuse parenchymal volume loss. Chronic changes.             MACRO:   None.        Signed by: Judith Velarde 8/12/2024 6:34 PM   Dictation workstation:   [id number]      CT abdomen pelvis w IV contrast   Final Result   1. There is a malpositioned PEG tube insufflated in the fatty   anterior to the stomach. There is a small amount of free air in this   location. This could be secondary to malpositioned PEG tube rather   than a ruptured viscus but should be correlated clinically.   2. Nonspecific thickening of loops of small bowel in the left side of   the abdomen with induration of the mesentery and a small amount of   ascites. This could represent Crohn's disease or enteritis.   3. Fibrofatty infiltration of the colon from the transverse colon to   the rectum. This can be seen with Crohn's disease, inactive.   4. Benign bilateral simple renal cysts.   5. The urinary bladder with a volume of 559 cc. Bladder wall   trabeculation, likely secondary to postobstructive uropathic change   from prostate enlargement.        MACRO:   None        Signed by: Yamilet Heck 8/10/2024 2:22 PM    Dictation workstation:   [id number]      XR chest 1 view   Final Result   No acute cardiopulmonary process.        MACRO:   None        Signed by: Yamilet Heck 8/10/2024 1:46 PM   Dictation workstation:   [id number]          Assessment and Plan  Principal Problem:    PEG tube malfunction (Multi)  Active Problems:    Cerebral infarction, unspecified (Multi)    Peritonitis (Multi)    Aphasia    79 y.o. male who initially underwent PEG tube placement due to dysphagia on 8/8/2024.  Patient represented on 8/10 due to PEG tube dislodgment and was found to be septic and is now status post exploratory laparotomy with replacement of gastrostomy tube on 8/10 with Dr. Velásquez.  Patient subsequently developed a perisplenic drain now status post IR drain placement.  Patient is overall improving.    Plan:  - Leukocytosis continues to improve, continue to monitor  - Continue tube feeds through PEG, maintain abdominal binder.  Follow tube feed recs per nutrition  - Continue midline dressing changes, wet-to-dry packing twice daily  - Continue IR drain to self suction, monitor output.  Continues to decrease  - SCDs, resume subq heparin  - Other cares per primary team    Discussed with attending Dr. Christen Massey,  - PGY4  General Surgery

## 2024-08-21 NOTE — CARE PLAN
Problem: Skin  Goal: Decreased wound size/increased tissue granulation at next dressing change  Outcome: Progressing  Flowsheets (Taken 8/20/2024 1300 by Navarro Yañez RN)  Decreased wound size/increased tissue granulation at next dressing change: Protective dressings over bony prominences  Goal: Participates in plan/prevention/treatment measures  Outcome: Progressing  Flowsheets (Taken 8/20/2024 1300 by Navarro Yañez RN)  Participates in plan/prevention/treatment measures: Elevate heels  Goal: Prevent/manage excess moisture  Outcome: Progressing  Flowsheets (Taken 8/20/2024 1300 by Navarro Yañez RN)  Prevent/manage excess moisture: Monitor for/manage infection if present  Goal: Prevent/minimize sheer/friction injuries  Outcome: Progressing  Flowsheets (Taken 8/20/2024 1300 by Navarro Yañez RN)  Prevent/minimize sheer/friction injuries: Turn/reposition every 2 hours/use positioning/transfer devices  Goal: Promote/optimize nutrition  Outcome: Progressing  Flowsheets (Taken 8/20/2024 1300 by Navarro Yañez RN)  Promote/optimize nutrition: Monitor/record intake including meals

## 2024-08-21 NOTE — PROGRESS NOTES
Physical Therapy    Physical Therapy Treatment    Patient Name: Abdi Wilson  MRN: [id number]  Today's Date: 8/21/2024  Time Calculation  Start Time: 1426  Stop Time: 1452  Time Calculation (min): 26 min    Assessment/Plan   PT Assessment  End of Session Communication: Bedside nurse  End of Session Patient Position: Bed, 3 rail up, Alarm on     PT Plan  Treatment/Interventions: Bed mobility  PT Plan: Ongoing PT  PT Frequency: 4 times per week  PT Discharge Recommendations: Moderate intensity level of continued care  PT Recommended Transfer Status: Total assist  PT - OK to Discharge: Yes    General Visit Information:   PT  Visit  PT Received On: 08/21/24  General  Prior to Session Communication: Bedside nurse  Patient Position Received: Bed, 3 rail up, Alarm on    Subjective   Precautions:  Precautions  Medical Precautions: Fall precautions, Abdominal precautions  Post-Surgical Precautions: Abdominal surgery precautions  Precautions Comment: NESSA DRAIN and ABD BINDER to abd.  NON-VERBAL (head nods Y/N), expressive aphasia    Objective   Pain:  Pain Assessment  Pain Assessment: 0-10  0-10 (Numeric) Pain Score:  (no c/o or moaning through mobility)  Cognition:  Cognition  Orientation Level: Unable to assess    Treatments:  Therapeutic Exercise  Therapeutic Exercise Performed: Yes  Therapeutic Exercise Activity 1: pt completed supine AAROM to EMELY LE: AP x 10 reps, heel slides x 10 reps, hip abduction/adduction x 10 reps, SAQ x 10 reps,    Bed Mobility  Bed Mobility: Yes  Bed Mobility 1  Bed Mobility 1: Rolling right, Rolling left  Level of Assistance 1: Maximum assistance, +2, Maximum verbal cues, Maximum tactile cues  Bed Mobility Comments 1: cues for proper hand placement    Outcome Measures:  [facility] Basic Mobility  Turning from your back to your side while in a flat bed without using bedrails: A lot  Moving from lying on your back to sitting on the side of a flat bed without using bedrails: Total  Moving to and from bed  to chair (including a wheelchair): Total  Standing up from a chair using your arms (e.g. wheelchair or bedside chair): Total  To walk in hospital room: Total  Climbing 3-5 steps with railing: Total  Basic Mobility - Total Score: 7    Education Documentation  Mobility Training, taught by Yareli Trevino PTA at 8/21/2024  3:42 PM.  Learner: Patient  Readiness: Acceptance  Method: Explanation  Response: Needs Reinforcement    Education Comments  No comments found.        OP EDUCATION:       Encounter Problems       Encounter Problems (Active)       Balance       STG - Maintains static sitting balance with upper extremity support (Progressing)       Start:  08/12/24    Expected End:  08/26/24       INTERVENTIONS:  1. Practice sitting on the edge of a bed/mat with minimal support.  2. Educate patient about maintining total hip precautions while maintaining balance.  3. Educate patient about pressure relief.  4. Educate patient about use of assistive device.            PT Transfers       STG - Patient to transfer to and from sit to supine with no greater than Min Ax2 (Progressing)       Start:  08/12/24    Expected End:  08/26/24               Pain - Adult          Strengthening        Pt will perform 10+ reps AAROM/AROM/RROM BLE to improve functional strength needed for improved mobility.  (Progressing)       Start:  08/12/24    Expected End:  08/26/24

## 2024-08-21 NOTE — CARE PLAN
The patient's goals for the shift include      The clinical goals for the shift include Pt will remain hemodynamically stable throughout shift.

## 2024-08-21 NOTE — PROGRESS NOTES
Abdi Wilson is a 79 y.o. male on day 11 of admission presenting with PEG tube malfunction (Multi).      Subjective   Abdi Wilson is a 79 y.o. male with PMHx s/f hypertension dyslipidemia old CVA mild cardiomyopathy ulcerative colitis GERD recent CVA in June of this year with dysphagia and expressive aphasia presenting with abdominal pain fever.  Patient had a PEG tube placed for nutritional purposes about 2 days ago.  Had presented to emergency department complaining of some pain in the abdominal area the patient was noted to have low blood pressure and fever in emergency department.  On presenting to emergency department patient had temperature 96.3 heart rate 102 respiratory rate 24 blood pressure 135/81 Javy panel shows sodium 148 potassium 4.2 chloride 112 bicarb 25 BUN 37 creatinine 1.38 glucose 144 liver enzymes within normal limits total bilirubin 1.5 initial lactate 2.5 repeat lactate 2.4 CBC showing WBC 13.6 hemoglobin 19.7 hematocrit 60.7 platelets 269 CT scan of the abdomen pelvis was done showing malposition PEG tube there is a small amount of free air, patient was seen by general surgery who felt patient had peritonitis plan is to take patient for emergent surgery.   8/11: Patient was seen and examined.  He is nonverbal at baseline and still looks lethargic this morning.  Blood pressure is improved and patient is off pressors.  Hypernatremia persistent and patient has been started on IV fluid D5 water.  Will get repeat BMP and trend.  Continue current IV antibiotics.  Blood cultures are still pending.  Gastrostomy replacement done 8/10/2024.  Continue n.p.o. by general surgery recommendation.  Repeat CBC and BMP in a.m.  8/12: Patient was seen and examined.  He remains nonverbal which is baseline however patient is not responsive or interactive.  Failed attempt at physical therapy.  Discussed with patient's son over the phone and granddaughter.  Patient was cooperating adequately with physical  therapy prior to this admission.  I will get a CT of the head to rule out any recent stroke.  Stat lactic and ammonia TSH and vitamin B12.  General surgery still recommending holding G-tube.  We will have to consider other modes of feeding within 24 hours.  Will continue IV fluids pending review of stat BMP.  Leukocytosis trending down.  Blood cultures are negative.  Continue to trend CBC and BMP daily.  8/13: Patient was seen and examined.  Patient is more awake today; still lethargic.  Discussed with general surgery.  Patient to get contrast study by tomorrow and if no further abnormalities NG to be discontinued and will start the patient on oral diet and advance as tolerated.  Continue to trend CBC and BMP daily.  8/14: Patient was seen and examined.  Still lethargic, drowsy but arousable.  Fluoroscopic studies completed and reports pending.  Patient is now requiring 3 L nasal cannula oxygen to maintain saturation so I will get a stat chest x-ray.  General surgery to decide on NG status and possible tube feeding versus TPN.  Hypokalemia noted.  IV potassium given.  Continue to trend CBC and BMP daily.  8/15: Patient was seen and examined.  Looks clinically better today still although still lethargic.  NG tube discontinued and diet advanced per general surgery recommendation.  Repeat chest x-ray yesterday shows no changes compared to previous.  Wound culture is growing Pseudomonas which is pansensitive.  Continue IV Zosyn.  Blood cultures have remained negative x 4 days.  Potential discharge back to extended-care facility once patient is tolerating goal rate of PEG tube feeding.  Repeat CBC and BMP in a.m.  8/16:Mr. Abdi Wilson is a 79 y.o. male, with a past medical history of CVA hemiplegia dysphagia dysarthria, status post PEG tube admitted for PEG tube malfunction status post replaced given also IV antibiotic Zosyn for negative blood culture, +ve wound culture is negative, pending tube feed tolerance.  Plan  to discharge him to SNF once tolerated feeding tube. Target to 60-65 ml/h. Now at 50cc.   8/17: Patient with increasing abdominal pain a lot of drainage from the wound as well.  Growing out abundant Pseudomonas from the wound currently and is susceptible to Zosyn.  Will get a CT of abdomen and pelvis since it has been over a week and patient has significant pain and white count is up a little bit.  Difficult historian because of previous stroke.  Also note increasing sodium will give a fluid bolus and increase free water.  May have to reinvolve surgery await CT results.  I believe tube feeds are at goal currently.  Once doing better will be returned back [facility].  8/18: Very large abscess noted on CAT scan 15.7 x 9.7 x 11.1 cm possibly subcapsular abscess by the spleen.  ID agrees with continuing Zosyn IR consulted for placement of drain.  Will need surgery to reengage on this patient.  Will reduce free water to 250 flushes.  Continue pulmonary toilet.  Patient's white count is going up which is concerning to discussed with ID. Clinically appears stable right now.  Will check labs in AM.  Patient's expressive and receptive aphasia continue to be a barrier.  8/19: Patient was evaluated this morning. Not in acute distress however patient appears uncomfortable. No acute changes overnight. Patient is resting in bed. Patient's white count is 23.4 this morning, continue on zosyn. Patient's expressive and receptive aphasia is a barrier to completing ROS this morning. Large abscess noted on CAT scan, subscapular abscess by the spleen. Went to IR this morning for drain placement. Clinically stable this morning. Patient is NPO effective this morning.   Addendum: Patient did have drain placed more serosanguineous pus draining.  Will restart patient's tube feeds.  Appears to be relatively comfortable when seen in the interventional laboratory.  Continue with aggressive pulmonary toilet continue antibiotics.  ID continues  to follow as well Case discussed at length with surgery and IR today.  8/20: Patient was evaluated this morning. Not in acute distress. No acute changes overnight. Patient is resting in bed. Patient is currently NPO with PEG tube running feed. Drain placed yesterday at left lateral abdomen with serosanguineous pus draining. ID continues to follow patient. Continue Zosyn 3.375 every 6 hours. Follow culture from IR drain placement. Patient's white count is 14 this morning. Patient is definitely clinically improving today.   8/21: Patient was evaluated this morning. No acute changes overnight. Not in acute distress. Patient is resting comfortably in bed, awake and alert but does not respond verbally to questions. WBC is at 13.4 this morning. IR placed drain is still draining purulent output. Preliminary results of sterile fluid culture/smear show (3+) Moderate Enterobacter cloacae complex. Continue to follow culture for sensitivity and final results. Continue on IV zosyn. Patient is clinically improving today.     Objective   EXAM:    Constitutional: Resting in bed comfortably. Patient with expressive aphasia, does not respond verbally to questions    Head and facial: Dry mucosa    Neck: Neck is not rigid. No gross JVD.     Lungs: Some crackles and rales bilaterally    Heart: Regular rate and mike     Abdomen: Abdominal pain PEG tube is in place drainage from acting from wound.    Pelvis/: No flank tenderness    Extremities: No edema    Neurologic: Patient with expressive aphasia unable to move right upper extremity appears uncomfortable    Dermatologic: Drainage from abdominal wound    Psychiatric/behavioral: Patient uncomfortable      Last Recorded Vitals  /76 (BP Location: Right arm, Patient Position: Lying)   Pulse 81   Temp 35.9 °C (96.6 °F) (Temporal)   Resp 16   Wt 87.8 kg (193 lb 9 oz)   SpO2 93%   Intake/Output last 3 Shifts:    Intake/Output Summary (Last 24 hours) at 8/21/2024 0834  Last data  filed at 8/21/2024 0431  Gross per 24 hour   Intake 1283 ml   Output 3360 ml   Net -2077 ml       Admission Weight  Weight: 94.3 kg (208 lb) (08/10/24 1113)    Daily Weight  08/21/24 : 87.8 kg (193 lb 9 oz)       Scheduled medications  acetaminophen, 975 mg, g-tube, q8h  atorvastatin, 40 mg, oral, Nightly  carvedilol, 6.25 mg, oral, BID  pantoprazole, 40 mg, oral, Daily before breakfast   Or  esomeprazole, 40 mg, nasoduodenal tube, Daily before breakfast   Or  pantoprazole, 40 mg, intravenous, Daily before breakfast  heparin (porcine), 5,000 Units, subcutaneous, q8h  hydrALAZINE, 25 mg, oral, TID  losartan, 50 mg, oral, Daily  piperacillin-tazobactam, 3.375 g, intravenous, q6h      Continuous medications     PRN medications  PRN medications: hydrALAZINE, ipratropium-albuteroL, morphine    Assessment/Plan      Peritonitis &sepsis s/p exploratory laparotomy and G-tube placement 8/10  Large perisplenic abscess status post drain placement 8/19/2024  Draining abdominal wound with Pseudomonas  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  History of stroke with expressive aphasia and dysphagia can speak some at baseline  A component of receptive aphasia as well  History of aspiration pneumonia  Difficulty with secretions  Dementia  Hypertension  Hyperlipidemia   CODE STATUS DNR no intubation  Hypokalemia  Hypernatremia   DVT prophylaxis-subcu heparin    acetaminophen, 975 mg, g-tube, q8h  atorvastatin, 40 mg, oral, Nightly  carvedilol, 6.25 mg, oral, BID  pantoprazole, 40 mg, intravenous, Daily before breakfast  heparin 5,000 Units, subcutaneous, q8h (held)  hydrALAZINE, 25 mg, oral, TID  losartan, 50 mg, oral, Daily  piperacillin-tazobactam, 3.375 g, intravenous, q6h  PT and OT recommend moderate intensity level of continued care  As needed morphine  DuoNeb as needed  Pulmonary toilet  Drain for abscess placed-monitor drainage  Preliminary results of sterile fluid culture/smear show (3+) Moderate Enterobacter cloacae  complex.  Continue IV zosyn  Suction as needed  Tube feeds restarted  Free water 250 mL every 4 hours PEG  Jerome 1.5 tube feed at 60 mL an hour  Discharge planning [facility]  Check labs in a.m.  See orders for complete plan         Spent 35 minutes in follow-up management of this patient       BRIDGETT AVELAR    Shared visit with student  Patient seen and examined  Note amended and addended  See my orders for complete plan.

## 2024-08-22 LAB
ANION GAP SERPL CALC-SCNC: 12 MMOL/L (ref 10–20)
BASOPHILS # BLD AUTO: 0.04 X10*3/UL (ref 0–0.1)
BASOPHILS NFR BLD AUTO: 0.3 %
BUN SERPL-MCNC: 16 MG/DL (ref 6–23)
CALCIUM SERPL-MCNC: 8.3 MG/DL (ref 8.6–10.3)
CHLORIDE SERPL-SCNC: 104 MMOL/L (ref 98–107)
CO2 SERPL-SCNC: 23 MMOL/L (ref 21–32)
CREAT SERPL-MCNC: 0.81 MG/DL (ref 0.5–1.3)
EGFRCR SERPLBLD CKD-EPI 2021: 89 ML/MIN/1.73M*2
EOSINOPHIL # BLD AUTO: 0.16 X10*3/UL (ref 0–0.4)
EOSINOPHIL NFR BLD AUTO: 1.3 %
ERYTHROCYTE [DISTWIDTH] IN BLOOD BY AUTOMATED COUNT: 14.6 % (ref 11.5–14.5)
GLUCOSE BLD MANUAL STRIP-MCNC: 101 MG/DL (ref 74–99)
GLUCOSE BLD MANUAL STRIP-MCNC: 108 MG/DL (ref 74–99)
GLUCOSE BLD MANUAL STRIP-MCNC: 127 MG/DL (ref 74–99)
GLUCOSE SERPL-MCNC: 93 MG/DL (ref 74–99)
HCT VFR BLD AUTO: 43.9 % (ref 41–52)
HGB BLD-MCNC: 14.4 G/DL (ref 13.5–17.5)
IMM GRANULOCYTES # BLD AUTO: 0.06 X10*3/UL (ref 0–0.5)
IMM GRANULOCYTES NFR BLD AUTO: 0.5 % (ref 0–0.9)
LYMPHOCYTES # BLD AUTO: 1.45 X10*3/UL (ref 0.8–3)
LYMPHOCYTES NFR BLD AUTO: 11.9 %
MAGNESIUM SERPL-MCNC: 2.13 MG/DL (ref 1.6–2.4)
MCH RBC QN AUTO: 28.9 PG (ref 26–34)
MCHC RBC AUTO-ENTMCNC: 32.8 G/DL (ref 32–36)
MCV RBC AUTO: 88 FL (ref 80–100)
MONOCYTES # BLD AUTO: 1.21 X10*3/UL (ref 0.05–0.8)
MONOCYTES NFR BLD AUTO: 9.9 %
NEUTROPHILS # BLD AUTO: 9.27 X10*3/UL (ref 1.6–5.5)
NEUTROPHILS NFR BLD AUTO: 76.1 %
NRBC BLD-RTO: 0 /100 WBCS (ref 0–0)
PLATELET # BLD AUTO: 495 X10*3/UL (ref 150–450)
POTASSIUM SERPL-SCNC: 4.3 MMOL/L (ref 3.5–5.3)
RBC # BLD AUTO: 4.99 X10*6/UL (ref 4.5–5.9)
SODIUM SERPL-SCNC: 135 MMOL/L (ref 136–145)
WBC # BLD AUTO: 12.2 X10*3/UL (ref 4.4–11.3)

## 2024-08-22 PROCEDURE — 36415 COLL VENOUS BLD VENIPUNCTURE: CPT | Performed by: INTERNAL MEDICINE

## 2024-08-22 PROCEDURE — 85025 COMPLETE CBC W/AUTO DIFF WBC: CPT | Performed by: INTERNAL MEDICINE

## 2024-08-22 PROCEDURE — 82947 ASSAY GLUCOSE BLOOD QUANT: CPT

## 2024-08-22 PROCEDURE — 2500000001 HC RX 250 WO HCPCS SELF ADMINISTERED DRUGS (ALT 637 FOR MEDICARE OP): Performed by: SURGERY

## 2024-08-22 PROCEDURE — 2500000001 HC RX 250 WO HCPCS SELF ADMINISTERED DRUGS (ALT 637 FOR MEDICARE OP): Performed by: INTERNAL MEDICINE

## 2024-08-22 PROCEDURE — 2500000004 HC RX 250 GENERAL PHARMACY W/ HCPCS (ALT 636 FOR OP/ED): Performed by: INTERNAL MEDICINE

## 2024-08-22 PROCEDURE — 82374 ASSAY BLOOD CARBON DIOXIDE: CPT | Performed by: INTERNAL MEDICINE

## 2024-08-22 PROCEDURE — 2500000004 HC RX 250 GENERAL PHARMACY W/ HCPCS (ALT 636 FOR OP/ED): Mod: JZ | Performed by: INTERNAL MEDICINE

## 2024-08-22 PROCEDURE — 99233 SBSQ HOSP IP/OBS HIGH 50: CPT | Performed by: INTERNAL MEDICINE

## 2024-08-22 PROCEDURE — 1210000001 HC SEMI-PRIVATE ROOM DAILY

## 2024-08-22 PROCEDURE — 83735 ASSAY OF MAGNESIUM: CPT | Performed by: INTERNAL MEDICINE

## 2024-08-22 RX ORDER — CIPROFLOXACIN 500 MG/1
500 TABLET ORAL 2 TIMES DAILY
Qty: 14 TABLET | Refills: 0 | Status: ON HOLD | OUTPATIENT
Start: 2024-08-22 | End: 2024-08-28 | Stop reason: ALTCHOICE

## 2024-08-22 RX ORDER — METRONIDAZOLE 250 MG/1
250 TABLET ORAL 3 TIMES DAILY
Qty: 21 TABLET | Refills: 0 | Status: ON HOLD | OUTPATIENT
Start: 2024-08-22 | End: 2024-08-28 | Stop reason: ALTCHOICE

## 2024-08-22 RX ORDER — CEFEPIME 1 G/50ML
2 INJECTION, SOLUTION INTRAVENOUS EVERY 8 HOURS
Status: DISCONTINUED | OUTPATIENT
Start: 2024-08-22 | End: 2024-08-23 | Stop reason: HOSPADM

## 2024-08-22 RX ORDER — IPRATROPIUM BROMIDE AND ALBUTEROL SULFATE 2.5; .5 MG/3ML; MG/3ML
3 SOLUTION RESPIRATORY (INHALATION) EVERY 4 HOURS PRN
Qty: 180 ML | Refills: 11 | Status: SHIPPED | OUTPATIENT
Start: 2024-08-22

## 2024-08-22 RX ORDER — LOSARTAN POTASSIUM 50 MG/1
50 TABLET ORAL DAILY
Qty: 30 TABLET | Refills: 0 | Status: SHIPPED | OUTPATIENT
Start: 2024-08-22

## 2024-08-22 RX ORDER — HYDRALAZINE HYDROCHLORIDE 25 MG/1
25 TABLET, FILM COATED ORAL 3 TIMES DAILY
Start: 2024-08-22

## 2024-08-22 RX ADMIN — CEFEPIME 2 G: 1 INJECTION, SOLUTION INTRAVENOUS at 11:16

## 2024-08-22 RX ADMIN — CEFEPIME 2 G: 1 INJECTION, SOLUTION INTRAVENOUS at 17:18

## 2024-08-22 RX ADMIN — ACETAMINOPHEN 1000 MG: 160 SOLUTION ORAL at 21:08

## 2024-08-22 RX ADMIN — CARVEDILOL 6.25 MG: 6.25 TABLET, FILM COATED ORAL at 21:09

## 2024-08-22 RX ADMIN — MORPHINE SULFATE 2 MG: 2 INJECTION, SOLUTION INTRAMUSCULAR; INTRAVENOUS at 01:27

## 2024-08-22 RX ADMIN — ACETAMINOPHEN 1000 MG: 160 SOLUTION ORAL at 15:03

## 2024-08-22 RX ADMIN — ATORVASTATIN CALCIUM 40 MG: 40 TABLET, FILM COATED ORAL at 21:09

## 2024-08-22 RX ADMIN — HYDRALAZINE HYDROCHLORIDE 25 MG: 25 TABLET ORAL at 09:17

## 2024-08-22 RX ADMIN — LOSARTAN POTASSIUM 50 MG: 50 TABLET, FILM COATED ORAL at 09:17

## 2024-08-22 RX ADMIN — HEPARIN SODIUM 5000 UNITS: 5000 INJECTION INTRAVENOUS; SUBCUTANEOUS at 04:54

## 2024-08-22 RX ADMIN — CARVEDILOL 6.25 MG: 6.25 TABLET, FILM COATED ORAL at 09:17

## 2024-08-22 RX ADMIN — PIPERACILLIN SODIUM AND TAZOBACTAM SODIUM 3.38 G: 3; .375 INJECTION, SOLUTION INTRAVENOUS at 04:48

## 2024-08-22 RX ADMIN — HYDRALAZINE HYDROCHLORIDE 25 MG: 25 TABLET ORAL at 21:08

## 2024-08-22 RX ADMIN — HYDRALAZINE HYDROCHLORIDE 25 MG: 25 TABLET ORAL at 15:03

## 2024-08-22 RX ADMIN — ESOMEPRAZOLE MAGNESIUM 40 MG: 40 GRANULE, DELAYED RELEASE ORAL at 09:17

## 2024-08-22 RX ADMIN — ACETAMINOPHEN 1000 MG: 160 SOLUTION ORAL at 05:01

## 2024-08-22 ASSESSMENT — COGNITIVE AND FUNCTIONAL STATUS - GENERAL
TOILETING: TOTAL
HELP NEEDED FOR BATHING: TOTAL
DRESSING REGULAR UPPER BODY CLOTHING: A LOT
TOILETING: TOTAL
STANDING UP FROM CHAIR USING ARMS: TOTAL
MOVING TO AND FROM BED TO CHAIR: TOTAL
MOVING TO AND FROM BED TO CHAIR: TOTAL
WALKING IN HOSPITAL ROOM: TOTAL
PERSONAL GROOMING: TOTAL
DRESSING REGULAR LOWER BODY CLOTHING: TOTAL
TURNING FROM BACK TO SIDE WHILE IN FLAT BAD: TOTAL
MOVING FROM LYING ON BACK TO SITTING ON SIDE OF FLAT BED WITH BEDRAILS: A LOT
STANDING UP FROM CHAIR USING ARMS: TOTAL
CLIMB 3 TO 5 STEPS WITH RAILING: TOTAL
DRESSING REGULAR UPPER BODY CLOTHING: A LOT
MOBILITY SCORE: 7
HELP NEEDED FOR BATHING: TOTAL
DAILY ACTIVITIY SCORE: 8
DRESSING REGULAR LOWER BODY CLOTHING: TOTAL
EATING MEALS: A LOT
DAILY ACTIVITIY SCORE: 8
CLIMB 3 TO 5 STEPS WITH RAILING: TOTAL
EATING MEALS: A LOT
TURNING FROM BACK TO SIDE WHILE IN FLAT BAD: TOTAL
MOVING FROM LYING ON BACK TO SITTING ON SIDE OF FLAT BED WITH BEDRAILS: A LOT
PERSONAL GROOMING: TOTAL
MOBILITY SCORE: 7
WALKING IN HOSPITAL ROOM: TOTAL

## 2024-08-22 ASSESSMENT — PAIN SCALES - WONG BAKER
WONGBAKER_NUMERICALRESPONSE: NO HURT
WONGBAKER_NUMERICALRESPONSE: NO HURT

## 2024-08-22 ASSESSMENT — PAIN SCALES - PAIN ASSESSMENT IN ADVANCED DEMENTIA (PAINAD): TOTALSCORE: MEDICATION (SEE MAR)

## 2024-08-22 ASSESSMENT — PAIN SCALES - GENERAL: PAINLEVEL_OUTOF10: 0 - NO PAIN

## 2024-08-22 NOTE — PROGRESS NOTES
Abdi Wilson is a 79 y.o. male on day 12 of admission presenting with PEG tube malfunction (Multi).      Subjective   Abdi Wilson is a 79 y.o. male with PMHx s/f hypertension dyslipidemia old CVA mild cardiomyopathy ulcerative colitis GERD recent CVA in June of this year with dysphagia and expressive aphasia presenting with abdominal pain fever.  Patient had a PEG tube placed for nutritional purposes about 2 days ago.  Had presented to emergency department complaining of some pain in the abdominal area the patient was noted to have low blood pressure and fever in emergency department.  On presenting to emergency department patient had temperature 96.3 heart rate 102 respiratory rate 24 blood pressure 135/81 Javy panel shows sodium 148 potassium 4.2 chloride 112 bicarb 25 BUN 37 creatinine 1.38 glucose 144 liver enzymes within normal limits total bilirubin 1.5 initial lactate 2.5 repeat lactate 2.4 CBC showing WBC 13.6 hemoglobin 19.7 hematocrit 60.7 platelets 269 CT scan of the abdomen pelvis was done showing malposition PEG tube there is a small amount of free air, patient was seen by general surgery who felt patient had peritonitis plan is to take patient for emergent surgery.   8/11: Patient was seen and examined.  He is nonverbal at baseline and still looks lethargic this morning.  Blood pressure is improved and patient is off pressors.  Hypernatremia persistent and patient has been started on IV fluid D5 water.  Will get repeat BMP and trend.  Continue current IV antibiotics.  Blood cultures are still pending.  Gastrostomy replacement done 8/10/2024.  Continue n.p.o. by general surgery recommendation.  Repeat CBC and BMP in a.m.  8/12: Patient was seen and examined.  He remains nonverbal which is baseline however patient is not responsive or interactive.  Failed attempt at physical therapy.  Discussed with patient's son over the phone and granddaughter.  Patient was cooperating adequately with physical  Notes from previous visit reviewed, patient has upcoming GI follow up scheduled in August.  Will provide PPI refill.   therapy prior to this admission.  I will get a CT of the head to rule out any recent stroke.  Stat lactic and ammonia TSH and vitamin B12.  General surgery still recommending holding G-tube.  We will have to consider other modes of feeding within 24 hours.  Will continue IV fluids pending review of stat BMP.  Leukocytosis trending down.  Blood cultures are negative.  Continue to trend CBC and BMP daily.  8/13: Patient was seen and examined.  Patient is more awake today; still lethargic.  Discussed with general surgery.  Patient to get contrast study by tomorrow and if no further abnormalities NG to be discontinued and will start the patient on oral diet and advance as tolerated.  Continue to trend CBC and BMP daily.  8/14: Patient was seen and examined.  Still lethargic, drowsy but arousable.  Fluoroscopic studies completed and reports pending.  Patient is now requiring 3 L nasal cannula oxygen to maintain saturation so I will get a stat chest x-ray.  General surgery to decide on NG status and possible tube feeding versus TPN.  Hypokalemia noted.  IV potassium given.  Continue to trend CBC and BMP daily.  8/15: Patient was seen and examined.  Looks clinically better today still although still lethargic.  NG tube discontinued and diet advanced per general surgery recommendation.  Repeat chest x-ray yesterday shows no changes compared to previous.  Wound culture is growing Pseudomonas which is pansensitive.  Continue IV Zosyn.  Blood cultures have remained negative x 4 days.  Potential discharge back to extended-care facility once patient is tolerating goal rate of PEG tube feeding.  Repeat CBC and BMP in a.m.  8/16:Mr. Abdi Wilson is a 79 y.o. male, with a past medical history of CVA hemiplegia dysphagia dysarthria, status post PEG tube admitted for PEG tube malfunction status post replaced given also IV antibiotic Zosyn for negative blood culture, +ve wound culture is negative, pending tube feed tolerance.  Plan  to discharge him to SNF once tolerated feeding tube. Target to 60-65 ml/h. Now at 50cc.   8/17: Patient with increasing abdominal pain a lot of drainage from the wound as well.  Growing out abundant Pseudomonas from the wound currently and is susceptible to Zosyn.  Will get a CT of abdomen and pelvis since it has been over a week and patient has significant pain and white count is up a little bit.  Difficult historian because of previous stroke.  Also note increasing sodium will give a fluid bolus and increase free water.  May have to reinvolve surgery await CT results.  I believe tube feeds are at goal currently.  Once doing better will be returned back [facility].  8/18: Very large abscess noted on CAT scan 15.7 x 9.7 x 11.1 cm possibly subcapsular abscess by the spleen.  ID agrees with continuing Zosyn IR consulted for placement of drain.  Will need surgery to reengage on this patient.  Will reduce free water to 250 flushes.  Continue pulmonary toilet.  Patient's white count is going up which is concerning to discussed with ID. Clinically appears stable right now.  Will check labs in AM.  Patient's expressive and receptive aphasia continue to be a barrier.  8/19: Patient was evaluated this morning. Not in acute distress however patient appears uncomfortable. No acute changes overnight. Patient is resting in bed. Patient's white count is 23.4 this morning, continue on zosyn. Patient's expressive and receptive aphasia is a barrier to completing ROS this morning. Large abscess noted on CAT scan, subscapular abscess by the spleen. Went to IR this morning for drain placement. Clinically stable this morning. Patient is NPO effective this morning.   Addendum: Patient did have drain placed more serosanguineous pus draining.  Will restart patient's tube feeds.  Appears to be relatively comfortable when seen in the interventional laboratory.  Continue with aggressive pulmonary toilet continue antibiotics.  ID continues  to follow as well Case discussed at length with surgery and IR today.  8/20: Patient was evaluated this morning. Not in acute distress. No acute changes overnight. Patient is resting in bed. Patient is currently NPO with PEG tube running feed. Drain placed yesterday at left lateral abdomen with serosanguineous pus draining. ID continues to follow patient. Continue Zosyn 3.375 every 6 hours. Follow culture from IR drain placement. Patient's white count is 14 this morning. Patient is definitely clinically improving today.   8/21: Patient was evaluated this morning. No acute changes overnight. Not in acute distress. Patient is resting comfortably in bed, awake and alert but does not respond verbally to questions. WBC is at 13.4 this morning. IR placed drain is still draining purulent output. Preliminary results of sterile fluid culture/smear show (3+) Moderate Enterobacter cloacae complex. Continue to follow culture for sensitivity and final results. Continue on IV zosyn. Patient is clinically improving today.   8/22: Patient was evaluated this morning. No acute changes over night. Not in acute distress. Patient is resting in bed, awake and alert, but does not respond verbally to questions. WBC is 12.2 this morning. IR placed drain is still draining purulent fluid, but at decreased rate. Patient is tolerating tube feeding. Preliminary sterile fluid culture/smear is growing (3+) moderate Enterobacter cloacae complex and (1+) rare Pseudomonas aeruginosa. Discontinue Zosyn and start Cefepime due to the possible development of resistance during treatment with the Enterobacter in case there is an amp C resistance element present. Drain management. Obtain blood cultures if patient has fevers or becomes unstable. Watch for diarrhea and C. Difficile. Continue to follow cultures for final result. Discharge planning on ciprofloxacin and Flagyl until 9/4/2024. Patient is clinically improving today.  Working on discharge  antibiotic type and duration will discuss with ID.  Patient appears to be ready for transfer back to rehab    Objective   EXAM:    Patient is generally feeling better today.  Decreased output from drain    Last Recorded Vitals  /89 (BP Location: Right arm, Patient Position: Lying)   Pulse 74   Temp 36.2 °C (97.2 °F) (Temporal)   Resp 16   Wt 81.6 kg (179 lb 14.3 oz)   SpO2 95%   Intake/Output last 3 Shifts:    Intake/Output Summary (Last 24 hours) at 8/22/2024 0855  Last data filed at 8/22/2024 0517  Gross per 24 hour   Intake 2286 ml   Output 3212 ml   Net -926 ml       Admission Weight  Weight: 94.3 kg (208 lb) (08/10/24 1113)    Daily Weight  08/22/24 : 81.6 kg (179 lb 14.3 oz)       Scheduled medications  acetaminophen, 1,000 mg, g-tube, q8h  atorvastatin, 40 mg, oral, Nightly  carvedilol, 6.25 mg, oral, BID  pantoprazole, 40 mg, oral, Daily before breakfast   Or  esomeprazole, 40 mg, nasoduodenal tube, Daily before breakfast   Or  pantoprazole, 40 mg, intravenous, Daily before breakfast  heparin (porcine), 5,000 Units, subcutaneous, q8h  hydrALAZINE, 25 mg, oral, TID  losartan, 50 mg, oral, Daily  piperacillin-tazobactam, 3.375 g, intravenous, q6h      Continuous medications     PRN medications  PRN medications: hydrALAZINE, ipratropium-albuteroL, morphine    Assessment/Plan      Peritonitis &sepsis s/p exploratory laparotomy and G-tube placement 8/10  Large perisplenic abscess status post drain placement 8/19/2024  Draining abdominal wound with Pseudomonas  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         Spent  35 minutes in follow-up management of this patient       BRIDGETT AVELAR    Shared visit with student  Patient seen and examined  Note amended and addended  See my orders for complete plan.

## 2024-08-22 NOTE — PROGRESS NOTES
"GENERAL SURGERY PROGRESS NOTE    Abdi Wilson   1945   [id number]     Abdi Wilson is a 79 y.o. male on day 12 of admission presenting with PEG tube malfunction (Multi).    Subjective  No acute events overnight.  Patient wakes and responds intermittently, nonverbal.  Continuing to tolerate tube feeds.  Having bowel movements.  IR drain having decreased amount but still draining purulent output.    Review of Systems:  Review of Systems   Reason unable to perform ROS: Nonverbal.       Objective    Last Recorded Vitals  Blood pressure 150/89, pulse 74, temperature 36.2 °C (97.2 °F), temperature source Temporal, resp. rate 16, height 1.753 m (5' 9\"), weight 81.6 kg (179 lb 14.3 oz), SpO2 95%.    Intake/Output last 3 Shifts:  I/O last 3 completed shifts:  In: 3569 (43.7 mL/kg) [NG/GT:3369; IV Piggyback:200]  Out: 4627 (56.7 mL/kg) [Urine:4400 (1.5 mL/kg/hr); Emesis/NG output:12; Drains:215]  Weight: 81.6 kg     Intake/Output Summary (Last 24 hours) at 8/22/2024 0745  Last data filed at 8/22/2024 0517  Gross per 24 hour   Intake 2286 ml   Output 3212 ml   Net -926 ml       Physical Exam  Constitutional:       General: He is not in acute distress.     Appearance: He is not toxic-appearing.   HENT:      Head: Normocephalic and atraumatic.   Eyes:      Conjunctiva/sclera: Conjunctivae normal.   Cardiovascular:      Rate and Rhythm: Normal rate and regular rhythm.      Pulses: Normal pulses.   Pulmonary:      Effort: Pulmonary effort is normal. No respiratory distress.   Abdominal:      Palpations: Abdomen is soft.      Comments: Nondistended.  Nontender.  Midline incision with packing in place, no erythema or purulence.  IR placed drain intact, purulent output.   Musculoskeletal:         General: No swelling.      Cervical back: Neck supple.   Skin:     General: Skin is warm and dry.   Neurological:      Mental Status: He is alert. Mental status is at baseline.         Relevant Results  Labs:  Results for orders placed " or performed during the hospital encounter of 08/10/24 (from the past 24 hour(s))   POCT GLUCOSE   Result Value Ref Range    POCT Glucose 122 (H) 74 - 99 mg/dL   POCT GLUCOSE   Result Value Ref Range    POCT Glucose 107 (H) 74 - 99 mg/dL   POCT GLUCOSE   Result Value Ref Range    POCT Glucose 108 (H) 74 - 99 mg/dL   CBC and Auto Differential   Result Value Ref Range    WBC 12.2 (H) 4.4 - 11.3 x10*3/uL    nRBC 0.0 0.0 - 0.0 /100 WBCs    RBC 4.99 4.50 - 5.90 x10*6/uL    Hemoglobin 14.4 13.5 - 17.5 g/dL    Hematocrit 43.9 41.0 - 52.0 %    MCV 88 80 - 100 fL    MCH 28.9 26.0 - 34.0 pg    MCHC 32.8 32.0 - 36.0 g/dL    RDW 14.6 (H) 11.5 - 14.5 %    Platelets 495 (H) 150 - 450 x10*3/uL    Neutrophils % 76.1 40.0 - 80.0 %    Immature Granulocytes %, Automated 0.5 0.0 - 0.9 %    Lymphocytes % 11.9 13.0 - 44.0 %    Monocytes % 9.9 2.0 - 10.0 %    Eosinophils % 1.3 0.0 - 6.0 %    Basophils % 0.3 0.0 - 2.0 %    Neutrophils Absolute 9.27 (H) 1.60 - 5.50 x10*3/uL    Immature Granulocytes Absolute, Automated 0.06 0.00 - 0.50 x10*3/uL    Lymphocytes Absolute 1.45 0.80 - 3.00 x10*3/uL    Monocytes Absolute 1.21 (H) 0.05 - 0.80 x10*3/uL    Eosinophils Absolute 0.16 0.00 - 0.40 x10*3/uL    Basophils Absolute 0.04 0.00 - 0.10 x10*3/uL   Basic Metabolic Panel   Result Value Ref Range    Glucose 93 74 - 99 mg/dL    Sodium 135 (L) 136 - 145 mmol/L    Potassium 4.3 3.5 - 5.3 mmol/L    Chloride 104 98 - 107 mmol/L    Bicarbonate 23 21 - 32 mmol/L    Anion Gap 12 10 - 20 mmol/L    Urea Nitrogen 16 6 - 23 mg/dL    Creatinine 0.81 0.50 - 1.30 mg/dL    eGFR 89 >60 mL/min/1.73m*2    Calcium 8.3 (L) 8.6 - 10.3 mg/dL   Magnesium   Result Value Ref Range    Magnesium 2.13 1.60 - 2.40 mg/dL   POCT GLUCOSE   Result Value Ref Range    POCT Glucose 101 (H) 74 - 99 mg/dL       Images:  IR body drain placement   Final Result   Successful ultrasound-guided placement of a 10 [place] drain into the   left upper quadrant abscess as outlined above.              Performed and dictated at [facility].        MACRO:   None        Signed by: Adria Wynn 8/19/2024 12:38 PM   Dictation workstation:   [id number]      CT abdomen pelvis w IV contrast   Final Result   15.7 x 9.7 x 11.1 cm perisplenic, possibly subcapsular, collection of   fluid and gas most compatible with abscess.        Percutaneous gastrostomy tube is present and appears appropriately   positioned. Free air seen on prior imaging 08/10/2024 adjacent to the   malpositioned gastrostomy tube at that time is now near resolved with   a small persistent punctate focus of free air adjacent to the stomach.        Defect in the anterior abdominal wall with overlying staples new from   prior imaging most compatible with interval surgery. Mild stranding   and fluid within this defect without evidence of a well-defined   collection..        The bladder is decompressed with a Cleevland catheter, which limits   evaluation, however, there is bladder wall thickening and mild   adjacent stranding. Findings may be seen with cystitis. Correlate   with urinalysis.        Partially visualized moderate left and small right pleural effusions   with superimposed atelectasis.        3 cm simple appearing cyst in the left kidney. Additional   indeterminate hypodensities in the left kidney measure up to   approximately 1.6 cm. Recommend nonemergent MRI to further   characterize.        Prominence of submucosal fat in the distal colon which may be seen   with chronic inflammatory processes.        MACRO:   Critical Finding:  See findings. Notification was initiated on   8/18/2024 at 4:08 am by  Evan Finkelstein.  (**-YCF-**) Instructions:        Signed by: Evan Finkelstein 8/18/2024 4:08 AM   Dictation workstation:   [id number]      FL upper GI w KUB   Final Result   1.  PEG tube is positioned in the stomach with no extravasation.   2. Prompt passage of contrast into the duodenal and no    gastroesophageal reflux             MACRO:   None        Signed by: Mei Crum 8/14/2024 10:10 AM   Dictation workstation:   [id number]      XR chest 2 views   Final Result   1.  Unchanged cardiomegaly   2. Pleural effusions and basilar atelectasis, left worse than right   and unchanged from the prior exam                  MACRO:   None        Signed by: Mei Crum 8/14/2024 9:22 AM   Dictation workstation:   [id number]      XR chest abdomen for OG NG placement   Final Result   Tube projects in the stomach        Bilateral basilar atelectasis             MACRO:   None        Signed by: Mei Crum 8/13/2024 1:01 PM   Dictation workstation:   [id number]      CT head wo IV contrast   Final Result   1.  No acute intracranial hemorrhage or acute territorial infarct.   2.  Diffuse parenchymal volume loss. Chronic changes.             MACRO:   None.        Signed by: Judith Velarde 8/12/2024 6:34 PM   Dictation workstation:   [id number]      CT abdomen pelvis w IV contrast   Final Result   1. There is a malpositioned PEG tube insufflated in the fatty   anterior to the stomach. There is a small amount of free air in this   location. This could be secondary to malpositioned PEG tube rather   than a ruptured viscus but should be correlated clinically.   2. Nonspecific thickening of loops of small bowel in the left side of   the abdomen with induration of the mesentery and a small amount of   ascites. This could represent Crohn's disease or enteritis.   3. Fibrofatty infiltration of the colon from the transverse colon to   the rectum. This can be seen with Crohn's disease, inactive.   4. Benign bilateral simple renal cysts.   5. The urinary bladder with a volume of 559 cc. Bladder wall   trabeculation, likely secondary to postobstructive uropathic change   from prostate enlargement.        MACRO:   None        Signed by: Yamilet Heck 8/10/2024 2:22 PM   Dictation workstation:   [id number]      XR  chest 1 view   Final Result   No acute cardiopulmonary process.        MACRO:   None        Signed by: Yamilet Maria R 8/10/2024 1:46 PM   Dictation workstation:   [id number]          Assessment and Plan  Principal Problem:    PEG tube malfunction (Multi)  Active Problems:    Cerebral infarction, unspecified (Multi)    Peritonitis (Multi)    Aphasia    79 y.o. male who initially underwent PEG tube placement due to dysphagia on 8/8/2024.  Patient represented on 8/10 due to PEG tube dislodgment and was found to be septic and is now status post exploratory laparotomy with replacement of gastrostomy tube on 8/10 with Dr. Velásquez.  Patient subsequently developed a perisplenic drain now status post IR drain placement.  Patient is continuing to overall improved.    Plan:  - Leukocytosis continues to improve, continue to monitor  - Continue tube feeds through PEG, maintain abdominal binder.  Follow tube feed recs per nutrition  - Continue midline dressing changes, wet-to-dry packing twice daily  - Continue IR drain to self suction, monitor output.  Continues to decrease.  - SCDs, subq heparin  - Other cares per primary team    Discussed with attending Dr. Christen Massey DO - PGY4  General Surgery

## 2024-08-22 NOTE — PROGRESS NOTES
[facility] INFECTIOUS DISEASE PROGRESS NOTE    Patient Name: Abdi Wilson  MRN: [id number]    INTERVAL HISTORY:   S/p splenic abscess/hematoma drain placement yesterday.  Culture sent.  No fevers or chills noted over the past 24 hours.    Patient Active Problem List   Diagnosis    Stroke-like symptoms    Cerebral artery occlusion with cerebral infarction (Multi)    Cerebral infarction, unspecified (Multi)    Confusion    PEG tube malfunction (Multi)    Dementia without behavioral disturbance (Multi)    Peritonitis (Multi)    Aphasia        ASSESSMENT:   #Perisplenic/subcapsular abscess.  Still draining purulent fluid.  Cultures now positive for Enterobacter and Pseudomonas  #Pseudomonas intra-abdominal abscess s/p washout 08/10  15.7 x 9.7 x 11.1 fluid and gas collection most compatible with abscess seen on the CT abdomen..  Patient is currently on appropriate antibiotics and is pending source control.  IR consulted for drain placement.  Patient might need surgical evaluation as well given the size of the abscess.  Likely Pseudomonas as patient recently had Pseudomonas infection and abscess was seen in the all the 4 quadrants.     Recommendations:  Follow-up WBC count that was elevated at 14.  Watch for diarrhea and C. difficile  -Continue Zosyn 3.375 every 6 hours  -Follow-up cultures from IR drain placement  -Obtain blood cultures if patient has fevers or becomes unstable  Discharge planning on ciprofloxacin and Flagyl till 8/30/2024.  May need follow-up imaging at the end of therapy  Check ECG to look for QT interval    MEDICATIONS: reviewed.    Current Facility-Administered Medications:     acetaminophen (Tylenol) oral liquid 1,000 mg, 1,000 mg, g-tube, q8h, Nadia Massey DO, 1,000 mg at 08/22/24 0501    atorvastatin (Lipitor) tablet 40 mg, 40 mg, oral, Nightly, Ezekiel Cintron MD, 40 mg at 08/21/24 2204    carvedilol (Coreg) tablet 6.25 mg, 6.25 mg, oral, BID, Ezekiel Cintron MD, 6.25 mg at 08/21/24 2204     "pantoprazole (ProtoNix) EC tablet 40 mg, 40 mg, oral, Daily before breakfast **OR** esomeprazole (NexIUM) suspension 40 mg, 40 mg, nasoduodenal tube, Daily before breakfast **OR** pantoprazole (ProtoNix) injection 40 mg, 40 mg, intravenous, Daily before breakfast, Ezekiel Cintron MD, 40 mg at 24 0401    heparin (porcine) injection 5,000 Units, 5,000 Units, subcutaneous, q8h, Ezekiel Cintron MD, 5,000 Units at 24 0454    hydrALAZINE (Apresoline) injection 5 mg, 5 mg, intravenous, q6h PRN, Ezekiel Cintron MD, 5 mg at 24 0503    hydrALAZINE (Apresoline) tablet 25 mg, 25 mg, oral, TID, Ezekiel Cintron MD, 25 mg at 24 2204    ipratropium-albuteroL (Duo-Neb) 0.5-2.5 mg/3 mL nebulizer solution 3 mL, 3 mL, nebulization, q4h PRN, Ezekiel Cintron MD    losartan (Cozaar) tablet 50 mg, 50 mg, oral, Daily, Ezekiel Cintron MD, 50 mg at 24 0859    morphine injection 2 mg, 2 mg, intravenous, q4h PRN, Ezekiel Cintron MD, 2 mg at 24 0127    piperacillin-tazobactam (Zosyn) 3.375 g in dextrose (iso) IV 50 mL, 3.375 g, intravenous, q6h, Ezekiel Cintron MD, Stopped at 24 0518     PHYSICAL EXAM:  Vital signs: /89 (BP Location: Right arm, Patient Position: Lying)   Pulse 74   Temp 36.2 °C (97.2 °F) (Temporal)   Resp 16   Ht 1.753 m (5' 9\")   Wt 81.6 kg (179 lb 14.3 oz)   SpO2 95%   BMI 26.57 kg/m²   Temp (24hrs), Av.3 °C (97.4 °F), Min:36.1 °C (96.9 °F), Max:36.8 °C (98.2 °F)    General: alert, oriented, NAD  Lungs: bilaterally clear to auscultation  Heart: regular rate and rhythm  Abdomen: soft, non tender, non distended, BS+  Extremities: no edema  No rashes  No joint inflammation  Neck supple  Lines ok  No CVAT    Labs:    Results for orders placed or performed during the hospital encounter of 08/10/24 (from the past 96 hour(s))   POCT GLUCOSE   Result Value Ref Range    POCT Glucose 133 (H) 74 - 99 mg/dL   POCT GLUCOSE   Result Value Ref Range    POCT Glucose 133 (H) 74 - 99 " mg/dL   POCT GLUCOSE   Result Value Ref Range    POCT Glucose 109 (H) 74 - 99 mg/dL   POCT GLUCOSE   Result Value Ref Range    POCT Glucose 140 (H) 74 - 99 mg/dL   POCT GLUCOSE   Result Value Ref Range    POCT Glucose 126 (H) 74 - 99 mg/dL   POCT GLUCOSE   Result Value Ref Range    POCT Glucose 97 74 - 99 mg/dL   Basic metabolic panel   Result Value Ref Range    Glucose 91 74 - 99 mg/dL    Sodium 134 (L) 136 - 145 mmol/L    Potassium 4.8 3.5 - 5.3 mmol/L    Chloride 105 98 - 107 mmol/L    Bicarbonate 20 (L) 21 - 32 mmol/L    Anion Gap 14 10 - 20 mmol/L    Urea Nitrogen 13 6 - 23 mg/dL    Creatinine 0.77 0.50 - 1.30 mg/dL    eGFR 90 >60 mL/min/1.73m*2    Calcium 8.0 (L) 8.6 - 10.3 mg/dL   Magnesium   Result Value Ref Range    Magnesium 2.03 1.60 - 2.40 mg/dL   Protime-INR   Result Value Ref Range    Protime 13.0 (H) 9.8 - 12.8 seconds    INR 1.2 (H) 0.9 - 1.1   CBC and Auto Differential   Result Value Ref Range    WBC 23.4 (H) 4.4 - 11.3 x10*3/uL    nRBC 0.0 0.0 - 0.0 /100 WBCs    RBC 4.91 4.50 - 5.90 x10*6/uL    Hemoglobin 14.2 13.5 - 17.5 g/dL    Hematocrit 42.4 41.0 - 52.0 %    MCV 86 80 - 100 fL    MCH 28.9 26.0 - 34.0 pg    MCHC 33.5 32.0 - 36.0 g/dL    RDW 14.9 (H) 11.5 - 14.5 %    Platelets 370 150 - 450 x10*3/uL    Neutrophils % 83.4 40.0 - 80.0 %    Immature Granulocytes %, Automated 1.0 (H) 0.0 - 0.9 %    Lymphocytes % 6.5 13.0 - 44.0 %    Monocytes % 7.0 2.0 - 10.0 %    Eosinophils % 1.7 0.0 - 6.0 %    Basophils % 0.4 0.0 - 2.0 %    Neutrophils Absolute 19.49 (H) 1.60 - 5.50 x10*3/uL    Immature Granulocytes Absolute, Automated 0.24 0.00 - 0.50 x10*3/uL    Lymphocytes Absolute 1.52 0.80 - 3.00 x10*3/uL    Monocytes Absolute 1.64 (H) 0.05 - 0.80 x10*3/uL    Eosinophils Absolute 0.40 0.00 - 0.40 x10*3/uL    Basophils Absolute 0.09 0.00 - 0.10 x10*3/uL   POCT GLUCOSE   Result Value Ref Range    POCT Glucose 100 (H) 74 - 99 mg/dL   Sterile Fluid Culture/Smear    Specimen: Aspirate; Fluid   Result Value Ref  Range    Sterile Fluid Culture/Smear (3+) Moderate Enterobacter cloacae complex (A)     Sterile Fluid Culture/Smear (1+) Rare Pseudomonas aeruginosa (A)     Gram Stain (2+) Few Polymorphonuclear leukocytes     Gram Stain No organisms seen        Susceptibility    Enterobacter cloacae complex - MICROSCAN     Amoxicillin/Clavulanate  Resistant ug/mL     Ampicillin  Resistant ug/mL     Ampicillin/Sulbactam  Resistant ug/mL     Cefazolin  Resistant ug/mL     Cefepime  Susceptible ug/mL     Ciprofloxacin  Susceptible ug/mL     Gentamicin  Susceptible ug/mL     Levofloxacin  Susceptible ug/mL     Piperacillin/Tazobactam  Susceptible ug/mL     Trimethoprim/Sulfamethoxazole  Resistant ug/mL   POCT GLUCOSE   Result Value Ref Range    POCT Glucose 92 74 - 99 mg/dL   POCT GLUCOSE   Result Value Ref Range    POCT Glucose 92 74 - 99 mg/dL   POCT GLUCOSE   Result Value Ref Range    POCT Glucose 130 (H) 74 - 99 mg/dL   POCT GLUCOSE   Result Value Ref Range    POCT Glucose 117 (H) 74 - 99 mg/dL   POCT GLUCOSE   Result Value Ref Range    POCT Glucose 159 (H) 74 - 99 mg/dL   CBC and Auto Differential   Result Value Ref Range    WBC 14.0 (H) 4.4 - 11.3 x10*3/uL    nRBC 0.0 0.0 - 0.0 /100 WBCs    RBC 4.53 4.50 - 5.90 x10*6/uL    Hemoglobin 13.1 (L) 13.5 - 17.5 g/dL    Hematocrit 39.6 (L) 41.0 - 52.0 %    MCV 87 80 - 100 fL    MCH 28.9 26.0 - 34.0 pg    MCHC 33.1 32.0 - 36.0 g/dL    RDW 14.7 (H) 11.5 - 14.5 %    Platelets 379 150 - 450 x10*3/uL    Neutrophils % 80.4 40.0 - 80.0 %    Immature Granulocytes %, Automated 0.8 0.0 - 0.9 %    Lymphocytes % 9.2 13.0 - 44.0 %    Monocytes % 7.8 2.0 - 10.0 %    Eosinophils % 1.6 0.0 - 6.0 %    Basophils % 0.2 0.0 - 2.0 %    Neutrophils Absolute 11.27 (H) 1.60 - 5.50 x10*3/uL    Immature Granulocytes Absolute, Automated 0.11 0.00 - 0.50 x10*3/uL    Lymphocytes Absolute 1.29 0.80 - 3.00 x10*3/uL    Monocytes Absolute 1.10 (H) 0.05 - 0.80 x10*3/uL    Eosinophils Absolute 0.23 0.00 - 0.40 x10*3/uL     Basophils Absolute 0.03 0.00 - 0.10 x10*3/uL   Basic Metabolic Panel   Result Value Ref Range    Glucose 149 (H) 74 - 99 mg/dL    Sodium 133 (L) 136 - 145 mmol/L    Potassium 4.3 3.5 - 5.3 mmol/L    Chloride 104 98 - 107 mmol/L    Bicarbonate 20 (L) 21 - 32 mmol/L    Anion Gap 13 10 - 20 mmol/L    Urea Nitrogen 17 6 - 23 mg/dL    Creatinine 0.80 0.50 - 1.30 mg/dL    eGFR 89 >60 mL/min/1.73m*2    Calcium 7.8 (L) 8.6 - 10.3 mg/dL   Magnesium   Result Value Ref Range    Magnesium 2.00 1.60 - 2.40 mg/dL   POCT GLUCOSE   Result Value Ref Range    POCT Glucose 167 (H) 74 - 99 mg/dL   POCT GLUCOSE   Result Value Ref Range    POCT Glucose 130 (H) 74 - 99 mg/dL   POCT GLUCOSE   Result Value Ref Range    POCT Glucose 114 (H) 74 - 99 mg/dL   POCT GLUCOSE   Result Value Ref Range    POCT Glucose 118 (H) 74 - 99 mg/dL   CBC and Auto Differential   Result Value Ref Range    WBC 13.4 (H) 4.4 - 11.3 x10*3/uL    nRBC 0.0 0.0 - 0.0 /100 WBCs    RBC 4.71 4.50 - 5.90 x10*6/uL    Hemoglobin 13.6 13.5 - 17.5 g/dL    Hematocrit 41.2 41.0 - 52.0 %    MCV 88 80 - 100 fL    MCH 28.9 26.0 - 34.0 pg    MCHC 33.0 32.0 - 36.0 g/dL    RDW 14.6 (H) 11.5 - 14.5 %    Platelets 442 150 - 450 x10*3/uL    Neutrophils % 79.8 40.0 - 80.0 %    Immature Granulocytes %, Automated 0.7 0.0 - 0.9 %    Lymphocytes % 9.0 13.0 - 44.0 %    Monocytes % 8.6 2.0 - 10.0 %    Eosinophils % 1.6 0.0 - 6.0 %    Basophils % 0.3 0.0 - 2.0 %    Neutrophils Absolute 10.69 (H) 1.60 - 5.50 x10*3/uL    Immature Granulocytes Absolute, Automated 0.09 0.00 - 0.50 x10*3/uL    Lymphocytes Absolute 1.20 0.80 - 3.00 x10*3/uL    Monocytes Absolute 1.15 (H) 0.05 - 0.80 x10*3/uL    Eosinophils Absolute 0.21 0.00 - 0.40 x10*3/uL    Basophils Absolute 0.04 0.00 - 0.10 x10*3/uL   Basic Metabolic Panel   Result Value Ref Range    Glucose 142 (H) 74 - 99 mg/dL    Sodium 132 (L) 136 - 145 mmol/L    Potassium 4.4 3.5 - 5.3 mmol/L    Chloride 103 98 - 107 mmol/L    Bicarbonate 21 21 - 32 mmol/L     Anion Gap 12 10 - 20 mmol/L    Urea Nitrogen 16 6 - 23 mg/dL    Creatinine 0.77 0.50 - 1.30 mg/dL    eGFR 90 >60 mL/min/1.73m*2    Calcium 8.0 (L) 8.6 - 10.3 mg/dL   Magnesium   Result Value Ref Range    Magnesium 1.95 1.60 - 2.40 mg/dL   POCT GLUCOSE   Result Value Ref Range    POCT Glucose 115 (H) 74 - 99 mg/dL   POCT GLUCOSE   Result Value Ref Range    POCT Glucose 122 (H) 74 - 99 mg/dL   POCT GLUCOSE   Result Value Ref Range    POCT Glucose 107 (H) 74 - 99 mg/dL   POCT GLUCOSE   Result Value Ref Range    POCT Glucose 108 (H) 74 - 99 mg/dL   CBC and Auto Differential   Result Value Ref Range    WBC 12.2 (H) 4.4 - 11.3 x10*3/uL    nRBC 0.0 0.0 - 0.0 /100 WBCs    RBC 4.99 4.50 - 5.90 x10*6/uL    Hemoglobin 14.4 13.5 - 17.5 g/dL    Hematocrit 43.9 41.0 - 52.0 %    MCV 88 80 - 100 fL    MCH 28.9 26.0 - 34.0 pg    MCHC 32.8 32.0 - 36.0 g/dL    RDW 14.6 (H) 11.5 - 14.5 %    Platelets 495 (H) 150 - 450 x10*3/uL    Neutrophils % 76.1 40.0 - 80.0 %    Immature Granulocytes %, Automated 0.5 0.0 - 0.9 %    Lymphocytes % 11.9 13.0 - 44.0 %    Monocytes % 9.9 2.0 - 10.0 %    Eosinophils % 1.3 0.0 - 6.0 %    Basophils % 0.3 0.0 - 2.0 %    Neutrophils Absolute 9.27 (H) 1.60 - 5.50 x10*3/uL    Immature Granulocytes Absolute, Automated 0.06 0.00 - 0.50 x10*3/uL    Lymphocytes Absolute 1.45 0.80 - 3.00 x10*3/uL    Monocytes Absolute 1.21 (H) 0.05 - 0.80 x10*3/uL    Eosinophils Absolute 0.16 0.00 - 0.40 x10*3/uL    Basophils Absolute 0.04 0.00 - 0.10 x10*3/uL   Basic Metabolic Panel   Result Value Ref Range    Glucose 93 74 - 99 mg/dL    Sodium 135 (L) 136 - 145 mmol/L    Potassium 4.3 3.5 - 5.3 mmol/L    Chloride 104 98 - 107 mmol/L    Bicarbonate 23 21 - 32 mmol/L    Anion Gap 12 10 - 20 mmol/L    Urea Nitrogen 16 6 - 23 mg/dL    Creatinine 0.81 0.50 - 1.30 mg/dL    eGFR 89 >60 mL/min/1.73m*2    Calcium 8.3 (L) 8.6 - 10.3 mg/dL   Magnesium   Result Value Ref Range    Magnesium 2.13 1.60 - 2.40 mg/dL   POCT GLUCOSE   Result  Value Ref Range    POCT Glucose 101 (H) 74 - 99 mg/dL        Microbiology data: reviewed    Imaging data: reviewed      Orion Cruz  Pager:[phone]

## 2024-08-22 NOTE — PROGRESS NOTES
[facility] INFECTIOUS DISEASE PROGRESS NOTE    Patient Name: Abdi Wilson  MRN: [id number]    INTERVAL HISTORY:   S/p splenic abscess/hematoma drain placement yesterday.  Culture sent.  No fevers or chills noted over the past 24 hours.    Patient Active Problem List   Diagnosis    Stroke-like symptoms    Cerebral artery occlusion with cerebral infarction (Multi)    Cerebral infarction, unspecified (Multi)    Confusion    PEG tube malfunction (Multi)    Dementia without behavioral disturbance (Multi)    Peritonitis (Multi)    Aphasia        ASSESSMENT:   #Perisplenic/subcapsular abscess.  Still draining purulent fluid.  Cultures now positive for Enterobacter and Pseudomonas  #Pseudomonas intra-abdominal abscess s/p washout 08/10  15.7 x 9.7 x 11.1 fluid and gas collection most compatible with abscess seen on the CT abdomen..  Patient is currently on appropriate antibiotics and is pending source control.  IR consulted for drain placement.  Patient might need surgical evaluation as well given the size of the abscess.  Likely Pseudomonas as patient recently had Pseudomonas infection and abscess was seen in the all the 4 quadrants.     Recommendations:  Follow-up WBC count that was elevated at 14.  Watch for diarrhea and C. difficile  -Discontinue Zosyn 3.375 every 6 hours  Start Cefepime due to the possible development of resistance during treatment with the Enterobacter in case there is an amp C resistance element present  - Drain management  -Obtain blood cultures if patient has fevers or becomes unstable  Discharge planning on ciprofloxacin and Flagyl till 9/4/2024.  May need follow-up imaging at the end of therapy  Check ECG to look for QT interval    MEDICATIONS: reviewed.    Current Facility-Administered Medications:     acetaminophen (Tylenol) oral liquid 1,000 mg, 1,000 mg, g-tube, q8h, Nadia Massey DO, 1,000 mg at 08/22/24 0501    atorvastatin (Lipitor) tablet 40 mg, 40 mg, oral, Nightly, Ezekiel Cintron MD,  "40 mg at 24    carvedilol (Coreg) tablet 6.25 mg, 6.25 mg, oral, BID, Ezekiel Cintron MD, 6.25 mg at 24    pantoprazole (ProtoNix) EC tablet 40 mg, 40 mg, oral, Daily before breakfast **OR** esomeprazole (NexIUM) suspension 40 mg, 40 mg, nasoduodenal tube, Daily before breakfast **OR** pantoprazole (ProtoNix) injection 40 mg, 40 mg, intravenous, Daily before breakfast, Ezekiel Cintron MD, 40 mg at 24 0401    heparin (porcine) injection 5,000 Units, 5,000 Units, subcutaneous, q8h, Ezekiel Cintron MD, 5,000 Units at 24 0454    hydrALAZINE (Apresoline) injection 5 mg, 5 mg, intravenous, q6h PRN, Ezekiel Cintron MD, 5 mg at 24 0503    hydrALAZINE (Apresoline) tablet 25 mg, 25 mg, oral, TID, Ezekiel Cintron MD, 25 mg at 24 220    ipratropium-albuteroL (Duo-Neb) 0.5-2.5 mg/3 mL nebulizer solution 3 mL, 3 mL, nebulization, q4h PRN, Ezekiel Cintron MD    losartan (Cozaar) tablet 50 mg, 50 mg, oral, Daily, Ezekiel Cintron MD, 50 mg at 24 0859    morphine injection 2 mg, 2 mg, intravenous, q4h PRN, Ezekiel Cintron MD, 2 mg at 24 0127    piperacillin-tazobactam (Zosyn) 3.375 g in dextrose (iso) IV 50 mL, 3.375 g, intravenous, q6h, Ezekiel Cintron MD, Stopped at 24 0518     PHYSICAL EXAM:  Vital signs: /89 (BP Location: Right arm, Patient Position: Lying)   Pulse 74   Temp 36.2 °C (97.2 °F) (Temporal)   Resp 16   Ht 1.753 m (5' 9\")   Wt 81.6 kg (179 lb 14.3 oz)   SpO2 95%   BMI 26.57 kg/m²   Temp (24hrs), Av.3 °C (97.4 °F), Min:36.1 °C (96.9 °F), Max:36.8 °C (98.2 °F)    General: alert, oriented, NAD  Lungs: bilaterally clear to auscultation  Heart: regular rate and rhythm  Abdomen: soft, non tender, non distended, BS+  Extremities: no edema  No rashes  No joint inflammation  Neck supple  Lines ok  No CVAT    Labs:    Results for orders placed or performed during the hospital encounter of 08/10/24 (from the past 96 hour(s))   POCT GLUCOSE   Result " Value Ref Range    POCT Glucose 133 (H) 74 - 99 mg/dL   POCT GLUCOSE   Result Value Ref Range    POCT Glucose 133 (H) 74 - 99 mg/dL   POCT GLUCOSE   Result Value Ref Range    POCT Glucose 109 (H) 74 - 99 mg/dL   POCT GLUCOSE   Result Value Ref Range    POCT Glucose 140 (H) 74 - 99 mg/dL   POCT GLUCOSE   Result Value Ref Range    POCT Glucose 126 (H) 74 - 99 mg/dL   POCT GLUCOSE   Result Value Ref Range    POCT Glucose 97 74 - 99 mg/dL   Basic metabolic panel   Result Value Ref Range    Glucose 91 74 - 99 mg/dL    Sodium 134 (L) 136 - 145 mmol/L    Potassium 4.8 3.5 - 5.3 mmol/L    Chloride 105 98 - 107 mmol/L    Bicarbonate 20 (L) 21 - 32 mmol/L    Anion Gap 14 10 - 20 mmol/L    Urea Nitrogen 13 6 - 23 mg/dL    Creatinine 0.77 0.50 - 1.30 mg/dL    eGFR 90 >60 mL/min/1.73m*2    Calcium 8.0 (L) 8.6 - 10.3 mg/dL   Magnesium   Result Value Ref Range    Magnesium 2.03 1.60 - 2.40 mg/dL   Protime-INR   Result Value Ref Range    Protime 13.0 (H) 9.8 - 12.8 seconds    INR 1.2 (H) 0.9 - 1.1   CBC and Auto Differential   Result Value Ref Range    WBC 23.4 (H) 4.4 - 11.3 x10*3/uL    nRBC 0.0 0.0 - 0.0 /100 WBCs    RBC 4.91 4.50 - 5.90 x10*6/uL    Hemoglobin 14.2 13.5 - 17.5 g/dL    Hematocrit 42.4 41.0 - 52.0 %    MCV 86 80 - 100 fL    MCH 28.9 26.0 - 34.0 pg    MCHC 33.5 32.0 - 36.0 g/dL    RDW 14.9 (H) 11.5 - 14.5 %    Platelets 370 150 - 450 x10*3/uL    Neutrophils % 83.4 40.0 - 80.0 %    Immature Granulocytes %, Automated 1.0 (H) 0.0 - 0.9 %    Lymphocytes % 6.5 13.0 - 44.0 %    Monocytes % 7.0 2.0 - 10.0 %    Eosinophils % 1.7 0.0 - 6.0 %    Basophils % 0.4 0.0 - 2.0 %    Neutrophils Absolute 19.49 (H) 1.60 - 5.50 x10*3/uL    Immature Granulocytes Absolute, Automated 0.24 0.00 - 0.50 x10*3/uL    Lymphocytes Absolute 1.52 0.80 - 3.00 x10*3/uL    Monocytes Absolute 1.64 (H) 0.05 - 0.80 x10*3/uL    Eosinophils Absolute 0.40 0.00 - 0.40 x10*3/uL    Basophils Absolute 0.09 0.00 - 0.10 x10*3/uL   POCT GLUCOSE   Result Value Ref  Range    POCT Glucose 100 (H) 74 - 99 mg/dL   Sterile Fluid Culture/Smear    Specimen: Aspirate; Fluid   Result Value Ref Range    Sterile Fluid Culture/Smear (3+) Moderate Enterobacter cloacae complex (A)     Sterile Fluid Culture/Smear (1+) Rare Pseudomonas aeruginosa (A)     Gram Stain (2+) Few Polymorphonuclear leukocytes     Gram Stain No organisms seen        Susceptibility    Enterobacter cloacae complex - MICROSCAN     Amoxicillin/Clavulanate  Resistant ug/mL     Ampicillin  Resistant ug/mL     Ampicillin/Sulbactam  Resistant ug/mL     Cefazolin  Resistant ug/mL     Cefepime  Susceptible ug/mL     Ciprofloxacin  Susceptible ug/mL     Gentamicin  Susceptible ug/mL     Levofloxacin  Susceptible ug/mL     Piperacillin/Tazobactam  Susceptible ug/mL     Trimethoprim/Sulfamethoxazole  Resistant ug/mL   POCT GLUCOSE   Result Value Ref Range    POCT Glucose 92 74 - 99 mg/dL   POCT GLUCOSE   Result Value Ref Range    POCT Glucose 92 74 - 99 mg/dL   POCT GLUCOSE   Result Value Ref Range    POCT Glucose 130 (H) 74 - 99 mg/dL   POCT GLUCOSE   Result Value Ref Range    POCT Glucose 117 (H) 74 - 99 mg/dL   POCT GLUCOSE   Result Value Ref Range    POCT Glucose 159 (H) 74 - 99 mg/dL   CBC and Auto Differential   Result Value Ref Range    WBC 14.0 (H) 4.4 - 11.3 x10*3/uL    nRBC 0.0 0.0 - 0.0 /100 WBCs    RBC 4.53 4.50 - 5.90 x10*6/uL    Hemoglobin 13.1 (L) 13.5 - 17.5 g/dL    Hematocrit 39.6 (L) 41.0 - 52.0 %    MCV 87 80 - 100 fL    MCH 28.9 26.0 - 34.0 pg    MCHC 33.1 32.0 - 36.0 g/dL    RDW 14.7 (H) 11.5 - 14.5 %    Platelets 379 150 - 450 x10*3/uL    Neutrophils % 80.4 40.0 - 80.0 %    Immature Granulocytes %, Automated 0.8 0.0 - 0.9 %    Lymphocytes % 9.2 13.0 - 44.0 %    Monocytes % 7.8 2.0 - 10.0 %    Eosinophils % 1.6 0.0 - 6.0 %    Basophils % 0.2 0.0 - 2.0 %    Neutrophils Absolute 11.27 (H) 1.60 - 5.50 x10*3/uL    Immature Granulocytes Absolute, Automated 0.11 0.00 - 0.50 x10*3/uL    Lymphocytes Absolute 1.29  0.80 - 3.00 x10*3/uL    Monocytes Absolute 1.10 (H) 0.05 - 0.80 x10*3/uL    Eosinophils Absolute 0.23 0.00 - 0.40 x10*3/uL    Basophils Absolute 0.03 0.00 - 0.10 x10*3/uL   Basic Metabolic Panel   Result Value Ref Range    Glucose 149 (H) 74 - 99 mg/dL    Sodium 133 (L) 136 - 145 mmol/L    Potassium 4.3 3.5 - 5.3 mmol/L    Chloride 104 98 - 107 mmol/L    Bicarbonate 20 (L) 21 - 32 mmol/L    Anion Gap 13 10 - 20 mmol/L    Urea Nitrogen 17 6 - 23 mg/dL    Creatinine 0.80 0.50 - 1.30 mg/dL    eGFR 89 >60 mL/min/1.73m*2    Calcium 7.8 (L) 8.6 - 10.3 mg/dL   Magnesium   Result Value Ref Range    Magnesium 2.00 1.60 - 2.40 mg/dL   POCT GLUCOSE   Result Value Ref Range    POCT Glucose 167 (H) 74 - 99 mg/dL   POCT GLUCOSE   Result Value Ref Range    POCT Glucose 130 (H) 74 - 99 mg/dL   POCT GLUCOSE   Result Value Ref Range    POCT Glucose 114 (H) 74 - 99 mg/dL   POCT GLUCOSE   Result Value Ref Range    POCT Glucose 118 (H) 74 - 99 mg/dL   CBC and Auto Differential   Result Value Ref Range    WBC 13.4 (H) 4.4 - 11.3 x10*3/uL    nRBC 0.0 0.0 - 0.0 /100 WBCs    RBC 4.71 4.50 - 5.90 x10*6/uL    Hemoglobin 13.6 13.5 - 17.5 g/dL    Hematocrit 41.2 41.0 - 52.0 %    MCV 88 80 - 100 fL    MCH 28.9 26.0 - 34.0 pg    MCHC 33.0 32.0 - 36.0 g/dL    RDW 14.6 (H) 11.5 - 14.5 %    Platelets 442 150 - 450 x10*3/uL    Neutrophils % 79.8 40.0 - 80.0 %    Immature Granulocytes %, Automated 0.7 0.0 - 0.9 %    Lymphocytes % 9.0 13.0 - 44.0 %    Monocytes % 8.6 2.0 - 10.0 %    Eosinophils % 1.6 0.0 - 6.0 %    Basophils % 0.3 0.0 - 2.0 %    Neutrophils Absolute 10.69 (H) 1.60 - 5.50 x10*3/uL    Immature Granulocytes Absolute, Automated 0.09 0.00 - 0.50 x10*3/uL    Lymphocytes Absolute 1.20 0.80 - 3.00 x10*3/uL    Monocytes Absolute 1.15 (H) 0.05 - 0.80 x10*3/uL    Eosinophils Absolute 0.21 0.00 - 0.40 x10*3/uL    Basophils Absolute 0.04 0.00 - 0.10 x10*3/uL   Basic Metabolic Panel   Result Value Ref Range    Glucose 142 (H) 74 - 99 mg/dL    Sodium  132 (L) 136 - 145 mmol/L    Potassium 4.4 3.5 - 5.3 mmol/L    Chloride 103 98 - 107 mmol/L    Bicarbonate 21 21 - 32 mmol/L    Anion Gap 12 10 - 20 mmol/L    Urea Nitrogen 16 6 - 23 mg/dL    Creatinine 0.77 0.50 - 1.30 mg/dL    eGFR 90 >60 mL/min/1.73m*2    Calcium 8.0 (L) 8.6 - 10.3 mg/dL   Magnesium   Result Value Ref Range    Magnesium 1.95 1.60 - 2.40 mg/dL   POCT GLUCOSE   Result Value Ref Range    POCT Glucose 115 (H) 74 - 99 mg/dL   POCT GLUCOSE   Result Value Ref Range    POCT Glucose 122 (H) 74 - 99 mg/dL   POCT GLUCOSE   Result Value Ref Range    POCT Glucose 107 (H) 74 - 99 mg/dL   POCT GLUCOSE   Result Value Ref Range    POCT Glucose 108 (H) 74 - 99 mg/dL   CBC and Auto Differential   Result Value Ref Range    WBC 12.2 (H) 4.4 - 11.3 x10*3/uL    nRBC 0.0 0.0 - 0.0 /100 WBCs    RBC 4.99 4.50 - 5.90 x10*6/uL    Hemoglobin 14.4 13.5 - 17.5 g/dL    Hematocrit 43.9 41.0 - 52.0 %    MCV 88 80 - 100 fL    MCH 28.9 26.0 - 34.0 pg    MCHC 32.8 32.0 - 36.0 g/dL    RDW 14.6 (H) 11.5 - 14.5 %    Platelets 495 (H) 150 - 450 x10*3/uL    Neutrophils % 76.1 40.0 - 80.0 %    Immature Granulocytes %, Automated 0.5 0.0 - 0.9 %    Lymphocytes % 11.9 13.0 - 44.0 %    Monocytes % 9.9 2.0 - 10.0 %    Eosinophils % 1.3 0.0 - 6.0 %    Basophils % 0.3 0.0 - 2.0 %    Neutrophils Absolute 9.27 (H) 1.60 - 5.50 x10*3/uL    Immature Granulocytes Absolute, Automated 0.06 0.00 - 0.50 x10*3/uL    Lymphocytes Absolute 1.45 0.80 - 3.00 x10*3/uL    Monocytes Absolute 1.21 (H) 0.05 - 0.80 x10*3/uL    Eosinophils Absolute 0.16 0.00 - 0.40 x10*3/uL    Basophils Absolute 0.04 0.00 - 0.10 x10*3/uL   Basic Metabolic Panel   Result Value Ref Range    Glucose 93 74 - 99 mg/dL    Sodium 135 (L) 136 - 145 mmol/L    Potassium 4.3 3.5 - 5.3 mmol/L    Chloride 104 98 - 107 mmol/L    Bicarbonate 23 21 - 32 mmol/L    Anion Gap 12 10 - 20 mmol/L    Urea Nitrogen 16 6 - 23 mg/dL    Creatinine 0.81 0.50 - 1.30 mg/dL    eGFR 89 >60 mL/min/1.73m*2    Calcium  8.3 (L) 8.6 - 10.3 mg/dL   Magnesium   Result Value Ref Range    Magnesium 2.13 1.60 - 2.40 mg/dL   POCT GLUCOSE   Result Value Ref Range    POCT Glucose 101 (H) 74 - 99 mg/dL        Microbiology data: reviewed    Imaging data: reviewed      Orion Cruz  Pager:[phone]

## 2024-08-23 VITALS
OXYGEN SATURATION: 96 % | HEART RATE: 76 BPM | BODY MASS INDEX: 26.1 KG/M2 | WEIGHT: 176.2 LBS | SYSTOLIC BLOOD PRESSURE: 160 MMHG | RESPIRATION RATE: 14 BRPM | TEMPERATURE: 96.6 F | DIASTOLIC BLOOD PRESSURE: 79 MMHG | HEIGHT: 69 IN

## 2024-08-23 LAB
ANION GAP SERPL CALC-SCNC: 12 MMOL/L (ref 10–20)
BACTERIA FLD CULT: ABNORMAL
BACTERIA FLD CULT: ABNORMAL
BASOPHILS # BLD AUTO: 0.07 X10*3/UL (ref 0–0.1)
BASOPHILS NFR BLD AUTO: 0.6 %
BUN SERPL-MCNC: 17 MG/DL (ref 6–23)
CALCIUM SERPL-MCNC: 8.1 MG/DL (ref 8.6–10.3)
CHLORIDE SERPL-SCNC: 104 MMOL/L (ref 98–107)
CO2 SERPL-SCNC: 22 MMOL/L (ref 21–32)
CREAT SERPL-MCNC: 0.82 MG/DL (ref 0.5–1.3)
EGFRCR SERPLBLD CKD-EPI 2021: 88 ML/MIN/1.73M*2
EOSINOPHIL # BLD AUTO: 0.21 X10*3/UL (ref 0–0.4)
EOSINOPHIL NFR BLD AUTO: 1.7 %
ERYTHROCYTE [DISTWIDTH] IN BLOOD BY AUTOMATED COUNT: 14.6 % (ref 11.5–14.5)
GLUCOSE BLD MANUAL STRIP-MCNC: 124 MG/DL (ref 74–99)
GLUCOSE SERPL-MCNC: 103 MG/DL (ref 74–99)
GRAM STN SPEC: ABNORMAL
GRAM STN SPEC: ABNORMAL
HCT VFR BLD AUTO: 40.9 % (ref 41–52)
HGB BLD-MCNC: 13.5 G/DL (ref 13.5–17.5)
IMM GRANULOCYTES # BLD AUTO: 0.07 X10*3/UL (ref 0–0.5)
IMM GRANULOCYTES NFR BLD AUTO: 0.6 % (ref 0–0.9)
LYMPHOCYTES # BLD AUTO: 1.41 X10*3/UL (ref 0.8–3)
LYMPHOCYTES NFR BLD AUTO: 11.2 %
MAGNESIUM SERPL-MCNC: 2.08 MG/DL (ref 1.6–2.4)
MCH RBC QN AUTO: 28.7 PG (ref 26–34)
MCHC RBC AUTO-ENTMCNC: 33 G/DL (ref 32–36)
MCV RBC AUTO: 87 FL (ref 80–100)
MONOCYTES # BLD AUTO: 1.46 X10*3/UL (ref 0.05–0.8)
MONOCYTES NFR BLD AUTO: 11.6 %
NEUTROPHILS # BLD AUTO: 9.41 X10*3/UL (ref 1.6–5.5)
NEUTROPHILS NFR BLD AUTO: 74.3 %
NRBC BLD-RTO: 0 /100 WBCS (ref 0–0)
PLATELET # BLD AUTO: 535 X10*3/UL (ref 150–450)
POTASSIUM SERPL-SCNC: 4 MMOL/L (ref 3.5–5.3)
RBC # BLD AUTO: 4.7 X10*6/UL (ref 4.5–5.9)
SODIUM SERPL-SCNC: 134 MMOL/L (ref 136–145)
WBC # BLD AUTO: 12.6 X10*3/UL (ref 4.4–11.3)

## 2024-08-23 PROCEDURE — 80048 BASIC METABOLIC PNL TOTAL CA: CPT | Performed by: INTERNAL MEDICINE

## 2024-08-23 PROCEDURE — 82947 ASSAY GLUCOSE BLOOD QUANT: CPT

## 2024-08-23 PROCEDURE — 2500000004 HC RX 250 GENERAL PHARMACY W/ HCPCS (ALT 636 FOR OP/ED): Performed by: INTERNAL MEDICINE

## 2024-08-23 PROCEDURE — 85025 COMPLETE CBC W/AUTO DIFF WBC: CPT | Performed by: INTERNAL MEDICINE

## 2024-08-23 PROCEDURE — 83735 ASSAY OF MAGNESIUM: CPT | Performed by: INTERNAL MEDICINE

## 2024-08-23 PROCEDURE — 2500000001 HC RX 250 WO HCPCS SELF ADMINISTERED DRUGS (ALT 637 FOR MEDICARE OP): Performed by: SURGERY

## 2024-08-23 PROCEDURE — 2500000001 HC RX 250 WO HCPCS SELF ADMINISTERED DRUGS (ALT 637 FOR MEDICARE OP): Performed by: INTERNAL MEDICINE

## 2024-08-23 PROCEDURE — 2500000004 HC RX 250 GENERAL PHARMACY W/ HCPCS (ALT 636 FOR OP/ED): Mod: JZ | Performed by: INTERNAL MEDICINE

## 2024-08-23 PROCEDURE — 36415 COLL VENOUS BLD VENIPUNCTURE: CPT | Performed by: INTERNAL MEDICINE

## 2024-08-23 RX ADMIN — CEFEPIME 2 G: 1 INJECTION, SOLUTION INTRAVENOUS at 02:40

## 2024-08-23 RX ADMIN — CARVEDILOL 6.25 MG: 6.25 TABLET, FILM COATED ORAL at 10:56

## 2024-08-23 RX ADMIN — MORPHINE SULFATE 2 MG: 2 INJECTION, SOLUTION INTRAMUSCULAR; INTRAVENOUS at 10:56

## 2024-08-23 RX ADMIN — HYDRALAZINE HYDROCHLORIDE 5 MG: 20 INJECTION INTRAMUSCULAR; INTRAVENOUS at 10:56

## 2024-08-23 RX ADMIN — PANTOPRAZOLE SODIUM 40 MG: 40 INJECTION, POWDER, FOR SOLUTION INTRAVENOUS at 06:21

## 2024-08-23 RX ADMIN — LOSARTAN POTASSIUM 50 MG: 50 TABLET, FILM COATED ORAL at 10:56

## 2024-08-23 RX ADMIN — ACETAMINOPHEN 1000 MG: 160 SOLUTION ORAL at 06:21

## 2024-08-23 NOTE — PROGRESS NOTES
Abdi Wilson is a 79 y.o. male on day 13 of admission presenting with PEG tube malfunction (Multi).      Subjective   Abdi Wilson is a 79 y.o. male with PMHx s/f hypertension dyslipidemia old CVA mild cardiomyopathy ulcerative colitis GERD recent CVA in June of this year with dysphagia and expressive aphasia presenting with abdominal pain fever.  Patient had a PEG tube placed for nutritional purposes about 2 days ago.  Had presented to emergency department complaining of some pain in the abdominal area the patient was noted to have low blood pressure and fever in emergency department.  On presenting to emergency department patient had temperature 96.3 heart rate 102 respiratory rate 24 blood pressure 135/81 Javy panel shows sodium 148 potassium 4.2 chloride 112 bicarb 25 BUN 37 creatinine 1.38 glucose 144 liver enzymes within normal limits total bilirubin 1.5 initial lactate 2.5 repeat lactate 2.4 CBC showing WBC 13.6 hemoglobin 19.7 hematocrit 60.7 platelets 269 CT scan of the abdomen pelvis was done showing malposition PEG tube there is a small amount of free air, patient was seen by general surgery who felt patient had peritonitis plan is to take patient for emergent surgery.   8/11: Patient was seen and examined.  He is nonverbal at baseline and still looks lethargic this morning.  Blood pressure is improved and patient is off pressors.  Hypernatremia persistent and patient has been started on IV fluid D5 water.  Will get repeat BMP and trend.  Continue current IV antibiotics.  Blood cultures are still pending.  Gastrostomy replacement done 8/10/2024.  Continue n.p.o. by general surgery recommendation.  Repeat CBC and BMP in a.m.  8/12: Patient was seen and examined.  He remains nonverbal which is baseline however patient is not responsive or interactive.  Failed attempt at physical therapy.  Discussed with patient's son over the phone and granddaughter.  Patient was cooperating adequately with physical  therapy prior to this admission.  I will get a CT of the head to rule out any recent stroke.  Stat lactic and ammonia TSH and vitamin B12.  General surgery still recommending holding G-tube.  We will have to consider other modes of feeding within 24 hours.  Will continue IV fluids pending review of stat BMP.  Leukocytosis trending down.  Blood cultures are negative.  Continue to trend CBC and BMP daily.  8/13: Patient was seen and examined.  Patient is more awake today; still lethargic.  Discussed with general surgery.  Patient to get contrast study by tomorrow and if no further abnormalities NG to be discontinued and will start the patient on oral diet and advance as tolerated.  Continue to trend CBC and BMP daily.  8/14: Patient was seen and examined.  Still lethargic, drowsy but arousable.  Fluoroscopic studies completed and reports pending.  Patient is now requiring 3 L nasal cannula oxygen to maintain saturation so I will get a stat chest x-ray.  General surgery to decide on NG status and possible tube feeding versus TPN.  Hypokalemia noted.  IV potassium given.  Continue to trend CBC and BMP daily.  8/15: Patient was seen and examined.  Looks clinically better today still although still lethargic.  NG tube discontinued and diet advanced per general surgery recommendation.  Repeat chest x-ray yesterday shows no changes compared to previous.  Wound culture is growing Pseudomonas which is pansensitive.  Continue IV Zosyn.  Blood cultures have remained negative x 4 days.  Potential discharge back to extended-care facility once patient is tolerating goal rate of PEG tube feeding.  Repeat CBC and BMP in a.m.  8/16:Mr. Abdi Wilson is a 79 y.o. male, with a past medical history of CVA hemiplegia dysphagia dysarthria, status post PEG tube admitted for PEG tube malfunction status post replaced given also IV antibiotic Zosyn for negative blood culture, +ve wound culture is negative, pending tube feed tolerance.  Plan  to discharge him to SNF once tolerated feeding tube. Target to 60-65 ml/h. Now at 50cc.   8/17: Patient with increasing abdominal pain a lot of drainage from the wound as well.  Growing out abundant Pseudomonas from the wound currently and is susceptible to Zosyn.  Will get a CT of abdomen and pelvis since it has been over a week and patient has significant pain and white count is up a little bit.  Difficult historian because of previous stroke.  Also note increasing sodium will give a fluid bolus and increase free water.  May have to reinvolve surgery await CT results.  I believe tube feeds are at goal currently.  Once doing better will be returned back [facility].  8/18: Very large abscess noted on CAT scan 15.7 x 9.7 x 11.1 cm possibly subcapsular abscess by the spleen.  ID agrees with continuing Zosyn IR consulted for placement of drain.  Will need surgery to reengage on this patient.  Will reduce free water to 250 flushes.  Continue pulmonary toilet.  Patient's white count is going up which is concerning to discussed with ID. Clinically appears stable right now.  Will check labs in AM.  Patient's expressive and receptive aphasia continue to be a barrier.  8/19: Patient was evaluated this morning. Not in acute distress however patient appears uncomfortable. No acute changes overnight. Patient is resting in bed. Patient's white count is 23.4 this morning, continue on zosyn. Patient's expressive and receptive aphasia is a barrier to completing ROS this morning. Large abscess noted on CAT scan, subscapular abscess by the spleen. Went to IR this morning for drain placement. Clinically stable this morning. Patient is NPO effective this morning.   Addendum: Patient did have drain placed more serosanguineous pus draining.  Will restart patient's tube feeds.  Appears to be relatively comfortable when seen in the interventional laboratory.  Continue with aggressive pulmonary toilet continue antibiotics.  ID continues  to follow as well Case discussed at length with surgery and IR today.  8/20: Patient was evaluated this morning. Not in acute distress. No acute changes overnight. Patient is resting in bed. Patient is currently NPO with PEG tube running feed. Drain placed yesterday at left lateral abdomen with serosanguineous pus draining. ID continues to follow patient. Continue Zosyn 3.375 every 6 hours. Follow culture from IR drain placement. Patient's white count is 14 this morning. Patient is definitely clinically improving today.   8/21: Patient was evaluated this morning. No acute changes overnight. Not in acute distress. Patient is resting comfortably in bed, awake and alert but does not respond verbally to questions. WBC is at 13.4 this morning. IR placed drain is still draining purulent output. Preliminary results of sterile fluid culture/smear show (3+) Moderate Enterobacter cloacae complex. Continue to follow culture for sensitivity and final results. Continue on IV zosyn. Patient is clinically improving today.   8/22: Patient was evaluated this morning. No acute changes over night. Not in acute distress. Patient is resting in bed, awake and alert, but does not respond verbally to questions. WBC is 12.2 this morning. IR placed drain is still draining purulent fluid, but at decreased rate. Patient is tolerating tube feeding. Preliminary sterile fluid culture/smear is growing (3+) moderate Enterobacter cloacae complex and (1+) rare Pseudomonas aeruginosa. Discontinue Zosyn and start Cefepime due to the possible development of resistance during treatment with the Enterobacter in case there is an amp C resistance element present. Drain management. Obtain blood cultures if patient has fevers or becomes unstable. Watch for diarrhea and C. Difficile. Continue to follow cultures for final result. Discharge planning on ciprofloxacin and Flagyl until 9/4/2024. Patient is clinically improving today.  Working on discharge  antibiotic type and duration will discuss with ID.  Patient appears to be ready for transfer back to rehab  8/23: Patient was evaluated this morning. No acute changes overnight. Not in acute distress. Patient resting in bed. Preliminary result of sterile fluid culture/smear growing (3+) moderate Enterobacter cloacae complex and (1+) rare Pseudomonas aeruginosa resistant to amoxicillin/clavulanate, ampicillin/sulbactam, cefazolin, TMP/SMX, ciprofloxacin, levofloxacin. Will need ID input on discharge antibiotics. IR placed drain, still draining purulent fluid, although at decreased rate. WBC is 12.6 this morning. Patient is clinically improving today.     Objective   EXAM:    Constitutional: Resting in bed comfortably. Patient with expressive aphasia, does not respond verbally to questions    Head and facial: Dry mucosa    Neck: Neck is not rigid. No gross JVD.     Lungs: Some crackles and rales bilaterally    Heart: Regular rate and mike     Abdomen: Abdominal pain PEG tube is in place drainage from acting from wound.    Pelvis/: No flank tenderness    Extremities: No edema    Neurologic: Patient with expressive aphasia unable to move right upper extremity appears uncomfortable    Dermatologic: Drainage from abdominal wound    Psychiatric/behavioral: Patient uncomfortable      Last Recorded Vitals  /68 (BP Location: Right arm, Patient Position: Lying)   Pulse 78   Temp 35.9 °C (96.6 °F) (Temporal)   Resp 18   Wt 79.9 kg (176 lb 3.2 oz)   SpO2 95%   Intake/Output last 3 Shifts:    Intake/Output Summary (Last 24 hours) at 8/23/2024 0827  Last data filed at 8/23/2024 0056  Gross per 24 hour   Intake 200 ml   Output 1150 ml   Net -950 ml       Admission Weight  Weight: 94.3 kg (208 lb) (08/10/24 1113)    Daily Weight  08/23/24 : 79.9 kg (176 lb 3.2 oz)       Scheduled medications  acetaminophen, 1,000 mg, g-tube, q8h  atorvastatin, 40 mg, oral, Nightly  carvedilol, 6.25 mg, oral, BID  cefepime, 2 g,  intravenous, q8h  pantoprazole, 40 mg, oral, Daily before breakfast   Or  esomeprazole, 40 mg, nasoduodenal tube, Daily before breakfast   Or  pantoprazole, 40 mg, intravenous, Daily before breakfast  heparin (porcine), 5,000 Units, subcutaneous, q8h  hydrALAZINE, 25 mg, oral, TID  losartan, 50 mg, oral, Daily      Continuous medications     PRN medications  PRN medications: hydrALAZINE, ipratropium-albuteroL, morphine    Assessment/Plan      Peritonitis &sepsis s/p exploratory laparotomy and G-tube placement 8/10  Large perisplenic abscess status post drain placement 8/19/2024  Draining abdominal wound with Pseudomonas  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  History of stroke with expressive aphasia and dysphagia can speak some at baseline  A component of receptive aphasia as well  History of aspiration pneumonia  Difficulty with secretions  Dementia  Hypertension  Hyperlipidemia   CODE STATUS DNR no intubation  Hypokalemia resolved  Hypernatremia resolved  DVT prophylaxis-subcu heparin    acetaminophen, 975 mg, g-tube, q8h  atorvastatin, 40 mg, oral, Nightly  carvedilol, 6.25 mg, oral, BID  pantoprazole, 40 mg, intravenous, Daily before breakfast  heparin 5,000 Units, subcutaneous, q8h (held)  hydrALAZINE, 25 mg, oral, TID  losartan, 50 mg, oral, Daily  Cefepime 2 g IV q8h  PT and OT recommend moderate intensity level of continued care  As needed morphine  DuoNeb as needed  Pulmonary toilet  Drain for abscess placed-monitor drainage  Drain management.  Preliminary results of sterile fluid culture/smear show (3+) moderate Enterobacter cloacae complex and (1+) rare Pseudomonas aeruginosa resistant to amoxicillin/clavulanate, ampicillin/sulbactam, cefazolin, TMP/SMX, ciprofloxacin, levofloxacin  Discontinue Zosyn and start Cefepime Watch for diarrhea and C. Difficile.   Obtain blood cultures if patient has fevers or becomes unstable.  Suction as needed  Tube feeds restarted  Free water 250 mL every 4 hours  PEG  Jerome 1.5 tube feed at 60 mL an hour  Discharge planning [facility]  ID input on discharge antibiotics given resistance   Check labs in a.m.  See orders for complete plan         Spent 35 minutes in follow-up management of this patient       BRIDGETT AVELAR    Shared visit with student  Patient seen and examined  Note amended and addended  See my orders for complete plan.

## 2024-08-23 NOTE — NURSING NOTE
Report called to Sara at [facility] ([phone]); pt picked up an transported by cot to facility. Report given to transporters, pt belongings sent with; pt cleaned up, changed, and catheter pulled by request of facility nurse, Sara.

## 2024-08-23 NOTE — DISCHARGE SUMMARY
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    Discharge Meds     Your medication list        START taking these medications        Instructions Last Dose Given Next Dose Due   ciprofloxacin 500 mg tablet  Commonly known as: Cipro      Take 1 tablet (500 mg) by mouth 2 times a day for 7 days.       ipratropium-albuteroL 0.5-2.5 mg/3 mL nebulizer solution  Commonly known as: Duo-Neb      Take 3 mL by nebulization every 4 hours if needed for wheezing.       metroNIDAZOLE 250 mg tablet  Commonly known as: Flagyl      Take 1 tablet (250 mg) by mouth 3 times a day for 7 days.              CHANGE how you take these medications        Instructions Last Dose Given Next Dose Due   hydrALAZINE 25 mg tablet  Commonly known as: Apresoline  What changed: additional instructions      Take 1 tablet (25 mg) by mouth 3 times a day.       losartan 50 mg tablet  Commonly known as: Cozaar  What changed:   medication strength  how much to take  additional instructions      Take 1 tablet (50 mg) by mouth once daily.              CONTINUE taking these medications        Instructions Last Dose Given Next Dose Due   acetaminophen 325 mg tablet  Commonly known as: Tylenol           atorvastatin 80 mg tablet  Commonly known as: Lipitor      Take 1 tablet (80 mg) by mouth once daily at bedtime.       carvedilol 6.25 mg tablet  Commonly known as: Coreg      Take 1 tablet (6.25  mg) by mouth 2 times a day.       clopidogrel 75 mg tablet  Commonly known as: Plavix           colestipol 5 gram granules  Commonly known as: Colestid           Dulcolax (bisacodyl) 10 mg suppository  Generic drug: bisacodyl           hyoscyamine 0.125 mg tablet  Commonly known as: Anaspaz, Levsin           lidocaine 4 % cream  Commonly known as: LMX           loperamide 2 mg tablet  Commonly known as: Imodium A-D           magnesium hydroxide 2,400 mg/10 mL suspension suspension  Commonly known as: Milk of Magnesia           Vitamin D2 1.25 MG (36138 UT) capsule  Generic drug: ergocalciferol                  STOP taking these medications      amoxicillin-pot clavulanate 875-125 mg tablet  Commonly known as: Augmentin        aspirin 81 mg EC tablet        levETIRAcetam 500 mg tablet  Commonly known as: Keppra        metoprolol succinate  mg 24 hr tablet  Commonly known as: Toprol-XL        SeroqueL 25 mg tablet  Generic drug: QUEtiapine                  Where to Get Your Medications        These medications were sent to [facility]  [address] [place], [place] [id number]      Hours: 8AM to 6PM Mon-Fri, 8AM to 4PM Sat-Sun Phone: [phone]   ciprofloxacin 500 mg tablet  ipratropium-albuteroL 0.5-2.5 mg/3 mL nebulizer solution  losartan 50 mg tablet  metroNIDAZOLE 250 mg tablet       Information about where to get these medications is not yet available    Ask your nurse or doctor about these medications  hydrALAZINE 25 mg tablet         Test Results Pending At Discharge  Pending Labs       No current pending labs.            Hospital Course   Abdi Wilson is a 79 y.o. male on day 12 of admission presenting with PEG tube malfunction (Multi).           Subjective  Abdi Wilson is a 79 y.o. male with PMHx s/f hypertension dyslipidemia old CVA mild cardiomyopathy ulcerative colitis GERD recent CVA in June of this year with dysphagia and expressive aphasia presenting with abdominal pain fever.   Patient had a PEG tube placed for nutritional purposes about 2 days ago.  Had presented to emergency department complaining of some pain in the abdominal area the patient was noted to have low blood pressure and fever in emergency department.  On presenting to emergency department patient had temperature 96.3 heart rate 102 respiratory rate 24 blood pressure 135/81 Javy panel shows sodium 148 potassium 4.2 chloride 112 bicarb 25 BUN 37 creatinine 1.38 glucose 144 liver enzymes within normal limits total bilirubin 1.5 initial lactate 2.5 repeat lactate 2.4 CBC showing WBC 13.6 hemoglobin 19.7 hematocrit 60.7 platelets 269 CT scan of the abdomen pelvis was done showing malposition PEG tube there is a small amount of free air, patient was seen by general surgery who felt patient had peritonitis plan is to take patient for emergent surgery.   8/11: Patient was seen and examined.  He is nonverbal at baseline and still looks lethargic this morning.  Blood pressure is improved and patient is off pressors.  Hypernatremia persistent and patient has been started on IV fluid D5 water.  Will get repeat BMP and trend.  Continue current IV antibiotics.  Blood cultures are still pending.  Gastrostomy replacement done 8/10/2024.  Continue n.p.o. by general surgery recommendation.  Repeat CBC and BMP in a.m.  8/12: Patient was seen and examined.  He remains nonverbal which is baseline however patient is not responsive or interactive.  Failed attempt at physical therapy.  Discussed with patient's son over the phone and granddaughter.  Patient was cooperating adequately with physical therapy prior to this admission.  I will get a CT of the head to rule out any recent stroke.  Stat lactic and ammonia TSH and vitamin B12.  General surgery still recommending holding G-tube.  We will have to consider other modes of feeding within 24 hours.  Will continue IV fluids pending review of stat BMP.  Leukocytosis trending down.  Blood cultures  are negative.  Continue to trend CBC and BMP daily.  8/13: Patient was seen and examined.  Patient is more awake today; still lethargic.   Discussed with general surgery.  Patient to get contrast study by tomorrow and if no further abnormalities NG to be discontinued and will start the patient on oral diet and advance as tolerated.  Continue to trend CBC and BMP daily.  8/14: Patient was seen and examined.  Still lethargic, drowsy but arousable.  Fluoroscopic studies completed and reports pending.  Patient is now requiring 3 L nasal cannula oxygen to maintain saturation so I will get a stat chest x-ray.  General surgery to decide on NG status and possible tube feeding versus TPN.  Hypokalemia noted.  IV potassium given.  Continue to trend CBC and BMP daily.  8/15: Patient was seen and examined.  Looks clinically better today still although still lethargic.  NG tube discontinued and diet advanced per general surgery recommendation.  Repeat chest x-ray yesterday shows no changes compared to previous.  Wound culture is growing Pseudomonas which is pansensitive.  Continue IV Zosyn.  Blood cultures have remained negative x 4 days.  Potential discharge back to extended-care facility once patient is tolerating goal rate of PEG tube feeding.  Repeat CBC and BMP in a.m.  8/16:Mr. Abdi Wilson is a 79 y.o. male, with a past medical history of CVA hemiplegia dysphagia dysarthria, status post PEG tube admitted for PEG tube malfunction status post replaced given also IV antibiotic Zosyn for negative blood culture, +ve wound culture is negative, pending tube feed tolerance.  Plan to discharge him to SNF once tolerated feeding tube. Target to 60-65 ml/h. Now at 50cc.   8/17: Patient with increasing abdominal pain a lot of drainage from the wound as well.  Growing out abundant Pseudomonas from the wound currently and is susceptible to Zosyn.  Will get a CT of abdomen and pelvis since it has been over a week and patient has  significant pain and white count is up a little bit.  Difficult historian because of previous stroke.  Also note increasing sodium will give a fluid bolus and increase free water.  May have to reinvolve surgery await CT results.  I believe tube feeds are at goal currently.  Once doing better will be returned back [facility].  8/18: Very large abscess noted on CAT scan 15.7 x 9.7 x 11.1 cm possibly subcapsular abscess by the spleen.  ID agrees with continuing Zosyn IR consulted for placement of drain.  Will need surgery to reengage on this patient.  Will reduce free water to 250 flushes.  Continue pulmonary toilet.  Patient's white count is going up which is concerning to discussed with ID. Clinically appears stable right now.  Will check labs in AM.  Patient's expressive and receptive aphasia continue to be a barrier.  8/19: Patient was evaluated this morning. Not in acute distress however patient appears uncomfortable. No acute changes overnight. Patient is resting in bed. Patient's white count is 23.4 this morning, continue on zosyn. Patient's expressive and receptive aphasia is a barrier to completing ROS this morning. Large abscess noted on CAT scan, subscapular abscess by the spleen. Went to IR this morning for drain placement. Clinically stable this morning. Patient is NPO effective this morning.   Addendum: Patient did have drain placed more serosanguineous pus draining.  Will restart patient's tube feeds.  Appears to be relatively comfortable when seen in the interventional laboratory.  Continue with aggressive pulmonary toilet continue antibiotics.  ID continues to follow as well Case discussed at length with surgery and IR today.  8/20: Patient was evaluated this morning. Not in acute distress. No acute changes overnight. Patient is resting in bed. Patient is currently NPO with PEG tube running feed. Drain placed yesterday at left lateral abdomen with serosanguineous pus draining. ID continues to follow  patient. Continue Zosyn 3.375 every 6 hours. Follow culture from IR drain placement. Patient's white count is 14 this morning. Patient is definitely clinically improving today.   8/21: Patient was evaluated this morning. No acute changes overnight. Not in acute distress. Patient is resting comfortably in bed, awake and alert but does not respond verbally to questions. WBC is at 13.4 this morning. IR placed drain is still draining purulent output. Preliminary results of sterile fluid culture/smear show (3+) Moderate Enterobacter cloacae complex. Continue to follow culture for sensitivity and final results. Continue on IV zosyn. Patient is clinically improving today.   8/22: Patient was evaluated this morning. No acute changes over night. Not in acute distress. Patient is resting in bed, awake and alert, but does not respond verbally to questions. WBC is 12.2 this morning. IR placed drain is still draining purulent fluid, but at decreased rate. Patient is tolerating tube feeding. Preliminary sterile fluid culture/smear is growing (3+) moderate Enterobacter cloacae complex and (1+) rare Pseudomonas aeruginosa. Discontinue Zosyn and start Cefepime due to the possible development of resistance during treatment with the Enterobacter in case there is an amp C resistance element present. Drain management. Obtain blood cultures if patient has fevers or becomes unstable. Watch for diarrhea and C. Difficile. Continue to follow cultures for final result. Discharge planning on ciprofloxacin and Flagyl until 9/4/2024. Patient is clinically improving today.  Working on discharge antibiotic type and duration will discuss with ID.  Patient appears to be ready for transfer back to rehab       Pertinent Physical Exam At Time of Discharge  Physical Exam  Patient generally feeling better today drainage NESSA drain decreased.  Outpatient Follow-Up  No future appointments.      Ezekiel Cintron MD

## 2024-08-23 NOTE — PROGRESS NOTES
08/23/24 1003   Discharge Planning   Does the patient need discharge transport arranged? Yes   RoundTrip coordination needed? Yes   Has discharge transport been arranged? Yes   What day is the transport expected? 08/23/24   What time is the transport expected? 1030     Notified RN and patients son of transport time of 1030 to [facility] . Answered all questions and verbalized understanding.  Report number is  [phone]

## 2024-08-23 NOTE — CARE PLAN
The patient's goals for the shift include      The clinical goals for the shift include Infection prevention throughout this shift.      Problem: Skin  Goal: Decreased wound size/increased tissue granulation at next dressing change  Outcome: Progressing     Problem: Skin  Goal: Promote skin healing  Outcome: Progressing     Problem: Discharge Planning  Goal: Discharge to home or other facility with appropriate resources  Outcome: Progressing     Problem: Nutrition  Goal: Tube feed tolerance  Outcome: Met     Problem: Safety - Adult  Goal: Free from fall injury  Outcome: Met

## 2024-08-23 NOTE — PROGRESS NOTES
"GENERAL SURGERY PROGRESS NOTE    Abdi Wilson   1945   [id number]     Abdi Wilson is a 79 y.o. male on day 13 of admission presenting with PEG tube malfunction (Multi).    Subjective  No acute events overnight.  Mentation unchanged.  Tolerating tube feeds and having bowel function.  IR drain in place and draining purulent drainage although lighter from yesterday.    Review of Systems:  Review of Systems   Reason unable to perform ROS: Nonverbal.       Objective    Last Recorded Vitals  Blood pressure 129/68, pulse 78, temperature 35.9 °C (96.6 °F), temperature source Temporal, resp. rate 18, height 1.753 m (5' 9\"), weight 79.9 kg (176 lb 3.2 oz), SpO2 95%.    Intake/Output last 3 Shifts:  I/O last 3 completed shifts:  In: 2486 (31.1 mL/kg) [NG/GT:2286; IV Piggyback:200]  Out: 3012 (37.7 mL/kg) [Urine:2950 (1 mL/kg/hr); Emesis/NG output:7; Drains:55]  Weight: 79.9 kg     Intake/Output Summary (Last 24 hours) at 8/23/2024 0912  Last data filed at 8/23/2024 0056  Gross per 24 hour   Intake 200 ml   Output 1150 ml   Net -950 ml       Physical Exam  Constitutional:       General: He is not in acute distress.     Appearance: He is not toxic-appearing.   HENT:      Head: Normocephalic and atraumatic.   Eyes:      Conjunctiva/sclera: Conjunctivae normal.   Cardiovascular:      Rate and Rhythm: Normal rate and regular rhythm.      Pulses: Normal pulses.   Pulmonary:      Effort: Pulmonary effort is normal. No respiratory distress.   Abdominal:      Palpations: Abdomen is soft.      Comments: Nondistended.  Nontender.  Midline incision with packing in place, no erythema or purulence.  IR placed drain intact, lighter purulent output   Musculoskeletal:         General: No swelling.      Cervical back: Neck supple.   Skin:     General: Skin is warm and dry.   Neurological:      Mental Status: He is alert. Mental status is at baseline.         Relevant Results  Labs:  Results for orders placed or performed during the " hospital encounter of 08/10/24 (from the past 24 hour(s))   POCT GLUCOSE   Result Value Ref Range    POCT Glucose 127 (H) 74 - 99 mg/dL   POCT GLUCOSE   Result Value Ref Range    POCT Glucose 124 (H) 74 - 99 mg/dL   CBC and Auto Differential   Result Value Ref Range    WBC 12.6 (H) 4.4 - 11.3 x10*3/uL    nRBC 0.0 0.0 - 0.0 /100 WBCs    RBC 4.70 4.50 - 5.90 x10*6/uL    Hemoglobin 13.5 13.5 - 17.5 g/dL    Hematocrit 40.9 (L) 41.0 - 52.0 %    MCV 87 80 - 100 fL    MCH 28.7 26.0 - 34.0 pg    MCHC 33.0 32.0 - 36.0 g/dL    RDW 14.6 (H) 11.5 - 14.5 %    Platelets 535 (H) 150 - 450 x10*3/uL    Neutrophils % 74.3 40.0 - 80.0 %    Immature Granulocytes %, Automated 0.6 0.0 - 0.9 %    Lymphocytes % 11.2 13.0 - 44.0 %    Monocytes % 11.6 2.0 - 10.0 %    Eosinophils % 1.7 0.0 - 6.0 %    Basophils % 0.6 0.0 - 2.0 %    Neutrophils Absolute 9.41 (H) 1.60 - 5.50 x10*3/uL    Immature Granulocytes Absolute, Automated 0.07 0.00 - 0.50 x10*3/uL    Lymphocytes Absolute 1.41 0.80 - 3.00 x10*3/uL    Monocytes Absolute 1.46 (H) 0.05 - 0.80 x10*3/uL    Eosinophils Absolute 0.21 0.00 - 0.40 x10*3/uL    Basophils Absolute 0.07 0.00 - 0.10 x10*3/uL   Basic Metabolic Panel   Result Value Ref Range    Glucose 103 (H) 74 - 99 mg/dL    Sodium 134 (L) 136 - 145 mmol/L    Potassium 4.0 3.5 - 5.3 mmol/L    Chloride 104 98 - 107 mmol/L    Bicarbonate 22 21 - 32 mmol/L    Anion Gap 12 10 - 20 mmol/L    Urea Nitrogen 17 6 - 23 mg/dL    Creatinine 0.82 0.50 - 1.30 mg/dL    eGFR 88 >60 mL/min/1.73m*2    Calcium 8.1 (L) 8.6 - 10.3 mg/dL   Magnesium   Result Value Ref Range    Magnesium 2.08 1.60 - 2.40 mg/dL       Images:  IR body drain placement   Final Result   Successful ultrasound-guided placement of a 10 Vietnamese drain into the   left upper quadrant abscess as outlined above.             Performed and dictated at [facility].        MACRO:   None        Signed by: Adria Wynn 8/19/2024 12:38 PM   Dictation  workstation:   [id number]      CT abdomen pelvis w IV contrast   Final Result   15.7 x 9.7 x 11.1 cm perisplenic, possibly subcapsular, collection of   fluid and gas most compatible with abscess.        Percutaneous gastrostomy tube is present and appears appropriately   positioned. Free air seen on prior imaging 08/10/2024 adjacent to the   malpositioned gastrostomy tube at that time is now near resolved with   a small persistent punctate focus of free air adjacent to the stomach.        Defect in the anterior abdominal wall with overlying staples new from   prior imaging most compatible with interval surgery. Mild stranding   and fluid within this defect without evidence of a well-defined   collection..        The bladder is decompressed with a Cleveland catheter, which limits   evaluation, however, there is bladder wall thickening and mild   adjacent stranding. Findings may be seen with cystitis. Correlate   with urinalysis.        Partially visualized moderate left and small right pleural effusions   with superimposed atelectasis.        3 cm simple appearing cyst in the left kidney. Additional   indeterminate hypodensities in the left kidney measure up to   approximately 1.6 cm. Recommend nonemergent MRI to further   characterize.        Prominence of submucosal fat in the distal colon which may be seen   with chronic inflammatory processes.        MACRO:   Critical Finding:  See findings. Notification was initiated on   8/18/2024 at 4:08 am by  Evan Finkelstein.  (**-YCF-**) Instructions:        Signed by: Evan Finkelstein 8/18/2024 4:08 AM   Dictation workstation:   [id number]      FL upper GI w KUB   Final Result   1.  PEG tube is positioned in the stomach with no extravasation.   2. Prompt passage of contrast into the duodenal and no   gastroesophageal reflux             MACRO:   None        Signed by: Mei Crum 8/14/2024 10:10 AM   Dictation workstation:   [id number]      XR chest 2 views   Final  Result   1.  Unchanged cardiomegaly   2. Pleural effusions and basilar atelectasis, left worse than right   and unchanged from the prior exam                  MACRO:   None        Signed by: Mei Crum 8/14/2024 9:22 AM   Dictation workstation:   [id number]      XR chest abdomen for OG NG placement   Final Result   Tube projects in the stomach        Bilateral basilar atelectasis             MACRO:   None        Signed by: Mei Crum 8/13/2024 1:01 PM   Dictation workstation:   [id number]      CT head wo IV contrast   Final Result   1.  No acute intracranial hemorrhage or acute territorial infarct.   2.  Diffuse parenchymal volume loss. Chronic changes.             MACRO:   None.        Signed by: Judith Velarde 8/12/2024 6:34 PM   Dictation workstation:   [id number]      CT abdomen pelvis w IV contrast   Final Result   1. There is a malpositioned PEG tube insufflated in the fatty   anterior to the stomach. There is a small amount of free air in this   location. This could be secondary to malpositioned PEG tube rather   than a ruptured viscus but should be correlated clinically.   2. Nonspecific thickening of loops of small bowel in the left side of   the abdomen with induration of the mesentery and a small amount of   ascites. This could represent Crohn's disease or enteritis.   3. Fibrofatty infiltration of the colon from the transverse colon to   the rectum. This can be seen with Crohn's disease, inactive.   4. Benign bilateral simple renal cysts.   5. The urinary bladder with a volume of 559 cc. Bladder wall   trabeculation, likely secondary to postobstructive uropathic change   from prostate enlargement.        MACRO:   None        Signed by: Yamilet Heck 8/10/2024 2:22 PM   Dictation workstation:   [id number]      XR chest 1 view   Final Result   No acute cardiopulmonary process.        MACRO:   None        Signed by: Yamilet Heck 8/10/2024 1:46 PM   Dictation workstation:   [id number]           Assessment and Plan  Principal Problem:    PEG tube malfunction (Multi)  Active Problems:    Cerebral infarction, unspecified (Multi)    Peritonitis (Multi)    Aphasia    79 y.o. male who initially underwent PEG tube placement due to dysphagia on 8/8/2024.  Patient represented on 8/10 due to PEG tube dislodgment and was found to be septic and is now status post exploratory laparotomy with replacement of gastrostomy tube on 8/10 with Dr. Velásquez.  Patient subsequently developed a perisplenic drain now status post IR drain placement.  Patient is continuing to overall improved.    Plan:  - Leukocytosis relatively unchanged, continue to monitor  - Continue tube feeds through PEG, maintain abdominal binder.  Follow tube feed recs per nutrition  - Continue midline dressing changes, wet-to-dry packing twice daily  - Continue IR drain to self suction, monitor output.  Continues to decrease.  - SCDs, subq heparin  - Other cares per primary team    Discussed with attending Dr. Christen Massey, DO - PGY4  General Surgery

## 2024-08-27 ENCOUNTER — APPOINTMENT (OUTPATIENT)
Dept: RADIOLOGY | Facility: HOSPITAL | Age: 79
End: 2024-08-27
Payer: MEDICARE

## 2024-08-27 ENCOUNTER — HOSPITAL ENCOUNTER (INPATIENT)
Facility: HOSPITAL | Age: 79
LOS: 3 days | Discharge: NURSING, REHAB OR LONG-TERM CARE | End: 2024-08-30
Attending: EMERGENCY MEDICINE | Admitting: INTERNAL MEDICINE
Payer: MEDICARE

## 2024-08-27 DIAGNOSIS — R47.01 APHASIA: ICD-10-CM

## 2024-08-27 DIAGNOSIS — L02.211 ABDOMINAL WALL ABSCESS: Primary | ICD-10-CM

## 2024-08-27 LAB
ALBUMIN SERPL BCP-MCNC: 3.5 G/DL (ref 3.4–5)
ALP SERPL-CCNC: 135 U/L (ref 33–136)
ALT SERPL W P-5'-P-CCNC: 41 U/L (ref 10–52)
ANION GAP SERPL CALC-SCNC: 14 MMOL/L (ref 10–20)
APPEARANCE UR: CLEAR
APTT PPP: 33 SECONDS (ref 27–38)
AST SERPL W P-5'-P-CCNC: 38 U/L (ref 9–39)
BASOPHILS # BLD AUTO: 0.07 X10*3/UL (ref 0–0.1)
BASOPHILS NFR BLD AUTO: 0.6 %
BILIRUB SERPL-MCNC: 0.7 MG/DL (ref 0–1.2)
BILIRUB UR STRIP.AUTO-MCNC: NEGATIVE MG/DL
BUN SERPL-MCNC: 16 MG/DL (ref 6–23)
CALCIUM SERPL-MCNC: 9.6 MG/DL (ref 8.6–10.3)
CHLORIDE SERPL-SCNC: 100 MMOL/L (ref 98–107)
CO2 SERPL-SCNC: 24 MMOL/L (ref 21–32)
COLOR UR: YELLOW
CREAT SERPL-MCNC: 0.72 MG/DL (ref 0.5–1.3)
EGFRCR SERPLBLD CKD-EPI 2021: >90 ML/MIN/1.73M*2
EOSINOPHIL # BLD AUTO: 0.19 X10*3/UL (ref 0–0.4)
EOSINOPHIL NFR BLD AUTO: 1.7 %
ERYTHROCYTE [DISTWIDTH] IN BLOOD BY AUTOMATED COUNT: 14.6 % (ref 11.5–14.5)
GLUCOSE SERPL-MCNC: 94 MG/DL (ref 74–99)
GLUCOSE UR STRIP.AUTO-MCNC: NORMAL MG/DL
HCT VFR BLD AUTO: 48.1 % (ref 41–52)
HGB BLD-MCNC: 15.8 G/DL (ref 13.5–17.5)
HOLD SPECIMEN: NORMAL
IMM GRANULOCYTES # BLD AUTO: 0.05 X10*3/UL (ref 0–0.5)
IMM GRANULOCYTES NFR BLD AUTO: 0.5 % (ref 0–0.9)
INR PPP: 1.1 (ref 0.9–1.1)
KETONES UR STRIP.AUTO-MCNC: NEGATIVE MG/DL
LACTATE SERPL-SCNC: 1.8 MMOL/L (ref 0.4–2)
LEUKOCYTE ESTERASE UR QL STRIP.AUTO: NEGATIVE
LYMPHOCYTES # BLD AUTO: 1.59 X10*3/UL (ref 0.8–3)
LYMPHOCYTES NFR BLD AUTO: 14.4 %
MCH RBC QN AUTO: 29 PG (ref 26–34)
MCHC RBC AUTO-ENTMCNC: 32.8 G/DL (ref 32–36)
MCV RBC AUTO: 88 FL (ref 80–100)
MONOCYTES # BLD AUTO: 1.18 X10*3/UL (ref 0.05–0.8)
MONOCYTES NFR BLD AUTO: 10.7 %
NEUTROPHILS # BLD AUTO: 7.96 X10*3/UL (ref 1.6–5.5)
NEUTROPHILS NFR BLD AUTO: 72.1 %
NITRITE UR QL STRIP.AUTO: NEGATIVE
NRBC BLD-RTO: 0 /100 WBCS (ref 0–0)
PH UR STRIP.AUTO: 5 [PH]
PLATELET # BLD AUTO: 566 X10*3/UL (ref 150–450)
POTASSIUM SERPL-SCNC: 4.2 MMOL/L (ref 3.5–5.3)
PROT SERPL-MCNC: 7.9 G/DL (ref 6.4–8.2)
PROT UR STRIP.AUTO-MCNC: NEGATIVE MG/DL
PROTHROMBIN TIME: 12.9 SECONDS (ref 9.8–12.8)
RBC # BLD AUTO: 5.45 X10*6/UL (ref 4.5–5.9)
RBC # UR STRIP.AUTO: NEGATIVE /UL
SODIUM SERPL-SCNC: 134 MMOL/L (ref 136–145)
SP GR UR STRIP.AUTO: 1.02
UROBILINOGEN UR STRIP.AUTO-MCNC: NORMAL MG/DL
WBC # BLD AUTO: 11 X10*3/UL (ref 4.4–11.3)

## 2024-08-27 PROCEDURE — 74177 CT ABD & PELVIS W/CONTRAST: CPT | Performed by: RADIOLOGY

## 2024-08-27 PROCEDURE — 2500000004 HC RX 250 GENERAL PHARMACY W/ HCPCS (ALT 636 FOR OP/ED)

## 2024-08-27 PROCEDURE — 96366 THER/PROPH/DIAG IV INF ADDON: CPT

## 2024-08-27 PROCEDURE — 87040 BLOOD CULTURE FOR BACTERIA: CPT | Mod: PORLAB | Performed by: EMERGENCY MEDICINE

## 2024-08-27 PROCEDURE — 96375 TX/PRO/DX INJ NEW DRUG ADDON: CPT

## 2024-08-27 PROCEDURE — 96365 THER/PROPH/DIAG IV INF INIT: CPT

## 2024-08-27 PROCEDURE — 2500000005 HC RX 250 GENERAL PHARMACY W/O HCPCS: Performed by: EMERGENCY MEDICINE

## 2024-08-27 PROCEDURE — 85610 PROTHROMBIN TIME: CPT | Performed by: EMERGENCY MEDICINE

## 2024-08-27 PROCEDURE — 2500000004 HC RX 250 GENERAL PHARMACY W/ HCPCS (ALT 636 FOR OP/ED): Performed by: EMERGENCY MEDICINE

## 2024-08-27 PROCEDURE — 96367 TX/PROPH/DG ADDL SEQ IV INF: CPT

## 2024-08-27 PROCEDURE — 92610 EVALUATE SWALLOWING FUNCTION: CPT | Mod: GN,KX | Performed by: SPEECH-LANGUAGE PATHOLOGIST

## 2024-08-27 PROCEDURE — 83605 ASSAY OF LACTIC ACID: CPT | Performed by: EMERGENCY MEDICINE

## 2024-08-27 PROCEDURE — 85025 COMPLETE CBC W/AUTO DIFF WBC: CPT | Performed by: EMERGENCY MEDICINE

## 2024-08-27 PROCEDURE — 2500000004 HC RX 250 GENERAL PHARMACY W/ HCPCS (ALT 636 FOR OP/ED): Performed by: NURSE PRACTITIONER

## 2024-08-27 PROCEDURE — 99285 EMERGENCY DEPT VISIT HI MDM: CPT | Mod: 25

## 2024-08-27 PROCEDURE — 99222 1ST HOSP IP/OBS MODERATE 55: CPT | Performed by: NURSE PRACTITIONER

## 2024-08-27 PROCEDURE — 2550000001 HC RX 255 CONTRASTS: Performed by: EMERGENCY MEDICINE

## 2024-08-27 PROCEDURE — 2060000001 HC INTERMEDIATE ICU ROOM DAILY

## 2024-08-27 PROCEDURE — 36415 COLL VENOUS BLD VENIPUNCTURE: CPT | Performed by: EMERGENCY MEDICINE

## 2024-08-27 PROCEDURE — 74177 CT ABD & PELVIS W/CONTRAST: CPT

## 2024-08-27 PROCEDURE — 80053 COMPREHEN METABOLIC PANEL: CPT | Performed by: EMERGENCY MEDICINE

## 2024-08-27 PROCEDURE — 2500000004 HC RX 250 GENERAL PHARMACY W/ HCPCS (ALT 636 FOR OP/ED): Mod: JZ | Performed by: INTERNAL MEDICINE

## 2024-08-27 PROCEDURE — 81003 URINALYSIS AUTO W/O SCOPE: CPT | Performed by: EMERGENCY MEDICINE

## 2024-08-27 RX ORDER — ONDANSETRON HYDROCHLORIDE 2 MG/ML
4 INJECTION, SOLUTION INTRAVENOUS EVERY 4 HOURS PRN
Status: DISCONTINUED | OUTPATIENT
Start: 2024-08-27 | End: 2024-08-30 | Stop reason: HOSPADM

## 2024-08-27 RX ORDER — METOPROLOL TARTRATE 1 MG/ML
5 INJECTION, SOLUTION INTRAVENOUS ONCE
Status: DISCONTINUED | OUTPATIENT
Start: 2024-08-27 | End: 2024-08-30 | Stop reason: HOSPADM

## 2024-08-27 RX ORDER — CARVEDILOL 6.25 MG/1
6.25 TABLET ORAL 2 TIMES DAILY
Status: DISCONTINUED | OUTPATIENT
Start: 2024-08-27 | End: 2024-08-30 | Stop reason: HOSPADM

## 2024-08-27 RX ORDER — HYDRALAZINE HYDROCHLORIDE 20 MG/ML
10 INJECTION INTRAMUSCULAR; INTRAVENOUS ONCE
Status: COMPLETED | OUTPATIENT
Start: 2024-08-27 | End: 2024-08-27

## 2024-08-27 RX ORDER — SENNOSIDES 8.6 MG/1
2 TABLET ORAL 2 TIMES DAILY
Status: DISCONTINUED | OUTPATIENT
Start: 2024-08-27 | End: 2024-08-30 | Stop reason: HOSPADM

## 2024-08-27 RX ORDER — DEXTROSE MONOHYDRATE AND SODIUM CHLORIDE 5; .9 G/100ML; G/100ML
75 INJECTION, SOLUTION INTRAVENOUS CONTINUOUS
Status: DISCONTINUED | OUTPATIENT
Start: 2024-08-27 | End: 2024-08-30 | Stop reason: HOSPADM

## 2024-08-27 RX ORDER — LOSARTAN POTASSIUM 50 MG/1
50 TABLET ORAL DAILY
Status: DISCONTINUED | OUTPATIENT
Start: 2024-08-27 | End: 2024-08-30 | Stop reason: HOSPADM

## 2024-08-27 RX ORDER — ACETAMINOPHEN 325 MG/1
650 TABLET ORAL EVERY 4 HOURS PRN
Status: DISCONTINUED | OUTPATIENT
Start: 2024-08-27 | End: 2024-08-30 | Stop reason: HOSPADM

## 2024-08-27 RX ORDER — HYDRALAZINE HYDROCHLORIDE 25 MG/1
25 TABLET, FILM COATED ORAL 3 TIMES DAILY
Status: DISCONTINUED | OUTPATIENT
Start: 2024-08-27 | End: 2024-08-30 | Stop reason: HOSPADM

## 2024-08-27 RX ORDER — ATORVASTATIN CALCIUM 40 MG/1
80 TABLET, FILM COATED ORAL NIGHTLY
Status: DISCONTINUED | OUTPATIENT
Start: 2024-08-27 | End: 2024-08-30 | Stop reason: HOSPADM

## 2024-08-27 RX ORDER — VANCOMYCIN HYDROCHLORIDE 1 G/20ML
INJECTION, POWDER, LYOPHILIZED, FOR SOLUTION INTRAVENOUS DAILY PRN
Status: DISCONTINUED | OUTPATIENT
Start: 2024-08-27 | End: 2024-08-29

## 2024-08-27 RX ORDER — CEFEPIME 1 G/50ML
2 INJECTION, SOLUTION INTRAVENOUS EVERY 8 HOURS
Status: DISCONTINUED | OUTPATIENT
Start: 2024-08-27 | End: 2024-08-30 | Stop reason: HOSPADM

## 2024-08-27 RX ORDER — VANCOMYCIN HYDROCHLORIDE 1 G/200ML
1000 INJECTION, SOLUTION INTRAVENOUS EVERY 12 HOURS
Status: DISCONTINUED | OUTPATIENT
Start: 2024-08-27 | End: 2024-08-28

## 2024-08-27 SDOH — SOCIAL STABILITY: SOCIAL INSECURITY: HAS ANYONE EVER THREATENED TO HURT YOUR FAMILY OR YOUR PETS?: UNABLE TO ASSESS

## 2024-08-27 SDOH — SOCIAL STABILITY: SOCIAL INSECURITY: HAVE YOU HAD THOUGHTS OF HARMING ANYONE ELSE?: NO

## 2024-08-27 SDOH — SOCIAL STABILITY: SOCIAL INSECURITY: WERE YOU ABLE TO COMPLETE ALL THE BEHAVIORAL HEALTH SCREENINGS?: NO

## 2024-08-27 SDOH — SOCIAL STABILITY: SOCIAL INSECURITY: HAVE YOU HAD THOUGHTS OF HARMING ANYONE ELSE?: UNABLE TO ASSESS

## 2024-08-27 SDOH — SOCIAL STABILITY: SOCIAL INSECURITY: DO YOU FEEL ANYONE HAS EXPLOITED OR TAKEN ADVANTAGE OF YOU FINANCIALLY OR OF YOUR PERSONAL PROPERTY?: UNABLE TO ASSESS

## 2024-08-27 SDOH — SOCIAL STABILITY: SOCIAL INSECURITY: ABUSE: ADULT

## 2024-08-27 SDOH — SOCIAL STABILITY: SOCIAL INSECURITY: DOES ANYONE TRY TO KEEP YOU FROM HAVING/CONTACTING OTHER FRIENDS OR DOING THINGS OUTSIDE YOUR HOME?: UNABLE TO ASSESS

## 2024-08-27 SDOH — SOCIAL STABILITY: SOCIAL INSECURITY: HAVE YOU HAD ANY THOUGHTS OF HARMING ANYONE ELSE?: UNABLE TO ASSESS

## 2024-08-27 SDOH — SOCIAL STABILITY: SOCIAL INSECURITY: DO YOU FEEL UNSAFE GOING BACK TO THE PLACE WHERE YOU ARE LIVING?: UNABLE TO ASSESS

## 2024-08-27 SDOH — SOCIAL STABILITY: SOCIAL INSECURITY: ARE YOU OR HAVE YOU BEEN THREATENED OR ABUSED PHYSICALLY, EMOTIONALLY, OR SEXUALLY BY ANYONE?: UNABLE TO ASSESS

## 2024-08-27 SDOH — SOCIAL STABILITY: SOCIAL INSECURITY: ARE THERE ANY APPARENT SIGNS OF INJURIES/BEHAVIORS THAT COULD BE RELATED TO ABUSE/NEGLECT?: UNABLE TO ASSESS

## 2024-08-27 SDOH — SOCIAL STABILITY: SOCIAL INSECURITY: WERE YOU ABLE TO COMPLETE ALL THE BEHAVIORAL HEALTH SCREENINGS?: YES

## 2024-08-27 ASSESSMENT — COGNITIVE AND FUNCTIONAL STATUS - GENERAL
DRESSING REGULAR UPPER BODY CLOTHING: A LOT
MOVING TO AND FROM BED TO CHAIR: A LOT
DRESSING REGULAR LOWER BODY CLOTHING: A LOT
WALKING IN HOSPITAL ROOM: A LOT
PERSONAL GROOMING: A LOT
MOBILITY SCORE: 12
DRESSING REGULAR LOWER BODY CLOTHING: A LOT
WALKING IN HOSPITAL ROOM: A LOT
TOILETING: A LOT
EATING MEALS: A LOT
CLIMB 3 TO 5 STEPS WITH RAILING: A LOT
TURNING FROM BACK TO SIDE WHILE IN FLAT BAD: A LOT
DRESSING REGULAR UPPER BODY CLOTHING: A LOT
CLIMB 3 TO 5 STEPS WITH RAILING: A LOT
MOVING FROM LYING ON BACK TO SITTING ON SIDE OF FLAT BED WITH BEDRAILS: A LOT
STANDING UP FROM CHAIR USING ARMS: A LOT
PERSONAL GROOMING: A LOT
MOVING FROM LYING ON BACK TO SITTING ON SIDE OF FLAT BED WITH BEDRAILS: A LITTLE
MOBILITY SCORE: 13
MOVING TO AND FROM BED TO CHAIR: A LOT
TURNING FROM BACK TO SIDE WHILE IN FLAT BAD: A LOT
PATIENT BASELINE BEDBOUND: NO
DAILY ACTIVITIY SCORE: 12
STANDING UP FROM CHAIR USING ARMS: A LOT
DAILY ACTIVITIY SCORE: 12
HELP NEEDED FOR BATHING: A LOT
TOILETING: A LOT
EATING MEALS: A LOT
HELP NEEDED FOR BATHING: A LOT

## 2024-08-27 ASSESSMENT — LIFESTYLE VARIABLES
SUBSTANCE_ABUSE_PAST_12_MONTHS: NO
AUDIT-C TOTAL SCORE: -1
HOW OFTEN DO YOU HAVE A DRINK CONTAINING ALCOHOL: NEVER
AUDIT-C TOTAL SCORE: 0
PRESCIPTION_ABUSE_PAST_12_MONTHS: NO
HOW OFTEN DO YOU HAVE A DRINK CONTAINING ALCOHOL: PATIENT UNABLE TO ANSWER
HOW MANY STANDARD DRINKS CONTAINING ALCOHOL DO YOU HAVE ON A TYPICAL DAY: PATIENT DOES NOT DRINK
AUDIT-C TOTAL SCORE: 0
SKIP TO QUESTIONS 9-10: 0
AUDIT-C TOTAL SCORE: -1
HOW OFTEN DO YOU HAVE 6 OR MORE DRINKS ON ONE OCCASION: PATIENT UNABLE TO ANSWER
HOW OFTEN DO YOU HAVE 6 OR MORE DRINKS ON ONE OCCASION: NEVER
HOW MANY STANDARD DRINKS CONTAINING ALCOHOL DO YOU HAVE ON A TYPICAL DAY: PATIENT UNABLE TO ANSWER
SKIP TO QUESTIONS 9-10: 1

## 2024-08-27 ASSESSMENT — PAIN SCALES - GENERAL
PAINLEVEL_OUTOF10: 0 - NO PAIN

## 2024-08-27 ASSESSMENT — ENCOUNTER SYMPTOMS
PALPITATIONS: 0
FREQUENCY: 0
BACK PAIN: 0
SPEECH DIFFICULTY: 0
LIGHT-HEADEDNESS: 0
EYE REDNESS: 0
DIZZINESS: 0
NAUSEA: 0
WEAKNESS: 0
NECK PAIN: 0
FEVER: 0
DYSURIA: 0
CHILLS: 0
SHORTNESS OF BREATH: 0
TROUBLE SWALLOWING: 0
EYE DISCHARGE: 0
CONSTIPATION: 0
VOMITING: 0
ARTHRALGIAS: 0
ABDOMINAL DISTENTION: 0
DIFFICULTY URINATING: 0
SORE THROAT: 0
DIARRHEA: 0
ABDOMINAL PAIN: 0
COUGH: 0
COLOR CHANGE: 0

## 2024-08-27 ASSESSMENT — ACTIVITIES OF DAILY LIVING (ADL)
HEARING - RIGHT EAR: DIFFICULTY WITH NOISE
ADEQUATE_TO_COMPLETE_ADL: NO
GROOMING: DEPENDENT
DRESSING YOURSELF: DEPENDENT
TOILETING: DEPENDENT
BATHING: DEPENDENT
JUDGMENT_ADEQUATE_SAFELY_COMPLETE_DAILY_ACTIVITIES: NO
LACK_OF_TRANSPORTATION: PATIENT UNABLE TO ANSWER
HEARING - LEFT EAR: DIFFICULTY WITH NOISE
WALKS IN HOME: DEPENDENT
FEEDING YOURSELF: DEPENDENT
PATIENT'S MEMORY ADEQUATE TO SAFELY COMPLETE DAILY ACTIVITIES?: NO

## 2024-08-27 ASSESSMENT — PAIN - FUNCTIONAL ASSESSMENT
PAIN_FUNCTIONAL_ASSESSMENT: CPOT (CRITICAL CARE PAIN OBSERVATION TOOL)
PAIN_FUNCTIONAL_ASSESSMENT: CPOT (CRITICAL CARE PAIN OBSERVATION TOOL)
PAIN_FUNCTIONAL_ASSESSMENT: 0-10
PAIN_FUNCTIONAL_ASSESSMENT: 0-10

## 2024-08-27 ASSESSMENT — PATIENT HEALTH QUESTIONNAIRE - PHQ9
2. FEELING DOWN, DEPRESSED OR HOPELESS: NOT AT ALL
1. LITTLE INTEREST OR PLEASURE IN DOING THINGS: NOT AT ALL
SUM OF ALL RESPONSES TO PHQ9 QUESTIONS 1 & 2: 0

## 2024-08-27 NOTE — PROGRESS NOTES
8/27/24 1434   08/27/24 1434   Discharge Planning   Living Arrangements Alone   Support Systems Children   Assistance Needed Total   Type of Residence Nursing home/residential care   Home or Post Acute Services Post acute facilities (Rehab/SNF/etc)   Type of Post Acute Facility Services Long term care   Expected Discharge Disposition Other  (ICF)   Does the patient need discharge transport arranged? Yes   RoundTrip coordination needed? Yes   Patient Choice   Provider Choice list and CMS website (https://medicare.gov/care-compare#search) for post-acute Quality and Resource Measure Data were provided and reviewed with: Family   Patient / Family choosing to utilize agency / facility established prior to hospitalization Yes     Spoke with pt and pt son. Pt from [facility] per son. Information obtained from son d/t pt confusion. Son confirmed plan is for pt to return to [facility] when medically ready. Per son, pt has not been out of bed to son's knowledge at least since last admission. Son concerned about possible infection to PEG site wound and states has not been changed to son's knowledge since discharge 8/23. Requested [facility] to send return referral. Will follow to determine discharge needs.  Dolores Strickland RN, BSN, [facility] Supervisor

## 2024-08-27 NOTE — ED TRIAGE NOTES
Pt BIBA from [facility] c/c of wound check. Per ems pt recently pulled his peg tube out and had surgery on abdomen and was here for sepsis. Facility was concerned with wound site because there is drainage as well as foul odor. Pt is A/&Ox1 oriented to self at baseline hx of CVA. Denies any pain at this time.     Pt A&Ox1, pt alert calm cooperative with care. Pt resp even and unlabored.     PMH: stroke

## 2024-08-27 NOTE — PROGRESS NOTES
Speech-Language Pathology Clinical Swallow Evaluation    Patient Name: Abdi Wilson  MRN: [id number]  : 1945  Today's Date: 24  Start time: Start Time: 1420  Stop time: Stop Time: 1430  Time calculation (min) : Time Calculation (min): 10 min    ASSESSMENT  Impressions:  Pt has profound oral phase and impaired pharyngeal phase dysphagia s/p stroke in 2024.  Pt initially had a Dobbhoff for nutrition and hydration, but switched to a peg tube in Early August.     Per SLP at [facility] pt continues to have impaired oral phase dysphagia and poor swallow trigger onset.  Due to pts other medical illnesses pt hasn't been able to participate in dysphagia therapy enough to see any significant progress in swallow function.     Continue NPO with peg tube feedings.    SLP will initiate dysphagia therapy for oral motor exercises, Thermal gustatory stimulation and pharyngeal exercises.      Pt would benefit from neuromuscular electrical stimulation and sEMG biofeedback if available.     Additional consult/referral: N/A     Prognosis: Fair, Good      PLAN  Recommendations:  MBSS recommended: SLP will continue to monitor to determine if MBSS is clinically warranted.  Solid consistency: NPO  Liquid consistency: NPO  Medication administration: Via PEG tube  Compensatory swallow strategies: N/A    Recommended frequency/duration:  Skilled SLP services recommended: Yes  Frequency: 2x/week  Duration: 2 weeks  Discharge recommendation: Recommend MODERATE intensity ST upon DC in order to ensure safety with least restrictive diet.  Strengths: Cognition, Motivation, and Family/caregiver support  Barriers to participation in tx: Severity of symptoms and Comorbidities      Goals (start date 2024 for two weeks - END ):  Pt will complete oral/pharyngeal/laryngeal strengthening exercises as directed given min/mod verbal cues and modeling from SLP in order to improve swallow function.   Status: Goal initiated this  date   Progress this date: N/A     Pt/family will demonstrate understanding of education related to dysphagia independently.   Status: Goal initiated this date   Progress this date: Pt's son present and agreeable with continuing exercises while in the hospital.  Son feels pts illnesses have prevented him from being able to participate in therapy and make progress.       Pt will consume PO trials with SLP without s/sx aspiration in order to determine readiness for PO diet.   Status: Goal initiated this date   Progress this date: N/A       REASON FOR ADMISSION:  CHIEF COMPLAINT:  presented from [facility] for concern for G-tube infection due to wound site drainage and odor.      PMHx relevant to rehab: dementia, recent CVA in June 2024 with expressive aphasia and dysphagia. CAD, HTN, hx strokes     Relevant imaging results: IMPRESSION:  Status post percutaneous gastrostomy to with complex collection noted in the abdominal wall inferiorly with extension through the ventral abdomen and few associated foci of pneumoperitoneum as described.   Findings appear new since prior imaging.      Possible 4 cm collection along the gastric wall, possibly present on  prior imaging.      Complex left subdiaphragmatic/perisplenic collection with drainage  catheter appear smaller since prior imaging.      Mesenteric stranding.      Fluid within the colon which may be infectious or inflammatory.      Complex/hyperdense left renal cystic lesion again noted.      Small pericardial effusion.      Partially imaged left pleural effusion with adjacent atelectasis.      Coronary artery calcifications and additional findings as detailed.      SUBJECTIVE  SLP Received On: 08/27/24  Patient Class: Inpatient  Living Environment: Nursing home (skilled/long-term)  Ordering Physician: Angela Pichardo CNP  Reason for Referral: Pt has peg tube. Being seen for dysphagia therapy by SLP at [facility]  Prior to Session Communication: Bedside nurse    RN  cleared pt to participate in session.    Pt/family reported = Son reported pt working with SLP at SNF, but he has been ill and not able to participate in tx and therefore, he hasn't made any significant progress.     Nutritional status: Appears well-nourished/no concerns and Dietitian consult ordered          BaseLine Diet: NPO with peg tube feedings  Current Diet : NPO    Pain Assessment  Pain Assessment: 0-10  0-10 (Numeric) Pain Score: 0 - No pain    Behavior/Cognition: Alert, Cooperative, Pleasant mood  Orientation: Oriented to self  Ability to follow functional commands: NT - pt awake but tired  Respiratory Status: Room air     Volitional Cough: Weak  Volitional Swallow: Delayed  Patient positioning: upright in gurney - awaiting room assignment.  Pt was seen in ED.       OBJECTIVE  Clinical swallow evaluation completed and consisted of interview, oral motor assessment, and PO trials (NO  PO trials given as pt is NPO).    Will initiate dysphagia therapeutic exercises to improve swallow strength and function.       ESOPHAGEAL PHASE: N/A      Treatment/Education:  Results and recommendations were relayed to: Patient, Family, Bedside nurse, and Physician  Education provided: Yes   Learner: Patient and Family   Barriers to learning: None, Acuteness of illness barrier, and Cognitive limitations barrier   Method of teaching: Verbal   Topic: role of ST, results of assessment, risk for aspiration, and recommendation for dysphagia follow-up   Outcome of teaching: Pt/family demonstrated good understanding  Treatment provided: No    Next Treatment Priority: oral motor exercises, Thermal gustatory stimulation, pharyngeal exercises

## 2024-08-27 NOTE — ED PROVIDER NOTES
HPI   Chief Complaint   Patient presents with    Wound Check       HPI    A 79 year old male AO x1 was brought to the ED via EMS because the nursing facility he resides at was concerned with wound site drainage as well odor.     The patient had a peg tube place on 8/8 for dysphagia as the result of a CVA in June of this year. On 8/10 the patient presented to the emergency department for abdominal pain, low BP, and fever. At that time a CT scan of the abdomen pelvis was done showing malposition PEG tube and  small amount of free air. Patient had peritonitis and was taken for emergent surgery underwent a replacement of the gastrostomy tube.  On 8/18 patient had a CT demonstrate a large casandra-splenic abscess, were IR was consulted to for drain placement which was one the following day. Patient was discharged on 8/23 with ciprofloxacin and Flagyl until 9/4/2024, cultures grew (3+) moderate Enterobacter cloacae complex and (1+) rare Pseudomonas aeruginosa.       Patient History   Past Medical History:   Diagnosis Date    Stroke (Multi)      No past surgical history on file.  No family history on file.  Social History     Tobacco Use    Smoking status: Never     Passive exposure: Never    Smokeless tobacco: Never   Vaping Use    Vaping status: Not on file   Substance Use Topics    Alcohol use: Not on file    Drug use: Not on file       Physical Exam   ED Triage Vitals [08/27/24 0151]   Temperature Heart Rate Respirations BP   35.9 °C (96.6 °F) 87 16 (!) 114/108      Pulse Ox Temp Source Heart Rate Source Patient Position   97 % Temporal Monitor --      BP Location FiO2 (%)     -- --       Physical Exam  Constitutional:       General: He is not in acute distress.     Appearance: He is not toxic-appearing.   HENT:      Head: Normocephalic and atraumatic.   Eyes:      Conjunctiva/sclera: Conjunctivae normal.   Cardiovascular:      Rate and Rhythm: Normal rate and regular rhythm.      Heart sounds: Normal heart sounds.    Pulmonary:      Effort: Pulmonary effort is normal.      Breath sounds: Normal breath sounds. No decreased air movement.   Abdominal:      Comments: Patient is not reporting pain or tenderness to palpation but appears to be in discomfort when palpating, midline incision with purulent drainage, midline wound dehiscence.    Lymphadenopathy:      Cervical: No cervical adenopathy.   Neurological:      Comments: AO x1 oriented to name   Occasionally answers in one word responses            ED Course & MDM   ED Course as of 08/27/24 0626   Tue Aug 27, 2024   0607 I spoke to Dr Mojica, on-call [facility].  Recommended that I speak to Dr. Velásquez, the patient's surgeon. [WJ]      ED Course User Index  [WJ] Osbaldo Lorenz, DO                 No data recorded     [place] Coma Scale Score: 14 (08/27/24 0155 : Claudia Ortiz RN)                     Medical Decision Making    Differential diagnosis include wound dehiscences, abdominal wall infection and sepsis. Labs and CT abdomen and pelvis were order. Patient had unremarkable labs. CT demonstrated new complex collection noted in the abdominal wall inferiorly with extension through the ventral abdomen and few associated foci of pneumoperitoneum. Additionally a 4 cm collection along the gastric wall that may have been previously identified. Patient was started on Vancomycin and Zosyn. Dr Mojica, on-call [facility] recommended that Dr. Velásquez, the patient's surgeon be contacted. Discussed patient and CT findings with Dr. Velásquez who will reach out with recommendations. Patient will be signed out to incoming provider at 0700, pending recommendations from surgery.     Procedure  Procedures     Mona Shea  08/27/24 0628

## 2024-08-27 NOTE — H&P
History Of Present Illness  Abdi Wilson is a 79 y.o. male with PMHx of dementia who presented from [facility] for concern for G-tube infection due to wound site drainage and odor. He suffered a stroke in June and underwent PEG placement 8/8/24. On 8/10/24 he was admitted for peritonitis and underwent emergent replacement of the gastrostomy tube.  On 8/18/24 a CT demonstrated a large casandra-splenic abscess and he underwent drain placement the following day. He was discharged on 8/23/24 with ciprofloxacin and Flagyl until 9/4/2024; cultures grew (3+) moderate Enterobacter cloacae complex and (1+) rare Pseudomonas aeruginosa. Prior to the PEG he had an NGT which he pulled out 3 times according to his son at bedside and the pt has been wearing an abdominal binder but he doesn't think the pt is pulling at his PEG tube. In the ED today CT demonstrated new complex collection noted in the abdominal wall inferiorly with extension through the ventral abdomen and few associated foci of pneumoperitoneum. Additionally a 4 cm collection along the gastric wall that may have been previously identified. He was started on vancomycin and Zosyn. Workup otherwise was benign except for uncontrolled HTN to 183/104. Paperwork from [facility] notes pt is DNRCC. Remainder of ROS reviewed and negative except as indicated in HPI.     Objective:  Past Medical History  He has a past medical history of CAD (coronary artery disease), Dementia (Multi), HTN (hypertension), and Stroke (Multi).    Surgical History  He has no past surgical history on file.    Social History     Tobacco Use    Smoking status: Never     Passive exposure: Never    Smokeless tobacco: Never       Family History  No family history on file.    Allergies  Patient has no known allergies.    Vitals:    08/27/24 0908   BP: (!) 183/104   Pulse: 92   Resp: 20   Temp:    SpO2: (!) 93%       There were no vitals filed for this visit.    Scheduled medications  atorvastatin, 80 mg,  oral, Nightly  carvedilol, 6.25 mg, oral, BID  hydrALAZINE, 25 mg, oral, TID  losartan, 50 mg, oral, Daily  piperacillin-tazobactam, 4.5 g, intravenous, q6h  sennosides, 2 tablet, oral, BID  vancomycin, 1,000 mg, intravenous, q12h      Continuous medications     PRN medications  PRN medications: acetaminophen, vancomycin    Results for orders placed or performed during the hospital encounter of 08/27/24 (from the past 24 hour(s))   Comprehensive Metabolic Panel   Result Value Ref Range    Glucose 94 74 - 99 mg/dL    Sodium 134 (L) 136 - 145 mmol/L    Potassium 4.2 3.5 - 5.3 mmol/L    Chloride 100 98 - 107 mmol/L    Bicarbonate 24 21 - 32 mmol/L    Anion Gap 14 10 - 20 mmol/L    Urea Nitrogen 16 6 - 23 mg/dL    Creatinine 0.72 0.50 - 1.30 mg/dL    eGFR >90 >60 mL/min/1.73m*2    Calcium 9.6 8.6 - 10.3 mg/dL    Albumin 3.5 3.4 - 5.0 g/dL    Alkaline Phosphatase 135 33 - 136 U/L    Total Protein 7.9 6.4 - 8.2 g/dL    AST 38 9 - 39 U/L    Bilirubin, Total 0.7 0.0 - 1.2 mg/dL    ALT 41 10 - 52 U/L   CBC and Auto Differential   Result Value Ref Range    WBC 11.0 4.4 - 11.3 x10*3/uL    nRBC 0.0 0.0 - 0.0 /100 WBCs    RBC 5.45 4.50 - 5.90 x10*6/uL    Hemoglobin 15.8 13.5 - 17.5 g/dL    Hematocrit 48.1 41.0 - 52.0 %    MCV 88 80 - 100 fL    MCH 29.0 26.0 - 34.0 pg    MCHC 32.8 32.0 - 36.0 g/dL    RDW 14.6 (H) 11.5 - 14.5 %    Platelets 566 (H) 150 - 450 x10*3/uL    Neutrophils % 72.1 40.0 - 80.0 %    Immature Granulocytes %, Automated 0.5 0.0 - 0.9 %    Lymphocytes % 14.4 13.0 - 44.0 %    Monocytes % 10.7 2.0 - 10.0 %    Eosinophils % 1.7 0.0 - 6.0 %    Basophils % 0.6 0.0 - 2.0 %    Neutrophils Absolute 7.96 (H) 1.60 - 5.50 x10*3/uL    Immature Granulocytes Absolute, Automated 0.05 0.00 - 0.50 x10*3/uL    Lymphocytes Absolute 1.59 0.80 - 3.00 x10*3/uL    Monocytes Absolute 1.18 (H) 0.05 - 0.80 x10*3/uL    Eosinophils Absolute 0.19 0.00 - 0.40 x10*3/uL    Basophils Absolute 0.07 0.00 - 0.10 x10*3/uL   Lactate   Result Value  Ref Range    Lactate 1.8 0.4 - 2.0 mmol/L   Coagulation Screen   Result Value Ref Range    Protime 12.9 (H) 9.8 - 12.8 seconds    INR 1.1 0.9 - 1.1    aPTT 33 27 - 38 seconds   Blood Culture    Specimen: Peripheral Venipuncture; Blood culture   Result Value Ref Range    Blood Culture Loaded on Instrument - Culture in progress    Blood Culture    Specimen: Peripheral Venipuncture; Blood culture   Result Value Ref Range    Blood Culture Loaded on Instrument - Culture in progress    Urinalysis with Reflex Culture and Microscopic   Result Value Ref Range    Color, Urine Yellow Light-Yellow, Yellow, Dark-Yellow    Appearance, Urine Clear Clear    Specific Gravity, Urine 1.024 1.005 - 1.035    pH, Urine 5.0 5.0, 5.5, 6.0, 6.5, 7.0, 7.5, 8.0    Protein, Urine NEGATIVE NEGATIVE, 10 (TRACE), 20 (TRACE) mg/dL    Glucose, Urine Normal Normal mg/dL    Blood, Urine NEGATIVE NEGATIVE    Ketones, Urine NEGATIVE NEGATIVE mg/dL    Bilirubin, Urine NEGATIVE NEGATIVE    Urobilinogen, Urine Normal Normal mg/dL    Nitrite, Urine NEGATIVE NEGATIVE    Leukocyte Esterase, Urine NEGATIVE NEGATIVE       I personally reviewed all pertinent labwork, imaging and vital signs, as well as medications, nursing, therapy, discharge planning and consult notes.     Constitutional: Well developed, awake, alert, calm, oriented x1, no acute distress, cooperative   Eyes: EOMI, clear sclera   ENMT: mucous membranes moist, no lesions seen   Head/Neck: Neck supple, no apparent injury, head atraumatic   Respiratory/Thorax: CTAB, good chest expansion, respirations even and unlabored   Cardiovascular: Regular rate and rhythm, no murmurs/rubs/gallops, normal S1 and S 2   Gastrointestinal: Abdomen nondistended, soft, nontender, +BS, no bruits, G tube incision dressed and binder in place but copious thin dark odiferous discharge is noted   Musculoskeletal: ROM intact, no joint swelling, normal  strength   Extremities: no cyanosis, edema, contusions or clubbing    Neurological: no focal deficit, pt alert and oriented x1   Psychological: Appropriate affect and behavior   Skin: Warm and dry, no lesions, no rashes       Assessment/Plan  Abdominal wall infection/abscess  Pt was admitted 8/10/24 for peritonitis is s/p emergent replacement of the gastrostomy tube  Drain was placed 8/19/24 for large casandra-splenic abscess   He was discharged 8/23/24 with ciprofloxacin and Flagyl until 9/4/2024, will start vanc and Zosyn here  Cultures grew (3+) moderate Enterobacter cloacae complex and (1+) rare Pseudomonas aeruginosa, blood cx pending  Prior to the PEG he had an NGT which he pulled out 3 times according to his son and is wearing a binder but son doesn't think the pt is pulling at his PEG tube  IR tomorrow to replace/reposition splenic drain, strict NPO for now, OK to use the PEG after procedure      Uncontrolled HTN   /104 today, order IV hydralazine, pt is NPO (see below)  Resume home meds when no longer NPO and pending SLP eval    Dysphagia  ED nurse and son note issues with dysphagia, will keep pt NPO and order swallow eval  Pt has been suctioned every 2 hrs at [facility], will continue here    Hx dementia     Recent stroke  Pt suffered a stroke in June and underwent PEG placement 8/8/24.   Continue statin and Coreg, Plavix held for procedure    DVT ppx  Held for procedure tomorrow    Discharge disposition  Pending PT/OT angelica, pt resides at [facility]  Paperwork from [facility] notes pt is DNRCC, will suspend for surgery         Angela Pichardo, CNP  Hospital Medicine

## 2024-08-27 NOTE — CONSULTS
Infectious Disease Inpatient Consult    Consults  Referred by Dr Christen Osullivan MD: Tobin Hawley MD    Reason For Consult  Abdominal wall abscess    History Of Present Illness  Abdi Wilson is a 79 y.o. male presenting with PMHx of dementia who presented from [facility] for concern for G-tube infection due to wound site drainage and odor. He suffered a stroke in June and underwent PEG placement 8/8/24. On 8/10/24 he was admitted for peritonitis and underwent emergent replacement of the gastrostomy tube.  On 8/18/24 a CT demonstrated a large casandra-splenic abscess and he underwent drain placement the following day. He was discharged on 8/23/24 with ciprofloxacin and Flagyl until 9/4/2024; cultures grew (3+) moderate Enterobacter cloacae complex and (1+) rare Pseudomonas aeruginosa. Prior to the PEG he had an NGT which he pulled out 3 times according to his son at bedside and the pt has been wearing an abdominal binder but he doesn't think the pt is pulling at his PEG tube. In the ED today CT demonstrated new complex collection noted in the abdominal wall inferiorly with extension through the ventral abdomen and few associated foci of pneumoperitoneum. Additionally a 4 cm collection along the gastric wall that may have been previously identified. He was started on vancomycin and Zosyn.      Past Medical History  He has a past medical history of CAD (coronary artery disease), Dementia (Multi), HTN (hypertension), and Stroke (Multi).    Surgical History  He has no past surgical history on file.     Social History     Occupational History    Not on file   Tobacco Use    Smoking status: Never     Passive exposure: Never    Smokeless tobacco: Never   Vaping Use    Vaping status: Not on file   Substance and Sexual Activity    Alcohol use: Not on file    Drug use: Not on file    Sexual activity: Not on file     Travel History   Travel since 07/27/24    No documented travel since 07/27/24         Service:  Branch Years Served Period          Comments:    Family History  No family history on file.  Allergies  Patient has no known allergies.     Immunization History   Administered Date(s) Administered    Ellen SARS-CoV-2 Vaccination 2021    Pfizer Purple Cap SARS-CoV-2 2022     Medications  Home medications:  (Not in a hospital admission)    Current medications:  Scheduled medications  atorvastatin, 80 mg, oral, Nightly  carvedilol, 6.25 mg, oral, BID  hydrALAZINE, 10 mg, intravenous, Once  hydrALAZINE, 25 mg, oral, TID  losartan, 50 mg, oral, Daily  piperacillin-tazobactam, 4.5 g, intravenous, q6h  sennosides, 2 tablet, oral, BID  vancomycin, 1,000 mg, intravenous, q12h      Continuous medications     PRN medications  PRN medications: acetaminophen, vancomycin    Review of Systems     Objective  Range of Vitals (last 24 hours)  Heart Rate:  [87-96]   Temperature:  [35.9 °C (96.6 °F)]   Respirations:  [16-20]   BP: (114-183)/()   Pulse Ox:  [93 %-99 %]   Daily Weight  24 : 79.9 kg (176 lb 3.2 oz)    There is no height or weight on file to calculate BMI.     Physical Exam  BP (!) 183/104   Pulse 92   Temp 35.9 °C (96.6 °F) (Temporal)   Resp 20   SpO2 (!) 93%   Temp (24hrs), Av.9 °C (96.6 °F), Min:35.9 °C (96.6 °F), Max:35.9 °C (96.6 °F)      General: alert, oriented, NAD  Lungs: bilaterally clear to auscultation  Heart: regular rate and rhythm  Abdomen: soft, non tender, non distended, BS+.  Foul-smelling drainage present   Extremities: no edema  No rashes  No joint inflammation  Neck supple  Lines ok  No CVAT     Relevant Results    Labs  Results from last 72 hours   Lab Units 24  0237   WBC AUTO x10*3/uL 11.0   HEMOGLOBIN g/dL 15.8   HEMATOCRIT % 48.1   PLATELETS AUTO x10*3/uL 566*   NEUTROS PCT AUTO % 72.1   LYMPHS PCT AUTO % 14.4   MONOS PCT AUTO % 10.7   EOS PCT AUTO % 1.7     Results from last 72 hours   Lab Units 24  0237   SODIUM mmol/L 134*  "  POTASSIUM mmol/L 4.2   CHLORIDE mmol/L 100   CO2 mmol/L 24   BUN mg/dL 16   CREATININE mg/dL 0.72   GLUCOSE mg/dL 94   CALCIUM mg/dL 9.6   ANION GAP mmol/L 14   EGFR mL/min/1.73m*2 >90     Results from last 72 hours   Lab Units 08/27/24  0237   ALK PHOS U/L 135   BILIRUBIN TOTAL mg/dL 0.7   PROTEIN TOTAL g/dL 7.9   ALT U/L 41   AST U/L 38   ALBUMIN g/dL 3.5     Estimated Creatinine Clearance: 80.5 mL/min (by C-G formula based on SCr of 0.72 mg/dL).  CRP   Date Value Ref Range Status   07/28/2023 3.33 (A) mg/dL Final     Comment:     REF VALUE  < 1.00     07/26/2023 7.32 (A) mg/dL Final     Comment:     REF VALUE  < 1.00     07/24/2023 2.15 (A) mg/dL Final     Comment:     REF VALUE  < 1.00       Sedimentation Rate   Date Value Ref Range Status   09/15/2022 18 0 - 20 mm/h Final   11/28/2021 36 (H) 0 - 20 mm/h Final     No results found for: \"HIV1X2\", \"HIVCONF\", \"ZHEWKV8MJ\"  No results found for: \"HEPCABINIT\", \"HEPCAB\", \"HCVPCRQUANT\"  Microbiology  Susceptibility data from last 100 days.  Collected Specimen Info Organism Amoxicillin/Clavulanate Ampicillin Ampicillin/Sulbactam Aztreonam Cefazolin Cefepime Ceftazidime Ciprofloxacin Gentamicin Levofloxacin Piperacillin/Tazobactam Tobramycin   08/19/24 Fluid from Aspirate Enterobacter cloacae complex  R  R  R   R  S   S  S  S  S      Pseudomonas aeruginosa     S   S  S  R   R  S  S   08/10/24 Tissue/Biopsy from Wound/Tissue Pseudomonas aeruginosa     S   S  S  S   S  S  S     Mixed Gram-Positive and Gram-Negative Bacteria                 Collected Specimen Info Organism Trimethoprim/Sulfamethoxazole   08/19/24 Fluid from Aspirate Enterobacter cloacae complex  R     Pseudomonas aeruginosa    08/10/24 Tissue/Biopsy from Wound/Tissue Pseudomonas aeruginosa      Mixed Gram-Positive and Gram-Negative Bacteria            No lab exists for component: \"AGALPCRNB\"                        Imaging  CT abdomen pelvis w IV contrast    Result Date: 8/27/2024  Interpreted By:  " Valorie Montano, STUDY: CT ABDOMEN PELVIS W IV CONTRAST;  8/27/2024 4:56 am   INDICATION: Signs/Symptoms:abdominal wall infection, s/p recent peg with complication.   COMPARISON: 08/17/2024   ACCESSION NUMBER(S): [id number]   ORDERING CLINICIAN: DOLORES HANCOCK   TECHNIQUE: Axial CT images of the abdomen and pelvis with coronal and sagittal reconstructed images obtained after intravenous administration of contrast   FINDINGS: LOWER CHEST: Redemonstrated left pleural effusion and adjacent atelectasis, partially imaged. Coronary artery calcifications noted however exam is not optimized for evaluation. Small pericardial effusion. BONES: No acute osseous abnormality. Degenerative changes with minimal grade 1 anterolisthesis of L3 on L4 and L4 on L5. Mild retrolisthesis of L5 on S1. ABDOMINAL WALL: Status post percutaneous gastrostomy tube again noted. There is a ventral abdominal wall complex collection with air and possible debris, inferior to the PEG tube, measuring up to 3.1 x 3.2 x 7.4 cm (AP by T by CC) with air-filled extension through the ventral abdominal wall (series 5, image 68/133). A few adjacent face I of free air are noted (series 2, image 63 and 70). Midline skin staples are again noted.   ABDOMEN:   LIVER: Within normal limits. BILE DUCTS: Normal caliber. GALLBLADDER: No calcified gallstones. No wall thickening. PANCREAS: Within normal limits. SPLEEN: A complex subdiaphragmatic and perisplenic collection is noted with fluid and air as well as a surgical drain, decreased in size since prior imaging. This region measures approximately 9.9 x 5.3 cm (oblique AP by T), previously measuring 15.7 x 9.7 cm. ADRENALS: Mild nodularity of the right adrenal body. KIDNEYS and URETERS: Symmetric renal enhancement. No hydronephrosis or perinephric fluid collection. A complex left renal cyst measures up to 1.7 cm. Additional left renal simple cysts noted. Subcentimeter low-attenuation lesion in the right renal the  cortex.     VESSELS: Atherosclerotic calcifications without aneurysmal dilatation seen. RETROPERITONEUM: No pathologically enlarged retroperitoneal lymph nodes.   PELVIS:   REPRODUCTIVE ORGANS: The prostate is enlarged up to 6.1 cm. Correlation with PSA recommended. BLADDER: Wall thickening of the urinary bladder noted. Few foci of intraluminal air present, non specific. Interval removal of a Cleveland catheter.   BOWEL: Percutaneous gastrostomy tube appears in otherwise expected position. Mild stranding and possible collection along the proximal greater curvature of the stomach the measuring at least 4.3 cm. The stomach is decompressed, partially limiting evaluation. No dilated loops of small bowel are identified. Fluid contents within colon present. Submucosal fat deposition predominantly in the sigmoid colon.  The appendix is not identified.  Therefore, appendicitis cannot be excluded on the basis of this exam. However, no significant inflammatory changes are noted within the right lower quadrant. PERITONEUM: Mesenteric stranding and fluid collections as well as free air as previously described.       Status post percutaneous gastrostomy to with complex collection noted in the abdominal wall inferiorly with extension through the ventral abdomen and few associated foci of pneumoperitoneum as described. Findings appear new since prior imaging.   Possible 4 cm collection along the gastric wall, possibly present on prior imaging.   Complex left subdiaphragmatic/perisplenic collection with drainage catheter appear smaller since prior imaging.   Mesenteric stranding.   Fluid within the colon which may be infectious or inflammatory.   Complex/hyperdense left renal cystic lesion again noted.   Small pericardial effusion.   Partially imaged left pleural effusion with adjacent atelectasis.   Coronary artery calcifications and additional findings as detailed.   MACRO: None   Signed by: Valorie Montano 8/27/2024 5:27 AM Dictation  workstation:   [id number]    IR body drain placement    Result Date: 8/19/2024  Interpreted By:  Adria Wynn, STUDY: IR BODY DRAIN PLACEMENT;  8/19/2024 12:27 pm   INDICATION: Signs/Symptoms:Abdominal ABscess, Drain?, culture to be sent.   COMPARISON: None.   ACCESSION NUMBER(S): [id number]   ORDERING CLINICIAN: TIMA DE ANDA   TECHNIQUE:   CONSENT: The patient/patient's POA/next of kin was informed of the nature of the proposed procedure. The purposes, alternatives, risks, and benefits were explained and discussed. All questions were answered and consent was obtained.   RADIATION EXPOSURE: None   SEDATION: Moderate conscious IV sedation services (supervision of administration, induction, and maintenance) were provided by the physician performing the procedure with intravenous fentanyl 50 mcg and versed 1 mg for 28 minutes. The physician was assisted by an independent trained observer, an interventional radiology nurse, in the continuous monitoring of patient level of consciousness and physiologic status.   MEDICATION/CONTRAST: No additional   TIME OUT: A time out was performed immediately prior to procedure start with the interventional team, correctly identifying the patient name, date of birth, MRN, procedure, anatomy (including marking of site and side), patient position, procedure consent form, relevant laboratory and imaging test results, antibiotic administration, safety precautions, and procedure-specific equipment needs.   COMPLICATIONS: No immediate adverse events identified.   FINDINGS: Patient placed in the right posterior oblique position and ultrasound evaluation performed over the left quadrant. Subdiaphragmatic, perisplenic collection identified congruent with CT imaging from 08/17/2024. The lowest intercostal space possible was identified and targeted. The area is prepped and draped in usual sterile fashion. Skin and subcutaneous tissues anesthetized using lidocaine solution. Small skin nick was  made. A 10 Indonesian all-purpose drain was then advanced into the collection under direct ultrasound guidance using a single step technique. With confirmation of placement within the collection per live ultrasound, the catheter was advanced off the sharp trocar into the posterior margin of the collection. Spontaneous drainage of turbid fluid present. A sample was sent for culture and sensitivity. The remainder of the fluid was aspirated to waist. No immediate complications. A single suture was used to secure the catheter.       Successful ultrasound-guided placement of a 10 Indonesian drain into the left upper quadrant abscess as outlined above.     Performed and dictated at [facility].   MACRO: None   Signed by: Adria Wynn 8/19/2024 12:38 PM Dictation workstation:   [id number]    CT abdomen pelvis w IV contrast    Result Date: 8/18/2024  Interpreted By:  Finkelstein, Evan, STUDY: CT ABDOMEN PELVIS W IV CONTRAST;  8/17/2024 5:38 pm   INDICATION: Signs/Symptoms:Minimal pain/abdominal drainage/previous malposition PEG tube.   COMPARISON: CT abdomen pelvis 08/10/2024   ACCESSION NUMBER(S): [id number]   ORDERING CLINICIAN: TATIANNA MILLS   TECHNIQUE: Axial CT images of the abdomen and pelvis with coronal and sagittal reconstructed images obtained after intravenous administration of 75 mL Omnipaque 350   FINDINGS: LOWER CHEST: There are coronary artery calcifications. Partially visualized moderate left and small right pleural effusions with superimposed atelectasis.   ABDOMEN:   LIVER: Normal attenuation and contour. BILE DUCTS: Normal caliber. GALLBLADDER: No calcified gallstones. No wall thickening. PORTAL VEIN: Patent SPLEEN: Perisplenic, possibly subcapsular collection of fluid and gas measuring 15.7 x 9.7 x 11.1 cm (maximum AP by transverse by craniocaudal dimensions). There is associated mass effect on the spleen. Remainder of the spleen demonstrates a homogeneous attenuation.  PANCREAS: Unremarkable. ADRENALS: Unremarkable. KIDNEYS, URETERS and URINARY BLADDER: Symmetric renal enhancement. No hydronephrosis or perinephric fluid collection. 3 cm hypodensity in the inferior pole of the left kidney measures simple fluid attenuation and is most compatible with a simple cyst. There are additional indeterminate hypodensities in the left kidney measuring approximately 1.5 cm and 1.6 cm in size which measuring intermediate density. The bladder is decompressed with a Cleveland catheter. There is bladder wall thickening and mild adjacent stranding. REPRODUCTIVE ORGANS: No pelvic masses.   ABDOMINAL WALL: A percutaneous gastrostomy tube is present. Defect in the anterior abdominal wall soft tissues inferior to the gastrostomy tube likely relates to prior surgery with overlying skin staples. Fat containing right inguinal hernia. PERITONEUM: Punctate focus of free air adjacent to the stomach.   BOWEL: A percutaneous gastrostomy tube is present and appears to be appropriately positioned within the stomach. Free air seen on prior imaging 08/10/2024 adjacent to the malpositioned gastrostomy tube is near resolved with a small punctate focus of air remaining best seen on image 71 of series 2. Prominence of the submucosal fat in the distal colon. The appendix is not definitively visualized, without focal pericecal inflammatory stranding.   VESSELS: Moderate aortoiliac calcifications. RETROPERITONEUM: No pathologically enlarged retroperitoneal lymph nodes.   BONES: No acute osseous abnormality.       15.7 x 9.7 x 11.1 cm perisplenic, possibly subcapsular, collection of fluid and gas most compatible with abscess.   Percutaneous gastrostomy tube is present and appears appropriately positioned. Free air seen on prior imaging 08/10/2024 adjacent to the malpositioned gastrostomy tube at that time is now near resolved with a small persistent punctate focus of free air adjacent to the stomach.   Defect in the anterior  abdominal wall with overlying staples new from prior imaging most compatible with interval surgery. Mild stranding and fluid within this defect without evidence of a well-defined collection..   The bladder is decompressed with a Cleveland catheter, which limits evaluation, however, there is bladder wall thickening and mild adjacent stranding. Findings may be seen with cystitis. Correlate with urinalysis.   Partially visualized moderate left and small right pleural effusions with superimposed atelectasis.   3 cm simple appearing cyst in the left kidney. Additional indeterminate hypodensities in the left kidney measure up to approximately 1.6 cm. Recommend nonemergent MRI to further characterize.   Prominence of submucosal fat in the distal colon which may be seen with chronic inflammatory processes.   MACRO: Critical Finding:  See findings. Notification was initiated on 8/18/2024 at 4:08 am by  Evan Finkelstein.  (**-YCF-**) Instructions:   Signed by: Evan Finkelstein 8/18/2024 4:08 AM Dictation workstation:   [id number]    ECG 12 Lead    Result Date: 8/14/2024  Sinus tachycardia Ventricular premature complex Prolonged PA interval LVH with secondary repolarization abnormality Inferior infarct, old Anterolateral infarct, age indeterminate See ED provider note for full interpretation and clinical correlation Confirmed by Alexandria Cullen ([id number]) on 8/14/2024 6:23:12 PM    FL upper GI w KUB    Result Date: 8/14/2024  Interpreted By:  Mei Crum, STUDY: FL UPPER GI W KUB;  8/14/2024 9:31 am   INDICATION: Signs/Symptoms:Please please contrast through patient's PEG tube in abdomen.   COMPARISON: None.   ACCESSION NUMBER(S): [id number]   ORDERING CLINICIAN: LUCY NUNES   TECHNIQUE: Total fluoroscopy time was  1 minutes 7 seconds with 8 spot images obtained. 60 mL of solution 50% Gastrografin and 50% water was inserted through the PEG tube.   FINDINGS: Peg tube, surgical drain and skin staples are noted in  the upper abdomen. An NG tube is also present in the stomach. With injection of contrast the contrast flows freely into the gastric lumen. Contrast promptly passes from stomach into duodenal. No extravasation of contrast is noted outside of the stomach. There was no gastroesophageal reflux identified.       1.  PEG tube is positioned in the stomach with no extravasation. 2. Prompt passage of contrast into the duodenal and no gastroesophageal reflux     MACRO: None   Signed by: Mei Crum 8/14/2024 10:10 AM Dictation workstation:   [id number]    XR chest 2 views    Result Date: 8/14/2024  Interpreted By:  Mei Crum, STUDY: XR CHEST 2 VIEWS;  8/14/2024 9:11 am   INDICATION: Signs/Symptoms:Hypoxia.   COMPARISON: 08/13/2024   ACCESSION NUMBER(S): [id number]   ORDERING CLINICIAN: LUCY NUNES   FINDINGS: AP upright and lateral views were obtained with the patient sitting.   NG tube is present with the tip below the diaphragm and beyond the image.   CARDIOMEDIASTINAL SILHOUETTE: Heart is enlarged, which is accentuated by the shallow inspiration. Aorta is atherosclerotic.   LUNGS: Bilateral pleural effusions and bilateral basilar atelectasis are present, left worse than right. Appearance is similar to the prior exam.   ABDOMEN: No remarkable upper abdominal findings.   BONES: No acute osseous changes.       1.  Unchanged cardiomegaly 2. Pleural effusions and basilar atelectasis, left worse than right and unchanged from the prior exam       MACRO: None   Signed by: Mei Crum 8/14/2024 9:22 AM Dictation workstation:   [id number]    XR chest abdomen for OG NG placement    Result Date: 8/13/2024  Interpreted By:  Mei Crum, STUDY: XR CHEST ABDOMEN FOR OG NG PLACEMENT; ;  8/13/2024 11:35 am   INDICATION: Signs/Symptoms:NG tube slightly came out. want to check placement.   COMPARISON: 06/23/2024   ACCESSION NUMBER(S): [id number]   ORDERING CLINICIAN: LUCY NUNES   FINDINGS:  Supine images including the chest and upper abdomen show NG tube projecting in the expected region of the body of the stomach. A gastrostomy tube also projects in the stomach. A surgical drain is present left side of the abdomen and skin staples projects slightly left of midline in the mid abdomen.   Patchy bowel gas is noted in the visualized portion of the abdomen without dilatation. No gross free air is visible.   Cardiac silhouette is unchanged in size. Atelectasis is present at both lung bases, left worse than right. Lung volumes are shallow.   No acute changes are noted in the osseous structures.       Tube projects in the stomach   Bilateral basilar atelectasis     MACRO: None   Signed by: Mei Crum 8/13/2024 1:01 PM Dictation workstation:   [id number]    CT head wo IV contrast    Result Date: 8/12/2024  Interpreted By:  Judith Velarde, STUDY: CT HEAD WO IV CONTRAST  8/12/2024 5:32 pm   INDICATION: Signs/Symptoms:AMS   COMPARISON: CT head 06/21/2024, 07/17/2023.   ACCESSION NUMBER(S): [id number]   ORDERING CLINICIAN: LUCY NUNES   TECHNIQUE: Serial, axial CT images of the brain were obtained without IV contrast. Coronal and sagittal reformatted images were performed.   FINDINGS: The ventricles, cisterns and sulci are prominent, consistent with diffuse volume loss.  There are areas of nonspecific white matter hypodensity, which are probably age-related or microvascular in nature. Remote infarcts at the bilateral basal ganglia. The gray-white matter differentiation is intact and there is no evidence of acute territorial infarct.  No mass effect or midline shift is seen.  There is no hemorrhage.  No extraaxial fluid collection. No air-fluid levels at the visualized paranasal sinuses. Mucous retention cyst/polyp at the left maxillary sinus. The mastoid air cells are clear. No depressed calvarial fracture.   Partially visualized nasogastric tube inserted through the right nostril.       1.  No  acute intracranial hemorrhage or acute territorial infarct. 2.  Diffuse parenchymal volume loss. Chronic changes.     MACRO: None.   Signed by: Judith Velarde 8/12/2024 6:34 PM Dictation workstation:   [id number]    CT abdomen pelvis w IV contrast    Result Date: 8/10/2024  Interpreted By:  Yamilet Heck, STUDY: CT ABDOMEN PELVIS W IV CONTRAST;  8/10/2024 2:08 pm   INDICATION: Signs/Symptoms:abbdominal pain.   COMPARISON: None.   ACCESSION NUMBER(S): [id number]   ORDERING CLINICIAN: DONITA ROBLES   TECHNIQUE: CT of the abdomen and pelvis was performed.  75 mL Omnipaque 350   FINDINGS: LOWER CHEST: There is mild bibasilar atelectasis.   ABDOMEN:   LIVER: There is no hepatic mass.   BILE DUCTS: There is no intrahepatic, common hepatic or common bile ductal dilatation.   GALLBLADDER: The gallbladder is unremarkable.   PANCREAS: The pancreas is unremarkable.   SPLEEN: The spleen is unremarkable. There is no splenic mass or splenomegaly.   ADRENAL GLANDS: The adrenal glands are unremarkable.   KIDNEYS AND URETERS: The kidneys function symmetrically. There are benign bilateral simple renal cysts. There is no intrarenal calculus or hydronephrosis. The bladder is distended measuring 13.6 x 11.3 x 7.0 cm with a volume of 559 cc. There is bladder wall trabeculation, likely secondary to postobstructive neuropathic change from prostate enlargement.   BOWEL: There is free intraperitoneal air. There is a feeding tube insufflated in the fatty anterior to the stomach. This is where the free air is located. This could be iatrogenic from malpositioned PEG tube rather than a ruptured viscus. There is thickening of loops of small bowel in the left side of the abdomen. There is a small amount of ascites in the left side of the abdomen interdigitated between the thickened loops of small bowel. There is induration of the mesentery. This could represent Crohn's disease or enteritis. There is diffuse fatty infiltration of the  colon from the mid transverse colon to the rectum. Fibrofatty infiltration can be seen with Crohn's disease, inactive.   VESSELS: There is atherosclerotic calcification of the abdominal aorta, iliac and femoral arteries.   PERITONEUM/RETROPERITONEUM/LYMPH NODES: There is no retroperitoneal or pelvic adenopathy.   ABDOMINAL WALL: The abdominal wall is unremarkable.   BONE AND SOFT TISSUE: There is no acute osseous finding.   The prostate gland is enlarged measuring 6.2 x 4.8 cm.       1. There is a malpositioned PEG tube insufflated in the fatty anterior to the stomach. There is a small amount of free air in this location. This could be secondary to malpositioned PEG tube rather than a ruptured viscus but should be correlated clinically. 2. Nonspecific thickening of loops of small bowel in the left side of the abdomen with induration of the mesentery and a small amount of ascites. This could represent Crohn's disease or enteritis. 3. Fibrofatty infiltration of the colon from the transverse colon to the rectum. This can be seen with Crohn's disease, inactive. 4. Benign bilateral simple renal cysts. 5. The urinary bladder with a volume of 559 cc. 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   MACRO: None   Signed by: Yamilet Heck 8/10/2024 2:22 PM Dictation workstation:   [id number]    XR chest 1 view    Result Date: 8/10/2024  Interpreted By:  Yamilet Heck, STUDY: XR CHEST 1 VIEW;  8/10/2024 1:44 pm   INDICATION: Signs/Symptoms:cough.   COMPARISON: 06/24/2024   ACCESSION NUMBER(S): [id number]   ORDERING CLINICIAN: DONITA ROBLES   FINDINGS: CARDIOMEDIASTINAL SILHOUETTE: The heart is enlarged in a left ventricular configuration, unchanged.     LUNGS: Lungs are clear.   ABDOMEN: No remarkable upper abdominal findings.     BONES: No acute osseous changes.       No acute cardiopulmonary process.   MACRO: None   Signed by: Yamilet Heck 8/10/2024  1:46 PM Dictation workstation:   [id number]    XR ABDOMEN FOR NG/OG/NE TUBE PLACEMENT    Result Date: 7/30/2024  EXAMINATION: ONE SUPINE XRAY VIEW(S) OF THE ABDOMEN 7/30/2024 9:50 pm COMPARISON: 7:22 p.m. HISTORY: ORDERING SYSTEM PROVIDED HISTORY: Confirmation of course of NG tube and location of tip of tube post advancement TECHNOLOGIST PROVIDED HISTORY: Reason for exam:->Confirmation of course of NG tube and location of tip of tube post advancement Portable?->Yes FINDINGS: Feeding tube advanced.  Tip now projects over the region of the gastric antrum/duodenal bulb.  Consider advancement 10-15 cm if post pyloric positioning is desired.  No ileus or obstruction lung bases clear.  Osseous and body wall soft tissues unremarkable.    Feeding tube tip projects over the region of the gastric antrum/duodenal bulb.    XR ABDOMEN FOR NG/OG/NE TUBE PLACEMENT    Result Date: 7/30/2024  EXAMINATION: ONE SUPINE XRAY VIEW(S) OF THE ABDOMEN 7/30/2024 7:21 pm COMPARISON: None. HISTORY: ORDERING SYSTEM PROVIDED HISTORY: G-tube Placement / Confirmation TECHNOLOGIST PROVIDED HISTORY: Reason for exam:->G-tube Placement / Confirmation FINDINGS: Single image shows esophageal route catheter into the left upper quadrant expected proximal stomach region.  No dilated bowels evident.    Findings suggestive of NG/OG into the proximal stomach. RECOMMENDATION: Clinical correlation.      Echo  No results found for this or any previous visit.    Assessment   Abdominal wall abscess  G-tube site infection  Uncontrolled hypertension  Chronic dysphagia  Dementia  History of stroke    Plan   Continue IV vancomycin  Check vancomycin trough level prior to the fourth dose and adjust as needed  Stop IV Zosyn  Start IV cefepime due to the prior history of Pseudomonas and Enterobacter  Discussed with general surgery, may need incision and drainage of the abdominal wall fluid collection.  Would appreciate operative cultures  Supportive care    I spent 32  minutes in the professional and overall care of this patient.      Orion Cruz MD

## 2024-08-27 NOTE — PROGRESS NOTES
Abdi Wilson is a 79 y.o. male admitted for No Principal Problem: There is no principal problem currently on the Problem List. Please update the Problem List and refresh.. Pharmacy reviewed the patient's [id number] medications and allergies for accuracy.    The list below reflects the PTA list prior to pharmacy medication history. A summary a changes to the PTA medication list has been listed below. Please review each medication in order reconciliation for additional clarification and justification.    Source of information:  Discharge Paperwork 08/23/24  No Changes!  Medications added:    Medications modified:    Medications to be removed:    Medications of concern:      Prior to Admission Medications   Prescriptions Last Dose Informant Patient Reported? Taking?   acetaminophen (Tylenol) 325 mg tablet   Yes No   Sig: Take 2 tablets (650 mg) by mouth every 4 hours if needed for mild pain (1 - 3) or fever (temp greater than 38.0 C).   atorvastatin (Lipitor) 80 mg tablet   No No   Sig: Take 1 tablet (80 mg) by mouth once daily at bedtime.   bisacodyl (Dulcolax, bisacodyl,) 10 mg suppository   Yes No   Sig: Insert 1 suppository (10 mg) into the rectum if needed for constipation.   carvedilol (Coreg) 6.25 mg tablet   No No   Sig: Take 1 tablet (6.25 mg) by mouth 2 times a day.   ciprofloxacin (Cipro) 500 mg tablet   No No   Sig: Take 1 tablet (500 mg) by mouth 2 times a day for 7 days.   clopidogrel (Plavix) 75 mg tablet   Yes No   Sig: Take 1 tablet (75 mg) by mouth once daily. N-G TUBE   colestipol (Colestid) 5 gram granules   Yes No   Sig: Take 5 g by mouth once daily. as directed   ergocalciferol (Vitamin D2) 1.25 MG (98077 UT) capsule   Yes No   Sig: Take 1 capsule (1,250 mcg) by mouth.   hydrALAZINE (Apresoline) 25 mg tablet   No No   Sig: Take 1 tablet (25 mg) by mouth 3 times a day.   hyoscyamine (Anaspaz, Levsin) 0.125 mg tablet   Yes No   Sig: Take 1 tablet (0.125 mg) by mouth every 4 hours if needed  for cramping. VIA G-TUBE   ipratropium-albuteroL (Duo-Neb) 0.5-2.5 mg/3 mL nebulizer solution   No No   Sig: Take 3 mL by nebulization every 4 hours if needed for wheezing.   lidocaine (LMX) 4 % cream   Yes No   Sig: Apply topically once daily as needed for mild pain (1 - 3).   loperamide (Imodium A-D) 2 mg tablet   Yes No   Sig: Take 1 tablet (2 mg) by mouth if needed for diarrhea. VIA N-G TUBE   losartan (Cozaar) 50 mg tablet   No No   Sig: Take 1 tablet (50 mg) by mouth once daily.   magnesium hydroxide (Milk of Magnesia) 2,400 mg/10 mL suspension suspension   Yes No   Sig: Take 30 mL by mouth if needed for constipation. VIA-G-TUBE   metroNIDAZOLE (Flagyl) 250 mg tablet   No No   Sig: Take 1 tablet (250 mg) by mouth 3 times a day for 7 days.      Facility-Administered Medications: None       Apurva Watson

## 2024-08-27 NOTE — PROGRESS NOTES
"Vancomycin Dosing by Pharmacy- INITIAL CONSULT NOTE    Abdi Wilson is a 79 y.o. year old male who Pharmacy has been consulted for vancomycin dosing for Vancomycin Indications: Intra-Abdominal. Based on the patient's indication and renal status this patient will be dosed based on a goal AUC of 400-600.     Renal function is currently stable.    Visit Vitals  BP (!) 149/102   Pulse 93   Temp 35.9 °C (96.6 °F) (Temporal)   Resp 18           Lab Results   Component Value Date    CREATININE 0.72 08/27/2024    CREATININE 0.82 08/23/2024    CREATININE 0.81 08/22/2024    CREATININE 0.77 08/21/2024       Patient weight is No results found for: \"PTWEIGHT\"    No results found for: \"CULTURE\"    No intake/output data recorded.    Lab Results   Component Value Date    PATIENTTEMP 37.0 07/17/2023          Assessment/Plan     Patient has already been given a loading dose of 2000 mg on 8/27 around 0300.  Will initiate vancomycin maintenance,  1000 mg every 12 hours starting on 8/27 @ 1800  This dosing regimen is predicted by [employer] to result in the following pharmacokinetic parameters:  Loading dose: N/A  Regimen: 1000 mg IV every 12 hours.  Start time: 16:34 on 08/27/2024  Exposure target: AUC24 (range)400-600 mg/L.hr   AUC24,ss: 551 mg/L.hr  Probability of AUC24 > 400: 81 %  Ctrough,ss: 17.8 mg/L  Probability of Ctrough,ss > 20: 40 %  Probability of nephrotoxicity (Lodise RUDOLPH 2009): 14 %  Follow-up level will be ordered on 8/28 at 0500 unless clinically indicated sooner.  Will continue to monitor renal function daily while on vancomycin and order serum creatinine at least every 48 hours if not already ordered.  Follow for continued vancomycin needs, clinical response, and signs/symptoms of toxicity.       Rachid Black, PharmD  PGY1 Pharmacy Resident    "

## 2024-08-27 NOTE — CONSULTS
GENERAL SURGERY CONSULTATION NOTE    Abdi Wilson   1945   [id number]     Inpatient consult to Acute Care Surgery  Consult performed by: Kostas Nielsen DO  Consult ordered by: Osbaldo Lorenz DO        Reason For Consult  Abdominal wall wound    History Of Present Illness  Abdi Wilson is a 79 y.o. male presenting with concerns for an abdominal wound infection. He is s/p PEG placement on 8/8/24 2/2 CVA which was complicated by dislodgement and he required an ex lap and replacement of the PEG tube. He was discharged with wound care for his midline wound. His midline wound has been draining dark brown fluid and material. He was also diagnosed with a perisplenic abscess on 8/18 an subsequently had a drain placed. Because of concerns of infection of his midline wound, the patient was brought to the ED for evaluation. He has been tolerating Tfs and having bowel function. Vitals on presentation notable for systolic pressures in the 180s. Labs were grossly remarkable. A CT scan today redemonstrated the splenic abscess as well as concerns for an abdominal wall abscess.     Past Medical History  He has a past medical history of CAD (coronary artery disease), Dementia (Multi), HTN (hypertension), and Stroke (Multi).    Surgical History  He has no past surgical history on file.    Medications  No current facility-administered medications on file prior to encounter.     Current Outpatient Medications on File Prior to Encounter   Medication Sig Dispense Refill    acetaminophen (Tylenol) 325 mg tablet Take 2 tablets (650 mg) by mouth every 4 hours if needed for mild pain (1 - 3) or fever (temp greater than 38.0 C).      atorvastatin (Lipitor) 80 mg tablet Take 1 tablet (80 mg) by mouth once daily at bedtime. 90 tablet 1    bisacodyl (Dulcolax, bisacodyl,) 10 mg suppository Insert 1 suppository (10 mg) into the rectum if needed for constipation.      carvedilol (Coreg) 6.25 mg tablet Take 1 tablet (6.25 mg) by mouth 2 times  a day.      ciprofloxacin (Cipro) 500 mg tablet Take 1 tablet (500 mg) by mouth 2 times a day for 7 days. 14 tablet 0    clopidogrel (Plavix) 75 mg tablet Take 1 tablet (75 mg) by mouth once daily. N-G TUBE      colestipol (Colestid) 5 gram granules Take 5 g by mouth once daily. as directed      ergocalciferol (Vitamin D2) 1.25 MG (56412 UT) capsule Take 1 capsule (1,250 mcg) by mouth.      hydrALAZINE (Apresoline) 25 mg tablet Take 1 tablet (25 mg) by mouth 3 times a day.      hyoscyamine (Anaspaz, Levsin) 0.125 mg tablet Take 1 tablet (0.125 mg) by mouth every 4 hours if needed for cramping. VIA G-TUBE      ipratropium-albuteroL (Duo-Neb) 0.5-2.5 mg/3 mL nebulizer solution Take 3 mL by nebulization every 4 hours if needed for wheezing. 180 mL 11    lidocaine (LMX) 4 % cream Apply topically once daily as needed for mild pain (1 - 3).      loperamide (Imodium A-D) 2 mg tablet Take 1 tablet (2 mg) by mouth if needed for diarrhea. VIA N-G TUBE      losartan (Cozaar) 50 mg tablet Take 1 tablet (50 mg) by mouth once daily. 30 tablet 0    magnesium hydroxide (Milk of Magnesia) 2,400 mg/10 mL suspension suspension Take 30 mL by mouth if needed for constipation. VIA-G-TUBE      metroNIDAZOLE (Flagyl) 250 mg tablet Take 1 tablet (250 mg) by mouth 3 times a day for 7 days. 21 tablet 0    [DISCONTINUED] amoxicillin-pot clavulanate (Augmentin) 875-125 mg tablet Take 1 tablet by mouth 2 times a day. 1 twice a day for 10 doses per NG. 10 tablet 0    [DISCONTINUED] aspirin 81 mg EC tablet Take 1 tablet (81 mg) by mouth once daily. Take for 21 days and stop 21 tablet 0    [DISCONTINUED] hydrALAZINE (Apresoline) 25 mg tablet Take 1 tablet (25 mg) by mouth 3 times a day. VIA G-TUBE      [DISCONTINUED] levETIRAcetam (Keppra) 500 mg tablet Take 1 tablet (500 mg) by mouth.      [DISCONTINUED] losartan (Cozaar) 100 mg tablet Take 1 tablet (100 mg) by mouth once daily. VIA N-G TUBE      [DISCONTINUED] metoprolol succinate XL (Toprol-XL)  100 mg 24 hr tablet Take 1 tablet (100 mg) by mouth once daily.      [DISCONTINUED] QUEtiapine (SeroqueL) 25 mg tablet Take 1 tablet (25 mg) by mouth daily at bedtime.         Allergies  Patient has no known allergies.     Social History  He reports that he has never smoked. He has never been exposed to tobacco smoke. He has never used smokeless tobacco. No history on file for alcohol use and drug use.    Family History  No family history on file.     Review of Systems   Constitutional:  Negative for chills and fever.   HENT:  Negative for congestion, sore throat and trouble swallowing.    Eyes:  Negative for discharge and redness.   Respiratory:  Negative for cough and shortness of breath.    Cardiovascular:  Negative for chest pain and palpitations.   Gastrointestinal:  Negative for abdominal distention, abdominal pain, constipation, diarrhea, nausea and vomiting.        Drainage from abdominal wound   Endocrine: Negative for cold intolerance and heat intolerance.   Genitourinary:  Negative for difficulty urinating, dysuria, frequency and urgency.   Musculoskeletal:  Negative for arthralgias, back pain and neck pain.   Skin:  Negative for color change and rash.   Neurological:  Negative for dizziness, speech difficulty, weakness and light-headedness.       Last Recorded Vitals  Blood pressure (!) 186/109, pulse 98, temperature 35.9 °C (96.6 °F), temperature source Temporal, resp. rate 18, SpO2 95%.     Physical Exam  Gen: NAD.  A&Ox3  HEENT: NC/AT.  Moist mucous membranes.  Neck: Normal range of motion.  CV: Regular rate.  Chest: Normal chest rise.  Normal respiratory effort.  Abdomen: Soft.  PEG tube in place without surrounding fluid or drainage. Midline incision with piece of retained gauze, grossly black purulent material. No erythema. Fascia intact.  No rigidity or guarding. Splenic drain with mildly purulent serous drainage.  Extremities: No edema.  Moving all extremities.     Relevant Results  Results for  orders placed or performed during the hospital encounter of 08/27/24 (from the past 24 hour(s))   Comprehensive Metabolic Panel   Result Value Ref Range    Glucose 94 74 - 99 mg/dL    Sodium 134 (L) 136 - 145 mmol/L    Potassium 4.2 3.5 - 5.3 mmol/L    Chloride 100 98 - 107 mmol/L    Bicarbonate 24 21 - 32 mmol/L    Anion Gap 14 10 - 20 mmol/L    Urea Nitrogen 16 6 - 23 mg/dL    Creatinine 0.72 0.50 - 1.30 mg/dL    eGFR >90 >60 mL/min/1.73m*2    Calcium 9.6 8.6 - 10.3 mg/dL    Albumin 3.5 3.4 - 5.0 g/dL    Alkaline Phosphatase 135 33 - 136 U/L    Total Protein 7.9 6.4 - 8.2 g/dL    AST 38 9 - 39 U/L    Bilirubin, Total 0.7 0.0 - 1.2 mg/dL    ALT 41 10 - 52 U/L   CBC and Auto Differential   Result Value Ref Range    WBC 11.0 4.4 - 11.3 x10*3/uL    nRBC 0.0 0.0 - 0.0 /100 WBCs    RBC 5.45 4.50 - 5.90 x10*6/uL    Hemoglobin 15.8 13.5 - 17.5 g/dL    Hematocrit 48.1 41.0 - 52.0 %    MCV 88 80 - 100 fL    MCH 29.0 26.0 - 34.0 pg    MCHC 32.8 32.0 - 36.0 g/dL    RDW 14.6 (H) 11.5 - 14.5 %    Platelets 566 (H) 150 - 450 x10*3/uL    Neutrophils % 72.1 40.0 - 80.0 %    Immature Granulocytes %, Automated 0.5 0.0 - 0.9 %    Lymphocytes % 14.4 13.0 - 44.0 %    Monocytes % 10.7 2.0 - 10.0 %    Eosinophils % 1.7 0.0 - 6.0 %    Basophils % 0.6 0.0 - 2.0 %    Neutrophils Absolute 7.96 (H) 1.60 - 5.50 x10*3/uL    Immature Granulocytes Absolute, Automated 0.05 0.00 - 0.50 x10*3/uL    Lymphocytes Absolute 1.59 0.80 - 3.00 x10*3/uL    Monocytes Absolute 1.18 (H) 0.05 - 0.80 x10*3/uL    Eosinophils Absolute 0.19 0.00 - 0.40 x10*3/uL    Basophils Absolute 0.07 0.00 - 0.10 x10*3/uL   Lactate   Result Value Ref Range    Lactate 1.8 0.4 - 2.0 mmol/L   Coagulation Screen   Result Value Ref Range    Protime 12.9 (H) 9.8 - 12.8 seconds    INR 1.1 0.9 - 1.1    aPTT 33 27 - 38 seconds   Blood Culture    Specimen: Peripheral Venipuncture; Blood culture   Result Value Ref Range    Blood Culture Loaded on Instrument - Culture in progress    Blood  Culture    Specimen: Peripheral Venipuncture; Blood culture   Result Value Ref Range    Blood Culture Loaded on Instrument - Culture in progress    Urinalysis with Reflex Culture and Microscopic   Result Value Ref Range    Color, Urine Yellow Light-Yellow, Yellow, Dark-Yellow    Appearance, Urine Clear Clear    Specific Gravity, Urine 1.024 1.005 - 1.035    pH, Urine 5.0 5.0, 5.5, 6.0, 6.5, 7.0, 7.5, 8.0    Protein, Urine NEGATIVE NEGATIVE, 10 (TRACE), 20 (TRACE) mg/dL    Glucose, Urine Normal Normal mg/dL    Blood, Urine NEGATIVE NEGATIVE    Ketones, Urine NEGATIVE NEGATIVE mg/dL    Bilirubin, Urine NEGATIVE NEGATIVE    Urobilinogen, Urine Normal Normal mg/dL    Nitrite, Urine NEGATIVE NEGATIVE    Leukocyte Esterase, Urine NEGATIVE NEGATIVE       CT abdomen pelvis w IV contrast    Result Date: 8/27/2024  Interpreted By:  Valorie Montano, STUDY: CT ABDOMEN PELVIS W IV CONTRAST;  8/27/2024 4:56 am   INDICATION: Signs/Symptoms:abdominal wall infection, s/p recent peg with complication.   COMPARISON: 08/17/2024   ACCESSION NUMBER(S): [id number]   ORDERING CLINICIAN: DOLORES HANCOCK   TECHNIQUE: Axial CT images of the abdomen and pelvis with coronal and sagittal reconstructed images obtained after intravenous administration of contrast   FINDINGS: LOWER CHEST: Redemonstrated left pleural effusion and adjacent atelectasis, partially imaged. Coronary artery calcifications noted however exam is not optimized for evaluation. Small pericardial effusion. BONES: No acute osseous abnormality. Degenerative changes with minimal grade 1 anterolisthesis of L3 on L4 and L4 on L5. Mild retrolisthesis of L5 on S1. ABDOMINAL WALL: Status post percutaneous gastrostomy tube again noted. There is a ventral abdominal wall complex collection with air and possible debris, inferior to the PEG tube, measuring up to 3.1 x 3.2 x 7.4 cm (AP by T by CC) with air-filled extension through the ventral abdominal wall (series 5, image 68/133). A few  adjacent face I of free air are noted (series 2, image 63 and 70). Midline skin staples are again noted.   ABDOMEN:   LIVER: Within normal limits. BILE DUCTS: Normal caliber. GALLBLADDER: No calcified gallstones. No wall thickening. PANCREAS: Within normal limits. SPLEEN: A complex subdiaphragmatic and perisplenic collection is noted with fluid and air as well as a surgical drain, decreased in size since prior imaging. This region measures approximately 9.9 x 5.3 cm (oblique AP by T), previously measuring 15.7 x 9.7 cm. ADRENALS: Mild nodularity of the right adrenal body. KIDNEYS and URETERS: Symmetric renal enhancement. No hydronephrosis or perinephric fluid collection. A complex left renal cyst measures up to 1.7 cm. Additional left renal simple cysts noted. Subcentimeter low-attenuation lesion in the right renal the cortex.     VESSELS: Atherosclerotic calcifications without aneurysmal dilatation seen. RETROPERITONEUM: No pathologically enlarged retroperitoneal lymph nodes.   PELVIS:   REPRODUCTIVE ORGANS: The prostate is enlarged up to 6.1 cm. Correlation with PSA recommended. BLADDER: Wall thickening of the urinary bladder noted. Few foci of intraluminal air present, non specific. Interval removal of a [place] catheter.   BOWEL: Percutaneous gastrostomy tube appears in otherwise expected position. Mild stranding and possible collection along the proximal greater curvature of the stomach the measuring at least 4.3 cm. The stomach is decompressed, partially limiting evaluation. No dilated loops of small bowel are identified. Fluid contents within colon present. Submucosal fat deposition predominantly in the sigmoid colon.  The appendix is not identified.  Therefore, appendicitis cannot be excluded on the basis of this exam. However, no significant inflammatory changes are noted within the right lower quadrant. PERITONEUM: Mesenteric stranding and fluid collections as well as free air as previously described.        Status post percutaneous gastrostomy to with complex collection noted in the abdominal wall inferiorly with extension through the ventral abdomen and few associated foci of pneumoperitoneum as described. Findings appear new since prior imaging.   Possible 4 cm collection along the gastric wall, possibly present on prior imaging.   Complex left subdiaphragmatic/perisplenic collection with drainage catheter appear smaller since prior imaging.   Mesenteric stranding.   Fluid within the colon which may be infectious or inflammatory.   Complex/hyperdense left renal cystic lesion again noted.   Small pericardial effusion.   Partially imaged left pleural effusion with adjacent atelectasis.   Coronary artery calcifications and additional findings as detailed.   MACRO: None   Signed by: Valorie Montano 8/27/2024 5:27 AM Dictation workstation:   [id number]    IR body drain placement    Result Date: 8/19/2024  Interpreted By:  Adria Wynn, STUDY: IR BODY DRAIN PLACEMENT;  8/19/2024 12:27 pm   INDICATION: Signs/Symptoms:Abdominal ABscess, Drain?, culture to be sent.   COMPARISON: None.   ACCESSION NUMBER(S): [id number]   ORDERING CLINICIAN: TIMA DE ANDA   TECHNIQUE:   CONSENT: The patient/patient's POA/next of kin was informed of the nature of the proposed procedure. The purposes, alternatives, risks, and benefits were explained and discussed. All questions were answered and consent was obtained.   RADIATION EXPOSURE: None   SEDATION: Moderate conscious IV sedation services (supervision of administration, induction, and maintenance) were provided by the physician performing the procedure with intravenous fentanyl 50 mcg and versed 1 mg for 28 minutes. The physician was assisted by an independent trained observer, an interventional radiology nurse, in the continuous monitoring of patient level of consciousness and physiologic status.   MEDICATION/CONTRAST: No additional   TIME OUT: A time out was performed immediately  prior to procedure start with the interventional team, correctly identifying the patient name, date of birth, MRN, procedure, anatomy (including marking of site and side), patient position, procedure consent form, relevant laboratory and imaging test results, antibiotic administration, safety precautions, and procedure-specific equipment needs.   COMPLICATIONS: No immediate adverse events identified.   FINDINGS: Patient placed in the right posterior oblique position and ultrasound evaluation performed over the left quadrant. Subdiaphragmatic, perisplenic collection identified congruent with CT imaging from 08/17/2024. The lowest intercostal space possible was identified and targeted. The area is prepped and draped in usual sterile fashion. Skin and subcutaneous tissues anesthetized using lidocaine solution. Small skin nick was made. A 10 [place]inese all-purpose drain was then advanced into the collection under direct ultrasound guidance using a single step technique. With confirmation of placement within the collection per live ultrasound, the catheter was advanced off the sharp trocar into the posterior margin of the collection. Spontaneous drainage of turbid fluid present. A sample was sent for culture and sensitivity. The remainder of the fluid was aspirated to waist. No immediate complications. A single suture was used to secure the catheter.       Successful ultrasound-guided placement of a 10 [place]inese drain into the left upper quadrant abscess as outlined above.     Performed and dictated at [facility].   MACRO: None   Signed by: Adria Wynn 8/19/2024 12:38 PM Dictation workstation:   [id number]    CT abdomen pelvis w IV contrast    Result Date: 8/18/2024  Interpreted By:  Finkelstein, Evan, STUDY: CT ABDOMEN PELVIS W IV CONTRAST;  8/17/2024 5:38 pm   INDICATION: Signs/Symptoms:Minimal pain/abdominal drainage/previous malposition PEG tube.   COMPARISON: CT abdomen pelvis  08/10/2024   ACCESSION NUMBER(S): [id number]   ORDERING CLINICIAN: TATIANNA MILLS   TECHNIQUE: Axial CT images of the abdomen and pelvis with coronal and sagittal reconstructed images obtained after intravenous administration of 75 mL Omnipaque 350   FINDINGS: LOWER CHEST: There are coronary artery calcifications. Partially visualized moderate left and small right pleural effusions with superimposed atelectasis.   ABDOMEN:   LIVER: Normal attenuation and contour. BILE DUCTS: Normal caliber. GALLBLADDER: No calcified gallstones. No wall thickening. PORTAL VEIN: Patent SPLEEN: Perisplenic, possibly subcapsular collection of fluid and gas measuring 15.7 x 9.7 x 11.1 cm (maximum AP by transverse by craniocaudal dimensions). There is associated mass effect on the spleen. Remainder of the spleen demonstrates a homogeneous attenuation. PANCREAS: Unremarkable. ADRENALS: Unremarkable. KIDNEYS, URETERS and URINARY BLADDER: Symmetric renal enhancement. No hydronephrosis or perinephric fluid collection. 3 cm hypodensity in the inferior pole of the left kidney measures simple fluid attenuation and is most compatible with a simple cyst. There are additional indeterminate hypodensities in the left kidney measuring approximately 1.5 cm and 1.6 cm in size which measuring intermediate density. The bladder is decompressed with a [place] catheter. There is bladder wall thickening and mild adjacent stranding. REPRODUCTIVE ORGANS: No pelvic masses.   ABDOMINAL WALL: A percutaneous gastrostomy tube is present. Defect in the anterior abdominal wall soft tissues inferior to the gastrostomy tube likely relates to prior surgery with overlying skin staples. Fat containing right inguinal hernia. PERITONEUM: Punctate focus of free air adjacent to the stomach.   BOWEL: A percutaneous gastrostomy tube is present and appears to be appropriately positioned within the stomach. Free air seen on prior imaging 08/10/2024 adjacent to the malpositioned  gastrostomy tube is near resolved with a small punctate focus of air remaining best seen on image 71 of series 2. Prominence of the submucosal fat in the distal colon. The appendix is not definitively visualized, without focal pericecal inflammatory stranding.   VESSELS: Moderate aortoiliac calcifications. RETROPERITONEUM: No pathologically enlarged retroperitoneal lymph nodes.   BONES: No acute osseous abnormality.       15.7 x 9.7 x 11.1 cm perisplenic, possibly subcapsular, collection of fluid and gas most compatible with abscess.   Percutaneous gastrostomy tube is present and appears appropriately positioned. Free air seen on prior imaging 08/10/2024 adjacent to the malpositioned gastrostomy tube at that time is now near resolved with a small persistent punctate focus of free air adjacent to the stomach.   Defect in the anterior abdominal wall with overlying staples new from prior imaging most compatible with interval surgery. Mild stranding and fluid within this defect without evidence of a well-defined collection..   The bladder is decompressed with a [place] catheter, which limits evaluation, however, there is bladder wall thickening and mild adjacent stranding. Findings may be seen with cystitis. Correlate with urinalysis.   Partially visualized moderate left and small right pleural effusions with superimposed atelectasis.   3 cm simple appearing cyst in the left kidney. Additional indeterminate hypodensities in the left kidney measure up to approximately 1.6 cm. Recommend nonemergent MRI to further characterize.   Prominence of submucosal fat in the distal colon which may be seen with chronic inflammatory processes.   MACRO: Critical Finding:  See findings. Notification was initiated on 8/18/2024 at 4:08 am by  Evan Finkelstein.  (**-YCF-**) Instructions:   Signed by: Evan Finkelstein 8/18/2024 4:08 AM Dictation workstation:   [id number]    ECG 12 Lead    Result Date: 8/14/2024  Sinus tachycardia  Ventricular premature complex Prolonged [place] interval LVH with secondary repolarization abnormality Inferior infarct, old Anterolateral infarct, age indeterminate See ED provider note for full interpretation and clinical correlation Confirmed by Alexandria Cullen ([id number]) on 8/14/2024 6:23:12 PM    FL upper GI w KUB    Result Date: 8/14/2024  Interpreted By:  Mei Crum, STUDY: FL UPPER GI W KUB;  8/14/2024 9:31 am   INDICATION: Signs/Symptoms:Please please contrast through patient's PEG tube in abdomen.   COMPARISON: None.   ACCESSION NUMBER(S): [id number]   ORDERING CLINICIAN: LUCY NUNES   TECHNIQUE: Total fluoroscopy time was  1 minutes 7 seconds with 8 spot images obtained. 60 mL of solution 50% Gastrografin and 50% water was inserted through the PEG tube.   FINDINGS: Peg tube, surgical drain and skin staples are noted in the upper abdomen. An NG tube is also present in the stomach. With injection of contrast the contrast flows freely into the gastric lumen. Contrast promptly passes from stomach into duodenal. No extravasation of contrast is noted outside of the stomach. There was no gastroesophageal reflux identified.       1.  PEG tube is positioned in the stomach with no extravasation. 2. Prompt passage of contrast into the duodenal and no gastroesophageal reflux     MACRO: None   Signed by: Mei Crum 8/14/2024 10:10 AM Dictation workstation:   [id number]    XR chest 2 views    Result Date: 8/14/2024  Interpreted By:  Mei Crum, STUDY: XR CHEST 2 VIEWS;  8/14/2024 9:11 am   INDICATION: Signs/Symptoms:Hypoxia.   COMPARISON: 08/13/2024   ACCESSION NUMBER(S): [id number]   ORDERING CLINICIAN: LUCY NUNES   FINDINGS: AP upright and lateral views were obtained with the patient sitting.   NG tube is present with the tip below the diaphragm and beyond the image.   CARDIOMEDIASTINAL SILHOUETTE: Heart is enlarged, which is accentuated by the shallow inspiration. Aorta is  atherosclerotic.   LUNGS: Bilateral pleural effusions and bilateral basilar atelectasis are present, left worse than right. Appearance is similar to the prior exam.   ABDOMEN: No remarkable upper abdominal findings.   BONES: No acute osseous changes.       1.  Unchanged cardiomegaly 2. Pleural effusions and basilar atelectasis, left worse than right and unchanged from the prior exam       MACRO: None   Signed by: Mei Crum 8/14/2024 9:22 AM Dictation workstation:   [id number]    XR chest abdomen for OG NG placement    Result Date: 8/13/2024  Interpreted By:  Mei Crum, STUDY: XR CHEST ABDOMEN FOR OG NG PLACEMENT; ;  8/13/2024 11:35 am   INDICATION: Signs/Symptoms:NG tube slightly came out. want to check placement.   COMPARISON: 06/23/2024   ACCESSION NUMBER(S): [id number]   ORDERING CLINICIAN: LUCY NUNES   FINDINGS: Supine images including the chest and upper abdomen show NG tube projecting in the expected region of the body of the stomach. A gastrostomy tube also projects in the stomach. A surgical drain is present left side of the abdomen and skin staples projects slightly left of midline in the mid abdomen.   Patchy bowel gas is noted in the visualized portion of the abdomen without dilatation. No gross free air is visible.   Cardiac silhouette is unchanged in size. Atelectasis is present at both lung bases, left worse than right. Lung volumes are shallow.   No acute changes are noted in the osseous structures.       Tube projects in the stomach   Bilateral basilar atelectasis     MACRO: None   Signed by: Mei Crum 8/13/2024 1:01 PM Dictation workstation:   [id number]    CT head wo IV contrast    Result Date: 8/12/2024  Interpreted By:  Judith Velarde, STUDY: CT HEAD WO IV CONTRAST  8/12/2024 5:32 pm   INDICATION: Signs/Symptoms:AMS   COMPARISON: CT head 06/21/2024, 07/17/2023.   ACCESSION NUMBER(S): [id number]   ORDERING CLINICIAN: LUCY NUNES   TECHNIQUE: Serial,  axial CT images of the brain were obtained without IV contrast. Coronal and sagittal reformatted images were performed.   FINDINGS: The ventricles, cisterns and sulci are prominent, consistent with diffuse volume loss.  There are areas of nonspecific white matter hypodensity, which are probably age-related or microvascular in nature. Remote infarcts at the bilateral basal ganglia. The gray-white matter differentiation is intact and there is no evidence of acute territorial infarct.  No mass effect or midline shift is seen.  There is no hemorrhage.  No extraaxial fluid collection. No air-fluid levels at the visualized paranasal sinuses. Mucous retention cyst/polyp at the left maxillary sinus. The mastoid air cells are clear. No depressed calvarial fracture.   Partially visualized nasogastric tube inserted through the right nostril.       1.  No acute intracranial hemorrhage or acute territorial infarct. 2.  Diffuse parenchymal volume loss. Chronic changes.     MACRO: None.   Signed by: Judith Velarde 8/12/2024 6:34 PM Dictation workstation:   [id number]    CT abdomen pelvis w IV contrast    Result Date: 8/10/2024  Interpreted By:  Yamilet Heck, STUDY: CT ABDOMEN PELVIS W IV CONTRAST;  8/10/2024 2:08 pm   INDICATION: Signs/Symptoms:abbdominal pain.   COMPARISON: None.   ACCESSION NUMBER(S): [id number]   ORDERING CLINICIAN: DONITA ROBLES   TECHNIQUE: CT of the abdomen and pelvis was performed.  75 mL Omnipaque 350   FINDINGS: LOWER CHEST: There is mild bibasilar atelectasis.   ABDOMEN:   LIVER: There is no hepatic mass.   BILE DUCTS: There is no intrahepatic, common hepatic or common bile ductal dilatation.   GALLBLADDER: The gallbladder is unremarkable.   PANCREAS: The pancreas is unremarkable.   SPLEEN: The spleen is unremarkable. There is no splenic mass or splenomegaly.   ADRENAL GLANDS: The adrenal glands are unremarkable.   KIDNEYS AND URETERS: The kidneys function symmetrically. There are benign  bilateral simple renal cysts. There is no intrarenal calculus or hydronephrosis. The bladder is distended measuring 13.6 x 11.3 x 7.0 cm with a volume of 559 cc. There is bladder wall trabeculation, likely secondary to postobstructive neuropathic change from prostate enlargement.   BOWEL: There is free intraperitoneal air. There is a feeding tube insufflated in the fatty anterior to the stomach. This is where the free air is located. This could be iatrogenic from malpositioned PEG tube rather than a ruptured viscus. There is thickening of loops of small bowel in the left side of the abdomen. There is a small amount of ascites in the left side of the abdomen interdigitated between the thickened loops of small bowel. There is induration of the mesentery. This could represent Crohn's disease or enteritis. There is diffuse fatty infiltration of the colon from the mid transverse colon to the rectum. Fibrofatty infiltration can be seen with Crohn's disease, inactive.   VESSELS: There is atherosclerotic calcification of the abdominal aorta, iliac and femoral arteries.   PERITONEUM/RETROPERITONEUM/LYMPH NODES: There is no retroperitoneal or pelvic adenopathy.   ABDOMINAL WALL: The abdominal wall is unremarkable.   BONE AND SOFT TISSUE: There is no acute osseous finding.   The prostate gland is enlarged measuring 6.2 x 4.8 cm.       1. There is a malpositioned PEG tube insufflated in the fatty anterior to the stomach. There is a small amount of free air in this location. This could be secondary to malpositioned PEG tube rather than a ruptured viscus but should be correlated clinically. 2. Nonspecific thickening of loops of small bowel in the left side of the abdomen with induration of the mesentery and a small amount of ascites. This could represent Crohn's disease or enteritis. 3. Fibrofatty infiltration of the colon from the transverse colon to the rectum. This can be seen with Crohn's disease, inactive. 4. Benign  bilateral simple renal cysts. 5. The urinary bladder with a volume of 559 cc. 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   MACRO: None   Signed by: Yamilet Heck 8/10/2024 2:22 PM Dictation workstation:   [id number]    XR chest 1 view    Result Date: 8/10/2024  Interpreted By:  Yamilet Heck, STUDY: XR CHEST 1 VIEW;  8/10/2024 1:44 pm   INDICATION: Signs/Symptoms:cough.   COMPARISON: 06/24/2024   ACCESSION NUMBER(S): [id number]   ORDERING CLINICIAN: DONITA ROBLES   FINDINGS: CARDIOMEDIASTINAL SILHOUETTE: The heart is enlarged in a left ventricular configuration, unchanged.     LUNGS: Lungs are clear.   ABDOMEN: No remarkable upper abdominal findings.     BONES: No acute osseous changes.       No acute cardiopulmonary process.   MACRO: None   Signed by: Yamilet Heck 8/10/2024 1:46 PM Dictation workstation:   [id number]    XR ABDOMEN FOR NG/OG/NE TUBE PLACEMENT    Result Date: 7/30/2024  EXAMINATION: ONE SUPINE XRAY VIEW(S) OF THE ABDOMEN 7/30/2024 9:50 pm COMPARISON: 7:22 p.m. HISTORY: ORDERING SYSTEM PROVIDED HISTORY: Confirmation of course of NG tube and location of tip of tube post advancement TECHNOLOGIST PROVIDED HISTORY: Reason for exam:->Confirmation of course of NG tube and location of tip of tube post advancement Portable?->Yes FINDINGS: Feeding tube advanced.  Tip now projects over the region of the gastric antrum/duodenal bulb.  Consider advancement 10-15 cm if post pyloric positioning is desired.  No ileus or obstruction lung bases clear.  Osseous and body wall soft tissues unremarkable.    Feeding tube tip projects over the region of the gastric antrum/duodenal bulb.    XR ABDOMEN FOR NG/OG/NE TUBE PLACEMENT    Result Date: 7/30/2024  EXAMINATION: ONE SUPINE XRAY VIEW(S) OF THE ABDOMEN 7/30/2024 7:21 pm COMPARISON: None. HISTORY: ORDERING SYSTEM PROVIDED HISTORY: G-tube Placement / Confirmation TECHNOLOGIST PROVIDED HISTORY:  Reason for exam:->G-tube Placement / Confirmation FINDINGS: Single image shows esophageal route catheter into the left upper quadrant expected proximal stomach region.  No dilated bowels evident.    Findings suggestive of NG/OG into the proximal stomach. RECOMMENDATION: Clinical correlation.      Assessment and Plan  Principal Problem:    Abdominal wall abscess    79 y.o. male with retained foreign body in midline wound and perisplenic abscess    Plan:  IR to replace/reposition splenic drain  Removed a piece of gauze from his midline wound which was likely the source of odor and drainage. The wound was cleaned.   Start wet-to-dry dressing changed BID for the midline abdominal wound  Continue antibiotics  Rest of care per primary/ID teams  Surgery will follow    Seen and discussed with Dr. Christen Nielsen, DO PGY3  General Surgery

## 2024-08-28 LAB
ANION GAP SERPL CALC-SCNC: 10 MMOL/L (ref 10–20)
BUN SERPL-MCNC: 15 MG/DL (ref 6–23)
CALCIUM SERPL-MCNC: 7.5 MG/DL (ref 8.6–10.3)
CHLORIDE SERPL-SCNC: 106 MMOL/L (ref 98–107)
CO2 SERPL-SCNC: 25 MMOL/L (ref 21–32)
CREAT SERPL-MCNC: 0.97 MG/DL (ref 0.5–1.3)
EGFRCR SERPLBLD CKD-EPI 2021: 78 ML/MIN/1.73M*2
ERYTHROCYTE [DISTWIDTH] IN BLOOD BY AUTOMATED COUNT: 14.5 % (ref 11.5–14.5)
GLUCOSE BLD MANUAL STRIP-MCNC: 96 MG/DL (ref 74–99)
GLUCOSE SERPL-MCNC: 340 MG/DL (ref 74–99)
HCT VFR BLD AUTO: 38.8 % (ref 41–52)
HGB BLD-MCNC: 12.6 G/DL (ref 13.5–17.5)
MCH RBC QN AUTO: 28.3 PG (ref 26–34)
MCHC RBC AUTO-ENTMCNC: 32.5 G/DL (ref 32–36)
MCV RBC AUTO: 87 FL (ref 80–100)
NRBC BLD-RTO: 0 /100 WBCS (ref 0–0)
PLATELET # BLD AUTO: 472 X10*3/UL (ref 150–450)
POC FINGERSTICK BLOOD GLUCOSE: 96 MG/DL (ref 70–100)
POTASSIUM SERPL-SCNC: 3.8 MMOL/L (ref 3.5–5.3)
RBC # BLD AUTO: 4.45 X10*6/UL (ref 4.5–5.9)
SODIUM SERPL-SCNC: 137 MMOL/L (ref 136–145)
VANCOMYCIN SERPL-MCNC: 13.9 UG/ML (ref 5–20)
WBC # BLD AUTO: 8.4 X10*3/UL (ref 4.4–11.3)

## 2024-08-28 PROCEDURE — 85027 COMPLETE CBC AUTOMATED: CPT | Performed by: NURSE PRACTITIONER

## 2024-08-28 PROCEDURE — 80048 BASIC METABOLIC PNL TOTAL CA: CPT | Performed by: NURSE PRACTITIONER

## 2024-08-28 PROCEDURE — 97161 PT EVAL LOW COMPLEX 20 MIN: CPT | Mod: GP | Performed by: PHYSICAL THERAPIST

## 2024-08-28 PROCEDURE — 2500000004 HC RX 250 GENERAL PHARMACY W/ HCPCS (ALT 636 FOR OP/ED)

## 2024-08-28 PROCEDURE — 2060000001 HC INTERMEDIATE ICU ROOM DAILY

## 2024-08-28 PROCEDURE — 97165 OT EVAL LOW COMPLEX 30 MIN: CPT | Mod: GO

## 2024-08-28 PROCEDURE — 99233 SBSQ HOSP IP/OBS HIGH 50: CPT | Performed by: INTERNAL MEDICINE

## 2024-08-28 PROCEDURE — 2500000001 HC RX 250 WO HCPCS SELF ADMINISTERED DRUGS (ALT 637 FOR MEDICARE OP): Performed by: NURSE PRACTITIONER

## 2024-08-28 PROCEDURE — 2500000001 HC RX 250 WO HCPCS SELF ADMINISTERED DRUGS (ALT 637 FOR MEDICARE OP): Performed by: INTERNAL MEDICINE

## 2024-08-28 PROCEDURE — 51701 INSERT BLADDER CATHETER: CPT

## 2024-08-28 PROCEDURE — 87070 CULTURE OTHR SPECIMN AEROBIC: CPT | Mod: PORLAB | Performed by: INTERNAL MEDICINE

## 2024-08-28 PROCEDURE — 2W03X4Z CHANGE BANDAGE ON ABDOMINAL WALL: ICD-10-PCS | Performed by: INTERNAL MEDICINE

## 2024-08-28 PROCEDURE — 80202 ASSAY OF VANCOMYCIN: CPT

## 2024-08-28 PROCEDURE — 2500000004 HC RX 250 GENERAL PHARMACY W/ HCPCS (ALT 636 FOR OP/ED): Mod: JZ | Performed by: INTERNAL MEDICINE

## 2024-08-28 PROCEDURE — 36415 COLL VENOUS BLD VENIPUNCTURE: CPT | Performed by: NURSE PRACTITIONER

## 2024-08-28 PROCEDURE — 92526 ORAL FUNCTION THERAPY: CPT | Mod: GN | Performed by: SPEECH-LANGUAGE PATHOLOGIST

## 2024-08-28 PROCEDURE — 82947 ASSAY GLUCOSE BLOOD QUANT: CPT

## 2024-08-28 RX ORDER — BISACODYL 10 MG/1
10 SUPPOSITORY RECTAL DAILY PRN
Status: DISCONTINUED | OUTPATIENT
Start: 2024-08-28 | End: 2024-08-30 | Stop reason: HOSPADM

## 2024-08-28 RX ORDER — ERGOCALCIFEROL 1.25 MG/1
1250 CAPSULE ORAL WEEKLY
Status: DISCONTINUED | OUTPATIENT
Start: 2024-08-28 | End: 2024-08-30 | Stop reason: HOSPADM

## 2024-08-28 RX ORDER — LIDOCAINE 40 MG/G
CREAM TOPICAL DAILY PRN
Status: DISCONTINUED | OUTPATIENT
Start: 2024-08-28 | End: 2024-08-30 | Stop reason: HOSPADM

## 2024-08-28 RX ORDER — IPRATROPIUM BROMIDE AND ALBUTEROL SULFATE 2.5; .5 MG/3ML; MG/3ML
3 SOLUTION RESPIRATORY (INHALATION) EVERY 4 HOURS PRN
Status: DISCONTINUED | OUTPATIENT
Start: 2024-08-28 | End: 2024-08-30 | Stop reason: HOSPADM

## 2024-08-28 RX ORDER — CLOPIDOGREL BISULFATE 75 MG/1
75 TABLET ORAL DAILY
Status: DISCONTINUED | OUTPATIENT
Start: 2024-08-28 | End: 2024-08-30 | Stop reason: HOSPADM

## 2024-08-28 RX ORDER — HYOSCYAMINE SULFATE 0.12 MG/1
0.12 TABLET, ORALLY DISINTEGRATING ORAL EVERY 4 HOURS PRN
Status: DISCONTINUED | OUTPATIENT
Start: 2024-08-28 | End: 2024-08-30 | Stop reason: HOSPADM

## 2024-08-28 RX ORDER — OXYCODONE HYDROCHLORIDE 5 MG/1
5 TABLET ORAL EVERY 6 HOURS PRN
Status: DISCONTINUED | OUTPATIENT
Start: 2024-08-28 | End: 2024-08-30 | Stop reason: HOSPADM

## 2024-08-28 ASSESSMENT — COGNITIVE AND FUNCTIONAL STATUS - GENERAL
MOVING FROM LYING ON BACK TO SITTING ON SIDE OF FLAT BED WITH BEDRAILS: TOTAL
TURNING FROM BACK TO SIDE WHILE IN FLAT BAD: A LOT
MOVING FROM LYING ON BACK TO SITTING ON SIDE OF FLAT BED WITH BEDRAILS: A LITTLE
MOVING TO AND FROM BED TO CHAIR: TOTAL
DRESSING REGULAR UPPER BODY CLOTHING: TOTAL
TOILETING: A LOT
PERSONAL GROOMING: A LOT
HELP NEEDED FOR BATHING: TOTAL
WALKING IN HOSPITAL ROOM: TOTAL
DAILY ACTIVITIY SCORE: 12
CLIMB 3 TO 5 STEPS WITH RAILING: A LOT
CLIMB 3 TO 5 STEPS WITH RAILING: TOTAL
DAILY ACTIVITIY SCORE: 6
STANDING UP FROM CHAIR USING ARMS: A LOT
MOBILITY SCORE: 13
DRESSING REGULAR LOWER BODY CLOTHING: TOTAL
PERSONAL GROOMING: TOTAL
DRESSING REGULAR LOWER BODY CLOTHING: A LOT
WALKING IN HOSPITAL ROOM: A LOT
DRESSING REGULAR UPPER BODY CLOTHING: A LOT
HELP NEEDED FOR BATHING: A LOT
STANDING UP FROM CHAIR USING ARMS: TOTAL
MOBILITY SCORE: 6
EATING MEALS: A LOT
TURNING FROM BACK TO SIDE WHILE IN FLAT BAD: TOTAL
TOILETING: TOTAL
EATING MEALS: TOTAL
MOVING TO AND FROM BED TO CHAIR: A LOT

## 2024-08-28 ASSESSMENT — ENCOUNTER SYMPTOMS
DIARRHEA: 0
PALPITATIONS: 0
DIZZINESS: 0
CONSTIPATION: 0
LIGHT-HEADEDNESS: 0
ABDOMINAL DISTENTION: 0
COLOR CHANGE: 0
EYE REDNESS: 0
SPEECH DIFFICULTY: 0
ARTHRALGIAS: 0
FEVER: 0
TROUBLE SWALLOWING: 0
NECK PAIN: 0
BACK PAIN: 0
VOMITING: 0
DIFFICULTY URINATING: 0
CHILLS: 0
EYE DISCHARGE: 0
NAUSEA: 0
SORE THROAT: 0
FREQUENCY: 0
SHORTNESS OF BREATH: 0
WEAKNESS: 0
COUGH: 0
DYSURIA: 0
ABDOMINAL PAIN: 1

## 2024-08-28 ASSESSMENT — PAIN - FUNCTIONAL ASSESSMENT
PAIN_FUNCTIONAL_ASSESSMENT: UNABLE TO SELF-REPORT
PAIN_FUNCTIONAL_ASSESSMENT: 0-10
PAIN_FUNCTIONAL_ASSESSMENT: 0-10

## 2024-08-28 ASSESSMENT — PAIN SCALES - GENERAL
PAINLEVEL_OUTOF10: 0 - NO PAIN

## 2024-08-28 ASSESSMENT — ACTIVITIES OF DAILY LIVING (ADL)
BATHING_ASSISTANCE: TOTAL
ADL_ASSISTANCE: NEEDS ASSISTANCE

## 2024-08-28 ASSESSMENT — PAIN SCALES - WONG BAKER
WONGBAKER_NUMERICALRESPONSE: NO HURT
WONGBAKER_NUMERICALRESPONSE: NO HURT

## 2024-08-28 NOTE — PROGRESS NOTES
Vancomycin Dosing by Pharmacy- FOLLOW UP    Abdi Wilson is a 79 y.o. year old male who Pharmacy has been consulted for vancomycin dosing for other abdominal infection . Based on the patient's indication and renal status this patient is being dosed based on a goal AUC of 400-600.     Renal function is currently declining.    Current vancomycin dose: 1000 mg given every 12 hours    Estimated vancomycin AUC on current dose: 628 mg/L.hr     Visit Vitals  /84   Pulse 87   Temp 36.3 °C (97.3 °F)   Resp (!) 32        Lab Results   Component Value Date    CREATININE 0.97 2024    CREATININE 0.72 2024    CREATININE 0.82 2024    CREATININE 0.81 2024        Patient weight is as follows:   Vitals:    24 1900   Weight: 79.9 kg (176 lb 2.4 oz)       Cultures:  No results found for the encounter in last 14 days.       I/O last 3 completed shifts:  In: 1245 (15.6 mL/kg) [I.V.:745 (9.3 mL/kg); IV Piggyback:500]  Out: 670 (8.4 mL/kg) [Urine:650 (0.2 mL/kg/hr); Drains:20]  Weight: 79.9 kg   I/O during current shift:  No intake/output data recorded.    Temp (24hrs), Av.3 °C (97.4 °F), Min:36.1 °C (97 °F), Max:36.5 °C (97.7 °F)      Assessment/Plan    Above goal AUC. Orders placed for new vancomcyin regimen of 1500 mg every 24 hours to begin at 0600 on .    This dosing regimen is predicted by [employer] to result in the following pharmacokinetic parameters:  Regimen: 1500 mg IV every 24 hours.  Start time: 05:15 on 2024  Exposure target: AUC24 (range)400-600 mg/L.hr   AUC24,ss: 489 mg/L.hr  Probability of AUC24 > 400: 82 %  Ctrough,ss: 14.3 mg/L  Probability of Ctrough,ss > 20: 19 %  Probability of nephrotoxicity (Lodise RUDOLPH ): 9 %      The next level will be obtained on  at 0500. May be obtained sooner if clinically indicated.   Will continue to monitor renal function daily while on vancomycin and order serum creatinine at least every 48 hours if not already ordered.  Follow for  continued vancomycin needs, clinical response, and signs/symptoms of toxicity.       Eric Crespo, RPh

## 2024-08-28 NOTE — PROGRESS NOTES
Abdi Wilson is a 79 y.o. male on day 1 of admission presenting with Abdominal wall abscess.      Subjective   History Of Present Illness  Abdi Wilson is a 79 y.o. male with PMHx of dementia who presented from [facility] for concern for G-tube infection due to wound site drainage and odor. He suffered a stroke in June and underwent PEG placement 8/8/24. On 8/10/24 he was admitted for peritonitis and underwent emergent replacement of the gastrostomy tube.  On 8/18/24 a CT demonstrated a large csaandra-splenic abscess and he underwent drain placement the following day. He was discharged on 8/23/24 with ciprofloxacin and Flagyl until 9/4/2024; cultures grew (3+) moderate Enterobacter cloacae complex and (1+) rare Pseudomonas aeruginosa. Prior to the PEG he had an NGT which he pulled out 3 times according to his son at bedside and the pt has been wearing an abdominal binder but he doesn't think the pt is pulling at his PEG tube. In the ED today CT demonstrated new complex collection noted in the abdominal wall inferiorly with extension through the ventral abdomen and few associated foci of pneumoperitoneum. Additionally a 4 cm collection along the gastric wall that may have been previously identified. He was started on vancomycin and Zosyn. Workup otherwise was benign except for uncontrolled HTN to 183/104. Paperwork from [facility] notes pt is DNRCC. Remainder of ROS reviewed and negative except as indicated in HPI.     08/28: Patient was evaluated this morning. Not in acute distress. Patient has had poor urinary output overnight. Bladder scan revealed 471 mL, at which time a straight cath was ordered. Patient denied any shortness of breath or chest pain. Patient complained of abdominal pain. Midline abdominal wound dressing is dry, surrounding area is non-erythematous. IR placed splenic drain on his last admission and is draining purulent fluid at slow rate. Patient is clinically improving today.  Evaluated by general  surgery and underwent bedside debridement.           Objective     Last Recorded Vitals  /84   Pulse 87   Temp 36.3 °C (97.3 °F)   Resp (!) 32   Wt 79.9 kg (176 lb 2.4 oz)   SpO2 97%   Intake/Output last 3 Shifts:    Intake/Output Summary (Last 24 hours) at 8/28/2024 0836  Last data filed at 8/28/2024 0515  Gross per 24 hour   Intake 1245 ml   Output 670 ml   Net 575 ml       Admission Weight  Weight: 79.9 kg (176 lb 2.4 oz) (08/27/24 1700)    Daily Weight  08/27/24 : 79.9 kg (176 lb 2.4 oz)    Image Results  CT abdomen pelvis w IV contrast  Narrative: Interpreted By:  Valorie Montano,   STUDY:  CT ABDOMEN PELVIS W IV CONTRAST;  8/27/2024 4:56 am      INDICATION:  Signs/Symptoms:abdominal wall infection, s/p recent peg with  complication.      COMPARISON:  08/17/2024      ACCESSION NUMBER(S):  [id number]      ORDERING CLINICIAN:  DOLORES HANCOCK      TECHNIQUE:  Axial CT images of the abdomen and pelvis with coronal and sagittal  reconstructed images obtained after intravenous administration of  contrast      FINDINGS:  LOWER CHEST: Redemonstrated left pleural effusion and adjacent  atelectasis, partially imaged. Coronary artery calcifications noted  however exam is not optimized for evaluation. Small pericardial  effusion. BONES: No acute osseous abnormality. Degenerative changes  with minimal grade 1 anterolisthesis of L3 on L4 and L4 on L5. Mild  retrolisthesis of L5 on S1. ABDOMINAL WALL: Status post percutaneous  gastrostomy tube again noted. There is a ventral abdominal wall  complex collection with air and possible debris, inferior to the PEG  tube, measuring up to 3.1 x 3.2 x 7.4 cm (AP by T by CC) with  air-filled extension through the ventral abdominal wall (series 5,  image 68/133). A few adjacent face I of free air are noted (series 2,  image 63 and 70). Midline skin staples are again noted.      ABDOMEN:      LIVER: Within normal limits.  BILE DUCTS: Normal caliber.  GALLBLADDER: No calcified  gallstones. No wall thickening.  PANCREAS: Within normal limits.  SPLEEN: A complex subdiaphragmatic and perisplenic collection is  noted with fluid and air as well as a surgical drain, decreased in  size since prior imaging. This region measures approximately 9.9 x  5.3 cm (oblique AP by T), previously measuring 15.7 x 9.7 cm.  ADRENALS: Mild nodularity of the right adrenal body. KIDNEYS and  URETERS: Symmetric renal enhancement. No hydronephrosis or  perinephric fluid collection. A complex left renal cyst measures up  to 1.7 cm. Additional left renal simple cysts noted. Subcentimeter  low-attenuation lesion in the right renal the cortex.          VESSELS: Atherosclerotic calcifications without aneurysmal dilatation  seen. RETROPERITONEUM: No pathologically enlarged retroperitoneal  lymph nodes.      PELVIS:      REPRODUCTIVE ORGANS: The prostate is enlarged up to 6.1 cm.  Correlation with PSA recommended. BLADDER: Wall thickening of the  urinary bladder noted. Few foci of intraluminal air present, non  specific. Interval removal of a [place] catheter.      BOWEL: Percutaneous gastrostomy tube appears in otherwise expected  position. Mild stranding and possible collection along the proximal  greater curvature of the stomach the measuring at least 4.3 cm. The  stomach is decompressed, partially limiting evaluation. No dilated  loops of small bowel are identified. Fluid contents within colon  present. Submucosal fat deposition predominantly in the sigmoid  colon.  The appendix is not identified.  Therefore, appendicitis  cannot be excluded on the basis of this exam. However, no significant  inflammatory changes are noted within the right lower quadrant.  PERITONEUM: Mesenteric stranding and fluid collections as well as  free air as previously described.      Impression: Status post percutaneous gastrostomy to with complex collection noted  in the abdominal wall inferiorly with extension through the ventral  abdomen and  few associated foci of pneumoperitoneum as described.  Findings appear new since prior imaging.      Possible 4 cm collection along the gastric wall, possibly present on  prior imaging.      Complex left subdiaphragmatic/perisplenic collection with drainage  catheter appear smaller since prior imaging.      Mesenteric stranding.      Fluid within the colon which may be infectious or inflammatory.      Complex/hyperdense left renal cystic lesion again noted.      Small pericardial effusion.      Partially imaged left pleural effusion with adjacent atelectasis.      Coronary artery calcifications and additional findings as detailed.      MACRO:  None      Signed by: Valorie Montano 8/27/2024 5:27 AM  Dictation workstation:   [id number]      Physical Exam    Constitutional: Pleasant and cooperative. Laying in bed in no acute distress. Expressive and receptive aphasia is a barrier to communication.  Skin: Warm and dry. Midline abdominal wound. Drainage from abdominal wound  Eyes: EOMI. Anicteric sclera.   ENT: Mucous membranes moist  Head and Neck: Normocephalic, atraumatic. No appreciable JVD. No appreciable thyromegaly.   Respiratory: Nonlabored. Lungs clear to auscultation bilaterally without obvious adventitious sounds. Chest rise is equal.  Cardiovascular: RRR. No gross murmur, gallop, or rub.  Gastro: Abdomen soft, nondistended. Abdomen is tender. No obvious organomegaly appreciated. Bowel sounds are present and normoactive. Midline abdominal wound. Splenic abscess drain.   : No CVA tenderness. Straight cath.  MSK: No gross abnormalities appreciated.  Extremities: No cyanosis, edema, or clubbing evident.   Neuro: A&Ox3. CN 2-12 grossly intact. No new focal neurologic deficits appreciated.  Psych: Appropriate mood and behavior. Expressive and receptive aphasia. Mentation has greatly improved since last admission.     Relevant Results  Scheduled medications  atorvastatin, 80 mg, oral, Nightly  carvedilol, 6.25  mg, oral, BID  cefepime, 2 g, intravenous, q8h  hydrALAZINE, 25 mg, oral, TID  losartan, 50 mg, oral, Daily  metoprolol, 5 mg, intravenous, Once  sennosides, 2 tablet, oral, BID  [START ON 8/29/2024] vancomycin, 1,500 mg, intravenous, q24h      Continuous medications  D5 % and 0.9 % sodium chloride, 75 mL/hr, Last Rate: 75 mL/hr (08/27/24 2051)      PRN medications  PRN medications: acetaminophen, ondansetron, vancomycin        Assessment/Plan   This patient currently has cardiac telemetry ordered; if you would like to modify or discontinue the telemetry order, click here to go to the orders activity to modify/discontinue the order.  Assessment & Plan  Abdominal wall abscess  Evaluated by surgery and underwent bedside debridement with removal of gauze.  Midline abdominal wound likely source of odor and drainage, wound was cleaned.  Midline abdominal wound dressing change bid wet-to-dry  IV cefepime and IV Vancomycin as per ID recommendation  Surgery follow-up    Uncontrolled HTN   Continue home medication  Monitor blood pressure and adjust as needed    Dysphagia   SLP consulted  Continue n.p.o. with tube feeding  Pulmonary toilet  Patient to be suctioned q2h    Recent stroke  Patient suffered a stroke in June and underwent PEG placement 08/08/24.   Continue Plavix, Coreg, statin                          BRIDGETT AVELAR

## 2024-08-28 NOTE — PROGRESS NOTES
"GENERAL SURGERY PROGRESS NOTE    Abdi Wilson   1945   [id number]     Abdi Wilson is a 79 y.o. male on day 1 of admission presenting with Abdominal wall abscess.      Subjective  Pt RUDY at bedside. No acute events overnight. Feeling much better overall.     Review of Systems   Constitutional:  Negative for chills and fever.   HENT:  Negative for congestion, sore throat and trouble swallowing.    Eyes:  Negative for discharge and redness.   Respiratory:  Negative for cough and shortness of breath.    Cardiovascular:  Negative for chest pain and palpitations.   Gastrointestinal:  Positive for abdominal pain. Negative for abdominal distention, constipation, diarrhea, nausea and vomiting.   Endocrine: Negative for cold intolerance and heat intolerance.   Genitourinary:  Negative for difficulty urinating, dysuria, frequency and urgency.   Musculoskeletal:  Negative for arthralgias, back pain and neck pain.   Skin:  Negative for color change and rash.   Neurological:  Negative for dizziness, speech difficulty, weakness and light-headedness.       Objective    Last Recorded Vitals  Blood pressure 133/84, pulse 87, temperature 36.2 °C (97.2 °F), temperature source Temporal, resp. rate (!) 32, height 1.753 m (5' 9.02\"), weight 79.9 kg (176 lb 2.4 oz), SpO2 97%.    Intake/Output last 3 Shifts:  I/O last 3 completed shifts:  In: 1245 (15.6 mL/kg) [I.V.:745 (9.3 mL/kg); IV Piggyback:500]  Out: 670 (8.4 mL/kg) [Urine:650 (0.2 mL/kg/hr); Drains:20]  Weight: 79.9 kg     Gen: NAD.  A&Ox3  HEENT: NC/AT.  Moist mucous membranes.  Neck: Normal range of motion.  CV: Regular rate.  Chest: Normal chest rise.  Normal respiratory effort.  Abdomen: Soft.  PEG tube in place without surrounding fluid or drainage. Midline incision improved, mostly pink tissue visible. No erythema. Fascia intact.  No rigidity or guarding. Splenic drain with mildly purulent serous drainage.   Extremities: No edema.  Moving all extremities.    Relevant " Results  Results for orders placed or performed during the hospital encounter of 08/27/24 (from the past 24 hour(s))   CBC   Result Value Ref Range    WBC 8.4 4.4 - 11.3 x10*3/uL    nRBC 0.0 0.0 - 0.0 /100 WBCs    RBC 4.45 (L) 4.50 - 5.90 x10*6/uL    Hemoglobin 12.6 (L) 13.5 - 17.5 g/dL    Hematocrit 38.8 (L) 41.0 - 52.0 %    MCV 87 80 - 100 fL    MCH 28.3 26.0 - 34.0 pg    MCHC 32.5 32.0 - 36.0 g/dL    RDW 14.5 11.5 - 14.5 %    Platelets 472 (H) 150 - 450 x10*3/uL   Basic metabolic panel   Result Value Ref Range    Glucose 340 (H) 74 - 99 mg/dL    Sodium 137 136 - 145 mmol/L    Potassium 3.8 3.5 - 5.3 mmol/L    Chloride 106 98 - 107 mmol/L    Bicarbonate 25 21 - 32 mmol/L    Anion Gap 10 10 - 20 mmol/L    Urea Nitrogen 15 6 - 23 mg/dL    Creatinine 0.97 0.50 - 1.30 mg/dL    eGFR 78 >60 mL/min/1.73m*2    Calcium 7.5 (L) 8.6 - 10.3 mg/dL   Vancomycin   Result Value Ref Range    Vancomycin 13.9 5.0 - 20.0 ug/mL       CT abdomen pelvis w IV contrast    Result Date: 8/27/2024  Interpreted By:  Valorie Montano, STUDY: CT ABDOMEN PELVIS W IV CONTRAST;  8/27/2024 4:56 am   INDICATION: Signs/Symptoms:abdominal wall infection, s/p recent peg with complication.   COMPARISON: 08/17/2024   ACCESSION NUMBER(S): [id number]   ORDERING CLINICIAN: DOLORES HANCOCK   TECHNIQUE: Axial CT images of the abdomen and pelvis with coronal and sagittal reconstructed images obtained after intravenous administration of contrast   FINDINGS: LOWER CHEST: Redemonstrated left pleural effusion and adjacent atelectasis, partially imaged. Coronary artery calcifications noted however exam is not optimized for evaluation. Small pericardial effusion. BONES: No acute osseous abnormality. Degenerative changes with minimal grade 1 anterolisthesis of L3 on L4 and L4 on L5. Mild retrolisthesis of L5 on S1. ABDOMINAL WALL: Status post percutaneous gastrostomy tube again noted. There is a ventral abdominal wall complex collection with air and possible debris,  inferior to the PEG tube, measuring up to 3.1 x 3.2 x 7.4 cm (AP by T by CC) with air-filled extension through the ventral abdominal wall (series 5, image 68/133). A few adjacent face I of free air are noted (series 2, image 63 and 70). Midline skin staples are again noted.   ABDOMEN:   LIVER: Within normal limits. BILE DUCTS: Normal caliber. GALLBLADDER: No calcified gallstones. No wall thickening. PANCREAS: Within normal limits. SPLEEN: A complex subdiaphragmatic and perisplenic collection is noted with fluid and air as well as a surgical drain, decreased in size since prior imaging. This region measures approximately 9.9 x 5.3 cm (oblique AP by T), previously measuring 15.7 x 9.7 cm. ADRENALS: Mild nodularity of the right adrenal body. KIDNEYS and URETERS: Symmetric renal enhancement. No hydronephrosis or perinephric fluid collection. A complex left renal cyst measures up to 1.7 cm. Additional left renal simple cysts noted. Subcentimeter low-attenuation lesion in the right renal the cortex.     VESSELS: Atherosclerotic calcifications without aneurysmal dilatation seen. RETROPERITONEUM: No pathologically enlarged retroperitoneal lymph nodes.   PELVIS:   REPRODUCTIVE ORGANS: The prostate is enlarged up to 6.1 cm. Correlation with PSA recommended. BLADDER: Wall thickening of the urinary bladder noted. Few foci of intraluminal air present, non specific. Interval removal of a Cleveland catheter.   BOWEL: Percutaneous gastrostomy tube appears in otherwise expected position. Mild stranding and possible collection along the proximal greater curvature of the stomach the measuring at least 4.3 cm. The stomach is decompressed, partially limiting evaluation. No dilated loops of small bowel are identified. Fluid contents within colon present. Submucosal fat deposition predominantly in the sigmoid colon.  The appendix is not identified.  Therefore, appendicitis cannot be excluded on the basis of this exam. However, no significant  inflammatory changes are noted within the right lower quadrant. PERITONEUM: Mesenteric stranding and fluid collections as well as free air as previously described.       Status post percutaneous gastrostomy to with complex collection noted in the abdominal wall inferiorly with extension through the ventral abdomen and few associated foci of pneumoperitoneum as described. Findings appear new since prior imaging.   Possible 4 cm collection along the gastric wall, possibly present on prior imaging.   Complex left subdiaphragmatic/perisplenic collection with drainage catheter appear smaller since prior imaging.   Mesenteric stranding.   Fluid within the colon which may be infectious or inflammatory.   Complex/hyperdense left renal cystic lesion again noted.   Small pericardial effusion.   Partially imaged left pleural effusion with adjacent atelectasis.   Coronary artery calcifications and additional findings as detailed.   MACRO: None   Signed by: Valorie Montano 8/27/2024 5:27 AM Dictation workstation:   [id number]    IR body drain placement    Result Date: 8/19/2024  Interpreted By:  Adria Wynn, STUDY: IR BODY DRAIN PLACEMENT;  8/19/2024 12:27 pm   INDICATION: Signs/Symptoms:Abdominal ABscess, Drain?, culture to be sent.   COMPARISON: None.   ACCESSION NUMBER(S): [id number]   ORDERING CLINICIAN: TIMA DE ANDA   TECHNIQUE:   CONSENT: The patient/patient's POA/next of kin was informed of the nature of the proposed procedure. The purposes, alternatives, risks, and benefits were explained and discussed. All questions were answered and consent was obtained.   RADIATION EXPOSURE: None   SEDATION: Moderate conscious IV sedation services (supervision of administration, induction, and maintenance) were provided by the physician performing the procedure with intravenous fentanyl 50 mcg and versed 1 mg for 28 minutes. The physician was assisted by an independent trained observer, an interventional radiology nurse, in the  continuous monitoring of patient level of consciousness and physiologic status.   MEDICATION/CONTRAST: No additional   TIME OUT: A time out was performed immediately prior to procedure start with the interventional team, correctly identifying the patient name, date of birth, MRN, procedure, anatomy (including marking of site and side), patient position, procedure consent form, relevant laboratory and imaging test results, antibiotic administration, safety precautions, and procedure-specific equipment needs.   COMPLICATIONS: No immediate adverse events identified.   FINDINGS: Patient placed in the right posterior oblique position and ultrasound evaluation performed over the left quadrant. Subdiaphragmatic, perisplenic collection identified congruent with CT imaging from 08/17/2024. The lowest intercostal space possible was identified and targeted. The area is prepped and draped in usual sterile fashion. Skin and subcutaneous tissues anesthetized using lidocaine solution. Small skin nick was made. A 10 Azeri all-purpose drain was then advanced into the collection under direct ultrasound guidance using a single step technique. With confirmation of placement within the collection per live ultrasound, the catheter was advanced off the sharp trocar into the posterior margin of the collection. Spontaneous drainage of turbid fluid present. A sample was sent for culture and sensitivity. The remainder of the fluid was aspirated to waist. No immediate complications. A single suture was used to secure the catheter.       Successful ultrasound-guided placement of a 10 Azeri drain into the left upper quadrant abscess as outlined above.     Performed and dictated at [facility].   MACRO: None   Signed by: Adria Wynn 8/19/2024 12:38 PM Dictation workstation:   [id number]    CT abdomen pelvis w IV contrast    Result Date: 8/18/2024  Interpreted By:  Finkelstein, Evan, STUDY: CT ABDOMEN PELVIS  W IV CONTRAST;  8/17/2024 5:38 pm   INDICATION: Signs/Symptoms:Minimal pain/abdominal drainage/previous malposition PEG tube.   COMPARISON: CT abdomen pelvis 08/10/2024   ACCESSION NUMBER(S): [id number]   ORDERING CLINICIAN: TATIANNA MILLS   TECHNIQUE: Axial CT images of the abdomen and pelvis with coronal and sagittal reconstructed images obtained after intravenous administration of 75 mL Omnipaque 350   FINDINGS: LOWER CHEST: There are coronary artery calcifications. Partially visualized moderate left and small right pleural effusions with superimposed atelectasis.   ABDOMEN:   LIVER: Normal attenuation and contour. BILE DUCTS: Normal caliber. GALLBLADDER: No calcified gallstones. No wall thickening. PORTAL VEIN: Patent SPLEEN: Perisplenic, possibly subcapsular collection of fluid and gas measuring 15.7 x 9.7 x 11.1 cm (maximum AP by transverse by craniocaudal dimensions). There is associated mass effect on the spleen. Remainder of the spleen demonstrates a homogeneous attenuation. PANCREAS: Unremarkable. ADRENALS: Unremarkable. KIDNEYS, URETERS and URINARY BLADDER: Symmetric renal enhancement. No hydronephrosis or perinephric fluid collection. 3 cm hypodensity in the inferior pole of the left kidney measures simple fluid attenuation and is most compatible with a simple cyst. There are additional indeterminate hypodensities in the left kidney measuring approximately 1.5 cm and 1.6 cm in size which measuring intermediate density. The bladder is decompressed with a [place] catheter. There is bladder wall thickening and mild adjacent stranding. REPRODUCTIVE ORGANS: No pelvic masses.   ABDOMINAL WALL: A percutaneous gastrostomy tube is present. Defect in the anterior abdominal wall soft tissues inferior to the gastrostomy tube likely relates to prior surgery with overlying skin staples. Fat containing right inguinal hernia. PERITONEUM: Punctate focus of free air adjacent to the stomach.   BOWEL: A percutaneous  gastrostomy tube is present and appears to be appropriately positioned within the stomach. Free air seen on prior imaging 08/10/2024 adjacent to the malpositioned gastrostomy tube is near resolved with a small punctate focus of air remaining best seen on image 71 of series 2. Prominence of the submucosal fat in the distal colon. The appendix is not definitively visualized, without focal pericecal inflammatory stranding.   VESSELS: Moderate aortoiliac calcifications. RETROPERITONEUM: No pathologically enlarged retroperitoneal lymph nodes.   BONES: No acute osseous abnormality.       15.7 x 9.7 x 11.1 cm perisplenic, possibly subcapsular, collection of fluid and gas most compatible with abscess.   Percutaneous gastrostomy tube is present and appears appropriately positioned. Free air seen on prior imaging 08/10/2024 adjacent to the malpositioned gastrostomy tube at that time is now near resolved with a small persistent punctate focus of free air adjacent to the stomach.   Defect in the anterior abdominal wall with overlying staples new from prior imaging most compatible with interval surgery. Mild stranding and fluid within this defect without evidence of a well-defined collection..   The bladder is decompressed with a [place] catheter, which limits evaluation, however, there is bladder wall thickening and mild adjacent stranding. Findings may be seen with cystitis. Correlate with urinalysis.   Partially visualized moderate left and small right pleural effusions with superimposed atelectasis.   3 cm simple appearing cyst in the left kidney. Additional indeterminate hypodensities in the left kidney measure up to approximately 1.6 cm. Recommend nonemergent MRI to further characterize.   Prominence of submucosal fat in the distal colon which may be seen with chronic inflammatory processes.   MACRO: Critical Finding:  See findings. Notification was initiated on 8/18/2024 at 4:08 am by  Evan Finkelstein.  (**-YCF-**)  Instructions:   Signed by: Evan Finkelstein 8/18/2024 4:08 AM Dictation workstation:   [id number]    ECG 12 Lead    Result Date: 8/14/2024  Sinus tachycardia Ventricular premature complex Prolonged PA interval LVH with secondary repolarization abnormality Inferior infarct, old Anterolateral infarct, age indeterminate See ED provider note for full interpretation and clinical correlation Confirmed by Alexandria Cullen ([id number]) on 8/14/2024 6:23:12 PM    FL upper GI w KUB    Result Date: 8/14/2024  Interpreted By:  Mei Crum, STUDY: FL UPPER GI W KUB;  8/14/2024 9:31 am   INDICATION: Signs/Symptoms:Please please contrast through patient's PEG tube in abdomen.   COMPARISON: None.   ACCESSION NUMBER(S): [id number]   ORDERING CLINICIAN: LUCY NUNES   TECHNIQUE: Total fluoroscopy time was  1 minutes 7 seconds with 8 spot images obtained. 60 mL of solution 50% Gastrografin and 50% water was inserted through the PEG tube.   FINDINGS: Peg tube, surgical drain and skin staples are noted in the upper abdomen. An NG tube is also present in the stomach. With injection of contrast the contrast flows freely into the gastric lumen. Contrast promptly passes from stomach into duodenal. No extravasation of contrast is noted outside of the stomach. There was no gastroesophageal reflux identified.       1.  PEG tube is positioned in the stomach with no extravasation. 2. Prompt passage of contrast into the duodenal and no gastroesophageal reflux     MACRO: None   Signed by: Mei Crum 8/14/2024 10:10 AM Dictation workstation:   [id number]    XR chest 2 views    Result Date: 8/14/2024  Interpreted By:  Mei Crum, STUDY: XR CHEST 2 VIEWS;  8/14/2024 9:11 am   INDICATION: Signs/Symptoms:Hypoxia.   COMPARISON: 08/13/2024   ACCESSION NUMBER(S): [id number]   ORDERING CLINICIAN: LUCY NUNES   FINDINGS: AP upright and lateral views were obtained with the patient sitting.   NG tube is present with the  tip below the diaphragm and beyond the image.   CARDIOMEDIASTINAL SILHOUETTE: Heart is enlarged, which is accentuated by the shallow inspiration. Aorta is atherosclerotic.   LUNGS: Bilateral pleural effusions and bilateral basilar atelectasis are present, left worse than right. Appearance is similar to the prior exam.   ABDOMEN: No remarkable upper abdominal findings.   BONES: No acute osseous changes.       1.  Unchanged cardiomegaly 2. Pleural effusions and basilar atelectasis, left worse than right and unchanged from the prior exam       MACRO: None   Signed by: Mei Crum 8/14/2024 9:22 AM Dictation workstation:   [id number]    XR chest abdomen for OG NG placement    Result Date: 8/13/2024  Interpreted By:  Mei Crum, STUDY: XR CHEST ABDOMEN FOR OG NG PLACEMENT; ;  8/13/2024 11:35 am   INDICATION: Signs/Symptoms:NG tube slightly came out. want to check placement.   COMPARISON: 06/23/2024   ACCESSION NUMBER(S): [id number]   ORDERING CLINICIAN: LUCY NUNES   FINDINGS: Supine images including the chest and upper abdomen show NG tube projecting in the expected region of the body of the stomach. A gastrostomy tube also projects in the stomach. A surgical drain is present left side of the abdomen and skin staples projects slightly left of midline in the mid abdomen.   Patchy bowel gas is noted in the visualized portion of the abdomen without dilatation. No gross free air is visible.   Cardiac silhouette is unchanged in size. Atelectasis is present at both lung bases, left worse than right. Lung volumes are shallow.   No acute changes are noted in the osseous structures.       Tube projects in the stomach   Bilateral basilar atelectasis     MACRO: None   Signed by: Mei Crum 8/13/2024 1:01 PM Dictation workstation:   [id number]    CT head wo IV contrast    Result Date: 8/12/2024  Interpreted By:  Judith Velarde, STUDY: CT HEAD WO IV CONTRAST  8/12/2024 5:32 pm   INDICATION:  Signs/Symptoms:AMS   COMPARISON: CT head 06/21/2024, 07/17/2023.   ACCESSION NUMBER(S): [id number]   ORDERING CLINICIAN: LUCY NUNES   TECHNIQUE: Serial, axial CT images of the brain were obtained without IV contrast. Coronal and sagittal reformatted images were performed.   FINDINGS: The ventricles, cisterns and sulci are prominent, consistent with diffuse volume loss.  There are areas of nonspecific white matter hypodensity, which are probably age-related or microvascular in nature. Remote infarcts at the bilateral basal ganglia. The gray-white matter differentiation is intact and there is no evidence of acute territorial infarct.  No mass effect or midline shift is seen.  There is no hemorrhage.  No extraaxial fluid collection. No air-fluid levels at the visualized paranasal sinuses. Mucous retention cyst/polyp at the left maxillary sinus. The mastoid air cells are clear. No depressed calvarial fracture.   Partially visualized nasogastric tube inserted through the right nostril.       1.  No acute intracranial hemorrhage or acute territorial infarct. 2.  Diffuse parenchymal volume loss. Chronic changes.     MACRO: None.   Signed by: Judith Velarde 8/12/2024 6:34 PM Dictation workstation:   [id number]    CT abdomen pelvis w IV contrast    Result Date: 8/10/2024  Interpreted By:  Yamilet Heck, STUDY: CT ABDOMEN PELVIS W IV CONTRAST;  8/10/2024 2:08 pm   INDICATION: Signs/Symptoms:abbdominal pain.   COMPARISON: None.   ACCESSION NUMBER(S): [id number]   ORDERING CLINICIAN: DONITA ROBLES   TECHNIQUE: CT of the abdomen and pelvis was performed.  75 mL Omnipaque 350   FINDINGS: LOWER CHEST: There is mild bibasilar atelectasis.   ABDOMEN:   LIVER: There is no hepatic mass.   BILE DUCTS: There is no intrahepatic, common hepatic or common bile ductal dilatation.   GALLBLADDER: The gallbladder is unremarkable.   PANCREAS: The pancreas is unremarkable.   SPLEEN: The spleen is unremarkable. There is no  splenic mass or splenomegaly.   ADRENAL GLANDS: The adrenal glands are unremarkable.   KIDNEYS AND URETERS: The kidneys function symmetrically. There are benign bilateral simple renal cysts. There is no intrarenal calculus or hydronephrosis. The bladder is distended measuring 13.6 x 11.3 x 7.0 cm with a volume of 559 cc. There is bladder wall trabeculation, likely secondary to postobstructive neuropathic change from prostate enlargement.   BOWEL: There is free intraperitoneal air. There is a feeding tube insufflated in the fatty anterior to the stomach. This is where the free air is located. This could be iatrogenic from malpositioned PEG tube rather than a ruptured viscus. There is thickening of loops of small bowel in the left side of the abdomen. There is a small amount of ascites in the left side of the abdomen interdigitated between the thickened loops of small bowel. There is induration of the mesentery. This could represent Crohn's disease or enteritis. There is diffuse fatty infiltration of the colon from the mid transverse colon to the rectum. Fibrofatty infiltration can be seen with Crohn's disease, inactive.   VESSELS: There is atherosclerotic calcification of the abdominal aorta, iliac and femoral arteries.   PERITONEUM/RETROPERITONEUM/LYMPH NODES: There is no retroperitoneal or pelvic adenopathy.   ABDOMINAL WALL: The abdominal wall is unremarkable.   BONE AND SOFT TISSUE: There is no acute osseous finding.   The prostate gland is enlarged measuring 6.2 x 4.8 cm.       1. There is a malpositioned PEG tube insufflated in the fatty anterior to the stomach. There is a small amount of free air in this location. This could be secondary to malpositioned PEG tube rather than a ruptured viscus but should be correlated clinically. 2. Nonspecific thickening of loops of small bowel in the left side of the abdomen with induration of the mesentery and a small amount of ascites. This could represent Crohn's disease  or enteritis. 3. Fibrofatty infiltration of the colon from the transverse colon to the rectum. This can be seen with Crohn's disease, inactive. 4. Benign bilateral simple renal cysts. 5. The urinary bladder with a volume of 559 cc. 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   MACRO: None   Signed by: Yamilet Heck 8/10/2024 2:22 PM Dictation workstation:   [id number]    XR chest 1 view    Result Date: 8/10/2024  Interpreted By:  Yamilet Heck, STUDY: XR CHEST 1 VIEW;  8/10/2024 1:44 pm   INDICATION: Signs/Symptoms:cough.   COMPARISON: 06/24/2024   ACCESSION NUMBER(S): [id number]   ORDERING CLINICIAN: DONITA ROBLES   FINDINGS: CARDIOMEDIASTINAL SILHOUETTE: The heart is enlarged in a left ventricular configuration, unchanged.     LUNGS: Lungs are clear.   ABDOMEN: No remarkable upper abdominal findings.     BONES: No acute osseous changes.       No acute cardiopulmonary process.   MACRO: None   Signed by: Yamilet Heck 8/10/2024 1:46 PM Dictation workstation:   [id number]    XR ABDOMEN FOR NG/OG/NE TUBE PLACEMENT    Result Date: 7/30/2024  EXAMINATION: ONE SUPINE XRAY VIEW(S) OF THE ABDOMEN 7/30/2024 9:50 pm COMPARISON: 7:22 p.m. HISTORY: ORDERING SYSTEM PROVIDED HISTORY: Confirmation of course of NG tube and location of tip of tube post advancement TECHNOLOGIST PROVIDED HISTORY: Reason for exam:->Confirmation of course of NG tube and location of tip of tube post advancement Portable?->Yes FINDINGS: Feeding tube advanced.  Tip now projects over the region of the gastric antrum/duodenal bulb.  Consider advancement 10-15 cm if post pyloric positioning is desired.  No ileus or obstruction lung bases clear.  Osseous and body wall soft tissues unremarkable.    Feeding tube tip projects over the region of the gastric antrum/duodenal bulb.    XR ABDOMEN FOR NG/OG/NE TUBE PLACEMENT    Result Date: 7/30/2024  EXAMINATION: ONE SUPINE XRAY VIEW(S) OF  THE ABDOMEN 7/30/2024 7:21 pm COMPARISON: None. HISTORY: ORDERING SYSTEM PROVIDED HISTORY: G-tube Placement / Confirmation TECHNOLOGIST PROVIDED HISTORY: Reason for exam:->G-tube Placement / Confirmation FINDINGS: Single image shows esophageal route catheter into the left upper quadrant expected proximal stomach region.  No dilated bowels evident.    Findings suggestive of NG/OG into the proximal stomach. RECOMMENDATION: Clinical correlation.      Assessment and Plan  Principal Problem:    Abdominal wall abscess    79 y.o. male with retained foreign body in midline wound and perisplenic abscess s/p drainage    Plan:  Midline wound improved - continue wet to dry dressings BID, monitor wound for worsening drainage or signs of infection  Continue abx per primary team  Discussed splenic drain repositioning with IR. At this time, they feel there is good control of drainage and would recommend continuing the drain as is. Drain flushes BID to ensure patency.  Rest of care per primary/ID  Surgery will follow    Seen and discussed with Dr. Christen Nielsen, DO PGY3  General Surgery

## 2024-08-28 NOTE — ASSESSMENT & PLAN NOTE
Evaluated by surgery and underwent bedside debridement with removal of gauze.  Midline abdominal wound likely source of odor and drainage, wound was cleaned.  Midline abdominal wound dressing change bid wet-to-dry  IV cefepime and IV Vancomycin as per ID recommendation  Surgery follow-up    Uncontrolled HTN   Continue home medication  Monitor blood pressure and adjust as needed    Dysphagia   SLP consulted  Continue n.p.o. with tube feeding  Pulmonary toilet  Patient to be suctioned q2h    Recent stroke  Patient suffered a stroke in June and underwent PEG placement 08/08/24.   Continue Plavix, Coreg, statin

## 2024-08-28 NOTE — CONSULTS
Attempted to see patient for wound consult. Speech Therapist currently evaluating patient. Bedside RN Marleny to notify me via secure chat when patient becomes available.

## 2024-08-28 NOTE — CONSULTS
Wound Care Consult     Visit Date: 8/28/2024      Patient Name: Abdi Wilson         MRN: [id number]           YOB: 1945     Pertinent Labs:   Albumin   Date Value Ref Range Status   08/27/2024 3.5 3.4 - 5.0 g/dL Final     Wound Assessment:  Wound Incision Abdomen Medial;Upper (Active)   Wound Image   08/27/24 1647   Site Assessment Unable to assess 08/28/24 1333   Drainage Description Yellow 08/28/24 1300   Drainage Amount Small 08/28/24 1300   Dressing Gauze 08/28/24 1300   Dressing Changed Changed 08/28/24 1300   Dressing Status Clean;Dry 08/28/24 1333       Wound 08/19/24 Pressure Injury Buttocks Bilateral (Active)   Wound Image   08/28/24 1333   Site Assessment Purple;Denuded;[place] 08/28/24 1333   Nasreen-Wound Assessment Blanchable erythema 08/28/24 1333   Pressure Injury Stage DTPI 08/28/24 1333   Shape cluster of evolving DTIs 08/28/24 1333   Wound Length (cm) 5 cm 08/28/24 1333   Wound Width (cm) 6 cm 08/28/24 1333   Wound Surface Area (cm^2) 30 cm^2 08/28/24 1333   Wound Depth (cm) 0.1 cm 08/28/24 1333   Wound Volume (cm^3) 3 cm^3 08/28/24 1333   Drainage Description None 08/28/24 1333   Drainage Amount None 08/28/24 1333   Dressing Foam 08/28/24 1333   Dressing Changed Changed 08/28/24 1333   Dressing Status Clean;Dry 08/28/24 1333       Wound 08/27/24 Other (comment) Flank Left;Upper (Active)   Wound Image   08/27/24 1643   Nasreen-Wound Assessment Other (Comment) 08/28/24 1333   Non-staged Wound Description Partial thickness 08/28/24 1333   Shape raised fluid filled blister 08/28/24 1333   Wound Length (cm) 1 cm 08/28/24 1333   Wound Width (cm) 1 cm 08/28/24 1333   Wound Surface Area (cm^2) 1 cm^2 08/28/24 1333   Margins Attached edges 08/28/24 1333   Drainage Description None 08/28/24 1333   Drainage Amount None 08/28/24 1333   Dressing Non adherent;Foam 08/28/24 1333   Dressing Changed Reinforced 08/28/24 1333   Dressing Status Clean;Dry 08/28/24 1200     Patient seen for pressure injury  to bilateral buttocks (present on admission) complicated by PMH: hypertension dyslipidemia old CVA mild cardiomyopathy ulcerative colitis GERD recent CVA in June of this year with dysphagia and expressive aphasia per chart. Exam conducted with bedside RN Marleny. Patient has abdominal wound- surgery is on consult for and following- dressing orders in place. Patient known to this RN from prior admission where DTI to bilateral buttocks was noted. At this time of exam DTI evolving. Small fluid filed blister to left flank directly beneath drain. Skin hygiene and dressing care provided. See detailed assessment above from flowsheet. Recommendations below, reviewed with Dr. Wan.     Treatment protocols recommended:  Bilateral buttocks- Cleanse with wound cleanser, pat dry, apply mepilex foam every 3 days/prn.   Left flank- Cover with adaptic and clean dry dressing every 3 days/prn.  Continue to off load, turning at least every 2 hours. Offload heels.      Therapeutic surface: Patient on Progressa AIR (ICU bed) during exam with turn and reposition system in place. Recommend waffle overlay (not found in clean utility, requested pca to obtain when able from store room). Preventative foams placed to bilateral heels. Patient positioned onto left side after exam. Staff to continue to turn and reposition patient atleast every 2 hours. Offload heels.     Nursing updated, continue pressure injury preventions, wound care to be completed by nursing per orders. and re-consult wound RN if needed.    See above recommendations for treatment. Patient will likely continue to require skilled assistance with skin hygiene and wound care upon discharge.    Please contact me with questions or changes in patient condition.  Olivia Maguire RN  [facility]   [phone]

## 2024-08-28 NOTE — PROGRESS NOTES
Speech-Language Pathology Clinical Swallow Treatment    Patient Name: Abdi Wilson  MRN: [id number]  : 1945  Today's Date: 24  Start time: Start Time: 1100  Stop time: Stop Time: 1130  Time calculation (min) : Time Calculation (min): 30 min    ASSESSMENT  SLP TX Intervention Outcome: Making Progress Towards Goals     Treatment Tolerance: Patient tolerated treatment well, Patient limited by fatigue    IMPRESSIONS: Pt has profound oral phase and impaired pharyngeal phase dysphagia s/p stroke in 2024. Pt initially had a Dobbhoff for nutrition and hydration, but switched to a peg tube in Early August.     Continue NPO with peg tube feedings.     Pt performed lingual and labial exercises with encouragement from son.  Pt completed exercises, but effort is fair d/t illness.    Continue lingual and labial exercises and pharyngeal exercises.  Thermal gustatory stim once abdominal wound is healed.     Pt would benefit from NMES and sEMG biofeedback if available.       PLAN  Recommended solid consistency: NPO  Recommended liquid consistency: NPO  Recommended medication administration: Via PEG tube  Safe swallow strategies: N/A  Discharge recommendation: Recommend MODERATE intensity ST upon DC in order to ensure safety with least restrictive diet.  Inpatient/Swing Bed or Outpatient: Inpatient  SLP TX Plan: Continue Plan of Care  SLP Plan: Skilled SLP  SLP Frequency: 2x per week  Duration: 2 weeks  Next Treatment Priority: oral motor exercises, pharyngeal exercises  Discussed POC: Patient, Nursing, Physician  Patient/Caregiver Agreeable: Yes      Goals (start date 2024 for two weeks - END ):    Pt will complete oral/pharyngeal/laryngeal strengthening exercises as directed given min/mod verbal cues and modeling from SLP in order to improve swallow function.              Status: Goal initiated this date              Progress this date: Pt completed lingual and labial exercises. Pt needed  "encouragement from son.  A few spontaneous swallows noted.  D/T abdominal wound TGS trials on hold.      Pt/family will demonstrate understanding of education related to dysphagia independently.              Status: Goal initiated this date              Progress this date: SLP encouraged son and family to work with pt to do lingual and labial exercises when visiting.  Son in agreement.  Handouts left at bedside.                Pt will consume PO trials with SLP without s/sx aspiration in order to determine readiness for PO diet.              Status: Goal initiated this date              Progress this date: N/A       SUBJECTIVE  Prior to Session Communication: Bedside nurse  RN cleared pt to participate in session and reported more awake and alert.   Respiratory status: Room air  Positioning: Upright in bed  Pt was alert, pleasant, and cooperative for session.  Pt noted - N/A    Pain Assessment: 0-10  0-10 (Numeric) Pain Score: 0 - No pain             Oriented to self    OBJECTIVE  Therapeutic swallow intervention:  Therapeutic Swallow  Therapeutic Swallow Intervention : Oral Strengthening Techniques, Pharyngeal Strengthening Techniques  Pharyngeal Strengthening Techniques: Effortful Swallow, Chin Tuck Against Resistance, Pitch Glides  Solid Diet Recommendations: NPO  Liquid Diet Recommendations: NPO  Swallow Comments: Pt sitting upright and slightly reclined.  Oral care provided prior to exercises. Mouth looks clean and moist. Pt has difficulty with managing secretions per son. Yesterday he choked on saliva. Pt has a oral suction unit in room and placed next to pt for him to use by SLP.  SLP modeling lingual and labial exercises for pt while aslo utilizing cold stimulus to assist. Pt with minimal trials with decreased ROM or no movement at all. When son asked pt to do exercises pt moved tongue to protrusion and laterally. When asked why he wouldn't do it for the therapist he stated \"It's not fun.\"  Pt then proceeded " to do 5 lingual protrusions and 5 lingual lateralizations for each side.  Pt then completed jaw open exercises 3/3, lip press exercises 3/3 and lip pucker (kiss) exercises 3/3, but lips were only minimally rounded.  Pt had a few spontaneous swallows during session, but not consistent. (Thermal gustatory stim (TGS) differed d/t abdominal wound as exercise will cause signicant adominal contraction and coughing and this may interfere with the wound healing appropriately.  Once wound is healed then should be able to do TGS at that time.)    Oral Phase:    Pharyngeal Phase:      Treatment/Education:  Results and recommendations were relayed to: Patient, Family, Bedside nurse, and Physician  Education provided: Yes   Learner: Patient, Family, and Child   Barriers to learning: None, Acuteness of illness barrier, Cognitive limitations barrier, and Communication limitations barrier   Method of teaching: Verbal, Written, and Demonstration   Topic: Role of ST, results of assessment, risk for aspiration, recommended continued NPO with peg tube feedings, recommendation for MBSS when appropriate, and recommendation for dysphagia follow-up   Outcome of teaching: Caregiver demonstrated good understanding, Pt demonstrated partial understanding, Education will be reinforced during follow-up visits, as appropriate, and Needs reinforcement

## 2024-08-28 NOTE — NURSING NOTE
1500:  Report received from Marleny    1600:  Notified IMS of the following:    -He was a DNR, DNI last admission and kept that from facility maybe we just didn't order it.   -should we do sugar checks while on tube feed. New order: Accu check 4 times daily    -Can he have anything stronger for pain. New order: Oxy 5mg Q6     -some meds need changed to crushable form     Surgery: wanted to clarify drain flush order of 5cc NS both ways BID    1630:  Surgery at bedside, added valve and explained how to flush drain    1900:  Report given to Miranda QUIÑONES

## 2024-08-28 NOTE — PROGRESS NOTES
Occupational Therapy  Evaluation    Patient Name: Abdi Wilson  MRN: [id number]  Today's Date: 8/28/2024  Time Calculation  Start Time: 1415  Stop Time: 1426  Time Calculation (min): 11 min    Current Problem:   1. Abdominal wall abscess        OT order: OT eval and treat   Referred by: Marino  Reason for referral: ADLs, safety assessment  Past medical history related to rehab:  has a past medical history of CAD (coronary artery disease), Dementia (Multi), HTN (hypertension), and Stroke (Multi).     Precautions:   Hearing/Visual Limitations: ? impairment in hearing and vision, difficult to assess  Medical Precautions: Fall precautions, Swallowing precautions  Precautions Comment: has PEG tube    ASSESSMENT  OT Assessment: OT eval completed. The patient is functioning below baseline for ADLs and mobility. can benefit from continued OT. Pt with Decreased ADL status, Decreased upper extremity strength, Decreased safe judgment during ADL, Decreased cognition, Decreased endurance, Decreased functional mobility, Decreased gross motor control, Decreased IADLs  Prognosis:    Barriers to discharge: Decreased caregiver support, Inaccessible home environment  Tolerance:      PLAN  Frequency: 3 times per week  Treatment Interventions: ADL retraining, Functional transfer training, UE strengthening/ROM, Endurance training, Visual perceptual retraining, Patient/family training, Cognitive reorientation, Equipment evaluation/education, Neuromuscular reeducation  Discharge Recommendations: Moderate intensity level of continued care  OT OK to discharge: Yes    GENERAL VISIT INFORMATION   Start of session communication: Bedside nurse  End of session communication: Bedside nurse  Family/caregiver present: No  Caregiver feedback:    Co-Treatment: PT  Reason for co-treatment: to optimize safety and mobility, while focusing on discipline specific goals   Position Pt Received:  Bed, 3 rail up, Alarm on (pt lying in bed, lethargic. minimally  interactive.)  End of session position: Bed, 3 rail up, Alarm on    SUBJECTIVE  Home Living:  Home Living Comments: pt unable to provide information. per chart pt sustained ACUTE CVA in JUNE 2024 and has in/out hospital and rehab since then.     Prior Level of Function:  Receives Help From: Personal care attendant  ADL Assistance: Needs assistance  Homemaking Assistance: Needs assistance  Ambulatory Assistance:  (unsure of ambulatory status lately. chart review indicates decline in ambulation and overall mobility since DC from hospital after CVA. pt unable to state if he gets out of bed. suspect pt requires a lot of assist)  Hand Dominance:  (unknown)      Pain:  Assessment: Unable to self-report  Score:    Type: Chronic pain, Acute pain  Location: Abdomen        OBJECTIVE      Cognition:  Overall Cognitive Status: Impaired  Arousal/Alertness: Inconsistent responses to stimuli  Orientation Level:  (oriented to person. not fully oriented to place, time or situation)  Following Commands:  (followed 50% commands)  Cognition Comments: pt nods head yes/no. makes vocal sounds but otherwise non verbal  Processing Speed: Delayed             Current ADL function:   EATING:  Total     GROOMING: Total     BATHING: Total     UB DRESSING: Total     LB DRESSING: Total     TOILETING: Total    ADL comments:       Activity Tolerance:  Endurance: Decreased tolerance for upright activites    Bed Mobility/Transfers:   Bed Mobility  Bed Mobility: No (pt lethargic and not following commands well enough. needing a lot/total assist from nursing to turn)  Transfers  Transfer: No (not appropriate to assess at this time)    Ambulation/Gait Training:  Functional Mobility  Functional Mobility Performed: No (not appropriate to assess at this time)        Vision: Vision - Basic Assessment  Current Vision:  (unknown. difficult to assess fully)   and Vision - Complex Assessment  Ocular Range of Motion:  (minimal occular movement. most of session  pt's gaze was fixed. minimal pupil change)  Head Position:  (upright, slightly turned left)  Tracking:  (regards caregivers with multiple cues and coaching. regards items in room with cues)    Sensation:  Light Touch:  (unknown)    Strength:  Strength Comments: RUE 2/5, LUE 2-/5 (except bilateral hand grasp is 5/5)    Perception:  Inattention/Neglect:  (favoring left side)    Coordination:  Movements are Fluid and Coordinated:  (UE movements/patterns are slow and bradykinetic.)     Hand Function:  Hand Function  Gross Grasp: Functional    Extremities: RUE   RUE : Within Functional Limits and LUE   LUE: Within Functional Limits    Outcome Measures: [facility] Daily Activity  Putting on and taking off regular lower body clothing: Total  Bathing (including washing, rinsing, drying): Total  Putting on and taking off regular upper body clothing: Total  Toileting, which includes using toilet, bedpan or urinal: Total  Taking care of personal grooming such as brushing teeth: Total  Eating Meals: Total  Daily Activity - Total Score: 6                    EDUCATION:     Education Documentation  ADL Training, taught by Poonam Kumar OT at 8/28/2024  4:10 PM.  Learner: Patient  Readiness: Nonacceptance  Method: Explanation, Demonstration  Response: Needs Reinforcement    Education Comments  No comments found.        Goals:   Encounter Problems       Encounter Problems (Active)       ADLs       Patient will complete daily grooming tasks brushing teeth and washing face/hair with set-up and supervision level of assistance and PRN adaptive equipment while supine in bed. (Progressing)       Start:  08/28/24    Expected End:  09/11/24               COGNITION/SAFETY       Patient will follow 100% simple commands to allow improved ADL performance. (Progressing)       Start:  08/28/24    Expected End:  09/11/24               TRANSFERS       Patient will perform bed mobility moderate assist level of assistance and bed rails in order to  improve safety and independence with mobility (Progressing)       Start:  08/28/24    Expected End:  09/11/24               VISION       Patient will visually attend to Left/Right  side of Tray and Body using compensatory strategies and  set-up and supervision level of assistance.  (Progressing)       Start:  08/28/24    Expected End:  09/11/24

## 2024-08-28 NOTE — PROGRESS NOTES
Physical Therapy                 Therapy Communication Note    Patient Name: Abdi Wilson  MRN: [id number]  Today's Date: 8/28/2024     Discipline: Physical Therapy    Missed Visit Reason: Missed Visit Reason: Other (Comment) (Unavailable due to working with SLP, reattempt later today)    Missed Time: Attempt    Comment:

## 2024-08-28 NOTE — CONSULTS
Nutrition Initial Assessment:   Nutrition Assessment    Reason for Assessment: Admission nursing screening    Medical history per chart:   Hypertension, dyslipidemia, old CVA, mild cardiomyopathy, ulcerative colitis, GERD, recent CVA in June of this year with dysphagia and expressive aphasia, PEG placement (8/8/24)    HPI:  Patient presents with concern for abdominal wound infection (midline wound site) and abdominal wall abscess  Patient s/p recent perisplenic abscess and IR drainage with NESSA drain placement    8/28:  Patient seen in ICU, awake, son present at bedside.  Patient's son reports patient was tolerating TF at nursing facility PTA, will monitor plan of care and for TF advancement/tolerance.  History of possible weight loss based on available weight history.  Noted wounds present.           Nutrition History:  Food and Nutrient History: Per [facility]:   Jevity 1.5 at 60 mL/hr + free water flush of 200 mL every 4 hours.       Current Diet: NPO Diet; Effective now      Nutrition Related Findings:   Oral Symptoms: swallowing     GI symptoms: no GI issues at this time.   BM: Last BM Date: 08/27/24  Food allergies: NKFA. has No Known Allergies.  Meds/Labs reviewed.  atorvastatin, 80 mg, oral, Nightly  carvedilol, 6.25 mg, oral, BID  cefepime, 2 g, intravenous, q8h  hydrALAZINE, 25 mg, oral, TID  losartan, 50 mg, oral, Daily  metoprolol, 5 mg, intravenous, Once  sennosides, 2 tablet, oral, BID  [START ON 8/29/2024] vancomycin, 1,500 mg, intravenous, q24h       D5 % and 0.9 % sodium chloride, 75 mL/hr, Last Rate: 75 mL/hr (08/27/24 2051)         Nutrition Significant Labs:    Results from last 7 days   Lab Units 08/28/24  0459 08/27/24  0237 08/23/24  0448 08/22/24  0516   GLUCOSE mg/dL 340* 94 103* 93   SODIUM mmol/L 137 134* 134* 135*   POTASSIUM mmol/L 3.8 4.2 4.0 4.3   CHLORIDE mmol/L 106 100 104 104   CO2 mmol/L 25 24 22 23   BUN mg/dL 15 16 17 16   CREATININE mg/dL 0.97 0.72 0.82 0.81  "  EGFR mL/min/1.73m*2 78 >90 88 89   CALCIUM mg/dL 7.5* 9.6 8.1* 8.3*   MAGNESIUM mg/dL  --   --  2.08 2.13     Lab Results   Component Value Date    HGBA1C 5.7 (H) 06/21/2024    HGBA1C 5.9 (A) 07/18/2023     Results from last 7 days   Lab Units 08/23/24  0001 08/22/24  1805 08/22/24  0607 08/22/24  0006 08/21/24  1802 08/21/24  1217   POCT GLUCOSE mg/dL 124* 127* 101* 108* 107* 122*       Anthropometrics:  Height: 175.3 cm (5' 9.02\")   Weight: 79.9 kg (176 lb 2.4 oz)   BMI (Calculated): 26  IBW/kg (Dietitian Calculated): 72.7 kg         Weight History:   Wt Readings from Last 10 Encounters:   08/27/24 79.9 kg (176 lb 2.4 oz)   08/23/24 79.9 kg (176 lb 3.2 oz)   08/08/24 94.3 kg (208 lb)   07/02/24 94.4 kg (208 lb 1.8 oz)   06/29/24 99.8 kg (220 lb)   06/27/24 91 kg (200 lb 9.9 oz)   09/16/22 90 kg (198 lb 6.6 oz)        Weight Change %:  Weight History / % Weight Change: Potential 15% weight loss x 2 months (7/2/24 208 lb, 8/20/24 194 lbs/taken from previous admit, 8/27/24 176 lbs).  Significant Weight Loss: Yes  Interpretation of Weight Loss: >7.5% in 3 months       Nutrition Focused Physical Exam Findings:    Subcutaneous Fat Loss:   Orbital Fat Pads: Mild-Moderate (slight dark circles and slight hollowing)  Buccal Fat Pads: Defer  Triceps: Defer  Ribs: Defer  Muscle Wasting:  Temporalis: Mild-Moderate (slight depression)  Pectoralis (Clavicular Region): Defer  Deltoid/Trapezius: Defer  Interosseous: Defer  Trapezius/Infraspinatus/Supraspinatus (Scapular Region): Defer  Quadriceps: Defer  Gastrocnemius: Defer  Edema:  Edema: none  Physical Findings:  Skin: Positive (abdominal inscision, PI to bilateral buttocks, left upper flank wound)    Estimated Needs:   Total Energy Estimated Needs (kCal):  (5189-4037)  Method for Estimating Needs: 30, IBW  Total Protein Estimated Needs (g):  (85-95)  Method for Estimating Needs: 1.2-1.3, IBW     Method for Estimating Needs: 1 mL, kcal or per physician        Nutrition " Diagnosis   Nutrition Diagnosis:  Malnutrition Diagnosis  Patient has Malnutrition Diagnosis:  (unable to determine at this time)    Nutrition Diagnosis  Patient has Nutrition Diagnosis: Yes  Diagnosis Status (1): New  Nutrition Diagnosis 1: Increased nutrient needs  Related to (1): wound healing, infection  As Evidenced by (1): multiple noted wounds, potential recent significant weight loss  Additional Nutrition Diagnosis: Diagnosis 2  Diagnosis Status (2): New  Nutrition Diagnosis 2: Inadequate oral intake  Related to (2): dysphagia  As Evidenced by (2): NPO status with need for enteral nutrition support       Nutrition Interventions/Recommendations   Nutrition Interventions and Recommendations:        Nutrition Prescription:  Individualized Nutrition Prescription Provided for : Enteral nutrition support        Nutrition Interventions:   Food and/or Nutrient Delivery Interventions  Interventions: Enteral intake  Enteral Intake: Other (Comment)  Goal: TF recommendations for when medically feasible:  Jevity 1.5, initiate at 10 mL/hr and advance 10 mL every 4 hours until goal rate of 60 mL/hr to provide a total of 1440 mL, 2160 kcals, 92 gm protein, 1094 mL free water  Additional Interventions: Recommend free water flush of 200 mL every 4 hours for an additional 1200 mL free water (2294 mL total w/TF at goal)    Coordination of Nutrition Care by a Nutrition Professional  Collaboration and Referral of Nutrition Care: Collaboration by nutrition professional with other providers  Goal: Discussed with ISHMAEL Farmer.    Nutrition Education:   Education Documentation  No documentation found.      Nutrition Counseling  Counseling Theoretical Approach: Other (Comment)  Goal: N/A       Nutrition Monitoring and Evaluation   Monitoring/Evaluation:   Food/Nutrient Related History Monitoring  Monitoring and Evaluation Plan: Energy intake  Energy Intake: Estimated energy intake  Criteria: meet >75% of estimated needs from TF with good  tolerance    Body Composition/Growth/Weight History  Monitoring and Evaluation Plan: Weight  Weight: Weight change  Criteria: Maintain stable weight, monitor trends    Biochemical Data, Medical Tests and Procedures  Monitoring and Evaluation Plan: Electrolyte/renal panel, Glucose/endocrine profile  Electrolyte and Renal Panel: Sodium, Potassium, Phosphorus, Magnesium  Criteria: WNL  Glucose/Endocrine Profile: Glucose, casual  Criteria: WNL    Nutrition Focused Physical Findings  Monitoring and Evaluation Plan: Skin  Skin: Impaired wound healing  Criteria: Promote healing            Time Spent/Follow-up Reminder:   Follow Up  Time Spent (min): 45 minutes  Last Date of Nutrition Visit: 08/28/24  Nutrition Follow-Up Needed?: Dietitian to reassess per policy  Follow up Comment: 8/30

## 2024-08-28 NOTE — PROGRESS NOTES
Physical Therapy    Physical Therapy Evaluation    Patient Name: Abdi Wilson  MRN: [id number]  Today's Date: 8/28/2024   Time Calculation  Start Time: 1416  Stop Time: 1427  Time Calculation (min): 11 min  04/04-A    Assessment/Plan   PT Assessment  PT Assessment Results: Decreased strength, Decreased endurance, Decreased mobility, Decreased cognition  Rehab Prognosis: Fair  Barriers to Discharge: none  Evaluation/Treatment Tolerance: Patient limited by fatigue  Medical Staff Made Aware: Yes  End of Session Communication: Bedside nurse  Assessment Comment: Patient requires significant assist for mobility/repositioning at this time, able to assist with ROM EMELY LE's. Would benefit from continued therapy at discharge to improve functional independence.  End of Session Patient Position: Bed, 3 rail up, Alarm off, not on at start of session, Alarm off, caregiver present  IP OR SWING BED PT PLAN  Inpatient or Swing Bed: Inpatient  PT Plan  Treatment/Interventions: Bed mobility, Transfer training, Balance training, Strengthening, Endurance training, Therapeutic exercise, Therapeutic activity  PT Plan: Ongoing PT  PT Frequency: 3 times per week  PT Discharge Recommendations: Moderate intensity level of continued care  PT - OK to Discharge: Yes (when medically cleared)      General Visit Information:  General  Reason for Referral: Dx: Abd wound infection, ? absccess  Referred By: Marino  Past Medical History Relevant to Rehab: dementia, CVA 6/24 with expressive aphasia, PEG 8/24 with multiple recent admits, prior CVA in past, CAD, HTN  Prior to Session Communication: Bedside nurse  Patient Position Received: Bed, 3 rail up, Alarm off, not on at start of session, Alarm off, caregiver present (RN present at start and end of eval)  General Comment: Patient seen in room 1004, tele, PEG, IV. Patient non verbal, does nod head occasionally to answer    Home Living:  Home Living  Type of Home:  (Patient has been at SNF/NH since CVA  in June. During eval in June, patient was able to amb short distance with 2 assist. During last recent stay, patient was mostly bed-bound, limited participation, in part due to abd pain/issues.)    Prior Level of Function:       Precautions:  Precautions  Precautions Comment: falls    Vital Signs:     Objective     Pain:  Pain Assessment  0-10 (Numeric) Pain Score:  (no apparent pain with activity; per reports patient has complained of abd pain)    Cognition:  Cognition  Overall Cognitive Status:  (Oriented to self. Follows up to 50% commands with repeated cues prn, appears with decreased attention)    General Assessments:  General Observation  General Observation: Completed EMELY LE AAROM/PROM for all major planes of motion   Activity Tolerance  Endurance: Tolerates less than 10 min exercise, no significant change in vital signs     Strength  Strength Comments: generalized weakness throughout. EMELY LE hips/knees/ankles grossly 2-/5 for all major muscle groups.                   Functional Assessments:     Bed Mobility  Bed Mobility:  (dependent assist for repositioning)                Extremity/Trunk Assessments:           LLE   LLE :  (ROM WFL, initially slight resistence to flex L LE)    Outcome Measures:     [facility] Basic Mobility  Turning from your back to your side while in a flat bed without using bedrails: Total  Moving from lying on your back to sitting on the side of a flat bed without using bedrails: Total  Moving to and from bed to chair (including a wheelchair): Total  Standing up from a chair using your arms (e.g. wheelchair or bedside chair): Total  To walk in hospital room: Total  Climbing 3-5 steps with railing: Total  Basic Mobility - Total Score: 6                                                             Goals:  Encounter Problems       Encounter Problems (Active)       PT Problem       mobility       Start:  08/28/24    Expected End:  09/11/24       Patient will perform all mobility (transfers/bed  mobility) with Mod assist x 2         exercises       Start:  08/28/24    Expected End:  09/11/24       Patient will perform 20+ reps of AAROM/AROM for EMELY LE's to improve safety and functional independence              Pain - Adult            Education Documentation  Precautions, taught by Luh Myrick, PT at 8/28/2024  4:19 PM.  Learner: Patient  Readiness: Acceptance  Method: Explanation  Response: Needs Reinforcement    Mobility Training, taught by Luh Myrick, PT at 8/28/2024  4:19 PM.  Learner: Patient  Readiness: Acceptance  Method: Explanation  Response: Needs Reinforcement    Education Comments  No comments found.

## 2024-08-28 NOTE — SIGNIFICANT EVENT
Notified by nursing staff that the patient was retaining urine and had poor urinary output since the beginning of shift.  He was noted to have a bladder scan of 471 at which time a straight cath was ordered.  If the patient has persistent urinary retention then would likely need a Cleveland catheter with outpatient follow-up with urology.,  Continue management per primary team with regards to presentation and management thus far.

## 2024-08-28 NOTE — PROGRESS NOTES
Social work consult placed for positive medical risk screen. SW reviewed pt's chart and communicated with TCC. No SW needs foreseen at this time. SW signing off; available upon request.    DAKOTAH Michel, LSW ([id number])   Care Transitions

## 2024-08-29 ENCOUNTER — APPOINTMENT (OUTPATIENT)
Dept: RADIOLOGY | Facility: HOSPITAL | Age: 79
End: 2024-08-29
Payer: MEDICARE

## 2024-08-29 LAB
ANION GAP SERPL CALC-SCNC: 9 MMOL/L (ref 10–20)
BUN SERPL-MCNC: 14 MG/DL (ref 6–23)
CALCIUM SERPL-MCNC: 7.7 MG/DL (ref 8.6–10.3)
CHLORIDE SERPL-SCNC: 107 MMOL/L (ref 98–107)
CO2 SERPL-SCNC: 23 MMOL/L (ref 21–32)
CREAT SERPL-MCNC: 0.74 MG/DL (ref 0.5–1.3)
EGFRCR SERPLBLD CKD-EPI 2021: >90 ML/MIN/1.73M*2
ERYTHROCYTE [DISTWIDTH] IN BLOOD BY AUTOMATED COUNT: 14.6 % (ref 11.5–14.5)
GLUCOSE BLD MANUAL STRIP-MCNC: 117 MG/DL (ref 74–99)
GLUCOSE BLD MANUAL STRIP-MCNC: 123 MG/DL (ref 74–99)
GLUCOSE SERPL-MCNC: 210 MG/DL (ref 74–99)
HCT VFR BLD AUTO: 36.7 % (ref 41–52)
HGB BLD-MCNC: 12.3 G/DL (ref 13.5–17.5)
MCH RBC QN AUTO: 28.9 PG (ref 26–34)
MCHC RBC AUTO-ENTMCNC: 33.5 G/DL (ref 32–36)
MCV RBC AUTO: 86 FL (ref 80–100)
NRBC BLD-RTO: 0 /100 WBCS (ref 0–0)
PLATELET # BLD AUTO: 405 X10*3/UL (ref 150–450)
POTASSIUM SERPL-SCNC: 3.7 MMOL/L (ref 3.5–5.3)
RBC # BLD AUTO: 4.25 X10*6/UL (ref 4.5–5.9)
SODIUM SERPL-SCNC: 135 MMOL/L (ref 136–145)
WBC # BLD AUTO: 8.1 X10*3/UL (ref 4.4–11.3)

## 2024-08-29 PROCEDURE — 36410 VNPNXR 3YR/> PHY/QHP DX/THER: CPT

## 2024-08-29 PROCEDURE — 2500000004 HC RX 250 GENERAL PHARMACY W/ HCPCS (ALT 636 FOR OP/ED): Performed by: PHARMACIST

## 2024-08-29 PROCEDURE — C1751 CATH, INF, PER/CENT/MIDLINE: HCPCS

## 2024-08-29 PROCEDURE — 82947 ASSAY GLUCOSE BLOOD QUANT: CPT

## 2024-08-29 PROCEDURE — 2500000004 HC RX 250 GENERAL PHARMACY W/ HCPCS (ALT 636 FOR OP/ED): Mod: JZ | Performed by: INTERNAL MEDICINE

## 2024-08-29 PROCEDURE — 05HY33Z INSERTION OF INFUSION DEVICE INTO UPPER VEIN, PERCUTANEOUS APPROACH: ICD-10-PCS | Performed by: INTERNAL MEDICINE

## 2024-08-29 PROCEDURE — 2500000001 HC RX 250 WO HCPCS SELF ADMINISTERED DRUGS (ALT 637 FOR MEDICARE OP): Performed by: INTERNAL MEDICINE

## 2024-08-29 PROCEDURE — 1200000002 HC GENERAL ROOM WITH TELEMETRY DAILY

## 2024-08-29 PROCEDURE — 99233 SBSQ HOSP IP/OBS HIGH 50: CPT | Performed by: INTERNAL MEDICINE

## 2024-08-29 PROCEDURE — 2780000003 HC OR 278 NO HCPCS

## 2024-08-29 PROCEDURE — 36415 COLL VENOUS BLD VENIPUNCTURE: CPT

## 2024-08-29 PROCEDURE — 2500000001 HC RX 250 WO HCPCS SELF ADMINISTERED DRUGS (ALT 637 FOR MEDICARE OP): Performed by: NURSE PRACTITIONER

## 2024-08-29 PROCEDURE — 80048 BASIC METABOLIC PNL TOTAL CA: CPT

## 2024-08-29 PROCEDURE — 85027 COMPLETE CBC AUTOMATED: CPT

## 2024-08-29 PROCEDURE — 2500000004 HC RX 250 GENERAL PHARMACY W/ HCPCS (ALT 636 FOR OP/ED): Performed by: NURSE PRACTITIONER

## 2024-08-29 RX ORDER — LIDOCAINE HYDROCHLORIDE 10 MG/ML
5 INJECTION INFILTRATION; PERINEURAL ONCE
Status: DISCONTINUED | OUTPATIENT
Start: 2024-08-29 | End: 2024-08-30 | Stop reason: HOSPADM

## 2024-08-29 ASSESSMENT — PAIN SCALES - PAIN ASSESSMENT IN ADVANCED DEMENTIA (PAINAD)
TOTALSCORE: 0
CONSOLABILITY: NO NEED TO CONSOLE
BODYLANGUAGE: RELAXED
FACIALEXPRESSION: SMILING OR INEXPRESSIVE
BREATHING: NORMAL

## 2024-08-29 ASSESSMENT — ENCOUNTER SYMPTOMS
VOMITING: 0
EYES NEGATIVE: 1
DYSURIA: 0
ABDOMINAL PAIN: 1
CONSTIPATION: 0
CARDIOVASCULAR NEGATIVE: 1
MUSCULOSKELETAL NEGATIVE: 1
COUGH: 0
FEVER: 0
ABDOMINAL DISTENTION: 0
RESPIRATORY NEGATIVE: 1
CHILLS: 0
DIFFICULTY URINATING: 0
BLOOD IN STOOL: 0
SHORTNESS OF BREATH: 0
DIARRHEA: 0
NAUSEA: 0
NEUROLOGICAL NEGATIVE: 1

## 2024-08-29 ASSESSMENT — PAIN SCALES - GENERAL
PAINLEVEL_OUTOF10: 0 - NO PAIN
PAINLEVEL_OUTOF10: 0 - NO PAIN

## 2024-08-29 ASSESSMENT — PAIN - FUNCTIONAL ASSESSMENT
PAIN_FUNCTIONAL_ASSESSMENT: CPOT (CRITICAL CARE PAIN OBSERVATION TOOL)
PAIN_FUNCTIONAL_ASSESSMENT: CPOT (CRITICAL CARE PAIN OBSERVATION TOOL)

## 2024-08-29 NOTE — PROGRESS NOTES
Abdi Wilson is a 79 y.o. male on day 2 of admission presenting with Abdominal wall abscess.      Assessment/Plan:    #Abdominal wall abscess s/p bedside wound drainage 8/27  #Splenic abscess s/p drain 8/19/24  Previous culture positive for Pseudomonas and Enterobacter cloacae.  On vancomycin and cefepime.    #Chronic dysphagia s/p PEG tube  #Dementia  #Stroke      Recommendations:    -DC vancomycin  -Continue cefepime 2 g every 8 hours  -Obtain midline  -Will continue to follow      Helio Weinberg MD  Date of service: 8/29/2024  Time of service: 9:38 AM      Subjective   Interval History: No acute events overnight.  Patient denies any new complaint.  Son at bedside concerned about recurrent infections.  Explained that he is on appropriate antibiotics at this time.        Review of Systems  Denies: fever, chills, nausea, vomiting, diarrhea, dysuria    Objective   Range of Vitals (last 24 hours)  Heart Rate:  [67-84]   Temp:  [36.2 °C (97.2 °F)-37 °C (98.6 °F)]   Resp:  [6-22]   BP: ()/(52-74)   Weight:  [83 kg (182 lb 15.7 oz)]   SpO2:  [92 %-99 %]  Daily Weight  08/29/24 : 83 kg (182 lb 15.7 oz)   Body mass index is 27.01 kg/m².      General: alert, oriented, NAD  Lungs: bilaterally clear to auscultation, no wheezing  Abdomen: soft, PE tube, LUQ NESSA drain with pus, clean dressing  Neuro: AAO x 3, able to follow commands  Skin: No rashes or ulcers  MSK: No joint inflammation         Antibiotics  cefepime - 2 gram/100 mL  vancomycin - 1500 mg/500 mL      Relevant Results  Labs  Results from last 72 hours   Lab Units 08/29/24  0441 08/28/24  0459 08/27/24  0237   WBC AUTO x10*3/uL 8.1 8.4 11.0   HEMOGLOBIN g/dL 12.3* 12.6* 15.8   HEMATOCRIT % 36.7* 38.8* 48.1   PLATELETS AUTO x10*3/uL 405 472* 566*   NEUTROS PCT AUTO %  --   --  72.1   LYMPHS PCT AUTO %  --   --  14.4   MONOS PCT AUTO %  --   --  10.7   EOS PCT AUTO %  --   --  1.7     Results from last 72 hours   Lab Units 08/29/24  0441 08/28/24  0458  08/27/24  0237   SODIUM mmol/L 135* 137 134*   POTASSIUM mmol/L 3.7 3.8 4.2   CHLORIDE mmol/L 107 106 100   CO2 mmol/L 23 25 24   BUN mg/dL 14 15 16   CREATININE mg/dL 0.74 0.97 0.72   GLUCOSE mg/dL 210* 340* 94   CALCIUM mg/dL 7.7* 7.5* 9.6   ANION GAP mmol/L 9* 10 14   EGFR mL/min/1.73m*2 >90 78 >90     Results from last 72 hours   Lab Units 08/27/24  0237   ALK PHOS U/L 135   BILIRUBIN TOTAL mg/dL 0.7   PROTEIN TOTAL g/dL 7.9   ALT U/L 41   AST U/L 38   ALBUMIN g/dL 3.5     Estimated Creatinine Clearance: 78.3 mL/min (by C-G formula based on SCr of 0.74 mg/dL).  CRP   Date Value Ref Range Status   07/28/2023 3.33 (A) mg/dL Final     Comment:     REF VALUE  < 1.00     07/26/2023 7.32 (A) mg/dL Final     Comment:     REF VALUE  < 1.00     07/24/2023 2.15 (A) mg/dL Final     Comment:     REF VALUE  < 1.00         Microbiology  Susceptibility data from last 14 days.  Collected Specimen Info Organism Amoxicillin/Clavulanate Ampicillin Ampicillin/Sulbactam Aztreonam Cefazolin Cefepime Ceftazidime Ciprofloxacin Gentamicin Levofloxacin Piperacillin/Tazobactam Tobramycin   08/19/24 Fluid from Aspirate Enterobacter cloacae complex  R  R  R   R  S   S  S  S  S      Pseudomonas aeruginosa     S   S  S  R   R  S  S     Collected Specimen Info Organism Trimethoprim/Sulfamethoxazole   08/19/24 Fluid from Aspirate Enterobacter cloacae complex  R     Pseudomonas aeruginosa        Imaging    CT abdomen pelvis w IV contrast    Result Date: 8/27/2024  Status post percutaneous gastrostomy to with complex collection noted in the abdominal wall inferiorly with extension through the ventral abdomen and few associated foci of pneumoperitoneum as described. Findings appear new since prior imaging.   Possible 4 cm collection along the gastric wall, possibly present on prior imaging.   Complex left subdiaphragmatic/perisplenic collection with drainage catheter appear smaller since prior imaging.   Mesenteric stranding.   Fluid within the  colon which may be infectious or inflammatory.   Complex/hyperdense left renal cystic lesion again noted.   Small pericardial effusion.   Partially imaged left pleural effusion with adjacent atelectasis.   Coronary artery calcifications and additional findings as detailed.   MACRO: None   Signed by: Valorie Montano 8/27/2024 5:27 AM Dictation workstation:   [id number]    IR body drain placement    Result Date: 8/19/2024  Successful ultrasound-guided placement of a 10 [place] drain into the left upper quadrant abscess as outlined above.     Performed and dictated at [facility].   MACRO: None   Signed by: Adria Wynn 8/19/2024 12:38 PM Dictation workstation:   [id number]    CT abdomen pelvis w IV contrast    Result Date: 8/18/2024  15.7 x 9.7 x 11.1 cm perisplenic, possibly subcapsular, collection of fluid and gas most compatible with abscess.   Percutaneous gastrostomy tube is present and appears appropriately positioned. Free air seen on prior imaging 08/10/2024 adjacent to the malpositioned gastrostomy tube at that time is now near resolved with a small persistent punctate focus of free air adjacent to the stomach.   Defect in the anterior abdominal wall with overlying staples new from prior imaging most compatible with interval surgery. Mild stranding and fluid within this defect without evidence of a well-defined collection..   The bladder is decompressed with a [place] catheter, which limits evaluation, however, there is bladder wall thickening and mild adjacent stranding. Findings may be seen with cystitis. Correlate with urinalysis.   Partially visualized moderate left and small right pleural effusions with superimposed atelectasis.   3 cm simple appearing cyst in the left kidney. Additional indeterminate hypodensities in the left kidney measure up to approximately 1.6 cm. Recommend nonemergent MRI to further characterize.   Prominence of submucosal fat in the distal colon  which may be seen with chronic inflammatory processes.   MACRO: Critical Finding:  See findings. Notification was initiated on 8/18/2024 at 4:08 am by  Evan Finkelstein.  (**-YCF-**) Instructions:   Signed by: Evan Finkelstein 8/18/2024 4:08 AM Dictation workstation:   [id number]    FL upper GI w KUB    Result Date: 8/14/2024  1.  PEG tube is positioned in the stomach with no extravasation. 2. Prompt passage of contrast into the duodenal and no gastroesophageal reflux     MACRO: None   Signed by: Mei Crum 8/14/2024 10:10 AM Dictation workstation:   [id number]    XR chest 2 views    Result Date: 8/14/2024  1.  Unchanged cardiomegaly 2. Pleural effusions and basilar atelectasis, left worse than right and unchanged from the prior exam       MACRO: None   Signed by: Mei Crum 8/14/2024 9:22 AM Dictation workstation:   [id number]    XR chest abdomen for OG NG placement    Result Date: 8/13/2024  Tube projects in the stomach   Bilateral basilar atelectasis     MACRO: None   Signed by: Mei Crum 8/13/2024 1:01 PM Dictation workstation:   [id number]    CT head wo IV contrast    Result Date: 8/12/2024  1.  No acute intracranial hemorrhage or acute territorial infarct. 2.  Diffuse parenchymal volume loss. Chronic changes.     MACRO: None.   Signed by: Judith Velarde 8/12/2024 6:34 PM Dictation workstation:   [id number]    CT abdomen pelvis w IV contrast    Result Date: 8/10/2024  1. There is a malpositioned PEG tube insufflated in the fatty anterior to the stomach. There is a small amount of free air in this location. This could be secondary to malpositioned PEG tube rather than a ruptured viscus but should be correlated clinically. 2. Nonspecific thickening of loops of small bowel in the left side of the abdomen with induration of the mesentery and a small amount of ascites. This could represent Crohn's disease or enteritis. 3. Fibrofatty infiltration of the colon from the transverse colon to  the rectum. This can be seen with Crohn's disease, inactive. 4. Benign bilateral simple renal cysts. 5. The urinary bladder with a volume of 559 cc. 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   MACRO: None   Signed by: Yamilet Heck 8/10/2024 2:22 PM Dictation workstation:   [id number]    XR chest 1 view    Result Date: 8/10/2024  No acute cardiopulmonary process.   MACRO: None   Signed by: Yamilet Heck 8/10/2024 1:46 PM Dictation workstation:   [id number]    XR ABDOMEN FOR NG/OG/NE TUBE PLACEMENT    Result Date: 7/30/2024  Feeding tube tip projects over the region of the gastric antrum/duodenal bulb.    XR ABDOMEN FOR NG/OG/NE TUBE PLACEMENT    Result Date: 7/30/2024  Findings suggestive of NG/OG into the proximal stomach. RECOMMENDATION: Clinical correlation.

## 2024-08-29 NOTE — PROGRESS NOTES
Vancomycin Dosing by Pharmacy- Cessation of Therapy    Consult to pharmacy for vancomycin dosing has been discontinued by the prescriber, pharmacy will sign off at this time.    Please call pharmacy if there are further questions or re-enter a consult if vancomycin is resumed.     Jil Oswald, PharmD

## 2024-08-29 NOTE — PROGRESS NOTES
"GENERAL SURGERY PROGRESS NOTE    Abdi Wilson   1945   [id number]     Abdi Wilson is a 79 y.o. male on day 2 of admission presenting with Abdominal wall abscess.      Subjective  Patient seen and examined. No acute events overnight. He is alert and feeling better, more talkative today. He is passing flatus and had a BM this morning.    Review of Systems   Constitutional:  Negative for chills and fever.   HENT: Negative.     Eyes: Negative.    Respiratory: Negative.  Negative for cough and shortness of breath.    Cardiovascular: Negative.  Negative for chest pain.   Gastrointestinal:  Positive for abdominal pain. Negative for abdominal distention, blood in stool, constipation, diarrhea, nausea and vomiting.   Genitourinary: Negative.  Negative for difficulty urinating and dysuria.   Musculoskeletal: Negative.    Skin: Negative.    Neurological: Negative.        Objective    Last Recorded Vitals  Blood pressure 129/74, pulse 79, temperature 36.9 °C (98.4 °F), temperature source Temporal, resp. rate 16, height 1.753 m (5' 9.02\"), weight 83 kg (182 lb 15.7 oz), SpO2 94%.    Intake/Output last 3 Shifts:  I/O last 3 completed shifts:  In: 3105 (37.4 mL/kg) [I.V.:2000 (24.1 mL/kg); Other:5; IV Piggyback:1100]  Out: 1005 (12.1 mL/kg) [Urine:950 (0.3 mL/kg/hr); Drains:55]  Weight: 83 kg     Gen: NAD. A&O x1  HEENT: NC/AT.  Moist mucous membranes.  Neck: Normal range of motion.  CV: Regular rate.  Chest: Normal chest rise.  Normal respiratory effort.  Abdomen: Soft. NT/ND.  No rigidity or guarding. PEG tube in place without surrounding erythema or drainage. Midline incision with fascia intact and minimal strike through on dressings. Left NESSA drain with minimally purulent drainage improved from yesterday.  Extremities: No edema.  Moving all extremities.  Skin: Warm and dry; midline wound as noted above    Relevant Results  Results for orders placed or performed during the hospital encounter of 08/27/24 (from the past 24 " hour(s))   POCT GLUCOSE   Result Value Ref Range    POCT Glucose 96 74 - 99 mg/dL   POCT fingerstick glucose manually resulted   Result Value Ref Range    POC Fingerstick Blood Glucose 96 70 - 100 mg/dl   CBC   Result Value Ref Range    WBC 8.1 4.4 - 11.3 x10*3/uL    nRBC 0.0 0.0 - 0.0 /100 WBCs    RBC 4.25 (L) 4.50 - 5.90 x10*6/uL    Hemoglobin 12.3 (L) 13.5 - 17.5 g/dL    Hematocrit 36.7 (L) 41.0 - 52.0 %    MCV 86 80 - 100 fL    MCH 28.9 26.0 - 34.0 pg    MCHC 33.5 32.0 - 36.0 g/dL    RDW 14.6 (H) 11.5 - 14.5 %    Platelets 405 150 - 450 x10*3/uL   Basic metabolic panel   Result Value Ref Range    Glucose 210 (H) 74 - 99 mg/dL    Sodium 135 (L) 136 - 145 mmol/L    Potassium 3.7 3.5 - 5.3 mmol/L    Chloride 107 98 - 107 mmol/L    Bicarbonate 23 21 - 32 mmol/L    Anion Gap 9 (L) 10 - 20 mmol/L    Urea Nitrogen 14 6 - 23 mg/dL    Creatinine 0.74 0.50 - 1.30 mg/dL    eGFR >90 >60 mL/min/1.73m*2    Calcium 7.7 (L) 8.6 - 10.3 mg/dL       CT abdomen pelvis w IV contrast    Result Date: 8/27/2024  Interpreted By:  Valorie Montano, STUDY: CT ABDOMEN PELVIS W IV CONTRAST;  8/27/2024 4:56 am   INDICATION: Signs/Symptoms:abdominal wall infection, s/p recent peg with complication.   COMPARISON: 08/17/2024   ACCESSION NUMBER(S): [id number]   ORDERING CLINICIAN: DOLORES HANCOCK   TECHNIQUE: Axial CT images of the abdomen and pelvis with coronal and sagittal reconstructed images obtained after intravenous administration of contrast   FINDINGS: LOWER CHEST: Redemonstrated left pleural effusion and adjacent atelectasis, partially imaged. Coronary artery calcifications noted however exam is not optimized for evaluation. Small pericardial effusion. BONES: No acute osseous abnormality. Degenerative changes with minimal grade 1 anterolisthesis of L3 on L4 and L4 on L5. Mild retrolisthesis of L5 on S1. ABDOMINAL WALL: Status post percutaneous gastrostomy tube again noted. There is a ventral abdominal wall complex collection with air and  possible debris, inferior to the PEG tube, measuring up to 3.1 x 3.2 x 7.4 cm (AP by T by CC) with air-filled extension through the ventral abdominal wall (series 5, image 68/133). A few adjacent face I of free air are noted (series 2, image 63 and 70). Midline skin staples are again noted.   ABDOMEN:   LIVER: Within normal limits. BILE DUCTS: Normal caliber. GALLBLADDER: No calcified gallstones. No wall thickening. PANCREAS: Within normal limits. SPLEEN: A complex subdiaphragmatic and perisplenic collection is noted with fluid and air as well as a surgical drain, decreased in size since prior imaging. This region measures approximately 9.9 x 5.3 cm (oblique AP by T), previously measuring 15.7 x 9.7 cm. ADRENALS: Mild nodularity of the right adrenal body. KIDNEYS and URETERS: Symmetric renal enhancement. No hydronephrosis or perinephric fluid collection. A complex left renal cyst measures up to 1.7 cm. Additional left renal simple cysts noted. Subcentimeter low-attenuation lesion in the right renal the cortex.     VESSELS: Atherosclerotic calcifications without aneurysmal dilatation seen. RETROPERITONEUM: No pathologically enlarged retroperitoneal lymph nodes.   PELVIS:   REPRODUCTIVE ORGANS: The prostate is enlarged up to 6.1 cm. Correlation with PSA recommended. BLADDER: Wall thickening of the urinary bladder noted. Few foci of intraluminal air present, non specific. Interval removal of a Cleveland catheter.   BOWEL: Percutaneous gastrostomy tube appears in otherwise expected position. Mild stranding and possible collection along the proximal greater curvature of the stomach the measuring at least 4.3 cm. The stomach is decompressed, partially limiting evaluation. No dilated loops of small bowel are identified. Fluid contents within colon present. Submucosal fat deposition predominantly in the sigmoid colon.  The appendix is not identified.  Therefore, appendicitis cannot be excluded on the basis of this exam.  However, no significant inflammatory changes are noted within the right lower quadrant. PERITONEUM: Mesenteric stranding and fluid collections as well as free air as previously described.       Status post percutaneous gastrostomy to with complex collection noted in the abdominal wall inferiorly with extension through the ventral abdomen and few associated foci of pneumoperitoneum as described. Findings appear new since prior imaging.   Possible 4 cm collection along the gastric wall, possibly present on prior imaging.   Complex left subdiaphragmatic/perisplenic collection with drainage catheter appear smaller since prior imaging.   Mesenteric stranding.   Fluid within the colon which may be infectious or inflammatory.   Complex/hyperdense left renal cystic lesion again noted.   Small pericardial effusion.   Partially imaged left pleural effusion with adjacent atelectasis.   Coronary artery calcifications and additional findings as detailed.   MACRO: None   Signed by: Valorie Montano 8/27/2024 5:27 AM Dictation workstation:   [id number]    IR body drain placement    Result Date: 8/19/2024  Interpreted By:  Adria Wynn, STUDY: IR BODY DRAIN PLACEMENT;  8/19/2024 12:27 pm   INDICATION: Signs/Symptoms:Abdominal ABscess, Drain?, culture to be sent.   COMPARISON: None.   ACCESSION NUMBER(S): [id number]   ORDERING CLINICIAN: TIMA DE ANDA   TECHNIQUE:   CONSENT: The patient/patient's POA/next of kin was informed of the nature of the proposed procedure. The purposes, alternatives, risks, and benefits were explained and discussed. All questions were answered and consent was obtained.   RADIATION EXPOSURE: None   SEDATION: Moderate conscious IV sedation services (supervision of administration, induction, and maintenance) were provided by the physician performing the procedure with intravenous fentanyl 50 mcg and versed 1 mg for 28 minutes. The physician was assisted by an independent trained observer, an interventional  radiology nurse, in the continuous monitoring of patient level of consciousness and physiologic status.   MEDICATION/CONTRAST: No additional   TIME OUT: A time out was performed immediately prior to procedure start with the interventional team, correctly identifying the patient name, date of birth, MRN, procedure, anatomy (including marking of site and side), patient position, procedure consent form, relevant laboratory and imaging test results, antibiotic administration, safety precautions, and procedure-specific equipment needs.   COMPLICATIONS: No immediate adverse events identified.   FINDINGS: Patient placed in the right posterior oblique position and ultrasound evaluation performed over the left quadrant. Subdiaphragmatic, perisplenic collection identified congruent with CT imaging from 08/17/2024. The lowest intercostal space possible was identified and targeted. The area is prepped and draped in usual sterile fashion. Skin and subcutaneous tissues anesthetized using lidocaine solution. Small skin nick was made. A 10 [place] all-purpose drain was then advanced into the collection under direct ultrasound guidance using a single step technique. With confirmation of placement within the collection per live ultrasound, the catheter was advanced off the sharp trocar into the posterior margin of the collection. Spontaneous drainage of turbid fluid present. A sample was sent for culture and sensitivity. The remainder of the fluid was aspirated to waist. No immediate complications. A single suture was used to secure the catheter.       Successful ultrasound-guided placement of a 10 [place] drain into the left upper quadrant abscess as outlined above.     Performed and dictated at [facility].   MACRO: None   Signed by: Adria Wynn 8/19/2024 12:38 PM Dictation workstation:   [id number]    CT abdomen pelvis w IV contrast    Result Date: 8/18/2024  Interpreted By:  Finkelstein, Evan,  STUDY: CT ABDOMEN PELVIS W IV CONTRAST;  8/17/2024 5:38 pm   INDICATION: Signs/Symptoms:Minimal pain/abdominal drainage/previous malposition PEG tube.   COMPARISON: CT abdomen pelvis 08/10/2024   ACCESSION NUMBER(S): [id number]   ORDERING CLINICIAN: TATIANNA MILLS   TECHNIQUE: Axial CT images of the abdomen and pelvis with coronal and sagittal reconstructed images obtained after intravenous administration of 75 mL Omnipaque 350   FINDINGS: LOWER CHEST: There are coronary artery calcifications. Partially visualized moderate left and small right pleural effusions with superimposed atelectasis.   ABDOMEN:   LIVER: Normal attenuation and contour. BILE DUCTS: Normal caliber. GALLBLADDER: No calcified gallstones. No wall thickening. PORTAL VEIN: Patent SPLEEN: Perisplenic, possibly subcapsular collection of fluid and gas measuring 15.7 x 9.7 x 11.1 cm (maximum AP by transverse by craniocaudal dimensions). There is associated mass effect on the spleen. Remainder of the spleen demonstrates a homogeneous attenuation. PANCREAS: Unremarkable. ADRENALS: Unremarkable. KIDNEYS, URETERS and URINARY BLADDER: Symmetric renal enhancement. No hydronephrosis or perinephric fluid collection. 3 cm hypodensity in the inferior pole of the left kidney measures simple fluid attenuation and is most compatible with a simple cyst. There are additional indeterminate hypodensities in the left kidney measuring approximately 1.5 cm and 1.6 cm in size which measuring intermediate density. The bladder is decompressed with a [place] catheter. There is bladder wall thickening and mild adjacent stranding. REPRODUCTIVE ORGANS: No pelvic masses.   ABDOMINAL WALL: A percutaneous gastrostomy tube is present. Defect in the anterior abdominal wall soft tissues inferior to the gastrostomy tube likely relates to prior surgery with overlying skin staples. Fat containing right inguinal hernia. PERITONEUM: Punctate focus of free air adjacent to the stomach.   BOWEL:  A percutaneous gastrostomy tube is present and appears to be appropriately positioned within the stomach. Free air seen on prior imaging 08/10/2024 adjacent to the malpositioned gastrostomy tube is near resolved with a small punctate focus of air remaining best seen on image 71 of series 2. Prominence of the submucosal fat in the distal colon. The appendix is not definitively visualized, without focal pericecal inflammatory stranding.   VESSELS: Moderate aortoiliac calcifications. RETROPERITONEUM: No pathologically enlarged retroperitoneal lymph nodes.   BONES: No acute osseous abnormality.       15.7 x 9.7 x 11.1 cm perisplenic, possibly subcapsular, collection of fluid and gas most compatible with abscess.   Percutaneous gastrostomy tube is present and appears appropriately positioned. Free air seen on prior imaging 08/10/2024 adjacent to the malpositioned gastrostomy tube at that time is now near resolved with a small persistent punctate focus of free air adjacent to the stomach.   Defect in the anterior abdominal wall with overlying staples new from prior imaging most compatible with interval surgery. Mild stranding and fluid within this defect without evidence of a well-defined collection..   The bladder is decompressed with a [place] catheter, which limits evaluation, however, there is bladder wall thickening and mild adjacent stranding. Findings may be seen with cystitis. Correlate with urinalysis.   Partially visualized moderate left and small right pleural effusions with superimposed atelectasis.   3 cm simple appearing cyst in the left kidney. Additional indeterminate hypodensities in the left kidney measure up to approximately 1.6 cm. Recommend nonemergent MRI to further characterize.   Prominence of submucosal fat in the distal colon which may be seen with chronic inflammatory processes.   MACRO: Critical Finding:  See findings. Notification was initiated on 8/18/2024 at 4:08 am by  Evan Finkelstein.   (**-YCF-**) Instructions:   Signed by: Evan Finkelstein 8/18/2024 4:08 AM Dictation workstation:   [id number]    ECG 12 Lead    Result Date: 8/14/2024  Sinus tachycardia Ventricular premature complex Prolonged RI interval LVH with secondary repolarization abnormality Inferior infarct, old Anterolateral infarct, age indeterminate See ED provider note for full interpretation and clinical correlation Confirmed by Alexandria Cullen ([id number]) on 8/14/2024 6:23:12 PM    FL upper GI w KUB    Result Date: 8/14/2024  Interpreted By:  Mei Crum, STUDY: FL UPPER GI W KUB;  8/14/2024 9:31 am   INDICATION: Signs/Symptoms:Please please contrast through patient's PEG tube in abdomen.   COMPARISON: None.   ACCESSION NUMBER(S): [id number]   ORDERING CLINICIAN: LUCY NUNES   TECHNIQUE: Total fluoroscopy time was  1 minutes 7 seconds with 8 spot images obtained. 60 mL of solution 50% Gastrografin and 50% water was inserted through the PEG tube.   FINDINGS: Peg tube, surgical drain and skin staples are noted in the upper abdomen. An NG tube is also present in the stomach. With injection of contrast the contrast flows freely into the gastric lumen. Contrast promptly passes from stomach into duodenal. No extravasation of contrast is noted outside of the stomach. There was no gastroesophageal reflux identified.       1.  PEG tube is positioned in the stomach with no extravasation. 2. Prompt passage of contrast into the duodenal and no gastroesophageal reflux     MACRO: None   Signed by: Mei Crum 8/14/2024 10:10 AM Dictation workstation:   [id number]    XR chest 2 views    Result Date: 8/14/2024  Interpreted By:  Mei Crum, STUDY: XR CHEST 2 VIEWS;  8/14/2024 9:11 am   INDICATION: Signs/Symptoms:Hypoxia.   COMPARISON: 08/13/2024   ACCESSION NUMBER(S): [id number]   ORDERING CLINICIAN: LUCY NUNES   FINDINGS: AP upright and lateral views were obtained with the patient sitting.   NG tube is  present with the tip below the diaphragm and beyond the image.   CARDIOMEDIASTINAL SILHOUETTE: Heart is enlarged, which is accentuated by the shallow inspiration. Aorta is atherosclerotic.   LUNGS: Bilateral pleural effusions and bilateral basilar atelectasis are present, left worse than right. Appearance is similar to the prior exam.   ABDOMEN: No remarkable upper abdominal findings.   BONES: No acute osseous changes.       1.  Unchanged cardiomegaly 2. Pleural effusions and basilar atelectasis, left worse than right and unchanged from the prior exam       MACRO: None   Signed by: Mei Crum 8/14/2024 9:22 AM Dictation workstation:   [id number]    XR chest abdomen for OG NG placement    Result Date: 8/13/2024  Interpreted By:  Mei Crum, STUDY: XR CHEST ABDOMEN FOR OG NG PLACEMENT; ;  8/13/2024 11:35 am   INDICATION: Signs/Symptoms:NG tube slightly came out. want to check placement.   COMPARISON: 06/23/2024   ACCESSION NUMBER(S): [id number]   ORDERING CLINICIAN: LUCY NUNES   FINDINGS: Supine images including the chest and upper abdomen show NG tube projecting in the expected region of the body of the stomach. A gastrostomy tube also projects in the stomach. A surgical drain is present left side of the abdomen and skin staples projects slightly left of midline in the mid abdomen.   Patchy bowel gas is noted in the visualized portion of the abdomen without dilatation. No gross free air is visible.   Cardiac silhouette is unchanged in size. Atelectasis is present at both lung bases, left worse than right. Lung volumes are shallow.   No acute changes are noted in the osseous structures.       Tube projects in the stomach   Bilateral basilar atelectasis     MACRO: None   Signed by: Mei Crum 8/13/2024 1:01 PM Dictation workstation:   [id number]    CT head wo IV contrast    Result Date: 8/12/2024  Interpreted By:  Judith Velarde, STUDY: CT HEAD WO IV CONTRAST  8/12/2024 5:32 pm    INDICATION: Signs/Symptoms:AMS   COMPARISON: CT head 06/21/2024, 07/17/2023.   ACCESSION NUMBER(S): [id number]   ORDERING CLINICIAN: LUCY NUNES   TECHNIQUE: Serial, axial CT images of the brain were obtained without IV contrast. Coronal and sagittal reformatted images were performed.   FINDINGS: The ventricles, cisterns and sulci are prominent, consistent with diffuse volume loss.  There are areas of nonspecific white matter hypodensity, which are probably age-related or microvascular in nature. Remote infarcts at the bilateral basal ganglia. The gray-white matter differentiation is intact and there is no evidence of acute territorial infarct.  No mass effect or midline shift is seen.  There is no hemorrhage.  No extraaxial fluid collection. No air-fluid levels at the visualized paranasal sinuses. Mucous retention cyst/polyp at the left maxillary sinus. The mastoid air cells are clear. No depressed calvarial fracture.   Partially visualized nasogastric tube inserted through the right nostril.       1.  No acute intracranial hemorrhage or acute territorial infarct. 2.  Diffuse parenchymal volume loss. Chronic changes.     MACRO: None.   Signed by: Judith Velarde 8/12/2024 6:34 PM Dictation workstation:   [id number]    CT abdomen pelvis w IV contrast    Result Date: 8/10/2024  Interpreted By:  Yamilet Heck, STUDY: CT ABDOMEN PELVIS W IV CONTRAST;  8/10/2024 2:08 pm   INDICATION: Signs/Symptoms:abbdominal pain.   COMPARISON: None.   ACCESSION NUMBER(S): [id number]   ORDERING CLINICIAN: DONITA ROBLES   TECHNIQUE: CT of the abdomen and pelvis was performed.  75 mL Omnipaque 350   FINDINGS: LOWER CHEST: There is mild bibasilar atelectasis.   ABDOMEN:   LIVER: There is no hepatic mass.   BILE DUCTS: There is no intrahepatic, common hepatic or common bile ductal dilatation.   GALLBLADDER: The gallbladder is unremarkable.   PANCREAS: The pancreas is unremarkable.   SPLEEN: The spleen is unremarkable.  There is no splenic mass or splenomegaly.   ADRENAL GLANDS: The adrenal glands are unremarkable.   KIDNEYS AND URETERS: The kidneys function symmetrically. There are benign bilateral simple renal cysts. There is no intrarenal calculus or hydronephrosis. The bladder is distended measuring 13.6 x 11.3 x 7.0 cm with a volume of 559 cc. There is bladder wall trabeculation, likely secondary to postobstructive neuropathic change from prostate enlargement.   BOWEL: There is free intraperitoneal air. There is a feeding tube insufflated in the fatty anterior to the stomach. This is where the free air is located. This could be iatrogenic from malpositioned PEG tube rather than a ruptured viscus. There is thickening of loops of small bowel in the left side of the abdomen. There is a small amount of ascites in the left side of the abdomen interdigitated between the thickened loops of small bowel. There is induration of the mesentery. This could represent Crohn's disease or enteritis. There is diffuse fatty infiltration of the colon from the mid transverse colon to the rectum. Fibrofatty infiltration can be seen with Crohn's disease, inactive.   VESSELS: There is atherosclerotic calcification of the abdominal aorta, iliac and femoral arteries.   PERITONEUM/RETROPERITONEUM/LYMPH NODES: There is no retroperitoneal or pelvic adenopathy.   ABDOMINAL WALL: The abdominal wall is unremarkable.   BONE AND SOFT TISSUE: There is no acute osseous finding.   The prostate gland is enlarged measuring 6.2 x 4.8 cm.       1. There is a malpositioned PEG tube insufflated in the fatty anterior to the stomach. There is a small amount of free air in this location. This could be secondary to malpositioned PEG tube rather than a ruptured viscus but should be correlated clinically. 2. Nonspecific thickening of loops of small bowel in the left side of the abdomen with induration of the mesentery and a small amount of ascites. This could represent  Crohn's disease or enteritis. 3. Fibrofatty infiltration of the colon from the transverse colon to the rectum. This can be seen with Crohn's disease, inactive. 4. Benign bilateral simple renal cysts. 5. The urinary bladder with a volume of 559 cc. 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   MACRO: None   Signed by: Yamilet Heck 8/10/2024 2:22 PM Dictation workstation:   [id number]    XR chest 1 view    Result Date: 8/10/2024  Interpreted By:  Yamilet Heck, STUDY: XR CHEST 1 VIEW;  8/10/2024 1:44 pm   INDICATION: Signs/Symptoms:cough.   COMPARISON: 06/24/2024   ACCESSION NUMBER(S): [id number]   ORDERING CLINICIAN: DONITA ROBLES   FINDINGS: CARDIOMEDIASTINAL SILHOUETTE: The heart is enlarged in a left ventricular configuration, unchanged.     LUNGS: Lungs are clear.   ABDOMEN: No remarkable upper abdominal findings.     BONES: No acute osseous changes.       No acute cardiopulmonary process.   MACRO: None   Signed by: Yamilet Heck 8/10/2024 1:46 PM Dictation workstation:   [id number]    XR ABDOMEN FOR NG/OG/NE TUBE PLACEMENT    Result Date: 7/30/2024  EXAMINATION: ONE SUPINE XRAY VIEW(S) OF THE ABDOMEN 7/30/2024 9:50 pm COMPARISON: 7:22 p.m. HISTORY: ORDERING SYSTEM PROVIDED HISTORY: Confirmation of course of NG tube and location of tip of tube post advancement TECHNOLOGIST PROVIDED HISTORY: Reason for exam:->Confirmation of course of NG tube and location of tip of tube post advancement Portable?->Yes FINDINGS: Feeding tube advanced.  Tip now projects over the region of the gastric antrum/duodenal bulb.  Consider advancement 10-15 cm if post pyloric positioning is desired.  No ileus or obstruction lung bases clear.  Osseous and body wall soft tissues unremarkable.    Feeding tube tip projects over the region of the gastric antrum/duodenal bulb.    XR ABDOMEN FOR NG/OG/NE TUBE PLACEMENT    Result Date: 7/30/2024  EXAMINATION: ONE SUPINE  XRAY VIEW(S) OF THE ABDOMEN 7/30/2024 7:21 pm COMPARISON: None. HISTORY: ORDERING SYSTEM PROVIDED HISTORY: G-tube Placement / Confirmation TECHNOLOGIST PROVIDED HISTORY: Reason for exam:->G-tube Placement / Confirmation FINDINGS: Single image shows esophageal route catheter into the left upper quadrant expected proximal stomach region.  No dilated bowels evident.    Findings suggestive of NG/OG into the proximal stomach. RECOMMENDATION: Clinical correlation.      Assessment and Plan  Principal Problem:    Abdominal wall abscess    79 y.o. 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     Plan:  - continue wet to dry BID dressing changes for midline incision by bedside RN  - monitor for worsening drainage or surrounding erythema   - follow A.M. labs   - continue abx per primary   - left NESSA drain with 20 ml of SS output overnight; continue drain flushes BID to ensure patency   - continue tube feeds via PEG tube per nutrition recs  - appreciate rest of care to primary team  - General surgery to signoff - please don't hesitate to call or page with any questions or concerns.    Lorena Taylor OMS-IV    I have reviewed the medical student's documentation and made edits where appropriate.    Arik Moore MD  PGY2 General Surgery

## 2024-08-29 NOTE — PROGRESS NOTES
[facility] INFECTIOUS DISEASE PROGRESS NOTE    Patient Name: Abdi Wilson  MRN: [id number]    INTERVAL HISTORY:   Patient has been admitted to the ICU.  No fevers or chills over the last 24 hours.  He denies any pain.  NESSA drain with purulent fluid present.    Patient Active Problem List   Diagnosis    Stroke-like symptoms    Cerebral artery occlusion with cerebral infarction (Multi)    Cerebral infarction, unspecified (Multi)    Confusion    PEG tube malfunction (Multi)    Dementia without behavioral disturbance (Multi)    Peritonitis (Multi)    Aphasia    Abdominal wall abscess        ASSESSMENT:   Abdominal wall abscess: Evaluated by surgery and underwent bedside debridement with removal of gauze.   G-tube site infection  Uncontrolled hypertension  Chronic dysphagia  Dementia  History of stroke     Plan  Continue IV vancomycin  Check vancomycin trough level prior to the fourth dose and adjust as needed  Continue Cefepime due to the prior history of Pseudomonas and Enterobacter  Supportive care  ICU care  Monitor temp and counts  Follow cultures from the drain and adjust antibiotics as needed    MEDICATIONS: reviewed.    Current Facility-Administered Medications:     acetaminophen (Tylenol) tablet 650 mg, 650 mg, oral, q4h PRN, CECELIA Altamirano    atorvastatin (Lipitor) tablet 80 mg, 80 mg, oral, Nightly, DIANNE Altamirano-CNP, 80 mg at 08/28/24 2022    bisacodyl (Dulcolax) suppository 10 mg, 10 mg, rectal, Daily PRN, Ronit Wan MD    carvedilol (Coreg) tablet 6.25 mg, 6.25 mg, oral, BID, CECELIA Altamirano, 6.25 mg at 08/28/24 2022    cefepime (Maxipime) 2 g in dextrose (iso)  mL, 2 g, intravenous, q8h, Orion Cruz MD, Stopped at 08/28/24 1524    clopidogrel (Plavix) tablet 75 mg, 75 mg, oral, Daily, Ronit Wan MD, 75 mg at 08/28/24 1423    D5 % and 0.9 % sodium chloride infusion, 75 mL/hr, intravenous, Continuous, CECELIA Altamirano, Last Rate: 75 mL/hr at 08/27/24 2051,  "75 mL/hr at 24    ergocalciferol (Vitamin D-2) capsule 1,250 mcg, 1,250 mcg, oral, Weekly, Ronit Wan MD    hydrALAZINE (Apresoline) tablet 25 mg, 25 mg, oral, TID, CECELIA Altamirano, 25 mg at 24    hyoscyamine (Anaspaz) disintegrating tablet 0.125 mg, 0.125 mg, oral, q4h PRN, Ronit Wan MD    ipratropium-albuteroL (Duo-Neb) 0.5-2.5 mg/3 mL nebulizer solution 3 mL, 3 mL, nebulization, q4h PRN, Ronit Wan MD    lidocaine (LMX) 4 % cream, , Topical, Daily PRN, Ronit Wan MD    losartan (Cozaar) tablet 50 mg, 50 mg, oral, Daily, CECELIA Altamirano, 50 mg at 24 0856    metoprolol tartrate (Lopressor) injection 5 mg, 5 mg, intravenous, Once, CECELIA Altamirano    ondansetron (Zofran) injection 4 mg, 4 mg, intravenous, q4h PRN, CECELIA Altamirano, 4 mg at 24 1156    oxyCODONE (Roxicodone) immediate release tablet 5 mg, 5 mg, g-tube, q6h PRN, Ronit Wan MD    sennosides (Senokot) tablet 17.2 mg, 2 tablet, oral, BID, CECELIA Altamirano, 17.2 mg at 24    [START ON 2024] vancomycin (Vancocin) 1,500 mg in dextrose 5%  mL, 1,500 mg, intravenous, q24h, Eric Crespo, [facility]    vancomycin (Vancocin) pharmacy to dose - pharmacy monitoring, , miscellaneous, Daily PRN, CECELIA Altamirano     PHYSICAL EXAM:  Vital signs: /84   Pulse 87   Temp 36.4 °C (97.5 °F) (Temporal)   Resp (!) 32   Ht 1.753 m (5' 9.02\")   Wt 79.9 kg (176 lb 2.4 oz)   SpO2 97%   BMI 26.00 kg/m²   Temp (24hrs), Av.3 °C (97.4 °F), Min:36.2 °C (97.2 °F), Max:36.5 °C (97.7 °F)    General: alert, oriented, NAD  Lungs: bilaterally clear to auscultation  Heart: regular rate and rhythm  Abdomen: Midline abdominal incision with purulent drainage and odor.  NESSA drain with purulent drainage.  Tube feeds ongoing.  Extremities: no edema  No rashes  No joint inflammation  Neck supple  Lines ok  No CVAT              Labs:    Results for " orders placed or performed during the hospital encounter of 08/27/24 (from the past 96 hour(s))   Comprehensive Metabolic Panel   Result Value Ref Range    Glucose 94 74 - 99 mg/dL    Sodium 134 (L) 136 - 145 mmol/L    Potassium 4.2 3.5 - 5.3 mmol/L    Chloride 100 98 - 107 mmol/L    Bicarbonate 24 21 - 32 mmol/L    Anion Gap 14 10 - 20 mmol/L    Urea Nitrogen 16 6 - 23 mg/dL    Creatinine 0.72 0.50 - 1.30 mg/dL    eGFR >90 >60 mL/min/1.73m*2    Calcium 9.6 8.6 - 10.3 mg/dL    Albumin 3.5 3.4 - 5.0 g/dL    Alkaline Phosphatase 135 33 - 136 U/L    Total Protein 7.9 6.4 - 8.2 g/dL    AST 38 9 - 39 U/L    Bilirubin, Total 0.7 0.0 - 1.2 mg/dL    ALT 41 10 - 52 U/L   CBC and Auto Differential   Result Value Ref Range    WBC 11.0 4.4 - 11.3 x10*3/uL    nRBC 0.0 0.0 - 0.0 /100 WBCs    RBC 5.45 4.50 - 5.90 x10*6/uL    Hemoglobin 15.8 13.5 - 17.5 g/dL    Hematocrit 48.1 41.0 - 52.0 %    MCV 88 80 - 100 fL    MCH 29.0 26.0 - 34.0 pg    MCHC 32.8 32.0 - 36.0 g/dL    RDW 14.6 (H) 11.5 - 14.5 %    Platelets 566 (H) 150 - 450 x10*3/uL    Neutrophils % 72.1 40.0 - 80.0 %    Immature Granulocytes %, Automated 0.5 0.0 - 0.9 %    Lymphocytes % 14.4 13.0 - 44.0 %    Monocytes % 10.7 2.0 - 10.0 %    Eosinophils % 1.7 0.0 - 6.0 %    Basophils % 0.6 0.0 - 2.0 %    Neutrophils Absolute 7.96 (H) 1.60 - 5.50 x10*3/uL    Immature Granulocytes Absolute, Automated 0.05 0.00 - 0.50 x10*3/uL    Lymphocytes Absolute 1.59 0.80 - 3.00 x10*3/uL    Monocytes Absolute 1.18 (H) 0.05 - 0.80 x10*3/uL    Eosinophils Absolute 0.19 0.00 - 0.40 x10*3/uL    Basophils Absolute 0.07 0.00 - 0.10 x10*3/uL   Lactate   Result Value Ref Range    Lactate 1.8 0.4 - 2.0 mmol/L   Coagulation Screen   Result Value Ref Range    Protime 12.9 (H) 9.8 - 12.8 seconds    INR 1.1 0.9 - 1.1    aPTT 33 27 - 38 seconds   Blood Culture    Specimen: Peripheral Venipuncture; Blood culture   Result Value Ref Range    Blood Culture No growth at 1 day    Blood Culture    Specimen:  Peripheral Venipuncture; Blood culture   Result Value Ref Range    Blood Culture No growth at 1 day    Urinalysis with Reflex Culture and Microscopic   Result Value Ref Range    Color, Urine Yellow Light-Yellow, Yellow, Dark-Yellow    Appearance, Urine Clear Clear    Specific Gravity, Urine 1.024 1.005 - 1.035    pH, Urine 5.0 5.0, 5.5, 6.0, 6.5, 7.0, 7.5, 8.0    Protein, Urine NEGATIVE NEGATIVE, 10 (TRACE), 20 (TRACE) mg/dL    Glucose, Urine Normal Normal mg/dL    Blood, Urine NEGATIVE NEGATIVE    Ketones, Urine NEGATIVE NEGATIVE mg/dL    Bilirubin, Urine NEGATIVE NEGATIVE    Urobilinogen, Urine Normal Normal mg/dL    Nitrite, Urine NEGATIVE NEGATIVE    Leukocyte Esterase, Urine NEGATIVE NEGATIVE   Extra Urine Gray Tube   Result Value Ref Range    Extra Tube Hold for add-ons.    CBC   Result Value Ref Range    WBC 8.4 4.4 - 11.3 x10*3/uL    nRBC 0.0 0.0 - 0.0 /100 WBCs    RBC 4.45 (L) 4.50 - 5.90 x10*6/uL    Hemoglobin 12.6 (L) 13.5 - 17.5 g/dL    Hematocrit 38.8 (L) 41.0 - 52.0 %    MCV 87 80 - 100 fL    MCH 28.3 26.0 - 34.0 pg    MCHC 32.5 32.0 - 36.0 g/dL    RDW 14.5 11.5 - 14.5 %    Platelets 472 (H) 150 - 450 x10*3/uL   Basic metabolic panel   Result Value Ref Range    Glucose 340 (H) 74 - 99 mg/dL    Sodium 137 136 - 145 mmol/L    Potassium 3.8 3.5 - 5.3 mmol/L    Chloride 106 98 - 107 mmol/L    Bicarbonate 25 21 - 32 mmol/L    Anion Gap 10 10 - 20 mmol/L    Urea Nitrogen 15 6 - 23 mg/dL    Creatinine 0.97 0.50 - 1.30 mg/dL    eGFR 78 >60 mL/min/1.73m*2    Calcium 7.5 (L) 8.6 - 10.3 mg/dL   Vancomycin   Result Value Ref Range    Vancomycin 13.9 5.0 - 20.0 ug/mL   POCT GLUCOSE   Result Value Ref Range    POCT Glucose 96 74 - 99 mg/dL   POCT fingerstick glucose manually resulted   Result Value Ref Range    POC Fingerstick Blood Glucose 96 70 - 100 mg/dl          Microbiology data: reviewed    Imaging data: reviewed      Orion Cruz  Pager:[phone]  Date of service: 8/28/2024

## 2024-08-29 NOTE — ASSESSMENT & PLAN NOTE
Evaluated by surgery and underwent bedside debridement with removal of gauze.  Midline abdominal wound likely source of odor and drainage, wound was cleaned.  Midline abdominal wound dressing change bid wet-to-dry  IV cefepime per ID recommendation  IV Vancomycin discontinued  Surgery follow-up  Discharge planning in a.m. on oral antibiotics once cleared by ID/surgery    Uncontrolled HTN   Continue home medication  Monitor blood pressure and adjust as needed    Dysphagia   SLP consulted  Continue n.p.o. with tube feeding  Pulmonary toilet  Patient to be suctioned q2h    Recent stroke  Patient suffered a stroke in June and underwent PEG placement 08/08/24.   Continue Plavix, Coreg, statin    Physical debility/deconditioning  Secondary to stroke  PT/OT on board  Discharge planning

## 2024-08-29 NOTE — NURSING NOTE
Pt arrived to room now via bed from ICU. Bed locked, in low position, bed alarm on, call light within reach. Pt oriented to room and call light system, pt verbalizes understanding.

## 2024-08-29 NOTE — PROGRESS NOTES
Epi Wilson is a 79 y.o. male on day 2 of admission presenting with Abdominal wall abscess.      Subjective   History Of Present Illness  Abdi Wilson is a 79 y.o. male with PMHx of dementia who presented from [facility] for concern for G-tube infection due to wound site drainage and odor. He suffered a stroke in June and underwent PEG placement 8/8/24. On 8/10/24 he was admitted for peritonitis and underwent emergent replacement of the gastrostomy tube.  On 8/18/24 a CT demonstrated a large casandra-splenic abscess and he underwent drain placement the following day. He was discharged on 8/23/24 with ciprofloxacin and Flagyl until 9/4/2024; cultures grew (3+) moderate Enterobacter cloacae complex and (1+) rare Pseudomonas aeruginosa. Prior to the PEG he had an NGT which he pulled out 3 times according to his son at bedside and the pt has been wearing an abdominal binder but he doesn't think the pt is pulling at his PEG tube. In the ED today CT demonstrated new complex collection noted in the abdominal wall inferiorly with extension through the ventral abdomen and few associated foci of pneumoperitoneum. Additionally a 4 cm collection along the gastric wall that may have been previously identified. He was started on vancomycin and Zosyn. Workup otherwise was benign except for uncontrolled HTN to 183/104. Paperwork from [facility] notes pt is DNRCC. Remainder of ROS reviewed and negative except as indicated in HPI.     08/28: Patient was evaluated this morning. Not in acute distress. Patient has had poor urinary output overnight. Bladder scan revealed 471 mL, at which time a straight cath was ordered. Patient denied any shortness of breath or chest pain. Patient complained of abdominal pain. Midline abdominal wound dressing is dry, surrounding area is non-erythematous. IR placed splenic drain on his last admission and is draining purulent fluid at slow rate. Patient is clinically improving today.  Evaluated by  general surgery and underwent bedside debridement.  08/29: Patient was evaluated this morning. Not in acute distress. No acute changes overnight. Patient denies any chest pain or abdominal pain. Midline abdominal wound dressing is dry surrounding area is non erythematous. IR placed splenic drain is draining purulent fluid at decreased rate. Vancomycin has been discontinued. Patient is clinically improving today.           Objective     Last Recorded Vitals  /69 (BP Location: Right arm, Patient Position: Lying)   Pulse 73   Temp 37 °C (98.6 °F) (Temporal)   Resp 22   Wt 83 kg (182 lb 15.7 oz)   SpO2 97%   Intake/Output last 3 Shifts:    Intake/Output Summary (Last 24 hours) at 8/29/2024 1128  Last data filed at 8/29/2024 1000  Gross per 24 hour   Intake 2260 ml   Output 335 ml   Net 1925 ml       Admission Weight  Weight: 79.9 kg (176 lb 2.4 oz) (08/27/24 1700)    Daily Weight  08/29/24 : 83 kg (182 lb 15.7 oz)    Image Results  CT abdomen pelvis w IV contrast  Narrative: Interpreted By:  Valorie Montano,   STUDY:  CT ABDOMEN PELVIS W IV CONTRAST;  8/27/2024 4:56 am      INDICATION:  Signs/Symptoms:abdominal wall infection, s/p recent peg with  complication.      COMPARISON:  08/17/2024      ACCESSION NUMBER(S):  [id number]      ORDERING CLINICIAN:  DOLORES HANCOCK      TECHNIQUE:  Axial CT images of the abdomen and pelvis with coronal and sagittal  reconstructed images obtained after intravenous administration of  contrast      FINDINGS:  LOWER CHEST: Redemonstrated left pleural effusion and adjacent  atelectasis, partially imaged. Coronary artery calcifications noted  however exam is not optimized for evaluation. Small pericardial  effusion. BONES: No acute osseous abnormality. Degenerative changes  with minimal grade 1 anterolisthesis of L3 on L4 and L4 on L5. Mild  retrolisthesis of L5 on S1. ABDOMINAL WALL: Status post percutaneous  gastrostomy tube again noted. There is a ventral abdominal  wall  complex collection with air and possible debris, inferior to the PEG  tube, measuring up to 3.1 x 3.2 x 7.4 cm (AP by T by CC) with  air-filled extension through the ventral abdominal wall (series 5,  image 68/133). A few adjacent face I of free air are noted (series 2,  image 63 and 70). Midline skin staples are again noted.      ABDOMEN:      LIVER: Within normal limits.  BILE DUCTS: Normal caliber.  GALLBLADDER: No calcified gallstones. No wall thickening.  PANCREAS: Within normal limits.  SPLEEN: A complex subdiaphragmatic and perisplenic collection is  noted with fluid and air as well as a surgical drain, decreased in  size since prior imaging. This region measures approximately 9.9 x  5.3 cm (oblique AP by T), previously measuring 15.7 x 9.7 cm.  ADRENALS: Mild nodularity of the right adrenal body. KIDNEYS and  URETERS: Symmetric renal enhancement. No hydronephrosis or  perinephric fluid collection. A complex left renal cyst measures up  to 1.7 cm. Additional left renal simple cysts noted. Subcentimeter  low-attenuation lesion in the right renal the cortex.          VESSELS: Atherosclerotic calcifications without aneurysmal dilatation  seen. RETROPERITONEUM: No pathologically enlarged retroperitoneal  lymph nodes.      PELVIS:      REPRODUCTIVE ORGANS: The prostate is enlarged up to 6.1 cm.  Correlation with PSA recommended. BLADDER: Wall thickening of the  urinary bladder noted. Few foci of intraluminal air present, non  specific. Interval removal of a [place] catheter.      BOWEL: Percutaneous gastrostomy tube appears in otherwise expected  position. Mild stranding and possible collection along the proximal  greater curvature of the stomach the measuring at least 4.3 cm. The  stomach is decompressed, partially limiting evaluation. No dilated  loops of small bowel are identified. Fluid contents within colon  present. Submucosal fat deposition predominantly in the sigmoid  colon.  The appendix is not  identified.  Therefore, appendicitis  cannot be excluded on the basis of this exam. However, no significant  inflammatory changes are noted within the right lower quadrant.  PERITONEUM: Mesenteric stranding and fluid collections as well as  free air as previously described.      Impression: Status post percutaneous gastrostomy to with complex collection noted  in the abdominal wall inferiorly with extension through the ventral  abdomen and few associated foci of pneumoperitoneum as described.  Findings appear new since prior imaging.      Possible 4 cm collection along the gastric wall, possibly present on  prior imaging.      Complex left subdiaphragmatic/perisplenic collection with drainage  catheter appear smaller since prior imaging.      Mesenteric stranding.      Fluid within the colon which may be infectious or inflammatory.      Complex/hyperdense left renal cystic lesion again noted.      Small pericardial effusion.      Partially imaged left pleural effusion with adjacent atelectasis.      Coronary artery calcifications and additional findings as detailed.      MACRO:  None      Signed by: Valorie Montano 8/27/2024 5:27 AM  Dictation workstation:   [id number]      Physical Exam    Constitutional: Pleasant and cooperative. Laying in bed in no acute distress. Expressive and receptive aphasia is a barrier to communication.  Skin: Warm and dry. Midline abdominal wound. Drainage from abdominal wound  Eyes: EOMI. Anicteric sclera.   ENT: Mucous membranes moist  Head and Neck: Normocephalic, atraumatic. No appreciable JVD. No appreciable thyromegaly.   Respiratory: Nonlabored. Lungs clear to auscultation bilaterally without obvious adventitious sounds. Chest rise is equal.  Cardiovascular: RRR. No gross murmur, gallop, or rub.  Gastro: Abdomen soft, nondistended. Abdomen is tender. No obvious organomegaly appreciated. Bowel sounds are present and normoactive. Midline abdominal wound. Splenic abscess drain.   :  No CVA tenderness. Straight cath.  MSK: No gross abnormalities appreciated.  Extremities: No cyanosis, edema, or clubbing evident.   Neuro: A&Ox3. CN 2-12 grossly intact. No new focal neurologic deficits appreciated.  Psych: Appropriate mood and behavior. Expressive and receptive aphasia. Mentation has greatly improved since last admission.     Relevant Results  Scheduled medications  atorvastatin, 80 mg, oral, Nightly  carvedilol, 6.25 mg, oral, BID  cefepime, 2 g, intravenous, q8h  clopidogrel, 75 mg, oral, Daily  ergocalciferol, 1,250 mcg, oral, Weekly  hydrALAZINE, 25 mg, oral, TID  losartan, 50 mg, oral, Daily  metoprolol, 5 mg, intravenous, Once  sennosides, 2 tablet, oral, BID      Continuous medications  D5 % and 0.9 % sodium chloride, 75 mL/hr, Last Rate: 75 mL/hr (08/27/24 2051)      PRN medications  PRN medications: acetaminophen, bisacodyl, hyoscyamine, ipratropium-albuteroL, lidocaine, ondansetron, oxyCODONE        Assessment/Plan   This patient currently has cardiac telemetry ordered; if you would like to modify or discontinue the telemetry order, click here to go to the orders activity to modify/discontinue the order.  Assessment & Plan  Abdominal wall abscess  Evaluated by surgery and underwent bedside debridement with removal of gauze.  Midline abdominal wound likely source of odor and drainage, wound was cleaned.  Midline abdominal wound dressing change bid wet-to-dry  IV cefepime per ID recommendation  IV Vancomycin discontinued  Surgery follow-up  Discharge planning in a.m. on oral antibiotics once cleared by ID/surgery    Uncontrolled HTN   Continue home medication  Monitor blood pressure and adjust as needed    Dysphagia   SLP consulted  Continue n.p.o. with tube feeding  Pulmonary toilet  Patient to be suctioned q2h    Recent stroke  Patient suffered a stroke in June and underwent PEG placement 08/08/24.   Continue Plavix, Coreg, statin    Physical debility/deconditioning  Secondary to  stroke  PT/OT on board  Discharge planning                          BRIDGETT AVELAR    I am the supervising physician in the management of the patient. I have evaluated patient independently,  reviewed and agree with student's documented note. I have edited the above assessment/plan to reflect my final impressions and plans.    08/29/24 at 11:49 AM - Ronit Wan MD

## 2024-08-29 NOTE — PROGRESS NOTES
"Occupational Therapy                 Therapy Communication Note    Patient Name: Abdi Wilson  MRN: [id number]  Today's Date: 8/29/2024     Discipline: Occupational Therapy    Missed Visit Reason: Missed Visit Reason:  pt. Transferred from ICU this p.m.  (pt. resistive to blankets being removed stated \" cold\" provided warmed blankets and positioning of head and neck.)    Missed Time: Attempt    Comment:  "

## 2024-08-29 NOTE — CARE PLAN
Problem: Skin  Goal: Decreased wound size/increased tissue granulation at next dressing change  Outcome: Progressing  Flowsheets (Taken 8/28/2024 2000)  Decreased wound size/increased tissue granulation at next dressing change:   Promote sleep for wound healing   Protective dressings over bony prominences  Goal: Participates in plan/prevention/treatment measures  Outcome: Progressing  Goal: Prevent/manage excess moisture  Outcome: Progressing  Flowsheets (Taken 8/28/2024 2000)  Prevent/manage excess moisture:   Cleanse incontinence/protect with barrier cream   Monitor for/manage infection if present   Follow provider orders for dressing changes   Moisturize dry skin  Goal: Prevent/minimize sheer/friction injuries  Outcome: Progressing  Flowsheets (Taken 8/28/2024 2000)  Prevent/minimize sheer/friction injuries:   Use pull sheet   HOB 30 degrees or less   Turn/reposition every 2 hours/use positioning/transfer devices  Goal: Promote/optimize nutrition  Outcome: Progressing  Flowsheets (Taken 8/28/2024 2000)  Promote/optimize nutrition: Monitor/record intake including meals  Goal: Promote skin healing  Outcome: Progressing  Flowsheets (Taken 8/28/2024 2000)  Promote skin healing:   Assess skin/pad under line(s)/device(s)   Protective dressings over bony prominences   Turn/reposition every 2 hours/use positioning/transfer devices   Ensure correct size (line/device) and apply per  instructions   Rotate device position/do not position patient on device     Problem: Nutrition  Goal: Less than 5 days NPO/clear liquids  Outcome: Progressing  Goal: Nutrition support goals are met within 48 hrs  Outcome: Progressing     Problem: Pain - Adult  Goal: Verbalizes/displays adequate comfort level or baseline comfort level  Outcome: Progressing     Problem: Safety - Adult  Goal: Free from fall injury  Outcome: Progressing     Problem: Discharge Planning  Goal: Discharge to home or other facility with appropriate  resources  Outcome: Progressing     Problem: Chronic Conditions and Co-morbidities  Goal: Patient's chronic conditions and co-morbidity symptoms are monitored and maintained or improved  Outcome: Progressing     Problem: Fall/Injury  Goal: Not fall by end of shift  Outcome: Progressing  Goal: Be free from injury by end of the shift  Outcome: Progressing  Goal: Verbalize understanding of personal risk factors for fall in the hospital  Outcome: Progressing  Goal: Verbalize understanding of risk factor reduction measures to prevent injury from fall in the home  Outcome: Progressing  Goal: Use assistive devices by end of the shift  Outcome: Progressing  Goal: Pace activities to prevent fatigue by end of the shift  Outcome: Progressing     Problem: Pain  Goal: Takes deep breaths with improved pain control throughout the shift  Outcome: Progressing  Goal: Turns in bed with improved pain control throughout the shift  Outcome: Progressing  Goal: Walks with improved pain control throughout the shift  Outcome: Progressing  Goal: Performs ADL's with improved pain control throughout shift  Outcome: Progressing  Goal: Participates in PT with improved pain control throughout the shift  Outcome: Progressing  Goal: Free from opioid side effects throughout the shift  Outcome: Progressing  Goal: Free from acute confusion related to pain meds throughout the shift  Outcome: Progressing     Problem: Infection prevention/bleeding  Goal: Infection s/sx managed  Outcome: Progressing  Goal: No further progression of infection  Outcome: Progressing  Goal: No signs of bleeding  Outcome: Progressing  Goal: Normal coagulation studies  Outcome: Progressing     Problem: Perfusion/Cardiac  Goal: Adequate perfusion to organs/extremities  Outcome: Progressing  Goal: Hemodynamically stable  Outcome: Progressing  Goal: No cardiac arrhythmias  Outcome: Progressing     Problem: Respiratory/Oxygenation  Goal: No signs of respiratory compromise  Outcome:  Progressing  Goal: Tolerates activity without increased O2 demands  Outcome: Progressing     Problem: Neuro/Coping  Goal: Minimal anxiety; utilize coping mechanisms  Outcome: Progressing  Goal: No signs of neurological compromise  Outcome: Progressing  Goal: Increase self care/family involvement  Outcome: Progressing     Problem: Fluid/Electrolyte/Nutrition  Goal: Fluid balance within 1 liter of normovolemia  Outcome: Progressing  Goal: No signs of renal failure  Outcome: Progressing  Goal: Normal electrolyte levels  Outcome: Progressing  Goal: Normal glucose levels  Outcome: Progressing  Goal: Tolerates nutritional intake  Outcome: Progressing

## 2024-08-29 NOTE — PROGRESS NOTES
Verified with [facility] that patient will not need Auth if he discharges before 9/1, but after 9/1 will need auth due changing insurance. Patient is under skilled due to CVA but is LTC at the facility

## 2024-08-30 VITALS
TEMPERATURE: 97.7 F | SYSTOLIC BLOOD PRESSURE: 147 MMHG | RESPIRATION RATE: 18 BRPM | WEIGHT: 182.98 LBS | HEART RATE: 78 BPM | OXYGEN SATURATION: 98 % | HEIGHT: 69 IN | BODY MASS INDEX: 27.1 KG/M2 | DIASTOLIC BLOOD PRESSURE: 78 MMHG

## 2024-08-30 PROBLEM — L02.211 ABDOMINAL WALL ABSCESS: Status: RESOLVED | Noted: 2024-08-27 | Resolved: 2024-08-30

## 2024-08-30 LAB
GLUCOSE BLD MANUAL STRIP-MCNC: 110 MG/DL (ref 74–99)
GLUCOSE BLD MANUAL STRIP-MCNC: 117 MG/DL (ref 74–99)
GLUCOSE BLD MANUAL STRIP-MCNC: 120 MG/DL (ref 74–99)

## 2024-08-30 PROCEDURE — 99239 HOSP IP/OBS DSCHRG MGMT >30: CPT | Performed by: INTERNAL MEDICINE

## 2024-08-30 PROCEDURE — 2500000001 HC RX 250 WO HCPCS SELF ADMINISTERED DRUGS (ALT 637 FOR MEDICARE OP): Performed by: INTERNAL MEDICINE

## 2024-08-30 PROCEDURE — 76937 US GUIDE VASCULAR ACCESS: CPT

## 2024-08-30 PROCEDURE — 82947 ASSAY GLUCOSE BLOOD QUANT: CPT

## 2024-08-30 PROCEDURE — 92526 ORAL FUNCTION THERAPY: CPT | Mod: GN | Performed by: SPEECH-LANGUAGE PATHOLOGIST

## 2024-08-30 PROCEDURE — 2500000004 HC RX 250 GENERAL PHARMACY W/ HCPCS (ALT 636 FOR OP/ED): Mod: JZ | Performed by: INTERNAL MEDICINE

## 2024-08-30 RX ORDER — OXYCODONE HYDROCHLORIDE 5 MG/1
5 TABLET ORAL EVERY 6 HOURS PRN
Qty: 15 TABLET | Refills: 0 | Status: SHIPPED | OUTPATIENT
Start: 2024-08-30

## 2024-08-30 RX ORDER — CEFEPIME 1 G/50ML
2 INJECTION, SOLUTION INTRAVENOUS EVERY 8 HOURS
Start: 2024-08-30 | End: 2024-09-04

## 2024-08-30 ASSESSMENT — ENCOUNTER SYMPTOMS
CHILLS: 0
CARDIOVASCULAR NEGATIVE: 1
SHORTNESS OF BREATH: 0
COUGH: 0
VOMITING: 0
NAUSEA: 0
BLOOD IN STOOL: 0
ABDOMINAL DISTENTION: 0
RESPIRATORY NEGATIVE: 1
EYES NEGATIVE: 1
DYSURIA: 0
DIFFICULTY URINATING: 0
DIARRHEA: 0
FEVER: 0
MUSCULOSKELETAL NEGATIVE: 1
ABDOMINAL PAIN: 1
CONSTIPATION: 0
NEUROLOGICAL NEGATIVE: 1

## 2024-08-30 ASSESSMENT — COGNITIVE AND FUNCTIONAL STATUS - GENERAL
MOVING TO AND FROM BED TO CHAIR: TOTAL
TOILETING: TOTAL
TURNING FROM BACK TO SIDE WHILE IN FLAT BAD: A LOT
CLIMB 3 TO 5 STEPS WITH RAILING: TOTAL
DAILY ACTIVITIY SCORE: 8
HELP NEEDED FOR BATHING: TOTAL
MOBILITY SCORE: 8
STANDING UP FROM CHAIR USING ARMS: TOTAL
EATING MEALS: TOTAL
DRESSING REGULAR LOWER BODY CLOTHING: TOTAL
MOVING FROM LYING ON BACK TO SITTING ON SIDE OF FLAT BED WITH BEDRAILS: A LOT
DRESSING REGULAR UPPER BODY CLOTHING: A LOT
PERSONAL GROOMING: A LOT
WALKING IN HOSPITAL ROOM: TOTAL

## 2024-08-30 ASSESSMENT — PAIN SCALES - GENERAL: PAINLEVEL_OUTOF10: 0 - NO PAIN

## 2024-08-30 ASSESSMENT — PAIN - FUNCTIONAL ASSESSMENT: PAIN_FUNCTIONAL_ASSESSMENT: 0-10

## 2024-08-30 NOTE — PROGRESS NOTES
Speech-Language Pathology Clinical Swallow Treatment    Patient Name: Abdi Wilson  MRN: [id number]  : 1945  Today's Date: 24  Start time: Start Time: 1058  Stop time: Stop Time: 1116  Time calculation (min) : Time Calculation (min): 18 min    ASSESSMENT  SLP TX Intervention Outcome: Making Progress Towards Goals (fair)     Treatment Tolerance: Patient tolerated treatment well (difficulty following commands)  Prognosis: Fair, Good     IMPRESSIONS: Pt has profound oral phase and impaired pharyngeal phase dysphagia s/p stroke in 2024. Pt initially had a Dobbhoff for nutrition and hydration, but switched to a peg tube in Early August.      Pt having progressive lingual tongue pumping and jaw movements interfering with completing oral motor exercises this date despite thermal stim to lips and tongue, verbal cues and visual modeling.  Pt had them yesterday, but did exercises with encouragement from son.   Today son not present.     Continue NPO with peg tube feedings.     Continue lingual and labial exercises and pharyngeal exercises.  Consider respiratory muscle strength training with either the EMST or The Breather devices.  Thermal gustatory stim once abdominal wound is healed.      Pt would benefit from NMES and sEMG biofeedback if available.     PLAN  Recommended solid consistency: NPO  Recommended liquid consistency: NPO  Recommended medication administration: Via PEG tube  Safe swallow strategies: N/A  Discharge recommendation: Recommend MODERATE intensity ST upon DC in order to train therapeutic exercises to improve swallow function for eventual oral intake.  Inpatient/Swing Bed or Outpatient: Inpatient  SLP TX Plan: Continue Plan of Care  SLP Plan: Skilled SLP  SLP Frequency: 2x per week  Duration: 2 weeks  Next Treatment Priority: oral motor exercises, pharyngeal exercises. Consider respiratory muscle strength training.  Hold Thermal gustatory stimulation until abdominal wound is  healed.  Discussed POC: Patient, Nursing, Physician  Patient/Caregiver Agreeable: Yes      Goals (start date 08/27/2024 for two weeks - END 09/09/03/2024):     Pt will complete oral/pharyngeal/laryngeal strengthening exercises as directed given min/mod verbal cues and modeling from SLP in order to improve swallow function.              Status: Goal initiated this date              Progress this date: lingual protrusion x1. Perseverative lingual and jaw movement prohibiting participation in oral motor exercises despite use of thermal stim to lips and tongue, verbal cues and modeling.  A few spontaneous swallows noted.  D/T abdominal wound TGS trials on hold.      Pt/family will demonstrate understanding of education related to dysphagia independently.              Status: Goal initiated this date              Progress this date: No family present today.  Previous discussion completed.                 Pt will consume PO trials with SLP without s/sx aspiration in order to determine readiness for PO diet.              Status: Goal initiated this date              Progress this date: N/A - pt not managing oral secretions at this time.       SUBJECTIVE  Prior to Session Communication: Bedside nurse  RN cleared pt to participate in session.  Pt has orders to discharge today.  Respiratory status: Supplemental oxygen via NC  Positioning: Upright in bed  Pt was alert, pleasant, and cooperative for session.  Pt noted - not talkative today.  Just saying single words at a time intermittently.     Pain Assessment: 0-10  0-10 (Numeric) Pain Score: 0 - No pain  Pain Type: Chronic pain, Acute pain  Pain Location: Abdomen       ORIENTATION: Oriented to self    OBJECTIVE  Therapeutic swallow intervention:  Therapeutic Swallow  Therapeutic Swallow Intervention : Oral Strengthening Techniques, Thermal Stimulation  Pharyngeal Strengthening Techniques: Effortful Swallow, Chin Tuck Against Resistance, Pitch Glides  Solid Diet  Recommendations: NPO  Liquid Diet Recommendations: NPO    Swallow Comments: Pt sitting upright and slightly reclined. Oral care provided prior to exercises. Mouth looks clean and moist. No oral suction present. RN notified.  Pt discharging to facility is 20 min.     SLP modeling lingual and labial exercises for pt while aslo utilizing cold stimulus to assist. Pt with minimal trials with decreased ROM or no movement at all despite using cold stimulus, verbal cues and modeling.   Son not present today to assist with exercises.     Pt had a few spontaneous swallows during session, but not consistent. (Thermal gustatory stim (TGS) differed d/t abdominal wound as exercise will cause signicant adominal contraction and coughing and this may interfere with the wound healing appropriately. Once wound is healed then should be able to do TGS at that time.)         Treatment/Education:  Results and recommendations were relayed to: Patient, Bedside nurse, and Physician  Education provided: Yes   Learner: Patient   Barriers to learning: Acuteness of illness barrier, Cognitive limitations barrier, and Communication limitations barrier   Method of teaching: Verbal   Topic: risk for aspiration and recommendation for dysphagia follow-up   Outcome of teaching: Pt demonstrated partial understanding, Education will be reinforced during follow-up visits, as appropriate, and Needs reinforcement

## 2024-08-30 NOTE — PROGRESS NOTES
08/30/24 1121   Discharge Planning   Home or Post Acute Services Post acute facilities (Rehab/SNF/etc)   Type of Post Acute Facility Services Skilled nursing   Expected Discharge Disposition SNF   Does the patient need discharge transport arranged? Yes   RoundTrip coordination needed? Yes   Has discharge transport been arranged? Yes   What day is the transport expected? 08/30/24   What time is the transport expected? 1200     Notified RN and patients son of transport time of 1200 to [facility] . Answered all questions and verbalized understanding.  Report number is

## 2024-08-30 NOTE — PROGRESS NOTES
Speech-Language Pathology                 Therapy Communication Note    Patient Name: Abdi Wilson  MRN: [id number]  Today's Date: 8/30/2024     Discipline: Speech Language Pathology    Missed Visit Reason: Missed Time Reason: Nursing care    Missed Time: Attempt    Comment: Pt being seen in sterile environment for PICC line placement.    SLP will try again later.     Pt is NPO with peg tube feedings.

## 2024-08-30 NOTE — PROCEDURES
Vascular Access Team Procedure Note     Visit Date: 8/30/2024      Patient Name: Abdi Wilson         MRN: [id number]             Procedure: Right arm midline placement for 7 days of IV atbs. See LDA avatar for line details.          Elza Hinkle RN  8/30/2024  10:34 AM

## 2024-08-30 NOTE — PROGRESS NOTES
"GENERAL SURGERY PROGRESS NOTE    Abdi Wilson   1945   [id number]     Abdi Wilson is a 79 y.o. male on day 3 of admission presenting with Abdominal wall abscess.      Subjective  Patient seen and examined. No acute events overnight. Tolerating tube feeds. Having bowl function    Review of Systems   Unable to perform ROS: Patient unresponsive   Constitutional:  Negative for chills and fever.   HENT: Negative.     Eyes: Negative.    Respiratory: Negative.  Negative for cough and shortness of breath.    Cardiovascular: Negative.  Negative for chest pain.   Gastrointestinal:  Positive for abdominal pain. Negative for abdominal distention, blood in stool, constipation, diarrhea, nausea and vomiting.   Genitourinary: Negative.  Negative for difficulty urinating and dysuria.   Musculoskeletal: Negative.    Skin: Negative.    Neurological: Negative.        Objective    Last Recorded Vitals  Blood pressure 134/79, pulse 84, temperature 36.6 °C (97.8 °F), temperature source Temporal, resp. rate 16, height 1.753 m (5' 9.02\"), weight 83 kg (182 lb 15.7 oz), SpO2 95%.    Intake/Output last 3 Shifts:  I/O last 3 completed shifts:  In: 2950 (35.5 mL/kg) [I.V.:1255 (15.1 mL/kg); NG/GT:795; IV Piggyback:900]  Out: 1615 (19.5 mL/kg) [Urine:1600 (0.5 mL/kg/hr); Drains:15]  Weight: 83 kg     Gen: NAD. A&O x1  HEENT: NC/AT.  Moist mucous membranes.  Neck: Normal range of motion.  CV: Regular rate.  Chest: Normal chest rise.  Normal respiratory effort.  Abdomen: Soft. NT/ND.  No rigidity or guarding. PEG tube in place without surrounding erythema or drainage. Midline incision with no strike through on dressings, unsaturated packing. Left NESSA drain with minimally purulent/serous drainage  Extremities: No edema.    Skin: Warm and dry; midline wound as noted above    Relevant Results  Results for orders placed or performed during the hospital encounter of 08/27/24 (from the past 24 hour(s))   POCT GLUCOSE   Result Value Ref Range    " POCT Glucose 117 (H) 74 - 99 mg/dL   POCT GLUCOSE   Result Value Ref Range    POCT Glucose 123 (H) 74 - 99 mg/dL   POCT GLUCOSE   Result Value Ref Range    POCT Glucose 120 (H) 74 - 99 mg/dL   POCT GLUCOSE   Result Value Ref Range    POCT Glucose 117 (H) 74 - 99 mg/dL       CT abdomen pelvis w IV contrast    Result Date: 8/27/2024  Interpreted By:  Valorie Montaon, STUDY: CT ABDOMEN PELVIS W IV CONTRAST;  8/27/2024 4:56 am   INDICATION: Signs/Symptoms:abdominal wall infection, s/p recent peg with complication.   COMPARISON: 08/17/2024   ACCESSION NUMBER(S): [id number]   ORDERING CLINICIAN: DOLORES HANCOCK   TECHNIQUE: Axial CT images of the abdomen and pelvis with coronal and sagittal reconstructed images obtained after intravenous administration of contrast   FINDINGS: LOWER CHEST: Redemonstrated left pleural effusion and adjacent atelectasis, partially imaged. Coronary artery calcifications noted however exam is not optimized for evaluation. Small pericardial effusion. BONES: No acute osseous abnormality. Degenerative changes with minimal grade 1 anterolisthesis of L3 on L4 and L4 on L5. Mild retrolisthesis of L5 on S1. ABDOMINAL WALL: Status post percutaneous gastrostomy tube again noted. There is a ventral abdominal wall complex collection with air and possible debris, inferior to the PEG tube, measuring up to 3.1 x 3.2 x 7.4 cm (AP by T by CC) with air-filled extension through the ventral abdominal wall (series 5, image 68/133). A few adjacent face I of free air are noted (series 2, image 63 and 70). Midline skin staples are again noted.   ABDOMEN:   LIVER: Within normal limits. BILE DUCTS: Normal caliber. GALLBLADDER: No calcified gallstones. No wall thickening. PANCREAS: Within normal limits. SPLEEN: A complex subdiaphragmatic and perisplenic collection is noted with fluid and air as well as a surgical drain, decreased in size since prior imaging. This region measures approximately 9.9 x 5.3 cm (oblique AP by  T), previously measuring 15.7 x 9.7 cm. ADRENALS: Mild nodularity of the right adrenal body. KIDNEYS and URETERS: Symmetric renal enhancement. No hydronephrosis or perinephric fluid collection. A complex left renal cyst measures up to 1.7 cm. Additional left renal simple cysts noted. Subcentimeter low-attenuation lesion in the right renal the cortex.     VESSELS: Atherosclerotic calcifications without aneurysmal dilatation seen. RETROPERITONEUM: No pathologically enlarged retroperitoneal lymph nodes.   PELVIS:   REPRODUCTIVE ORGANS: The prostate is enlarged up to 6.1 cm. Correlation with PSA recommended. BLADDER: Wall thickening of the urinary bladder noted. Few foci of intraluminal air present, non specific. Interval removal of a Cleveland catheter.   BOWEL: Percutaneous gastrostomy tube appears in otherwise expected position. Mild stranding and possible collection along the proximal greater curvature of the stomach the measuring at least 4.3 cm. The stomach is decompressed, partially limiting evaluation. No dilated loops of small bowel are identified. Fluid contents within colon present. Submucosal fat deposition predominantly in the sigmoid colon.  The appendix is not identified.  Therefore, appendicitis cannot be excluded on the basis of this exam. However, no significant inflammatory changes are noted within the right lower quadrant. PERITONEUM: Mesenteric stranding and fluid collections as well as free air as previously described.       Status post percutaneous gastrostomy to with complex collection noted in the abdominal wall inferiorly with extension through the ventral abdomen and few associated foci of pneumoperitoneum as described. Findings appear new since prior imaging.   Possible 4 cm collection along the gastric wall, possibly present on prior imaging.   Complex left subdiaphragmatic/perisplenic collection with drainage catheter appear smaller since prior imaging.   Mesenteric stranding.   Fluid within  the colon which may be infectious or inflammatory.   Complex/hyperdense left renal cystic lesion again noted.   Small pericardial effusion.   Partially imaged left pleural effusion with adjacent atelectasis.   Coronary artery calcifications and additional findings as detailed.   MACRO: None   Signed by: Valorie Montano 8/27/2024 5:27 AM Dictation workstation:   [id number]    IR body drain placement    Result Date: 8/19/2024  Interpreted By:  Adria Wynn, STUDY: IR BODY DRAIN PLACEMENT;  8/19/2024 12:27 pm   INDICATION: Signs/Symptoms:Abdominal ABscess, Drain?, culture to be sent.   COMPARISON: None.   ACCESSION NUMBER(S): [id number]   ORDERING CLINICIAN: TIMA DE ANDA   TECHNIQUE:   CONSENT: The patient/patient's POA/next of kin was informed of the nature of the proposed procedure. The purposes, alternatives, risks, and benefits were explained and discussed. All questions were answered and consent was obtained.   RADIATION EXPOSURE: None   SEDATION: Moderate conscious IV sedation services (supervision of administration, induction, and maintenance) were provided by the physician performing the procedure with intravenous fentanyl 50 mcg and versed 1 mg for 28 minutes. The physician was assisted by an independent trained observer, an interventional radiology nurse, in the continuous monitoring of patient level of consciousness and physiologic status.   MEDICATION/CONTRAST: No additional   TIME OUT: A time out was performed immediately prior to procedure start with the interventional team, correctly identifying the patient name, date of birth, MRN, procedure, anatomy (including marking of site and side), patient position, procedure consent form, relevant laboratory and imaging test results, antibiotic administration, safety precautions, and procedure-specific equipment needs.   COMPLICATIONS: No immediate adverse events identified.   FINDINGS: Patient placed in the right posterior oblique position and ultrasound  evaluation performed over the left quadrant. Subdiaphragmatic, perisplenic collection identified congruent with CT imaging from 08/17/2024. The lowest intercostal space possible was identified and targeted. The area is prepped and draped in usual sterile fashion. Skin and subcutaneous tissues anesthetized using lidocaine solution. Small skin nick was made. A 10 Japanese all-purpose drain was then advanced into the collection under direct ultrasound guidance using a single step technique. With confirmation of placement within the collection per live ultrasound, the catheter was advanced off the sharp trocar into the posterior margin of the collection. Spontaneous drainage of turbid fluid present. A sample was sent for culture and sensitivity. The remainder of the fluid was aspirated to waist. No immediate complications. A single suture was used to secure the catheter.       Successful ultrasound-guided placement of a 10 Japanese drain into the left upper quadrant abscess as outlined above.     Performed and dictated at [facility].   MACRO: None   Signed by: Adria Wynn 8/19/2024 12:38 PM Dictation workstation:   [id number]    CT abdomen pelvis w IV contrast    Result Date: 8/18/2024  Interpreted By:  Finkelstein, Evan, STUDY: CT ABDOMEN PELVIS W IV CONTRAST;  8/17/2024 5:38 pm   INDICATION: Signs/Symptoms:Minimal pain/abdominal drainage/previous malposition PEG tube.   COMPARISON: CT abdomen pelvis 08/10/2024   ACCESSION NUMBER(S): [id number]   ORDERING CLINICIAN: TATIANNA MILLS   TECHNIQUE: Axial CT images of the abdomen and pelvis with coronal and sagittal reconstructed images obtained after intravenous administration of 75 mL Omnipaque 350   FINDINGS: LOWER CHEST: There are coronary artery calcifications. Partially visualized moderate left and small right pleural effusions with superimposed atelectasis.   ABDOMEN:   LIVER: Normal attenuation and contour. BILE DUCTS: Normal  caliber. GALLBLADDER: No calcified gallstones. No wall thickening. PORTAL VEIN: Patent SPLEEN: Perisplenic, possibly subcapsular collection of fluid and gas measuring 15.7 x 9.7 x 11.1 cm (maximum AP by transverse by craniocaudal dimensions). There is associated mass effect on the spleen. Remainder of the spleen demonstrates a homogeneous attenuation. PANCREAS: Unremarkable. ADRENALS: Unremarkable. KIDNEYS, URETERS and URINARY BLADDER: Symmetric renal enhancement. No hydronephrosis or perinephric fluid collection. 3 cm hypodensity in the inferior pole of the left kidney measures simple fluid attenuation and is most compatible with a simple cyst. There are additional indeterminate hypodensities in the left kidney measuring approximately 1.5 cm and 1.6 cm in size which measuring intermediate density. The bladder is decompressed with a Cleveland catheter. There is bladder wall thickening and mild adjacent stranding. REPRODUCTIVE ORGANS: No pelvic masses.   ABDOMINAL WALL: A percutaneous gastrostomy tube is present. Defect in the anterior abdominal wall soft tissues inferior to the gastrostomy tube likely relates to prior surgery with overlying skin staples. Fat containing right inguinal hernia. PERITONEUM: Punctate focus of free air adjacent to the stomach.   BOWEL: A percutaneous gastrostomy tube is present and appears to be appropriately positioned within the stomach. Free air seen on prior imaging 08/10/2024 adjacent to the malpositioned gastrostomy tube is near resolved with a small punctate focus of air remaining best seen on image 71 of series 2. Prominence of the submucosal fat in the distal colon. The appendix is not definitively visualized, without focal pericecal inflammatory stranding.   VESSELS: Moderate aortoiliac calcifications. RETROPERITONEUM: No pathologically enlarged retroperitoneal lymph nodes.   BONES: No acute osseous abnormality.       15.7 x 9.7 x 11.1 cm perisplenic, possibly subcapsular,  collection of fluid and gas most compatible with abscess.   Percutaneous gastrostomy tube is present and appears appropriately positioned. Free air seen on prior imaging 08/10/2024 adjacent to the malpositioned gastrostomy tube at that time is now near resolved with a small persistent punctate focus of free air adjacent to the stomach.   Defect in the anterior abdominal wall with overlying staples new from prior imaging most compatible with interval surgery. Mild stranding and fluid within this defect without evidence of a well-defined collection..   The bladder is decompressed with a Cleveland catheter, which limits evaluation, however, there is bladder wall thickening and mild adjacent stranding. Findings may be seen with cystitis. Correlate with urinalysis.   Partially visualized moderate left and small right pleural effusions with superimposed atelectasis.   3 cm simple appearing cyst in the left kidney. Additional indeterminate hypodensities in the left kidney measure up to approximately 1.6 cm. Recommend nonemergent MRI to further characterize.   Prominence of submucosal fat in the distal colon which may be seen with chronic inflammatory processes.   MACRO: Critical Finding:  See findings. Notification was initiated on 8/18/2024 at 4:08 am by  Evan Finkelstein.  (**-YCF-**) Instructions:   Signed by: Evan Finkelstein 8/18/2024 4:08 AM Dictation workstation:   [id number]    ECG 12 Lead    Result Date: 8/14/2024  Sinus tachycardia Ventricular premature complex Prolonged AL interval LVH with secondary repolarization abnormality Inferior infarct, old Anterolateral infarct, age indeterminate See ED provider note for full interpretation and clinical correlation Confirmed by Alexandria Cullen ([id number]) on 8/14/2024 6:23:12 PM    FL upper GI w KUB    Result Date: 8/14/2024  Interpreted By:  Mei Crum, STUDY: FL UPPER GI W KUB;  8/14/2024 9:31 am   INDICATION: Signs/Symptoms:Please please contrast through  patient's PEG tube in abdomen.   COMPARISON: None.   ACCESSION NUMBER(S): [id number]   ORDERING CLINICIAN: LUCY NUNES   TECHNIQUE: Total fluoroscopy time was  1 minutes 7 seconds with 8 spot images obtained. 60 mL of solution 50% Gastrografin and 50% water was inserted through the PEG tube.   FINDINGS: Peg tube, surgical drain and skin staples are noted in the upper abdomen. An NG tube is also present in the stomach. With injection of contrast the contrast flows freely into the gastric lumen. Contrast promptly passes from stomach into duodenal. No extravasation of contrast is noted outside of the stomach. There was no gastroesophageal reflux identified.       1.  PEG tube is positioned in the stomach with no extravasation. 2. Prompt passage of contrast into the duodenal and no gastroesophageal reflux     MACRO: None   Signed by: Mei Crum 8/14/2024 10:10 AM Dictation workstation:   [id number]    XR chest 2 views    Result Date: 8/14/2024  Interpreted By:  Mei Crum, STUDY: XR CHEST 2 VIEWS;  8/14/2024 9:11 am   INDICATION: Signs/Symptoms:Hypoxia.   COMPARISON: 08/13/2024   ACCESSION NUMBER(S): [id number]   ORDERING CLINICIAN: LUCY NUNES   FINDINGS: AP upright and lateral views were obtained with the patient sitting.   NG tube is present with the tip below the diaphragm and beyond the image.   CARDIOMEDIASTINAL SILHOUETTE: Heart is enlarged, which is accentuated by the shallow inspiration. Aorta is atherosclerotic.   LUNGS: Bilateral pleural effusions and bilateral basilar atelectasis are present, left worse than right. Appearance is similar to the prior exam.   ABDOMEN: No remarkable upper abdominal findings.   BONES: No acute osseous changes.       1.  Unchanged cardiomegaly 2. Pleural effusions and basilar atelectasis, left worse than right and unchanged from the prior exam       MACRO: None   Signed by: Mei Crum 8/14/2024 9:22 AM Dictation workstation:    [id number]    XR chest abdomen for OG NG placement    Result Date: 8/13/2024  Interpreted By:  Mei Crum, STUDY: XR CHEST ABDOMEN FOR OG NG PLACEMENT; ;  8/13/2024 11:35 am   INDICATION: Signs/Symptoms:NG tube slightly came out. want to check placement.   COMPARISON: 06/23/2024   ACCESSION NUMBER(S): [id number]   ORDERING CLINICIAN: LUCY NUNES   FINDINGS: Supine images including the chest and upper abdomen show NG tube projecting in the expected region of the body of the stomach. A gastrostomy tube also projects in the stomach. A surgical drain is present left side of the abdomen and skin staples projects slightly left of midline in the mid abdomen.   Patchy bowel gas is noted in the visualized portion of the abdomen without dilatation. No gross free air is visible.   Cardiac silhouette is unchanged in size. Atelectasis is present at both lung bases, left worse than right. Lung volumes are shallow.   No acute changes are noted in the osseous structures.       Tube projects in the stomach   Bilateral basilar atelectasis     MACRO: None   Signed by: Mei Crum 8/13/2024 1:01 PM Dictation workstation:   [id number]    CT head wo IV contrast    Result Date: 8/12/2024  Interpreted By:  Judith Velarde, STUDY: CT HEAD WO IV CONTRAST  8/12/2024 5:32 pm   INDICATION: Signs/Symptoms:AMS   COMPARISON: CT head 06/21/2024, 07/17/2023.   ACCESSION NUMBER(S): [id number]   ORDERING CLINICIAN: LUCY NUNES   TECHNIQUE: Serial, axial CT images of the brain were obtained without IV contrast. Coronal and sagittal reformatted images were performed.   FINDINGS: The ventricles, cisterns and sulci are prominent, consistent with diffuse volume loss.  There are areas of nonspecific white matter hypodensity, which are probably age-related or microvascular in nature. Remote infarcts at the bilateral basal ganglia. The gray-white matter differentiation is intact and there is no evidence of acute  territorial infarct.  No mass effect or midline shift is seen.  There is no hemorrhage.  No extraaxial fluid collection. No air-fluid levels at the visualized paranasal sinuses. Mucous retention cyst/polyp at the left maxillary sinus. The mastoid air cells are clear. No depressed calvarial fracture.   Partially visualized nasogastric tube inserted through the right nostril.       1.  No acute intracranial hemorrhage or acute territorial infarct. 2.  Diffuse parenchymal volume loss. Chronic changes.     MACRO: None.   Signed by: Judith Velarde 8/12/2024 6:34 PM Dictation workstation:   [id number]    CT abdomen pelvis w IV contrast    Result Date: 8/10/2024  Interpreted By:  Yamilet Heck, STUDY: CT ABDOMEN PELVIS W IV CONTRAST;  8/10/2024 2:08 pm   INDICATION: Signs/Symptoms:abbdominal pain.   COMPARISON: None.   ACCESSION NUMBER(S): [id number]   ORDERING CLINICIAN: DONITA ROBLES   TECHNIQUE: CT of the abdomen and pelvis was performed.  75 mL Omnipaque 350   FINDINGS: LOWER CHEST: There is mild bibasilar atelectasis.   ABDOMEN:   LIVER: There is no hepatic mass.   BILE DUCTS: There is no intrahepatic, common hepatic or common bile ductal dilatation.   GALLBLADDER: The gallbladder is unremarkable.   PANCREAS: The pancreas is unremarkable.   SPLEEN: The spleen is unremarkable. There is no splenic mass or splenomegaly.   ADRENAL GLANDS: The adrenal glands are unremarkable.   KIDNEYS AND URETERS: The kidneys function symmetrically. There are benign bilateral simple renal cysts. There is no intrarenal calculus or hydronephrosis. The bladder is distended measuring 13.6 x 11.3 x 7.0 cm with a volume of 559 cc. There is bladder wall trabeculation, likely secondary to postobstructive neuropathic change from prostate enlargement.   BOWEL: There is free intraperitoneal air. There is a feeding tube insufflated in the fatty anterior to the stomach. This is where the free air is located. This could be iatrogenic from  malpositioned PEG tube rather than a ruptured viscus. There is thickening of loops of small bowel in the left side of the abdomen. There is a small amount of ascites in the left side of the abdomen interdigitated between the thickened loops of small bowel. There is induration of the mesentery. This could represent Crohn's disease or enteritis. There is diffuse fatty infiltration of the colon from the mid transverse colon to the rectum. Fibrofatty infiltration can be seen with Crohn's disease, inactive.   VESSELS: There is atherosclerotic calcification of the abdominal aorta, iliac and femoral arteries.   PERITONEUM/RETROPERITONEUM/LYMPH NODES: There is no retroperitoneal or pelvic adenopathy.   ABDOMINAL WALL: The abdominal wall is unremarkable.   BONE AND SOFT TISSUE: There is no acute osseous finding.   The prostate gland is enlarged measuring 6.2 x 4.8 cm.       1. There is a malpositioned PEG tube insufflated in the fatty anterior to the stomach. There is a small amount of free air in this location. This could be secondary to malpositioned PEG tube rather than a ruptured viscus but should be correlated clinically. 2. Nonspecific thickening of loops of small bowel in the left side of the abdomen with induration of the mesentery and a small amount of ascites. This could represent Crohn's disease or enteritis. 3. Fibrofatty infiltration of the colon from the transverse colon to the rectum. This can be seen with Crohn's disease, inactive. 4. Benign bilateral simple renal cysts. 5. The urinary bladder with a volume of 559 cc. 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   MACRO: None   Signed by: Yamilet Heck 8/10/2024 2:22 PM Dictation workstation:   [id number]    XR chest 1 view    Result Date: 8/10/2024  Interpreted By:  Yamilet Heck, STUDY: XR CHEST 1 VIEW;  8/10/2024 1:44 pm   INDICATION: Signs/Symptoms:cough.   COMPARISON: 06/24/2024    ACCESSION NUMBER(S): [id number]   ORDERING CLINICIAN: DONITA ROBLES   FINDINGS: CARDIOMEDIASTINAL SILHOUETTE: The heart is enlarged in a left ventricular configuration, unchanged.     LUNGS: Lungs are clear.   ABDOMEN: No remarkable upper abdominal findings.     BONES: No acute osseous changes.       No acute cardiopulmonary process.   MACRO: None   Signed by: Yamilet Heck 8/10/2024 1:46 PM Dictation workstation:   [id number]    XR ABDOMEN FOR NG/OG/NE TUBE PLACEMENT    Result Date: 7/30/2024  EXAMINATION: ONE SUPINE XRAY VIEW(S) OF THE ABDOMEN 7/30/2024 9:50 pm COMPARISON: 7:22 p.m. HISTORY: ORDERING SYSTEM PROVIDED HISTORY: Confirmation of course of NG tube and location of tip of tube post advancement TECHNOLOGIST PROVIDED HISTORY: Reason for exam:->Confirmation of course of NG tube and location of tip of tube post advancement Portable?->Yes FINDINGS: Feeding tube advanced.  Tip now projects over the region of the gastric antrum/duodenal bulb.  Consider advancement 10-15 cm if post pyloric positioning is desired.  No ileus or obstruction lung bases clear.  Osseous and body wall soft tissues unremarkable.    Feeding tube tip projects over the region of the gastric antrum/duodenal bulb.    XR ABDOMEN FOR NG/OG/NE TUBE PLACEMENT    Result Date: 7/30/2024  EXAMINATION: ONE SUPINE XRAY VIEW(S) OF THE ABDOMEN 7/30/2024 7:21 pm COMPARISON: None. HISTORY: ORDERING SYSTEM PROVIDED HISTORY: G-tube Placement / Confirmation TECHNOLOGIST PROVIDED HISTORY: Reason for exam:->G-tube Placement / Confirmation FINDINGS: Single image shows esophageal route catheter into the left upper quadrant expected proximal stomach region.  No dilated bowels evident.    Findings suggestive of NG/OG into the proximal stomach. RECOMMENDATION: Clinical correlation.      Assessment and Plan  Principal Problem:    Abdominal wall abscess    79 y.o. 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     Plan:  - continue wet to dry BID dressing changes for midline incision by bedside RN  - monitor for worsening drainage or surrounding erythema   - follow A.M. labs   - continue abx per primary   - continue drain flushes BID to ensure patency   - continue tube feeds via PEG tube per nutrition recs  - appreciate rest of care to primary team    Kostas Nielsen,  PGY3  General Surgery

## 2024-08-30 NOTE — PROGRESS NOTES
Abdi Wilson is a 79 y.o. male on day 3 of admission presenting with Abdominal wall abscess.      Assessment/Plan:     #Abdominal wall abscess s/p bedside wound drainage 8/27  #Splenic abscess s/p drain 8/19/24  Previous culture positive for Pseudomonas and Enterobacter cloacae.  Appropriate source control of the abdominal wall abscess.  Significant decrease in the size of the splenic abscess.  Patient does have NESSA drain which hopefully act as a source control     #Chronic dysphagia s/p PEG tube  #Dementia  #Stroke        Recommendations:     -Continue cefepime 2 g every 8 hours through September 4 as per previous plan  -Patient can be discharged from ID standpoint  -Will continue to follow      Helio Weinberg MD  Date of service: 8/30/2024  Time of service: 12:28 PM      Subjective   Interval History: No acute events overnight.  Patient denies any new complaint.        Review of Systems  Denies: fever, chills, nausea, vomiting, diarrhea, dysuria    Objective   Range of Vitals (last 24 hours)  Heart Rate:  [71-85]   Temp:  [36.1 °C (97 °F)-36.6 °C (97.8 °F)]   Resp:  [10-26]   BP: (125-137)/(67-79)   SpO2:  [93 %-99 %]  Daily Weight  08/29/24 : 83 kg (182 lb 15.7 oz)   Body mass index is 27.01 kg/m².    General: alert, oriented, NAD  Lungs: bilaterally clear to auscultation, no wheezing  Abdomen: soft, PE tube, LUQ NESSA drain with pus, clean dressing  Neuro: AAO x 3, able to follow commands  Skin: No rashes or ulcers  MSK: No joint inflammation  Ext: R arm picc line without surrounding erythema or drainage       Antibiotics  cefepime - 2 gram/100 mL, 2 gram/100 mL      Relevant Results  Labs  Results from last 72 hours   Lab Units 08/29/24  0441 08/28/24  0459   WBC AUTO x10*3/uL 8.1 8.4   HEMOGLOBIN g/dL 12.3* 12.6*   HEMATOCRIT % 36.7* 38.8*   PLATELETS AUTO x10*3/uL 405 472*     Results from last 72 hours   Lab Units 08/29/24  0441 08/28/24  0459   SODIUM mmol/L 135* 137   POTASSIUM mmol/L 3.7 3.8   CHLORIDE mmol/L  107 106   CO2 mmol/L 23 25   BUN mg/dL 14 15   CREATININE mg/dL 0.74 0.97   GLUCOSE mg/dL 210* 340*   CALCIUM mg/dL 7.7* 7.5*   ANION GAP mmol/L 9* 10   EGFR mL/min/1.73m*2 >90 78         Estimated Creatinine Clearance: 78.3 mL/min (by C-G formula based on SCr of 0.74 mg/dL).  CRP   Date Value Ref Range Status   07/28/2023 3.33 (A) mg/dL Final     Comment:     REF VALUE  < 1.00     07/26/2023 7.32 (A) mg/dL Final     Comment:     REF VALUE  < 1.00     07/24/2023 2.15 (A) mg/dL Final     Comment:     REF VALUE  < 1.00         Microbiology  Susceptibility data from last 14 days.  Collected Specimen Info Organism Amoxicillin/Clavulanate Ampicillin Ampicillin/Sulbactam Aztreonam Cefazolin Cefepime Ceftazidime Ciprofloxacin Gentamicin Levofloxacin Piperacillin/Tazobactam Tobramycin   08/28/24 Tissue/Biopsy from Wound/Tissue Staphylococcus aureus               08/19/24 Fluid from Aspirate Enterobacter cloacae complex  R  R  R   R  S   S  S  S  S      Pseudomonas aeruginosa     S   S  S  R   R  S  S     Collected Specimen Info Organism Trimethoprim/Sulfamethoxazole   08/28/24 Tissue/Biopsy from Wound/Tissue Staphylococcus aureus    08/19/24 Fluid from Aspirate Enterobacter cloacae complex  R     Pseudomonas aeruginosa        Imaging    CT abdomen pelvis w IV contrast    Result Date: 8/27/2024  Status post percutaneous gastrostomy to with complex collection noted in the abdominal wall inferiorly with extension through the ventral abdomen and few associated foci of pneumoperitoneum as described. Findings appear new since prior imaging.   Possible 4 cm collection along the gastric wall, possibly present on prior imaging.   Complex left subdiaphragmatic/perisplenic collection with drainage catheter appear smaller since prior imaging.   Mesenteric stranding.   Fluid within the colon which may be infectious or inflammatory.   Complex/hyperdense left renal cystic lesion again noted.   Small pericardial effusion.   Partially  imaged left pleural effusion with adjacent atelectasis.   Coronary artery calcifications and additional findings as detailed.   MACRO: None   Signed by: Valorie Montano 8/27/2024 5:27 AM Dictation workstation:   [id number]    IR body drain placement    Result Date: 8/19/2024  Successful ultrasound-guided placement of a 10 [place] drain into the left upper quadrant abscess as outlined above.     Performed and dictated at [facility].   MACRO: None   Signed by: Adria Wynn 8/19/2024 12:38 PM Dictation workstation:   [id number]    CT abdomen pelvis w IV contrast    Result Date: 8/18/2024  15.7 x 9.7 x 11.1 cm perisplenic, possibly subcapsular, collection of fluid and gas most compatible with abscess.   Percutaneous gastrostomy tube is present and appears appropriately positioned. Free air seen on prior imaging 08/10/2024 adjacent to the malpositioned gastrostomy tube at that time is now near resolved with a small persistent punctate focus of free air adjacent to the stomach.   Defect in the anterior abdominal wall with overlying staples new from prior imaging most compatible with interval surgery. Mild stranding and fluid within this defect without evidence of a well-defined collection..   The bladder is decompressed with a [place] catheter, which limits evaluation, however, there is bladder wall thickening and mild adjacent stranding. Findings may be seen with cystitis. Correlate with urinalysis.   Partially visualized moderate left and small right pleural effusions with superimposed atelectasis.   3 cm simple appearing cyst in the left kidney. Additional indeterminate hypodensities in the left kidney measure up to approximately 1.6 cm. Recommend nonemergent MRI to further characterize.   Prominence of submucosal fat in the distal colon which may be seen with chronic inflammatory processes.   MACRO: Critical Finding:  See findings. Notification was initiated on 8/18/2024 at 4:08 am  by  Evan Finkelstein.  (**-YCF-**) Instructions:   Signed by: Evan Finkelstein 8/18/2024 4:08 AM Dictation workstation:   [id number]    FL upper GI w KUB    Result Date: 8/14/2024  1.  PEG tube is positioned in the stomach with no extravasation. 2. Prompt passage of contrast into the duodenal and no gastroesophageal reflux     MACRO: None   Signed by: Mei Crum 8/14/2024 10:10 AM Dictation workstation:   [id number]    XR chest 2 views    Result Date: 8/14/2024  1.  Unchanged cardiomegaly 2. Pleural effusions and basilar atelectasis, left worse than right and unchanged from the prior exam       MACRO: None   Signed by: Mei Crum 8/14/2024 9:22 AM Dictation workstation:   [id number]    XR chest abdomen for OG NG placement    Result Date: 8/13/2024  Tube projects in the stomach   Bilateral basilar atelectasis     MACRO: None   Signed by: Mei Crum 8/13/2024 1:01 PM Dictation workstation:   [id number]    CT head wo IV contrast    Result Date: 8/12/2024  1.  No acute intracranial hemorrhage or acute territorial infarct. 2.  Diffuse parenchymal volume loss. Chronic changes.     MACRO: None.   Signed by: Judith Velarde 8/12/2024 6:34 PM Dictation workstation:   [id number]    CT abdomen pelvis w IV contrast    Result Date: 8/10/2024  1. There is a malpositioned PEG tube insufflated in the fatty anterior to the stomach. There is a small amount of free air in this location. This could be secondary to malpositioned PEG tube rather than a ruptured viscus but should be correlated clinically. 2. Nonspecific thickening of loops of small bowel in the left side of the abdomen with induration of the mesentery and a small amount of ascites. This could represent Crohn's disease or enteritis. 3. Fibrofatty infiltration of the colon from the transverse colon to the rectum. This can be seen with Crohn's disease, inactive. 4. Benign bilateral simple renal cysts. 5. The urinary bladder with a volume of 559 cc.  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   MACRO: None   Signed by: Yamilet Heck 8/10/2024 2:22 PM Dictation workstation:   [id number]    XR chest 1 view    Result Date: 8/10/2024  No acute cardiopulmonary process.   MACRO: None   Signed by: Yamilet Heck 8/10/2024 1:46 PM Dictation workstation:   [id number]

## 2024-08-30 NOTE — DISCHARGE SUMMARY
Discharge Diagnosis  Abdominal wall abscess  Recent ischemic stroke with significant dysphagia requiring PEG tube  Physical debility/deconditioning  Hypertension        This discharge took greater than 35 minutes.    Test Results Pending At Discharge  Pending Labs       Order Current Status    POCT fingerstick glucose manually resulted In process    Blood Culture Preliminary result    Blood Culture Preliminary result    Tissue/Wound Culture/Smear Preliminary result            Hospital Course   Abdi Wilson is a 79 y.o. male with PMHx of dementia who presented from [facility] for concern for G-tube infection due to wound site drainage and odor. He suffered a stroke in June and underwent PEG placement 8/8/24. On 8/10/24 he was admitted for peritonitis and underwent emergent replacement of the gastrostomy tube.  On 8/18/24 a CT demonstrated a large casandra-splenic abscess and he underwent drain placement the following day. He was discharged on 8/23/24 with ciprofloxacin and Flagyl until 9/4/2024; cultures grew (3+) moderate Enterobacter cloacae complex and (1+) rare Pseudomonas aeruginosa. Prior to the PEG he had an NGT which he pulled out 3 times according to his son at bedside and the pt has been wearing an abdominal binder but he doesn't think the pt is pulling at his PEG tube. In the ED today CT demonstrated new complex collection noted in the abdominal wall inferiorly with extension through the ventral abdomen and few associated foci of pneumoperitoneum. Additionally a 4 cm collection along the gastric wall that may have been previously identified. He was started on vancomycin and Zosyn. Workup otherwise was benign except for uncontrolled HTN to 183/104. Paperwork from [facility] notes pt is DNRCC. Remainder of ROS reviewed and negative except as indicated in HPI.      08/28: Patient was evaluated this morning. Not in acute distress. Patient has had poor urinary output overnight. Bladder scan revealed 471 mL, at  which time a straight cath was ordered. Patient denied any shortness of breath or chest pain. Patient complained of abdominal pain. Midline abdominal wound dressing is dry, surrounding area is non-erythematous. IR placed splenic drain on his last admission and is draining purulent fluid at slow rate. Patient is clinically improving today.  Evaluated by general surgery and underwent bedside debridement.  08/29: Patient was evaluated this morning. Not in acute distress. No acute changes overnight. Patient denies any chest pain or abdominal pain. Midline abdominal wound dressing is dry surrounding area is non erythematous. IR placed splenic drain is draining purulent fluid at decreased rate. Vancomycin has been discontinued. Patient is clinically improving today.  08/30: ID recommends IV cefepime for 7 more days.  Midline will be placed today and patient will be discharged back to [facility] on IV cefepime.  He will continue rest of his medication.  Follow-up with general surgery as an outpatient.       Pertinent Physical Exam At Time of Discharge  Physical Exam  Patient is awake, significant expressive aphasia  Eyes: PERRLA, no conjunctival congestion  Chest: Bilateral Air entry, no crackles or wheezing  Heart: s1S2 regular, no murmur  Abdomen: Soft, non tender, BS present, PEG tube site looks clean  Ext:    Home Medications     Medication List      START taking these medications     cefepime IV; Commonly known as: Maxipime; Infuse 100 mL (2 g) over 0.5   hours into a venous catheter every 8 hours for 5 days.   oxyCODONE 5 mg immediate release tablet; Commonly known as: Roxicodone;   1 tablet (5 mg) by g-tube route every 6 hours if needed for moderate pain   (4 - 6) or severe pain (7 - 10).     CONTINUE taking these medications     acetaminophen 325 mg tablet; Commonly known as: Tylenol   atorvastatin 80 mg tablet; Commonly known as: Lipitor; Take 1 tablet (80   mg) by mouth once daily at bedtime.   carvedilol 6.25 mg  tablet; Commonly known as: Coreg; Take 1 tablet (6.25   mg) by mouth 2 times a day.   clopidogrel 75 mg tablet; Commonly known as: Plavix   colestipol 5 gram granules; Commonly known as: Colestid   Dulcolax (bisacodyl) 10 mg suppository; Generic drug: bisacodyl   hydrALAZINE 25 mg tablet; Commonly known as: Apresoline; Take 1 tablet   (25 mg) by mouth 3 times a day.   hyoscyamine 0.125 mg tablet; Commonly known as: Anaspaz, Levsin   ipratropium-albuteroL 0.5-2.5 mg/3 mL nebulizer solution; Commonly known   as: Duo-Neb; Take 3 mL by nebulization every 4 hours if needed for   wheezing.   lidocaine 4 % cream; Commonly known as: LMX   loperamide 2 mg tablet; Commonly known as: Imodium A-D   losartan 50 mg tablet; Commonly known as: Cozaar; Take 1 tablet (50 mg)   by mouth once daily.   magnesium hydroxide 2,400 mg/10 mL suspension suspension; Commonly known   as: Milk of Magnesia   Vitamin D2 1.25 MG (41285 UT) capsule; Generic drug: ergocalciferol       Outpatient Follow-Up  No follow-ups on file.     Ronit Wan MD  8/30/2024  8:48 AM

## 2024-08-30 NOTE — NURSING NOTE
Deep used to suction pt's mouth per pt request of phlegm. NESSA drain flushed as ordered now, pt tolerated well.

## 2024-08-30 NOTE — NURSING NOTE
EMS personnel to transport pt to SNF now. NESSA drain to suction/empty. PEG tube clamped for transport. Pt leaving with RUE midline intact from placement today. Pt changed/cleaned prior to discharge now. Pt denies further needs. Report given to EMS personnel now by this RN. Report already called to RN at [facility].

## 2024-08-31 LAB
BACTERIA BLD CULT: NORMAL
BACTERIA BLD CULT: NORMAL
BACTERIA SPEC CULT: ABNORMAL
GRAM STN SPEC: ABNORMAL
GRAM STN SPEC: ABNORMAL

## 2024-09-11 ENCOUNTER — HOSPITAL ENCOUNTER (INPATIENT)
Facility: HOSPITAL | Age: 79
LOS: 2 days | Discharge: HOSPICE | End: 2024-09-13
Attending: EMERGENCY MEDICINE | Admitting: INTERNAL MEDICINE
Payer: COMMERCIAL

## 2024-09-11 ENCOUNTER — APPOINTMENT (OUTPATIENT)
Dept: RADIOLOGY | Facility: HOSPITAL | Age: 79
End: 2024-09-11
Payer: COMMERCIAL

## 2024-09-11 ENCOUNTER — APPOINTMENT (OUTPATIENT)
Dept: CARDIOLOGY | Facility: HOSPITAL | Age: 79
End: 2024-09-11
Payer: COMMERCIAL

## 2024-09-11 DIAGNOSIS — R29.90 STROKE-LIKE SYMPTOMS: ICD-10-CM

## 2024-09-11 DIAGNOSIS — R41.82 ALTERED MENTAL STATUS, UNSPECIFIED ALTERED MENTAL STATUS TYPE: Primary | ICD-10-CM

## 2024-09-11 DIAGNOSIS — I63.50 CEREBRAL ARTERY OCCLUSION WITH CEREBRAL INFARCTION (MULTI): ICD-10-CM

## 2024-09-11 DIAGNOSIS — I63.9 CEREBRAL INFARCTION, UNSPECIFIED MECHANISM (MULTI): ICD-10-CM

## 2024-09-11 LAB
ALBUMIN SERPL BCP-MCNC: 3.4 G/DL (ref 3.4–5)
ALP SERPL-CCNC: 95 U/L (ref 33–136)
ALT SERPL W P-5'-P-CCNC: 23 U/L (ref 10–52)
ANION GAP BLDV CALCULATED.4IONS-SCNC: 5 MMOL/L (ref 10–25)
ANION GAP SERPL CALC-SCNC: 17 MMOL/L (ref 10–20)
APPEARANCE UR: CLEAR
AST SERPL W P-5'-P-CCNC: 21 U/L (ref 9–39)
BASE EXCESS BLDV CALC-SCNC: 5.8 MMOL/L (ref -2–3)
BASOPHILS # BLD AUTO: 0.06 X10*3/UL (ref 0–0.1)
BASOPHILS NFR BLD AUTO: 0.4 %
BILIRUB SERPL-MCNC: 1 MG/DL (ref 0–1.2)
BILIRUB UR STRIP.AUTO-MCNC: NEGATIVE MG/DL
BODY TEMPERATURE: 37 DEGREES CELSIUS
BUN SERPL-MCNC: 18 MG/DL (ref 6–23)
CA-I BLDV-SCNC: 1.15 MMOL/L (ref 1.1–1.33)
CALCIUM SERPL-MCNC: 8.7 MG/DL (ref 8.6–10.3)
CAOX CRY #/AREA UR COMP ASSIST: ABNORMAL /HPF
CHLORIDE BLDV-SCNC: 102 MMOL/L (ref 98–107)
CHLORIDE SERPL-SCNC: 98 MMOL/L (ref 98–107)
CO2 SERPL-SCNC: 24 MMOL/L (ref 21–32)
COLOR UR: YELLOW
CREAT SERPL-MCNC: 0.69 MG/DL (ref 0.5–1.3)
EGFRCR SERPLBLD CKD-EPI 2021: >90 ML/MIN/1.73M*2
EOSINOPHIL # BLD AUTO: 0 X10*3/UL (ref 0–0.4)
EOSINOPHIL NFR BLD AUTO: 0 %
ERYTHROCYTE [DISTWIDTH] IN BLOOD BY AUTOMATED COUNT: 14.7 % (ref 11.5–14.5)
GLUCOSE BLDV-MCNC: 182 MG/DL (ref 74–99)
GLUCOSE SERPL-MCNC: 160 MG/DL (ref 74–99)
GLUCOSE UR STRIP.AUTO-MCNC: NORMAL MG/DL
HCO3 BLDV-SCNC: 28.4 MMOL/L (ref 22–26)
HCT VFR BLD AUTO: 43.5 % (ref 41–52)
HCT VFR BLD EST: 44 % (ref 41–52)
HGB BLD-MCNC: 14.4 G/DL (ref 13.5–17.5)
HGB BLDV-MCNC: 14.7 G/DL (ref 13.5–17.5)
HYALINE CASTS #/AREA URNS AUTO: ABNORMAL /LPF
IMM GRANULOCYTES # BLD AUTO: 0.07 X10*3/UL (ref 0–0.5)
IMM GRANULOCYTES NFR BLD AUTO: 0.5 % (ref 0–0.9)
INHALED O2 CONCENTRATION: 21 %
INR PPP: 1.2 (ref 0.9–1.1)
KETONES UR STRIP.AUTO-MCNC: NEGATIVE MG/DL
LACTATE BLDV-SCNC: 3 MMOL/L (ref 0.4–2)
LACTATE BLDV-SCNC: 3.4 MMOL/L (ref 0.4–2)
LACTATE SERPL-SCNC: 2.4 MMOL/L (ref 0.4–2)
LACTATE SERPL-SCNC: 2.5 MMOL/L (ref 0.4–2)
LEUKOCYTE ESTERASE UR QL STRIP.AUTO: ABNORMAL
LYMPHOCYTES # BLD AUTO: 0.84 X10*3/UL (ref 0.8–3)
LYMPHOCYTES NFR BLD AUTO: 5.7 %
MCH RBC QN AUTO: 28.4 PG (ref 26–34)
MCHC RBC AUTO-ENTMCNC: 33.1 G/DL (ref 32–36)
MCV RBC AUTO: 86 FL (ref 80–100)
MONOCYTES # BLD AUTO: 0.37 X10*3/UL (ref 0.05–0.8)
MONOCYTES NFR BLD AUTO: 2.5 %
MUCOUS THREADS #/AREA URNS AUTO: ABNORMAL /LPF
NEUTROPHILS # BLD AUTO: 13.46 X10*3/UL (ref 1.6–5.5)
NEUTROPHILS NFR BLD AUTO: 90.9 %
NITRITE UR QL STRIP.AUTO: NEGATIVE
NRBC BLD-RTO: 0 /100 WBCS (ref 0–0)
OXYHGB MFR BLDV: 79 % (ref 45–75)
PCO2 BLDV: 34 MM HG (ref 41–51)
PH BLDV: 7.53 PH (ref 7.33–7.43)
PH UR STRIP.AUTO: 5.5 [PH]
PLATELET # BLD AUTO: 282 X10*3/UL (ref 150–450)
PO2 BLDV: 51 MM HG (ref 35–45)
POTASSIUM BLDV-SCNC: 5.2 MMOL/L (ref 3.5–5.3)
POTASSIUM SERPL-SCNC: 4.6 MMOL/L (ref 3.5–5.3)
PROT SERPL-MCNC: 7.4 G/DL (ref 6.4–8.2)
PROT UR STRIP.AUTO-MCNC: ABNORMAL MG/DL
PROTHROMBIN TIME: 13.2 SECONDS (ref 9.8–12.8)
RBC # BLD AUTO: 5.07 X10*6/UL (ref 4.5–5.9)
RBC # UR STRIP.AUTO: NEGATIVE /UL
RBC #/AREA URNS AUTO: ABNORMAL /HPF
SAO2 % BLDV: 81 % (ref 45–75)
SODIUM BLDV-SCNC: 130 MMOL/L (ref 136–145)
SODIUM SERPL-SCNC: 134 MMOL/L (ref 136–145)
SP GR UR STRIP.AUTO: 1.03
UROBILINOGEN UR STRIP.AUTO-MCNC: NORMAL MG/DL
WBC # BLD AUTO: 14.8 X10*3/UL (ref 4.4–11.3)
WBC #/AREA URNS AUTO: ABNORMAL /HPF

## 2024-09-11 PROCEDURE — 99285 EMERGENCY DEPT VISIT HI MDM: CPT

## 2024-09-11 PROCEDURE — 70450 CT HEAD/BRAIN W/O DYE: CPT

## 2024-09-11 PROCEDURE — 2500000004 HC RX 250 GENERAL PHARMACY W/ HCPCS (ALT 636 FOR OP/ED): Performed by: NURSE PRACTITIONER

## 2024-09-11 PROCEDURE — 84132 ASSAY OF SERUM POTASSIUM: CPT | Performed by: EMERGENCY MEDICINE

## 2024-09-11 PROCEDURE — 85025 COMPLETE CBC W/AUTO DIFF WBC: CPT | Performed by: EMERGENCY MEDICINE

## 2024-09-11 PROCEDURE — 2500000001 HC RX 250 WO HCPCS SELF ADMINISTERED DRUGS (ALT 637 FOR MEDICARE OP): Performed by: NURSE PRACTITIONER

## 2024-09-11 PROCEDURE — 84075 ASSAY ALKALINE PHOSPHATASE: CPT | Performed by: EMERGENCY MEDICINE

## 2024-09-11 PROCEDURE — 70450 CT HEAD/BRAIN W/O DYE: CPT | Performed by: RADIOLOGY

## 2024-09-11 PROCEDURE — 71045 X-RAY EXAM CHEST 1 VIEW: CPT

## 2024-09-11 PROCEDURE — 81001 URINALYSIS AUTO W/SCOPE: CPT | Performed by: EMERGENCY MEDICINE

## 2024-09-11 PROCEDURE — 83605 ASSAY OF LACTIC ACID: CPT | Performed by: EMERGENCY MEDICINE

## 2024-09-11 PROCEDURE — 87086 URINE CULTURE/COLONY COUNT: CPT | Mod: PORLAB | Performed by: EMERGENCY MEDICINE

## 2024-09-11 PROCEDURE — 85610 PROTHROMBIN TIME: CPT | Performed by: EMERGENCY MEDICINE

## 2024-09-11 PROCEDURE — 99222 1ST HOSP IP/OBS MODERATE 55: CPT | Performed by: NURSE PRACTITIONER

## 2024-09-11 PROCEDURE — 71045 X-RAY EXAM CHEST 1 VIEW: CPT | Performed by: RADIOLOGY

## 2024-09-11 PROCEDURE — 93005 ELECTROCARDIOGRAM TRACING: CPT

## 2024-09-11 PROCEDURE — P9612 CATHETERIZE FOR URINE SPEC: HCPCS

## 2024-09-11 PROCEDURE — 2060000001 HC INTERMEDIATE ICU ROOM DAILY

## 2024-09-11 RX ORDER — CHOLESTYRAMINE 4 G/9G
4 POWDER, FOR SUSPENSION ORAL NIGHTLY
Status: DISCONTINUED | OUTPATIENT
Start: 2024-09-11 | End: 2024-09-13

## 2024-09-11 RX ORDER — LOSARTAN POTASSIUM 50 MG/1
50 TABLET ORAL DAILY
Status: DISCONTINUED | OUTPATIENT
Start: 2024-09-11 | End: 2024-09-13

## 2024-09-11 RX ORDER — COLESTIPOL HYDROCHLORIDE 5 G/5G
5 GRANULE, FOR SUSPENSION ORAL NIGHTLY
Status: DISCONTINUED | OUTPATIENT
Start: 2024-09-11 | End: 2024-09-11

## 2024-09-11 RX ORDER — OXYCODONE HYDROCHLORIDE 5 MG/1
5 TABLET ORAL EVERY 6 HOURS PRN
Status: DISCONTINUED | OUTPATIENT
Start: 2024-09-11 | End: 2024-09-13

## 2024-09-11 RX ORDER — HYDRALAZINE HYDROCHLORIDE 25 MG/1
25 TABLET, FILM COATED ORAL 3 TIMES DAILY
Status: DISCONTINUED | OUTPATIENT
Start: 2024-09-11 | End: 2024-09-13

## 2024-09-11 RX ORDER — CLOPIDOGREL BISULFATE 75 MG/1
75 TABLET ORAL DAILY
Status: DISCONTINUED | OUTPATIENT
Start: 2024-09-11 | End: 2024-09-13

## 2024-09-11 RX ORDER — ATORVASTATIN CALCIUM 40 MG/1
80 TABLET, FILM COATED ORAL NIGHTLY
Status: DISCONTINUED | OUTPATIENT
Start: 2024-09-11 | End: 2024-09-13

## 2024-09-11 RX ORDER — IPRATROPIUM BROMIDE AND ALBUTEROL SULFATE 2.5; .5 MG/3ML; MG/3ML
3 SOLUTION RESPIRATORY (INHALATION) EVERY 4 HOURS PRN
Status: DISCONTINUED | OUTPATIENT
Start: 2024-09-11 | End: 2024-09-13 | Stop reason: HOSPADM

## 2024-09-11 RX ORDER — ACETAMINOPHEN 325 MG/1
650 TABLET ORAL EVERY 4 HOURS PRN
Status: DISCONTINUED | OUTPATIENT
Start: 2024-09-11 | End: 2024-09-13 | Stop reason: HOSPADM

## 2024-09-11 RX ORDER — ENOXAPARIN SODIUM 100 MG/ML
40 INJECTION SUBCUTANEOUS EVERY 24 HOURS
Status: DISCONTINUED | OUTPATIENT
Start: 2024-09-11 | End: 2024-09-13

## 2024-09-11 RX ORDER — CARVEDILOL 6.25 MG/1
6.25 TABLET ORAL 2 TIMES DAILY
Status: DISCONTINUED | OUTPATIENT
Start: 2024-09-11 | End: 2024-09-13

## 2024-09-11 RX ADMIN — HYDRALAZINE HYDROCHLORIDE 25 MG: 25 TABLET ORAL at 20:55

## 2024-09-11 RX ADMIN — CHOLESTYRAMINE 4 G: 4 POWDER, FOR SUSPENSION ORAL at 20:56

## 2024-09-11 RX ADMIN — ATORVASTATIN CALCIUM 80 MG: 40 TABLET, FILM COATED ORAL at 20:56

## 2024-09-11 RX ADMIN — ENOXAPARIN SODIUM 40 MG: 40 INJECTION SUBCUTANEOUS at 20:56

## 2024-09-11 RX ADMIN — CARVEDILOL 6.25 MG: 6.25 TABLET, FILM COATED ORAL at 20:56

## 2024-09-11 SDOH — SOCIAL STABILITY: SOCIAL INSECURITY: ABUSE: ADULT

## 2024-09-11 SDOH — SOCIAL STABILITY: SOCIAL INSECURITY: HAVE YOU HAD THOUGHTS OF HARMING ANYONE ELSE?: UNABLE TO ASSESS

## 2024-09-11 SDOH — SOCIAL STABILITY: SOCIAL INSECURITY: WERE YOU ABLE TO COMPLETE ALL THE BEHAVIORAL HEALTH SCREENINGS?: NO

## 2024-09-11 ASSESSMENT — COLUMBIA-SUICIDE SEVERITY RATING SCALE - C-SSRS
6. HAVE YOU EVER DONE ANYTHING, STARTED TO DO ANYTHING, OR PREPARED TO DO ANYTHING TO END YOUR LIFE?: NO
1. IN THE PAST MONTH, HAVE YOU WISHED YOU WERE DEAD OR WISHED YOU COULD GO TO SLEEP AND NOT WAKE UP?: NO
2. HAVE YOU ACTUALLY HAD ANY THOUGHTS OF KILLING YOURSELF?: NO

## 2024-09-11 ASSESSMENT — COGNITIVE AND FUNCTIONAL STATUS - GENERAL
DRESSING REGULAR LOWER BODY CLOTHING: TOTAL
STANDING UP FROM CHAIR USING ARMS: TOTAL
MOBILITY SCORE: 6
DAILY ACTIVITIY SCORE: 6
MOVING FROM LYING ON BACK TO SITTING ON SIDE OF FLAT BED WITH BEDRAILS: TOTAL
MOVING TO AND FROM BED TO CHAIR: TOTAL
TOILETING: TOTAL
PERSONAL GROOMING: TOTAL
WALKING IN HOSPITAL ROOM: TOTAL
TURNING FROM BACK TO SIDE WHILE IN FLAT BAD: TOTAL
CLIMB 3 TO 5 STEPS WITH RAILING: TOTAL
EATING MEALS: TOTAL
PATIENT BASELINE BEDBOUND: YES
HELP NEEDED FOR BATHING: TOTAL
DRESSING REGULAR UPPER BODY CLOTHING: TOTAL

## 2024-09-11 ASSESSMENT — ACTIVITIES OF DAILY LIVING (ADL)
JUDGMENT_ADEQUATE_SAFELY_COMPLETE_DAILY_ACTIVITIES: NO
HEARING - RIGHT EAR: UNABLE TO ASSESS
BATHING: DEPENDENT
HEARING - LEFT EAR: UNABLE TO ASSESS
GROOMING: DEPENDENT
DRESSING YOURSELF: DEPENDENT
FEEDING YOURSELF: DEPENDENT
ADEQUATE_TO_COMPLETE_ADL: UNABLE TO ASSESS
WALKS IN HOME: DEPENDENT
TOILETING: DEPENDENT
PATIENT'S MEMORY ADEQUATE TO SAFELY COMPLETE DAILY ACTIVITIES?: NO

## 2024-09-11 ASSESSMENT — PAIN SCALES - PAIN ASSESSMENT IN ADVANCED DEMENTIA (PAINAD)
BODYLANGUAGE: RELAXED
CONSOLABILITY: NO NEED TO CONSOLE
BREATHING: NORMAL
FACIALEXPRESSION: SMILING OR INEXPRESSIVE
TOTALSCORE: 0

## 2024-09-11 ASSESSMENT — LIFESTYLE VARIABLES
HOW OFTEN DO YOU HAVE 6 OR MORE DRINKS ON ONE OCCASION: PATIENT DECLINED
HAVE PEOPLE ANNOYED YOU BY CRITICIZING YOUR DRINKING: NO
TOTAL SCORE: 0
AUDIT-C TOTAL SCORE: -1
HOW MANY STANDARD DRINKS CONTAINING ALCOHOL DO YOU HAVE ON A TYPICAL DAY: PATIENT DECLINED
EVER HAD A DRINK FIRST THING IN THE MORNING TO STEADY YOUR NERVES TO GET RID OF A HANGOVER: NO
HOW OFTEN DO YOU HAVE A DRINK CONTAINING ALCOHOL: PATIENT DECLINED
HAVE YOU EVER FELT YOU SHOULD CUT DOWN ON YOUR DRINKING: NO
AUDIT-C TOTAL SCORE: -1
EVER FELT BAD OR GUILTY ABOUT YOUR DRINKING: NO
SKIP TO QUESTIONS 9-10: 0

## 2024-09-11 ASSESSMENT — PAIN - FUNCTIONAL ASSESSMENT: PAIN_FUNCTIONAL_ASSESSMENT: WONG-BAKER FACES

## 2024-09-11 ASSESSMENT — PATIENT HEALTH QUESTIONNAIRE - PHQ9
SUM OF ALL RESPONSES TO PHQ9 QUESTIONS 1 & 2: 0
2. FEELING DOWN, DEPRESSED OR HOPELESS: NOT AT ALL
1. LITTLE INTEREST OR PLEASURE IN DOING THINGS: NOT AT ALL

## 2024-09-11 ASSESSMENT — PAIN SCALES - GENERAL: PAINLEVEL_OUTOF10: 0 - NO PAIN

## 2024-09-11 NOTE — ED PROVIDER NOTES
HPI   Chief Complaint   Patient presents with    Altered Mental Status     Has been having seizures the last 4 days. Is DNRCCA, son originally didn't want treated but now patient is altered and not responding verbally so son wants treated.        Patient presents to the emergency department secondary to altered mental status.  Patient is unresponsive other than to painful stimuli.  At this time history is being obtained from paramedics and from the patient's son.  Patient has a history of stroke.  He then required multiple NG tube and did develop peritonitis with a splenic abscess.  PEG tube was placed and there is a drain present in the splenic abscess.  Per EMS report and son patient developed seizures today.  He had a pretty prolonged seizure according to bystanders.  Since then he has had decreased level of consciousness.  He was hypertensive during the episode of seizure also.  Patient is DNR CCA.  I did discuss this with the patient's son and he wants to maintain the DNR CCA.                          No data recorded                Patient History   Past Medical History:   Diagnosis Date    CAD (coronary artery disease)     Dementia (Multi)     HTN (hypertension)     Stroke (Multi)      History reviewed. No pertinent surgical history.  No family history on file.  Social History     Tobacco Use    Smoking status: Never     Passive exposure: Never    Smokeless tobacco: Never   Vaping Use    Vaping status: Not on file   Substance Use Topics    Alcohol use: Not on file    Drug use: Not on file       Physical Exam   ED Triage Vitals [09/11/24 1206]   Temperature Heart Rate Respirations BP   36.9 °C (98.4 °F) 88 18 144/88      Pulse Ox Temp Source Heart Rate Source Patient Position   95 % Temporal Monitor Lying      BP Location FiO2 (%)     Left arm --       Physical Exam  Constitutional:       General: He is not in acute distress.     Appearance: He is well-developed. He is not diaphoretic.      Comments: Patient  laying in bed with eyes closed.  He does respond to painful stimuli with nailbed pressure on the great toes bilaterally.   HENT:      Head: Normocephalic and atraumatic.      Mouth/Throat:      Mouth: Mucous membranes are moist.   Eyes:      Comments: Pupils are equal bilaterally.  Minimally reactive.   Neck:      Comments: No appreciable tenderness.  Cardiovascular:      Rate and Rhythm: Normal rate and regular rhythm.   Pulmonary:      Effort: Pulmonary effort is normal.      Breath sounds: Normal breath sounds. No wheezing, rhonchi or rales.   Abdominal:      General: There is no distension.      Palpations: Abdomen is soft.      Tenderness: There is no abdominal tenderness. There is no guarding.   Musculoskeletal:      Comments: Patient withdraws from pain in the lower extremities.  Intermittent twitching in the upper extremities.  No persistent seizure activity.   Skin:     General: Skin is warm and dry.      Capillary Refill: Capillary refill takes less than 2 seconds.   Neurological:      Mental Status: He is lethargic.      Cranial Nerves: No cranial nerve deficit.       Labs Reviewed   CBC WITH AUTO DIFFERENTIAL - Abnormal       Result Value    WBC 14.8 (*)     nRBC 0.0      RBC 5.07      Hemoglobin 14.4      Hematocrit 43.5      MCV 86      MCH 28.4      MCHC 33.1      RDW 14.7 (*)     Platelets 282      Neutrophils % 90.9      Immature Granulocytes %, Automated 0.5      Lymphocytes % 5.7      Monocytes % 2.5      Eosinophils % 0.0      Basophils % 0.4      Neutrophils Absolute 13.46 (*)     Immature Granulocytes Absolute, Automated 0.07      Lymphocytes Absolute 0.84      Monocytes Absolute 0.37      Eosinophils Absolute 0.00      Basophils Absolute 0.06     COMPREHENSIVE METABOLIC PANEL   LACTATE   PROTIME-INR   BLOOD GAS VENOUS FULL PANEL     Pain Management Panel           No data to display              CT head wo IV contrast    (Results Pending)     ED Course & MDM   Diagnoses as of 09/11/24 7807    Altered mental status, unspecified altered mental status type       Medical Decision Making  Patient present secondary to altered mental status.  Patient is minimally responsive upon arrival.  Patient is evaluated in the emergency department CT head laboratory workup and EKG.  EKG was obtained at 1:14 p.m.  EKG shows sinus rhythm rate of 92.  Nonspecific ST changes.  No acute ST elevation.  IN interval is 245 and QTc is 450.  CT head shows no evidence of acute intracranial lesion.  Patient has a mild lactic acidosis but this could be residual to seizure activity at his facility.  No evidence of seizure activity here.  Laboratory workup otherwise shows a mild leukocytosis but no other acute findings.  Patient is hemodynamically stable at this time.  I reconfirmed a DNR CCA no intubation with the son who is at bedside.  At this time patient would benefit from hospitalization for further workup by neurology and MRI to evaluate for possible anoxia related to the seizures, new stroke or other causes of the patient's decreased level of consciousness.  Patient was discussed with the nurse practitioner on for the hospitalist service and will be admitted.        Procedure  Procedures     Karen Gutierrez MD  09/11/24 0795

## 2024-09-11 NOTE — DOCUMENTATION CLARIFICATION NOTE
"    PATIENT:               ABDI ELI  ACCT #:                  [id number]  MRN:                       [id number]  :                       1945  ADMIT DATE:       2024 1:43 AM  DISCH DATE:        2024 2:17 PM  RESPONDING PROVIDER #:        [id number]          PROVIDER RESPONSE TEXT:    no debridement was done    CDI QUERY TEXT:    Clarification    .    Instruction:    Based on your assessment of the patient and the clinical information, please provide the requested documentation by clicking on the appropriate radio button and enter any additional information if prompted.    Question: Please further clarify the debridement procedure as    When answering this query, please exercise your independent professional judgment. The fact that a question is being asked, does not imply that any particular answer is desired or expected.    The patient's clinical indicators include:  Clinical Information: This query refers to the procedure performed on 24 .  \"Abdi Eli is a 79 y.o. male presenting with concerns for an abdominal wound infection. He is s/p PEG placement on 24 2/2 CVA which was complicated by dislodgement and he required an ex lap and replacement of the PEG tube.A CT scan today redemonstrated the splenic abscess as well as concerns for an abdominal wall abscess.\"  \"Removed a piece of gauze from his midline wound which was likely the source of odor and drainage. The wound was cleaned.  Start wet-to-dry dressing changed BID for the midline abdominal wound\"  Options provided:  -- Excisional debridement to and including skin  -- Excisional debridement down to and including subcutaneous tissue and fascia  -- Non-excisional debridement to and including skin  -- Non-excisional debridement down to and including subcutaneous tissue and fascia  -- Other - I will add my own diagnosis  -- Refer to Clinical Documentation Reviewer    Query created by: Princess Milner on 2024 11:46 AM      Electronically " signed by:  MARIA DRIVER MD 9/11/2024 3:48 PM

## 2024-09-11 NOTE — PROGRESS NOTES
Abdi Wilson is a 79 y.o. male admitted for No Principal Problem: There is no principal problem currently on the Problem List. Please update the Problem List and refresh.. Pharmacy reviewed the patient's [id number] medications and allergies for accuracy.    The list below reflects the PTA list prior to pharmacy medication history. A summary a changes to the PTA medication list has been listed below. Please review each medication in order reconciliation for additional clarification and justification.    Source of information:  Discharge paperwork 08/30/2024     Medications added:    Medications modified:  Vitamin D2 50,000 units --> omce a week    Medications to be removed:  Cefepime IV    Medications of concern:      Prior to Admission Medications   Prescriptions Last Dose Informant Patient Reported? Taking?   acetaminophen (Tylenol) 325 mg tablet   Yes No   Sig: Take 2 tablets (650 mg) by mouth every 4 hours if needed for mild pain (1 - 3) or fever (temp greater than 38.0 C).   atorvastatin (Lipitor) 80 mg tablet   No No   Sig: Take 1 tablet (80 mg) by mouth once daily at bedtime.   bisacodyl (Dulcolax, bisacodyl,) 10 mg suppository   Yes No   Sig: Insert 1 suppository (10 mg) into the rectum if needed for constipation.   carvedilol (Coreg) 6.25 mg tablet   No No   Sig: Take 1 tablet (6.25 mg) by mouth 2 times a day.   cefepime (Maxipime) IV   No No   Sig: Infuse 100 mL (2 g) over 0.5 hours into a venous catheter every 8 hours for 5 days.   clopidogrel (Plavix) 75 mg tablet   Yes No   Sig: Take 1 tablet (75 mg) by mouth once daily. N-G TUBE   colestipol (Colestid) 5 gram granules   Yes No   Sig: Take 5 g by mouth once daily. as directed   ergocalciferol (Vitamin D2) 1.25 MG (38876 UT) capsule   Yes No   Sig: Take 1 capsule (1,250 mcg) by mouth.   hydrALAZINE (Apresoline) 25 mg tablet   No No   Sig: Take 1 tablet (25 mg) by mouth 3 times a day.   hyoscyamine (Anaspaz, Levsin) 0.125 mg tablet   Yes No   Sig:  Take 1 tablet (0.125 mg) by mouth every 4 hours if needed for cramping. VIA G-TUBE   ipratropium-albuteroL (Duo-Neb) 0.5-2.5 mg/3 mL nebulizer solution   No No   Sig: Take 3 mL by nebulization every 4 hours if needed for wheezing.   lidocaine (LMX) 4 % cream   Yes No   Sig: Apply topically once daily as needed for mild pain (1 - 3).   loperamide (Imodium A-D) 2 mg tablet   Yes No   Sig: Take 1 tablet (2 mg) by mouth if needed for diarrhea. VIA N-G TUBE   losartan (Cozaar) 50 mg tablet   No No   Sig: Take 1 tablet (50 mg) by mouth once daily.   magnesium hydroxide (Milk of Magnesia) 2,400 mg/10 mL suspension suspension   Yes No   Sig: Take 30 mL by mouth if needed for constipation. VIA-G-TUBE   oxyCODONE (Roxicodone) 5 mg immediate release tablet   No No   Si tablet (5 mg) by g-tube route every 6 hours if needed for moderate pain (4 - 6) or severe pain (7 - 10).      Facility-Administered Medications: None       Apurva Watson

## 2024-09-12 ENCOUNTER — HOSPITAL ENCOUNTER (INPATIENT)
Dept: NEUROLOGY | Facility: HOSPITAL | Age: 79
Discharge: HOME | End: 2024-09-12
Payer: COMMERCIAL

## 2024-09-12 ENCOUNTER — APPOINTMENT (OUTPATIENT)
Dept: RADIOLOGY | Facility: HOSPITAL | Age: 79
End: 2024-09-12
Payer: COMMERCIAL

## 2024-09-12 LAB
ALBUMIN SERPL BCP-MCNC: 3.1 G/DL (ref 3.4–5)
ANION GAP SERPL CALC-SCNC: 10 MMOL/L (ref 10–20)
BUN SERPL-MCNC: 20 MG/DL (ref 6–23)
CALCIUM SERPL-MCNC: 8.2 MG/DL (ref 8.6–10.3)
CHLORIDE SERPL-SCNC: 99 MMOL/L (ref 98–107)
CO2 SERPL-SCNC: 28 MMOL/L (ref 21–32)
CREAT SERPL-MCNC: 0.72 MG/DL (ref 0.5–1.3)
EGFRCR SERPLBLD CKD-EPI 2021: >90 ML/MIN/1.73M*2
ERYTHROCYTE [DISTWIDTH] IN BLOOD BY AUTOMATED COUNT: 14.9 % (ref 11.5–14.5)
GLUCOSE BLD MANUAL STRIP-MCNC: 108 MG/DL (ref 74–99)
GLUCOSE SERPL-MCNC: 124 MG/DL (ref 74–99)
HCT VFR BLD AUTO: 39.6 % (ref 41–52)
HGB BLD-MCNC: 12.9 G/DL (ref 13.5–17.5)
HOLD SPECIMEN: NORMAL
LACTATE SERPL-SCNC: 1.8 MMOL/L (ref 0.4–2)
MAGNESIUM SERPL-MCNC: 1.93 MG/DL (ref 1.6–2.4)
MCH RBC QN AUTO: 28.1 PG (ref 26–34)
MCHC RBC AUTO-ENTMCNC: 32.6 G/DL (ref 32–36)
MCV RBC AUTO: 86 FL (ref 80–100)
NRBC BLD-RTO: 0 /100 WBCS (ref 0–0)
PHOSPHATE SERPL-MCNC: 3.7 MG/DL (ref 2.5–4.9)
PLATELET # BLD AUTO: 244 X10*3/UL (ref 150–450)
POTASSIUM SERPL-SCNC: 3.8 MMOL/L (ref 3.5–5.3)
RBC # BLD AUTO: 4.59 X10*6/UL (ref 4.5–5.9)
SODIUM SERPL-SCNC: 133 MMOL/L (ref 136–145)
TSH SERPL-ACNC: 2.98 MIU/L (ref 0.44–3.98)
WBC # BLD AUTO: 14.4 X10*3/UL (ref 4.4–11.3)

## 2024-09-12 PROCEDURE — 70553 MRI BRAIN STEM W/O & W/DYE: CPT

## 2024-09-12 PROCEDURE — 70547 MR ANGIOGRAPHY NECK W/O DYE: CPT

## 2024-09-12 PROCEDURE — 95819 EEG AWAKE AND ASLEEP: CPT | Performed by: STUDENT IN AN ORGANIZED HEALTH CARE EDUCATION/TRAINING PROGRAM

## 2024-09-12 PROCEDURE — 2500000004 HC RX 250 GENERAL PHARMACY W/ HCPCS (ALT 636 FOR OP/ED): Performed by: NURSE PRACTITIONER

## 2024-09-12 PROCEDURE — 84443 ASSAY THYROID STIM HORMONE: CPT | Performed by: PSYCHIATRY & NEUROLOGY

## 2024-09-12 PROCEDURE — 87040 BLOOD CULTURE FOR BACTERIA: CPT | Mod: PORLAB | Performed by: INTERNAL MEDICINE

## 2024-09-12 PROCEDURE — 83735 ASSAY OF MAGNESIUM: CPT | Performed by: INTERNAL MEDICINE

## 2024-09-12 PROCEDURE — 70553 MRI BRAIN STEM W/O & W/DYE: CPT | Performed by: STUDENT IN AN ORGANIZED HEALTH CARE EDUCATION/TRAINING PROGRAM

## 2024-09-12 PROCEDURE — 87077 CULTURE AEROBIC IDENTIFY: CPT | Mod: PORLAB | Performed by: INTERNAL MEDICINE

## 2024-09-12 PROCEDURE — 70547 MR ANGIOGRAPHY NECK W/O DYE: CPT | Performed by: STUDENT IN AN ORGANIZED HEALTH CARE EDUCATION/TRAINING PROGRAM

## 2024-09-12 PROCEDURE — 2500000005 HC RX 250 GENERAL PHARMACY W/O HCPCS: Performed by: INTERNAL MEDICINE

## 2024-09-12 PROCEDURE — 2550000001 HC RX 255 CONTRASTS: Performed by: INTERNAL MEDICINE

## 2024-09-12 PROCEDURE — 85027 COMPLETE CBC AUTOMATED: CPT | Performed by: NURSE PRACTITIONER

## 2024-09-12 PROCEDURE — 70544 MR ANGIOGRAPHY HEAD W/O DYE: CPT

## 2024-09-12 PROCEDURE — 99223 1ST HOSP IP/OBS HIGH 75: CPT | Performed by: PSYCHIATRY & NEUROLOGY

## 2024-09-12 PROCEDURE — 95819 EEG AWAKE AND ASLEEP: CPT

## 2024-09-12 PROCEDURE — 82947 ASSAY GLUCOSE BLOOD QUANT: CPT

## 2024-09-12 PROCEDURE — 36415 COLL VENOUS BLD VENIPUNCTURE: CPT | Performed by: PSYCHIATRY & NEUROLOGY

## 2024-09-12 PROCEDURE — A9575 INJ GADOTERATE MEGLUMI 0.1ML: HCPCS | Performed by: INTERNAL MEDICINE

## 2024-09-12 PROCEDURE — 2060000001 HC INTERMEDIATE ICU ROOM DAILY

## 2024-09-12 PROCEDURE — 80069 RENAL FUNCTION PANEL: CPT | Performed by: INTERNAL MEDICINE

## 2024-09-12 PROCEDURE — 83605 ASSAY OF LACTIC ACID: CPT | Performed by: INTERNAL MEDICINE

## 2024-09-12 PROCEDURE — 99233 SBSQ HOSP IP/OBS HIGH 50: CPT | Performed by: INTERNAL MEDICINE

## 2024-09-12 PROCEDURE — 36415 COLL VENOUS BLD VENIPUNCTURE: CPT | Performed by: INTERNAL MEDICINE

## 2024-09-12 PROCEDURE — 2500000001 HC RX 250 WO HCPCS SELF ADMINISTERED DRUGS (ALT 637 FOR MEDICARE OP): Performed by: NURSE PRACTITIONER

## 2024-09-12 RX ORDER — GADOTERATE MEGLUMINE 376.9 MG/ML
0.2 INJECTION INTRAVENOUS
Status: COMPLETED | OUTPATIENT
Start: 2024-09-12 | End: 2024-09-12

## 2024-09-12 RX ADMIN — CARVEDILOL 6.25 MG: 6.25 TABLET, FILM COATED ORAL at 09:20

## 2024-09-12 RX ADMIN — CARVEDILOL 6.25 MG: 6.25 TABLET, FILM COATED ORAL at 22:03

## 2024-09-12 RX ADMIN — Medication 2 L/MIN: at 19:41

## 2024-09-12 RX ADMIN — CLOPIDOGREL 75 MG: 75 TABLET ORAL at 09:20

## 2024-09-12 RX ADMIN — HYDRALAZINE HYDROCHLORIDE 25 MG: 25 TABLET ORAL at 22:03

## 2024-09-12 RX ADMIN — GADOTERATE MEGLUMINE 16 ML: 376.9 INJECTION, SOLUTION INTRAVENOUS at 19:25

## 2024-09-12 RX ADMIN — OXYCODONE HYDROCHLORIDE 5 MG: 5 TABLET ORAL at 09:21

## 2024-09-12 RX ADMIN — ENOXAPARIN SODIUM 40 MG: 40 INJECTION SUBCUTANEOUS at 15:44

## 2024-09-12 RX ADMIN — ATORVASTATIN CALCIUM 80 MG: 40 TABLET, FILM COATED ORAL at 22:03

## 2024-09-12 RX ADMIN — CHOLESTYRAMINE 4 G: 4 POWDER, FOR SUSPENSION ORAL at 22:03

## 2024-09-12 RX ADMIN — LOSARTAN POTASSIUM 50 MG: 50 TABLET, FILM COATED ORAL at 09:20

## 2024-09-12 RX ADMIN — Medication 2 L/MIN: at 17:50

## 2024-09-12 RX ADMIN — HYDRALAZINE HYDROCHLORIDE 25 MG: 25 TABLET ORAL at 15:44

## 2024-09-12 RX ADMIN — HYDRALAZINE HYDROCHLORIDE 25 MG: 25 TABLET ORAL at 09:20

## 2024-09-12 ASSESSMENT — PAIN SCALES - PAIN ASSESSMENT IN ADVANCED DEMENTIA (PAINAD)
TOTALSCORE: 0
TOTALSCORE: 0
FACIALEXPRESSION: SMILING OR INEXPRESSIVE
CONSOLABILITY: NO NEED TO CONSOLE
FACIALEXPRESSION: SMILING OR INEXPRESSIVE
CONSOLABILITY: NO NEED TO CONSOLE
BREATHING: NORMAL
BREATHING: NORMAL
BODYLANGUAGE: RELAXED
BODYLANGUAGE: RELAXED

## 2024-09-12 ASSESSMENT — COGNITIVE AND FUNCTIONAL STATUS - GENERAL
WALKING IN HOSPITAL ROOM: TOTAL
DRESSING REGULAR UPPER BODY CLOTHING: TOTAL
MOVING FROM LYING ON BACK TO SITTING ON SIDE OF FLAT BED WITH BEDRAILS: TOTAL
CLIMB 3 TO 5 STEPS WITH RAILING: TOTAL
HELP NEEDED FOR BATHING: TOTAL
MOVING TO AND FROM BED TO CHAIR: TOTAL
PERSONAL GROOMING: TOTAL
TURNING FROM BACK TO SIDE WHILE IN FLAT BAD: TOTAL
DRESSING REGULAR LOWER BODY CLOTHING: TOTAL
TOILETING: TOTAL
STANDING UP FROM CHAIR USING ARMS: TOTAL
MOBILITY SCORE: 6
DAILY ACTIVITIY SCORE: 6
EATING MEALS: TOTAL

## 2024-09-12 ASSESSMENT — ACTIVITIES OF DAILY LIVING (ADL): LACK_OF_TRANSPORTATION: PATIENT UNABLE TO ANSWER

## 2024-09-12 ASSESSMENT — PAIN SCALES - WONG BAKER: WONGBAKER_NUMERICALRESPONSE: NO HURT

## 2024-09-12 NOTE — CONSULTS
Nutrition Initial Assessment:   Nutrition Assessment    Reason for Assessment: Admission nursing screening    Medical history per chart:   Dementia, Patient s/p stroke in June and underwent PEG placement 8/8/24 (prior to the PEG he pulled out his NGT 3 times). On 8/10/24 he was admitted for peritonitis and underwent emergent replacement of the gastrostomy tube. On 8/19/24 he underwent drain placement for a large casandra-splenic abscess. He was discharged on 8/23/24 with ciprofloxacin and Flagyl until 9/4/2024. He was admitted 8/27/24 for abdominal wall abscess and underwent debridement.     HPI:  Patient presents from nursing facility with AMS, seizure like activity     9/12:  Patient sleeping at time of visit, TF infusing at goal rate, appears to be tolerating well per RN.        Nutrition History:  Food and Nutrient History: Per [facility] (previous recent admission):   Jevity 1.5 at 60 mL/hr + free water flush of 200 mL every 4 hours.       Current Diet: Jevity 1.5 Enteral feeding with NPO 60; 200; Water; Sterile water; Every 4 hours  TF meeting estimated needs     Nutrition Related Findings:   Oral Symptoms: swallowing Teeth: Missing teeth   GI symptoms: RD unable to assess GI symptoms at this time.   BM:    Food allergies: NKFA. has No Known Allergies.  Meds/Labs reviewed.  atorvastatin, 80 mg, g-tube, Nightly  carvedilol, 6.25 mg, g-tube, BID  cholestyramine, 4 g, oral, Nightly  clopidogrel, 75 mg, g-tube, Daily  enoxaparin, 40 mg, subcutaneous, q24h  hydrALAZINE, 25 mg, g-tube, TID  losartan, 50 mg, g-tube, Daily             Nutrition Significant Labs:    Results from last 7 days   Lab Units 09/12/24  0424 09/11/24  1237   GLUCOSE mg/dL 124* 160*   SODIUM mmol/L 133* 134*   POTASSIUM mmol/L 3.8 4.6   CHLORIDE mmol/L 99 98   CO2 mmol/L 28 24   BUN mg/dL 20 18   CREATININE mg/dL 0.72 0.69   EGFR mL/min/1.73m*2 >90 >90   CALCIUM mg/dL 8.2* 8.7   PHOSPHORUS mg/dL 3.7  --    MAGNESIUM mg/dL 1.93  --   "    Lab Results   Component Value Date    HGBA1C 5.7 (H) 06/21/2024    HGBA1C 5.9 (A) 07/18/2023           Anthropometrics:  Height: 175.3 cm (5' 9\")   Weight: 80.7 kg (177 lb 14.6 oz)   BMI (Calculated): 26.26  IBW/kg (Dietitian Calculated): 72.7 kg            Weight History:   Wt Readings from Last 10 Encounters:   09/12/24 80.7 kg (177 lb 14.6 oz)   08/29/24 83 kg (182 lb 15.7 oz)   08/23/24 79.9 kg (176 lb 3.2 oz)   08/08/24 94.3 kg (208 lb)   07/02/24 94.4 kg (208 lb 1.8 oz)   06/29/24 99.8 kg (220 lb)   06/27/24 91 kg (200 lb 9.9 oz)   09/16/22 90 kg (198 lb 6.6 oz)        Weight Change %:  Weight History / % Weight Change: Variable weights per available history, potential significant loss of 11.5% over the past 3 months (6/27/24 200 lb, 7/2/24 208 lb, 9/12/24 177 lbs)  Significant Weight Loss: Yes  Interpretation of Weight Loss: >7.5% in 3 months       Nutrition Focused Physical Exam Findings:  defer: patient asleep, mild/moderate losses during previous recent admission.        Physical Findings:  Skin: Positive (Multiple wounds noted)    Estimated Needs:   Total Energy Estimated Needs (kCal):  (1892-1089)  Method for Estimating Needs: 30, IBW  Total Protein Estimated Needs (g):  (85-95)  Method for Estimating Needs: 1.2-1.3, IBW     Method for Estimating Needs: 1 mL, kcal or per physician        Nutrition Diagnosis   Nutrition Diagnosis:       Nutrition Diagnosis  Patient has Nutrition Diagnosis: Yes  Diagnosis Status (1): New  Nutrition Diagnosis 1: Increased nutrient needs  Related to (1): wound healing, chronic illness  As Evidenced by (1): multiple noted wounds, potential recent significant weight loss  Additional Nutrition Diagnosis: Diagnosis 2  Diagnosis Status (2): New  Nutrition Diagnosis 2: Inadequate oral intake  Related to (2): dysphagia  As Evidenced by (2): NPO status with need for enteral nutrition support       Nutrition Interventions/Recommendations   Nutrition Interventions and " Recommendations:        Nutrition Prescription:  Individualized Nutrition Prescription Provided for : Enteral nutrition support        Nutrition Interventions:   Food and/or Nutrient Delivery Interventions  Interventions: Enteral intake  Enteral Intake: Other (Comment)  Goal: Continue Jevity 1.5 with goal rate of 60 mL/hr + 200 mL free water flush every 4 hours.  Provides a total of 1440 mL, 2160 kcals, 92 gm protein, 2294 mL free water    Coordination of Nutrition Care by a Nutrition Professional  Collaboration and Referral of Nutrition Care: Collaboration by nutrition professional with other providers  Goal: Discussed with ISHMAEL Myles    Nutrition Education:   Education Documentation  No documentation found.      Nutrition Counseling  Counseling Theoretical Approach: Other (Comment)  Goal: N/A - patient asleep, no family present       Nutrition Monitoring and Evaluation   Monitoring/Evaluation:   Food/Nutrient Related History Monitoring  Monitoring and Evaluation Plan: Enteral and parenteral nutrition intake  Enteral and Parenteral Nutrition Intake: Enteral nutrition intake  Criteria: TF to meet >75% of estimated needs with good tolerance    Body Composition/Growth/Weight History  Monitoring and Evaluation Plan: Weight  Weight: Weight change  Criteria: Maintain stable weight    Biochemical Data, Medical Tests and Procedures  Monitoring and Evaluation Plan: Electrolyte/renal panel, Glucose/endocrine profile  Electrolyte and Renal Panel: Sodium, Potassium, Phosphorus, Magnesium  Criteria: WNL  Glucose/Endocrine Profile: Glucose, casual  Criteria: WNL    Nutrition Focused Physical Findings  Monitoring and Evaluation Plan: Skin  Skin: Impaired wound healing  Criteria: Promote healing            Time Spent/Follow-up Reminder:   Follow Up  Time Spent (min): 45 minutes  Last Date of Nutrition Visit: 09/12/24  Nutrition Follow-Up Needed?: Dietitian to reassess per policy  Follow up Comment: 9/16-9/17

## 2024-09-12 NOTE — CONSULTS
Wound Care Consult     Visit Date: 9/12/24      Patient Name: Abdi Wilson         MRN: [id number]           YOB: 1945        Pertinent Labs:   Albumin   Date Value Ref Range Status   09/12/2024 3.1 (L) 3.4 - 5.0 g/dL Final   Nutrition on consult.     Wound Assessment:  Wound Incision Abdomen Medial;Upper (Active)   Wound Image   09/12/24 1522   Site Assessment Unable to assess 09/12/24 2100   Nasreen-Wound Assessment Pink 09/12/24 1522   Shape linear 09/12/24 1522   Wound Length (cm) 3 cm 09/12/24 1522   Wound Width (cm) 1.5 cm 09/12/24 1522   Wound Surface Area (cm^2) 4.5 cm^2 09/12/24 1522   Wound Depth (cm) 2 cm 09/12/24 1522   Wound Volume (cm^3) 9 cm^3 09/12/24 1522   State of Healing Slough;Non-healing 09/12/24 1522   Wound Bed Slough (%) 15 % 09/12/24 1522   Margins Epibole (Rolled edges);Well-defined edges 09/12/24 1522   Closure Conrad;Dehisced 09/12/24 1522   Sutures/Staple Line Non approximated 09/12/24 1522   Drainage Description Yellow;Tan;Red 09/12/24 1522   Drainage Amount Moderate 09/12/24 1522   Dressing Packed;Moist to dry 09/12/24 1522   Dressing Changed Changed 09/12/24 1522   Dressing Status Clean;Dry 09/12/24 2100       Wound 08/19/24 Pressure Injury Buttocks Bilateral (Active)   Wound Image   09/12/24 1511   Site Assessment Yellow;Red;Burgundy;Purple 09/12/24 1511   Nasreen-Wound Assessment Non-blanchable erythema 09/12/24 1511   Pressure Injury Stage DTPI 09/12/24 1511   Shape evolving DTI 09/12/24 1511   Wound Length (cm) 3 cm 09/12/24 1511   Wound Width (cm) 5.5 cm 09/12/24 1511   Wound Surface Area (cm^2) 16.5 cm^2 09/12/24 1511   Wound Depth (cm) 0.2 cm 09/12/24 1511   Wound Volume (cm^3) 3.3 cm^3 09/12/24 1511   Wound Healing % -10 09/12/24 1511   Margins Poorly defined 09/12/24 1511   Drainage Description Serosanguineous 09/12/24 1511   Drainage Amount Small 09/12/24 1511   Dressing Other (Comment) 09/12/24 1511   Dressing Changed Changed 09/12/24 1511   Dressing Status  Clean;Dry 09/12/24 2100       Wound 09/11/24 Skin Tear Abdomen Lower;Right (Active)   Wound Image   09/11/24 2252   Site Assessment Red;Denuded 09/12/24 1522   Nasreen-Wound Assessment Pink 09/12/24 1522   Non-staged Wound Description Partial thickness 09/12/24 1522   Shape cluster of skin tears/scabs 09/12/24 1522   Wound Length (cm) 9 cm 09/12/24 1522   Wound Width (cm) 9 cm 09/12/24 1522   Wound Surface Area (cm^2) 81 cm^2 09/12/24 1522   Wound Depth (cm) 0.1 cm 09/12/24 1522   Wound Volume (cm^3) 8.1 cm^3 09/12/24 1522   Drainage Description None 09/12/24 1522   Drainage Amount None 09/12/24 1522   Dressing Dry dressing 09/12/24 2100   Dressing Changed Changed 09/12/24 1522   Dressing Status Dry;Clean 09/12/24 2100       Wound 09/11/24 Skin Tear Flank Left;Lower (Active)   Wound Image   09/11/24 2253   Site Assessment Denuded;Red 09/12/24 1522   Nasreen-Wound Assessment Other (Comment) 09/12/24 1522   Non-staged Wound Description Partial thickness 09/12/24 1522   Shape irregular skin tear with surrounding dry scabs 09/12/24 1522   Wound Length (cm) 1 cm 09/12/24 1522   Wound Width (cm) 2 cm 09/12/24 1522   Wound Surface Area (cm^2) 2 cm^2 09/12/24 1522   Wound Depth (cm) 0.1 cm 09/12/24 1522   Wound Volume (cm^3) 0.2 cm^3 09/12/24 1522   Drainage Description None 09/12/24 1522   Drainage Amount None 09/12/24 1522   Dressing Dry dressing 09/12/24 2100   Dressing Changed Changed 09/12/24 1522   Dressing Status Clean;Dry 09/12/24 2100       Wound 09/11/24 Other (comment) Back Lateral;Left (Active)   Wound Image   09/11/24 2309   Site Assessment Dry;Brown 09/12/24 1522   Nasreen-Wound Assessment Pink 09/12/24 1522   Non-staged Wound Description Partial thickness 09/12/24 1522   Shape dry scabs above drain tube site 09/12/24 1522   Wound Length (cm) 2 cm 09/12/24 1522   Wound Width (cm) 4 cm 09/12/24 1522   Wound Surface Area (cm^2) 8 cm^2 09/12/24 1522   Margins Attached edges 09/12/24 1522   Drainage Description None  09/12/24 1522   Dressing Gauze;Dry dressing 09/12/24 1522   Dressing Changed Reinforced 09/12/24 1522   Dressing Status Clean;Dry 09/12/24 2100      Patient seen for multiple wounds (present on admission) complicated by PMH: hypertension dyslipidemia old CVA mild cardiomyopathy ulcerative colitis GERD recent CVA in June of this year with dysphagia and expressive aphasia per chart. Exam conducted with PCA to assist with positioning and then RN Shar and Navarro to assess abd incision. Multiple skin tears/dry scabs noted to abdomen and flank, likely from abdominal binder. Dry scabs proximal to drain tube on left flank. Abdominal incision dehiscence (previously noted on last admission as well) foul smelling tan drainage noted, culture obtained. Distal end of wound noted to have slough and suspected unknown wire or staple within wound. Hyper granulation noted and medial aspect of wound with multiple wires looping tissue internally (unsure if this is a method of internal suture etc.) IMS notified and photo sent via secure chat. Recommend surgery evaluate abdominal wound and drain, unclear how long drain from prior admission is to stay in place at this time. Previous DTI to bilateral buttocks evolving with a stage 3 within. Skin hygiene and dressing care provided. See detailed assessment above from flowsheet. Recommendations below, reviewed with Dr. Wan.         Treatment protocols recommended:  Bilateral buttocks- Apply Triad, dime thickness to affected areas daily and prn for incontinence, cover with mepilex foam every other day/prn.   Abd incision- Cleanse with vashe, pack with vashe moistened roll gauze daily/prn. Surgical consult to evaluate wound.   Flank/abd- Cleanse with NSS and pat dry, cover with clean dry dressing every other day/prn.  Heel boots.  Continue to off load, turning at least every 2 hours. Offload heels.    Therapeutic surface: Patient on [employer] Dream Air pressure relieving mattress with turn and  reposition system in place. Positioned patient onto right side after exam. Staff to continue to turn/reposition patient atleast every 2 hours. Preventative heel foams placed. Offload heels. If patient transfers from SDU off Mobile mattress please obtain waffle overlay and place to mattress.     Nursing updated, continue pressure injury preventions, wound care to be completed by nursing per orders and re-consult wound RN if needed.    See above recommendations for treatment. Patient will likely continue to require skilled assistance with skin hygiene and wound care upon discharge.    Please contact me with questions or changes in patient condition.  Olivia Maguire RN  [facility]   [phone]

## 2024-09-12 NOTE — CARE PLAN
Problem: Pain - Adult  Goal: Verbalizes/displays adequate comfort level or baseline comfort level  Outcome: Not Progressing     Problem: Chronic Conditions and Co-morbidities  Goal: Patient's chronic conditions and co-morbidity symptoms are monitored and maintained or improved  Outcome: Not Progressing     Problem: Safety - Adult  Goal: Free from fall injury  Outcome: Progressing

## 2024-09-12 NOTE — PROGRESS NOTES
"   09/12/24 0851   Discharge Planning   Living Arrangements Alone   Support Systems Family members   Assistance Needed total   Type of Residence Nursing home/residential care   Home or Post Acute Services Post acute facilities (Rehab/SNF/etc)   Type of Post Acute Facility Services Long term care   Expected Discharge Disposition Other  (long term care)   Does the patient need discharge transport arranged? Yes   RoundTrip coordination needed? Yes   Has discharge transport been arranged? No     Patient is admitted from [facility] for altered mental status. He is known to the Care Transitions team and has been under long term care at [facility]. Return referral submitted to Jamee Fraga to confirm patient's status and identify any barriers to return.     Per response from Jamee Fraga, \"He is skilled under caresource and will need precert, if unable to continue skilled he can still return but he only has 2 or 3 bedhold days left before he will need a new LOC completed. I will get the exact date that will need completed and let you know\".   I spoke with patient's son Rafi Wilson and confirmed the plan is for patient to return to [facility] once medically cleared. TCC following.   "

## 2024-09-12 NOTE — PROGRESS NOTES
Social work consult placed for positive medical risk screen. SW reviewed pt's chart and communicated with TCC. No SW needs foreseen at this time. SW signing off; available upon request.    DAKOTAH Michel, LSW ([id number])   Care Transitions

## 2024-09-12 NOTE — PROGRESS NOTES
"Abdi Wilson is a 79 y.o. male on day 1 of admission presenting with Altered mental status, unspecified altered mental status type.      Subjective      Abdi Wilson is a 79 y.o. male with PMHx of stroke and dementia who presented from [facility] with altered mental status. Patient is unresponsive other than to painful stimuli and history was obtained from paramedics and his son. His son said this morning the pt was shaking and \"out of it\", gazing off into the distance (he is nonverbal at baseline). Since his stroke the pt was observed at least twice to be disengaged in this manner but the tremors are new today. He had a prolonged seizure according to bystanders. He was evidently hypertensive during the episode of seizure also. In the ED CT head showed no evidence of acute intracranial lesion. BP was controlled. Labwork was notable for , lactate 2.4 > 2,5, WBC 14.8k. CXR revealed left basilar airspace disease and left effusion. He was hypoxic and placed on 2 lpm O2 but is on RA during my exam with O2 sats 94%. He has had no evidence of seizure activity here . DNR CCA/DNI was reconfirmed with his son.      Of note, the pt suffered a stroke in June and underwent PEG placement 8/8/24 (prior to the PEG he pulled out his NGT 3 times). On 8/10/24 he was admitted for peritonitis and underwent emergent replacement of the gastrostomy tube.  On 8/19/24 he underwent drain placement for a large casandra-splenic abscess. He was discharged on 8/23/24 with ciprofloxacin and Flagyl until 9/4/2024. He was admitted 8/27/24 for abdominal wall abscess and underwent debridement. A midline was placed and he was discharged on IV cefepime for 7 days.   Remainder of ROS reviewed and negative except as indicated in HPI.     09/12: Patient was evaluated this morning.  Family member at bedside.  Patient remains nonverbal, unaware of regarding, lethargic.    Objective     Last Recorded Vitals  /75 (BP Location: Right arm, Patient " Position: Lying)   Pulse 84   Temp 36.1 °C (97 °F) (Temporal)   Resp 18   Wt 80.7 kg (177 lb 14.6 oz)   SpO2 97%   Intake/Output last 3 Shifts:    Intake/Output Summary (Last 24 hours) at 9/12/2024 1154  Last data filed at 9/12/2024 0953  Gross per 24 hour   Intake 611 ml   Output 265 ml   Net 346 ml       Admission Weight  Weight: 81.6 kg (180 lb) (09/11/24 1206)    Daily Weight  09/12/24 : 80.7 kg (177 lb 14.6 oz)    Image Results  XR chest 1 view  Narrative: Interpreted By:  John Arnold,   STUDY:  XR CHEST 1 VIEW;  9/11/2024 3:05 pm      INDICATION:  Signs/Symptoms:altered mental status.          COMPARISON:  08/14/2024      ACCESSION NUMBER(S):  [id number]      ORDERING CLINICIAN:  JEB MCCOY      FINDINGS:                  CARDIOMEDIASTINAL SILHOUETTE:  Cardiomediastinal silhouette is normal in size and configuration.      LUNGS:  Dense airspace disease in the left lung base. There is a small left  effusion. The right lung is clear      ABDOMEN:  No remarkable upper abdominal findings.      BONES:  No acute osseous changes.      Impression: Left basilar airspace disease and left effusion. Underlying pneumonia  is high in the differential. Radiographic follow-up to resolution is  advised.      MACRO:  None      Signed by: John Arnold 9/11/2024 3:11 PM  Dictation workstation:   [id number]  ECG 12 lead  Sinus rhythm  Ventricular premature complex  Prolonged RI interval  Incomplete left bundle branch block  LVH with secondary repolarization abnormality  Inferior infarct, old  Baseline wander in lead(s) V3  CT head wo IV contrast  Narrative: Interpreted By:  Beatriz Carreon,   STUDY:  CT HEAD WO IV CONTRAST;  9/11/2024 1:09 pm      INDICATION:  Signs/Symptoms:change in mental status.          COMPARISON:  08/12/2024      ACCESSION NUMBER(S):  [id number]      ORDERING CLINICIAN:  JEB MCCOY      TECHNIQUE:  Noncontrast axial CT scan of head was performed. Angled reformats in  brain  and bone windows were generated. The images were reviewed in  bone, brain, blood and soft tissue windows.      FINDINGS:  The ventricles, cisterns and sulci are prominent, consistent with  moderate to severe diffuse volume loss. There are areas of  nonspecific white matter hypodensity, which are probably age-related  or microvascular in nature.      Gray-white differentiation is intact and there is no evidence of  acute cortical infarct. Bilateral remote lacunar infarcts. The no  mass, mass effect or midline shift is seen. There is no evidence of  hemorrhage.      The visualized paranasal sinuses are clear.          Impression: No evidence of acute cortical infarct or intracranial hemorrhage.      Chronic changes as described      MACRO:  None      Signed by: Beatriz Carreon 9/11/2024 1:34 PM  Dictation workstation:   [id number]      Physical Exam  Patient is lethargic, nonverbal  Eyes: PERRLA, no conjunctival congestion  Chest: Bilateral Air entry, no crackles or wheezing  Heart: s1S2 regular, no murmur  Abdomen: Soft, non tender, BS present, anterior abdominal wall wound was sutured, wound gaping present.  PEG tube in place.  Ext:    Relevant Results               Assessment/Plan   This patient currently has cardiac telemetry ordered; if you would like to modify or discontinue the telemetry order, click here to go to the orders activity to modify/discontinue the order.      ?Seizure activity  Pt p/w altered mental status and tremors  He was observed at least twice before to be disengaged in this manner but the tremors are new today, hx recent stroke in June  CT head negative for acute finding, start seizure precautions and consult neurology     HTN  Continue on current medication  Monitor blood pressure and adjust dose as needed     Lactic acidosis  Suspect due to recent seizure-like activity,-lactic acid level improved.     Leukocytosis  Likely reactive, no fevers,   Monitor  Follow septic workup     Recent stroke in  June leading to severe dysphagia and other neurological deficit  Likely etiology for seizure activity  On PEG tube for feeding  PT/OT  Continue Plavix and Lipitor     Multiple recent admissions for abdominal infections  Pt is s/p PEG placement 8/8/24, then was admitted for peritonitis and underwent emergent replacement of the gastrostomy tube  He then underwent drain placement for a large casandra-splenic abscess and was recently readmitted with abdominal wall abscess   He has just completed a course of IV cefepime      DVT ppx: Lovenox  Lovenox       Ronit Wan MD

## 2024-09-12 NOTE — CONSULTS
Inpatient consult to Neurology  Consult performed by: Abdi Pimentel MD  Consult ordered by: DIANNE Altamirano-CNP      History Of Present Illness  Abdi Wilson is a 79 y.o. male left handed admitted to [facility] on 9/11/24 presenting with AMS and presumed seizure like activity. Neurology consulted for above.    Last known well: Tuesday (9/10/24)  Had stroke symptoms resolved at time of presentation: ?    Examined pt in AM. Pt alone with no family members present. Called and contacted son (Rafi) after visit, pt's designated POA. Pt nonverbal at baseline per notes. Pt breathing independently but unresponsive other than painful stimuli. Majority of history acquired from son, notes from current admission, and Chart Review. Resides at [facility] (resident since most recent CVA 6/21/24) where pt's son visits pt nearly if not every day.     PMHx: CAD, HTN, vascular dementia, prior falls, UC, s/p CVA x2 (2004 and R anterior thalamic/posterior limb of R IC CVA 6/21/24) with residual expressive aphasia and limited mobility (wheelchair bound)  -MRI brain (6/21/24): Acute infarct of posterior limb of R IC/anterior thalamus. Moderate global parenchymal volume loss with additional remote infarcts and chronic white matter changes favoring microangiopathy  -MRA (6/21/24): R vertebral artery occluded or markedly stenosed. Flow irregularity w/in distal R ICA w/o definitive visualization of R anterior choroidal artery. No other evidence of other major vessel cutt of or significant stenosis.     Since admission/discharge recent stroke (6/21/2024 - 6/27/2024 pt has had multiple other admissions including the following:  -8/8/24: PEG placement (placed after pulling out his NGT x3 times)  -8/10/24: Admitted for peritonitis with emergent gastrotomy tube replacement  -8/19/24 - 8/20/24: drain placement for a large perisplenic abscess. D/c w/ ciproflocaxin and Flagyl until 9/4/24  -8/27/24: Abdominal wall abscess s/p debridement.  "Midline placed before d/c on IV cefepime x7 days.     Per son, when attempting to visit his father yeterday (9/11/24), the pt appeared unusually still, \"mannequin-like\", eyes not moving, and gently shaking (mostly his legs) \"not like someone who was cold.\"  States this is unusual for the patient who prior to yesterday was at his normal (but limited) neurological baseline including waving to others, interactive with  others with either grunts or thumbs up/down signs, or \"blowing kisses\".  Son went to squeeze his father's (the patient) hand which felt limp \"like it had grease.\"  Son usually initiates their visits with a hand squeeze as the patient is nonverbal.  Son denies knowledge of prior seizures including last 4 days as noted in initial ED report.  Firmly endorses patient being at his baseline up until yesterday (9/11/2024).  Endorses 2 episodes of patient blankly staring at the ceiling without shaking/tremor during second to last admission (8/19/2024 - 8/23/2024??)  When face timing grandchildren on Sunday morning patient was smiling and waving to them.     Today, when son tried to greet his father in hospital bed by squeezing/shaking his shoulder, the patient did not respond which is unusual for him.  Son noticed increased secretion pooling, a previous problem related to his aphasia, and informed the pt's nurse for concern of aspiration risk. When the nurse was suctioning the pt's mouth, he did pucker/curl his lips around the suction tube.     Prior to admission, pt was admittedly involved in PT/OT/SLP therapy. SLP therapist informing son that pt \"really tries to get involved to get better, but want to be seen to make progress he has to be admitted back to the hospital.\"    Per ED provider note, pt presented to ED secondary to AMS with pt unresponsive other than to painful stimuli. Supposedly the patient had been having seizures for the past 4 days. Pt is NEAL, son originally deferred treatment but now that pt " is altered and not responding verbally with son now wanting treatment.   When interviewing son via phone call, he admittedly denies prior knowledge of any seizures/seizure-like activity. States during prior visit on Tuesday (9/10/24) pt was at his neurological baseline (nonverbal communication, using elevated wheelchair but working on stationary bike with PT).      Initial vitals during ED presentation show pt as slightly hypertensive (144/88) but otherwise hemodynamically stable and afebrile (HR 88, RR 18, T 98.4F, SpO2 95% on RA). At some point pt was noted to be hypoxic (during EMS transit??) with pt placed on 2L O2, resolved with SpO2 95% RA during ED assessment.   -CBC: Leukocytosis (14.8 -> 14.4), new normocytic anemia today (14.4 -> 12.9) without thrombocytosis/thrombocytopenia  -CMP: hyperglycemia (160 -> 124) w/ mild hyponatremia (134 -> 133) and new hypocalcemia 8.2. Hepatorenal function intact  -VBG:  mild respiratory alkalosis (pH 7.53, pCO2 34, pHCO3 28.).   -Elevated lactate (now resolved): 2.4-> 2.5 -> 1.8  -Mag wnl (1.9)  -UA: +LE with pyuria (WBC 11-20) on microscopy   -Elevated PT/INR (13.2/1.2)   HCT (9/11/24): No evidence of acute cortical infarct or intracranial hemorrhage  CXR (9/11/24): L basilar airspace disease and L effusion. Underlying PNA highly suspected. Radiological follow-up recommended.   TTE (6/21/24): estimated LVEF 46% with global LV hypokinesis and impaired relaxation during LV diastolic filling. Moderate AVR. LA indicated as normal size (no explicit indication of negative Bubble study).     Review of Systems: Not acquired given pt's somnolent state, responsive to painful stimuli only.       Past Medical History  Past Medical History:   Diagnosis Date    CAD (coronary artery disease)     Dementia (Multi)     HTN (hypertension)     Stroke (Multi)        Surgical History  History reviewed. No pertinent surgical history.    Social History  Social History     Tobacco Use    Smoking  status: Never     Passive exposure: Never    Smokeless tobacco: Never       Home Meds  Medications Prior to Admission   Medication Sig Dispense Refill Last Dose    acetaminophen (Tylenol) 325 mg tablet Take 2 tablets (650 mg) by mouth every 4 hours if needed for mild pain (1 - 3) or fever (temp greater than 38.0 C).       atorvastatin (Lipitor) 80 mg tablet Take 1 tablet (80 mg) by mouth once daily at bedtime. 90 tablet 1     bisacodyl (Dulcolax, bisacodyl,) 10 mg suppository Insert 1 suppository (10 mg) into the rectum if needed for constipation.       carvedilol (Coreg) 6.25 mg tablet Take 1 tablet (6.25 mg) by mouth 2 times a day.       [] cefepime (Maxipime) IV Infuse 100 mL (2 g) over 0.5 hours into a venous catheter every 8 hours for 5 days. (Patient not taking: Reported on 2024)   Not Taking    clopidogrel (Plavix) 75 mg tablet Take 1 tablet (75 mg) by mouth once daily. N-G TUBE       colestipol (Colestid) 5 gram granules Take 5 g by mouth once daily. as directed       ergocalciferol (Vitamin D2) 1.25 MG (61096 UT) capsule Take 1 capsule (1,250 mcg) by mouth 1 (one) time per week.       hydrALAZINE (Apresoline) 25 mg tablet Take 1 tablet (25 mg) by mouth 3 times a day.       hyoscyamine (Anaspaz, Levsin) 0.125 mg tablet Take 1 tablet (0.125 mg) by mouth every 4 hours if needed for cramping. VIA G-TUBE       ipratropium-albuteroL (Duo-Neb) 0.5-2.5 mg/3 mL nebulizer solution Take 3 mL by nebulization every 4 hours if needed for wheezing. 180 mL 11     lidocaine (LMX) 4 % cream Apply topically once daily as needed for mild pain (1 - 3).       loperamide (Imodium A-D) 2 mg tablet Take 1 tablet (2 mg) by mouth if needed for diarrhea. VIA N-G TUBE       losartan (Cozaar) 50 mg tablet Take 1 tablet (50 mg) by mouth once daily. 30 tablet 0     magnesium hydroxide (Milk of Magnesia) 2,400 mg/10 mL suspension suspension Take 30 mL by mouth if needed for constipation. VIA-G-TUBE       oxyCODONE (Roxicodone)  "5 mg immediate release tablet 1 tablet (5 mg) by g-tube route every 6 hours if needed for moderate pain (4 - 6) or severe pain (7 - 10). 15 tablet 0        Allergies  Patient has no known allergies.    Current Meds  Scheduled medications  atorvastatin, 80 mg, g-tube, Nightly  carvedilol, 6.25 mg, g-tube, BID  cholestyramine, 4 g, oral, Nightly  clopidogrel, 75 mg, g-tube, Daily  enoxaparin, 40 mg, subcutaneous, q24h  hydrALAZINE, 25 mg, g-tube, TID  losartan, 50 mg, g-tube, Daily  oxygen, , inhalation, Continuous - Inhalation      Continuous medications     PRN medications  PRN medications: acetaminophen, ipratropium-albuteroL, oxyCODONE    Last Recorded Vitals  Blood pressure 129/75, pulse 99, temperature 36.9 °C (98.4 °F), temperature source Temporal, resp. rate 17, height 1.753 m (5' 9\"), weight 80.7 kg (177 lb 14.6 oz), SpO2 96%.    Breathing independently, somnolent and responsive to painful stimuli only (withdrawals from pain)  Eyes open staring to L ceiling without certainty of pt being awake  Well-nourished, laying in bed  No leg edema    Supple neck    Mental status exam as above, nonconversant  Somnolent. Unable to assess fund of knowledge for recent/remote memory.   Pupils round, sluggishly to minimally reactive to light, 3-4 mm, (-) RAPD   Negative blink to threat bilaterally   (+) oculocephalic reflex  Fundoscopic examination was attempted but fundus was not visualized bilaterally   Slight nystagmus on forward gaze (R>L), no ptosis     No facial droop  Dysarthria unable to assess  Shoulder shrug/tongue protrusion unable to assess    (+) flexion withdrawal all extremities  Hyperreflexia of +3 on all 4 extremities, downgoing toes bilaterally   Sustained ankle clonus on L not present on R  Unable to have him stand or walk         Relevant Results  I have personally reviewed the following:    Labs  Results for orders placed or performed during the hospital encounter of 09/11/24 (from the past 24 hour(s)) "   CBC   Result Value Ref Range    WBC 14.4 (H) 4.4 - 11.3 x10*3/uL    nRBC 0.0 0.0 - 0.0 /100 WBCs    RBC 4.59 4.50 - 5.90 x10*6/uL    Hemoglobin 12.9 (L) 13.5 - 17.5 g/dL    Hematocrit 39.6 (L) 41.0 - 52.0 %    MCV 86 80 - 100 fL    MCH 28.1 26.0 - 34.0 pg    MCHC 32.6 32.0 - 36.0 g/dL    RDW 14.9 (H) 11.5 - 14.5 %    Platelets 244 150 - 450 x10*3/uL   Renal Function Panel   Result Value Ref Range    Glucose 124 (H) 74 - 99 mg/dL    Sodium 133 (L) 136 - 145 mmol/L    Potassium 3.8 3.5 - 5.3 mmol/L    Chloride 99 98 - 107 mmol/L    Bicarbonate 28 21 - 32 mmol/L    Anion Gap 10 10 - 20 mmol/L    Urea Nitrogen 20 6 - 23 mg/dL    Creatinine 0.72 0.50 - 1.30 mg/dL    eGFR >90 >60 mL/min/1.73m*2    Calcium 8.2 (L) 8.6 - 10.3 mg/dL    Phosphorus 3.7 2.5 - 4.9 mg/dL    Albumin 3.1 (L) 3.4 - 5.0 g/dL   Magnesium   Result Value Ref Range    Magnesium 1.93 1.60 - 2.40 mg/dL   Lactate   Result Value Ref Range    Lactate 1.8 0.4 - 2.0 mmol/L   Blood Culture    Specimen: Peripheral Venipuncture; Blood culture   Result Value Ref Range    Blood Culture Loaded on Instrument - Culture in progress    Blood Culture    Specimen: Peripheral Venipuncture; Blood culture   Result Value Ref Range    Blood Culture Loaded on Instrument - Culture in progress    TSH with reflex to Free T4 if abnormal   Result Value Ref Range    Thyroid Stimulating Hormone 2.98 0.44 - 3.98 mIU/L       Imaging results     CT head wo IV contrast    Result Date: 9/11/2024  Interpreted By:  Beatriz Carreon, STUDY: CT HEAD WO IV CONTRAST;  9/11/2024 1:09 pm   INDICATION: Signs/Symptoms:change in mental status.     COMPARISON: 08/12/2024   ACCESSION NUMBER(S): [id number]   ORDERING CLINICIAN: JEB MCCOY   TECHNIQUE: Noncontrast axial CT scan of head was performed. Angled reformats in brain and bone windows were generated. The images were reviewed in bone, brain, blood and soft tissue windows.   FINDINGS: The ventricles, cisterns and sulci are prominent,  consistent with moderate to severe diffuse volume loss. There are areas of nonspecific white matter hypodensity, which are probably age-related or microvascular in nature.   Gray-white differentiation is intact and there is no evidence of acute cortical infarct. Bilateral remote lacunar infarcts. The no mass, mass effect or midline shift is seen. There is no evidence of hemorrhage.   The visualized paranasal sinuses are clear.         No evidence of acute cortical infarct or intracranial hemorrhage.   Chronic changes as described   MACRO: None   Signed by: Beatriz Carreon 9/11/2024 1:34 PM Dictation workstation:   [id number]    CT head wo IV contrast    Result Date: 8/12/2024  Interpreted By:  Judith Velarde, STUDY: CT HEAD WO IV CONTRAST  8/12/2024 5:32 pm   INDICATION: Signs/Symptoms:AMS   COMPARISON: CT head 06/21/2024, 07/17/2023.   ACCESSION NUMBER(S): [id number]   ORDERING CLINICIAN: LUCY NUNES   TECHNIQUE: Serial, axial CT images of the brain were obtained without IV contrast. Coronal and sagittal reformatted images were performed.   FINDINGS: The ventricles, cisterns and sulci are prominent, consistent with diffuse volume loss.  There are areas of nonspecific white matter hypodensity, which are probably age-related or microvascular in nature. Remote infarcts at the bilateral basal ganglia. The gray-white matter differentiation is intact and there is no evidence of acute territorial infarct.  No mass effect or midline shift is seen.  There is no hemorrhage.  No extraaxial fluid collection. No air-fluid levels at the visualized paranasal sinuses. Mucous retention cyst/polyp at the left maxillary sinus. The mastoid air cells are clear. No depressed calvarial fracture.   Partially visualized nasogastric tube inserted through the right nostril.       1.  No acute intracranial hemorrhage or acute territorial infarct. 2.  Diffuse parenchymal volume loss. Chronic changes.     MACRO: None.   Signed by:  Judith Velarde 8/12/2024 6:34 PM Dictation workstation:   [id number]          Assessment/Plan  Altered mental status, acute (sometime between 9/10/24 and 9/11/24)  Seizure-like activity  S/p R anterior thalamic/R IC infarct 6/2024  Known R vertebral artery occlusion,     Reviewed recent history and correlating head imaging including my consultation note from recent R anterior thalamic/IC stroke (6/21/24) as stated in HPI. Contacted pt's medical POA (son, Rafi) via telephone to review/confirm above stated course of events. Pt remains DNR/DNI.   At present, it is unclear on exact etiology of pt's current somnolent state with approximate LKW Tuesday (9/10/24) AM.   Change in mental status noted sometime between 9/10/24 and 9/11/24.  Pt has had intervening other medical issues including splenic abscess, abdominal wall abscess and abx usage.    Location of known last 6/2024 infarct (R anterior thalamus/post limb of R IC) DOES NOT necessarily confer increased predisposition for symptomatic seizure--though anything is possible.   Pt appears to also have ? Prior chronic ICH vs infarct in L external capsule (subcortical L opercular area), which MAY very well possibly serve as a sz focus.    Reported staring spells during second to last admission (8/19/24-8/23/24) with use of ciprofloxacin raises concerns of possible subclinical seizure(s) related to decreased seizure threshold with fluroquinolone use.     It is also unclear the status of his abdominal process at this time. It appears pt may have cystitis/? UTI at this time.    Clinical concern for poss CNS infection seems less likely    In summary, will evaluate for primary CNS process, including possibility of seizure (? Subclinical)--provoked or unprovoked, stroke, etc. It is unclear to me that the shaking was a seizure, as described. I believe concomitant search for other metabolic issues (ongoing infection vs other metabolic encephalopathy) are in order.    Pt is  DNRCCA. Son confirms no intubation. I was trying to get a gauge on what are the parameters of they will allow/not allow to be done.      Recommendations:  Follow-up EEG--discussed with son possibilities--? Subclinical sz. Discussed risk of untreated poss subclinical seizures (IF present) with son including progression to status epilepticus  Do MRI brain wo  Continue high intensity statin  Continue home antiplatelet  5.    Cardiac telemetry  6.    Check ammonia/B12 in am  7.    PT/OT/ST eval when appropriate    Plan to get back with son sometime tomorrow after imaging and other neuro testing results are back and figure out next steps. At present, son appears agreeable to starting seizure medications if needed. Tentatively amenable to potential transfer for continuous EEG ([facility] [place], [facility], or [facility]) but wishes to revisit this again tomorrow after results/imaging returns.    Primary care team notified. All questions answered. Please call with questions.       Marlena Drew  [facility], MS-4  9/12/24  5:27 PM    Patient seen and examined with medical student Marlena Drew for the entirety of the visit.  Discussed case and plans with medical student.  Note above was reviewed, edited, and addended as appropriate.       Abdi Pimentel MD  9/12/2024  9:08 PM

## 2024-09-12 NOTE — CARE PLAN
The patient's goals for the shift include      The clinical goals for the shift include Pt will remain free of falls throughout shift.    Over the shift, the patient did not make progress toward the following goals. Barriers to progression include acuity of condition. Recommendations to address these barriers include medications as ordered.    Problem: Pain - Adult  Goal: Verbalizes/displays adequate comfort level or baseline comfort level  Outcome: Not Progressing     Problem: Safety - Adult  Goal: Free from fall injury  Outcome: Not Progressing     Problem: Discharge Planning  Goal: Discharge to home or other facility with appropriate resources  Outcome: Not Progressing     Problem: Chronic Conditions and Co-morbidities  Goal: Patient's chronic conditions and co-morbidity symptoms are monitored and maintained or improved  Outcome: Not Progressing

## 2024-09-12 NOTE — NURSING NOTE
1530:  Report received from Shar QUIÑONES    1600:  Wound nurse in to see patient, dressings changed. Abd wound cultured and sent  Surgery consulted to assess abd wound.    New bag of tube feed hung per orders    1800:  Patient off the floor for MRI    1900:  Report given to Anais QUIÑONES

## 2024-09-13 ENCOUNTER — HOSPITAL ENCOUNTER (INPATIENT)
Facility: HOSPITAL | Age: 79
End: 2024-09-13
Attending: INTERNAL MEDICINE | Admitting: INTERNAL MEDICINE
Payer: OTHER MISCELLANEOUS

## 2024-09-13 VITALS
DIASTOLIC BLOOD PRESSURE: 72 MMHG | RESPIRATION RATE: 18 BRPM | BODY MASS INDEX: 28.11 KG/M2 | HEART RATE: 89 BPM | OXYGEN SATURATION: 96 % | SYSTOLIC BLOOD PRESSURE: 122 MMHG | HEIGHT: 69 IN | TEMPERATURE: 98 F | WEIGHT: 189.82 LBS

## 2024-09-13 DIAGNOSIS — R29.90 STROKE-LIKE SYMPTOMS: ICD-10-CM

## 2024-09-13 DIAGNOSIS — I63.9 CEREBRAL INFARCTION, UNSPECIFIED MECHANISM (MULTI): ICD-10-CM

## 2024-09-13 DIAGNOSIS — R41.82 ALTERED MENTAL STATUS, UNSPECIFIED ALTERED MENTAL STATUS TYPE: ICD-10-CM

## 2024-09-13 DIAGNOSIS — I63.50 CEREBRAL ARTERY OCCLUSION WITH CEREBRAL INFARCTION (MULTI): ICD-10-CM

## 2024-09-13 LAB
AMMONIA PLAS-SCNC: 15 UMOL/L (ref 16–53)
ANION GAP SERPL CALC-SCNC: 11 MMOL/L (ref 10–20)
BACTERIA UR CULT: NORMAL
BUN SERPL-MCNC: 18 MG/DL (ref 6–23)
CALCIUM SERPL-MCNC: 7.9 MG/DL (ref 8.6–10.3)
CHLORIDE SERPL-SCNC: 100 MMOL/L (ref 98–107)
CO2 SERPL-SCNC: 26 MMOL/L (ref 21–32)
CREAT SERPL-MCNC: 0.66 MG/DL (ref 0.5–1.3)
EGFRCR SERPLBLD CKD-EPI 2021: >90 ML/MIN/1.73M*2
ERYTHROCYTE [DISTWIDTH] IN BLOOD BY AUTOMATED COUNT: 14.9 % (ref 11.5–14.5)
GLUCOSE BLD MANUAL STRIP-MCNC: 80 MG/DL (ref 74–99)
GLUCOSE SERPL-MCNC: 114 MG/DL (ref 74–99)
HCT VFR BLD AUTO: 37.4 % (ref 41–52)
HGB BLD-MCNC: 12.4 G/DL (ref 13.5–17.5)
MAGNESIUM SERPL-MCNC: 1.94 MG/DL (ref 1.6–2.4)
MCH RBC QN AUTO: 28.7 PG (ref 26–34)
MCHC RBC AUTO-ENTMCNC: 33.2 G/DL (ref 32–36)
MCV RBC AUTO: 87 FL (ref 80–100)
NRBC BLD-RTO: 0 /100 WBCS (ref 0–0)
PLATELET # BLD AUTO: 250 X10*3/UL (ref 150–450)
POTASSIUM SERPL-SCNC: 3.6 MMOL/L (ref 3.5–5.3)
RBC # BLD AUTO: 4.32 X10*6/UL (ref 4.5–5.9)
SODIUM SERPL-SCNC: 133 MMOL/L (ref 136–145)
VIT B12 SERPL-MCNC: 409 PG/ML (ref 211–911)
WBC # BLD AUTO: 14.3 X10*3/UL (ref 4.4–11.3)

## 2024-09-13 PROCEDURE — 82374 ASSAY BLOOD CARBON DIOXIDE: CPT | Performed by: INTERNAL MEDICINE

## 2024-09-13 PROCEDURE — 82947 ASSAY GLUCOSE BLOOD QUANT: CPT

## 2024-09-13 PROCEDURE — 83735 ASSAY OF MAGNESIUM: CPT | Performed by: INTERNAL MEDICINE

## 2024-09-13 PROCEDURE — 99233 SBSQ HOSP IP/OBS HIGH 50: CPT | Performed by: NURSE PRACTITIONER

## 2024-09-13 PROCEDURE — 36415 COLL VENOUS BLD VENIPUNCTURE: CPT | Performed by: PSYCHIATRY & NEUROLOGY

## 2024-09-13 PROCEDURE — 99223 1ST HOSP IP/OBS HIGH 75: CPT | Performed by: INTERNAL MEDICINE

## 2024-09-13 PROCEDURE — 2500000001 HC RX 250 WO HCPCS SELF ADMINISTERED DRUGS (ALT 637 FOR MEDICARE OP): Performed by: NURSE PRACTITIONER

## 2024-09-13 PROCEDURE — 2500000005 HC RX 250 GENERAL PHARMACY W/O HCPCS: Performed by: INTERNAL MEDICINE

## 2024-09-13 PROCEDURE — 80048 BASIC METABOLIC PNL TOTAL CA: CPT | Performed by: INTERNAL MEDICINE

## 2024-09-13 PROCEDURE — 2500000001 HC RX 250 WO HCPCS SELF ADMINISTERED DRUGS (ALT 637 FOR MEDICARE OP): Performed by: INTERNAL MEDICINE

## 2024-09-13 PROCEDURE — 82140 ASSAY OF AMMONIA: CPT | Performed by: PSYCHIATRY & NEUROLOGY

## 2024-09-13 PROCEDURE — 2500000004 HC RX 250 GENERAL PHARMACY W/ HCPCS (ALT 636 FOR OP/ED): Performed by: INTERNAL MEDICINE

## 2024-09-13 PROCEDURE — 1250000001 HC HOSPICE SEMI-PRIVATE ROOM DAILY

## 2024-09-13 PROCEDURE — 85027 COMPLETE CBC AUTOMATED: CPT | Performed by: INTERNAL MEDICINE

## 2024-09-13 PROCEDURE — 82607 VITAMIN B-12: CPT | Performed by: PSYCHIATRY & NEUROLOGY

## 2024-09-13 PROCEDURE — 99239 HOSP IP/OBS DSCHRG MGMT >30: CPT | Performed by: INTERNAL MEDICINE

## 2024-09-13 RX ORDER — HYDROMORPHONE HYDROCHLORIDE 0.2 MG/ML
0.2 INJECTION INTRAMUSCULAR; INTRAVENOUS; SUBCUTANEOUS
Status: DISCONTINUED | OUTPATIENT
Start: 2024-09-13 | End: 2024-09-17

## 2024-09-13 RX ORDER — IPRATROPIUM BROMIDE AND ALBUTEROL SULFATE 2.5; .5 MG/3ML; MG/3ML
3 SOLUTION RESPIRATORY (INHALATION) EVERY 4 HOURS PRN
Status: DISCONTINUED | OUTPATIENT
Start: 2024-09-13 | End: 2024-09-19 | Stop reason: HOSPADM

## 2024-09-13 RX ORDER — ACETAMINOPHEN 325 MG/1
650 TABLET ORAL EVERY 4 HOURS PRN
Status: DISCONTINUED | OUTPATIENT
Start: 2024-09-13 | End: 2024-09-19 | Stop reason: HOSPADM

## 2024-09-13 RX ORDER — LORAZEPAM 2 MG/ML
0.5 INJECTION INTRAMUSCULAR EVERY 4 HOURS PRN
Status: DISCONTINUED | OUTPATIENT
Start: 2024-09-13 | End: 2024-09-17

## 2024-09-13 RX ORDER — ACETAMINOPHEN 325 MG/1
650 TABLET ORAL EVERY 4 HOURS PRN
Status: CANCELLED | OUTPATIENT
Start: 2024-09-13

## 2024-09-13 RX ORDER — LORAZEPAM 2 MG/ML
1 INJECTION INTRAMUSCULAR EVERY 4 HOURS PRN
Status: DISCONTINUED | OUTPATIENT
Start: 2024-09-13 | End: 2024-09-13

## 2024-09-13 RX ORDER — HYDROMORPHONE HYDROCHLORIDE 0.2 MG/ML
0.2 INJECTION INTRAMUSCULAR; INTRAVENOUS; SUBCUTANEOUS 4 TIMES DAILY
Status: DISCONTINUED | OUTPATIENT
Start: 2024-09-13 | End: 2024-09-13

## 2024-09-13 RX ORDER — BISACODYL 10 MG/1
10 SUPPOSITORY RECTAL DAILY PRN
Status: CANCELLED | OUTPATIENT
Start: 2024-09-13

## 2024-09-13 RX ORDER — GLYCOPYRROLATE 0.2 MG/ML
0.2 INJECTION INTRAMUSCULAR; INTRAVENOUS DAILY PRN
Status: CANCELLED | OUTPATIENT
Start: 2024-09-13

## 2024-09-13 RX ORDER — GLYCOPYRROLATE 0.2 MG/ML
0.2 INJECTION INTRAMUSCULAR; INTRAVENOUS DAILY PRN
Status: DISCONTINUED | OUTPATIENT
Start: 2024-09-13 | End: 2024-09-13 | Stop reason: HOSPADM

## 2024-09-13 RX ORDER — ATROPINE SULFATE 10 MG/ML
1 SOLUTION/ DROPS OPHTHALMIC EVERY 6 HOURS PRN
Status: DISCONTINUED | OUTPATIENT
Start: 2024-09-13 | End: 2024-09-19 | Stop reason: HOSPADM

## 2024-09-13 RX ORDER — LORAZEPAM 2 MG/ML
2 INJECTION INTRAMUSCULAR EVERY 10 MIN PRN
Status: DISCONTINUED | OUTPATIENT
Start: 2024-09-13 | End: 2024-09-17

## 2024-09-13 RX ORDER — GLYCOPYRROLATE 0.2 MG/ML
0.2 INJECTION INTRAMUSCULAR; INTRAVENOUS EVERY 4 HOURS PRN
Status: DISCONTINUED | OUTPATIENT
Start: 2024-09-13 | End: 2024-09-17

## 2024-09-13 RX ORDER — GLYCOPYRROLATE 0.2 MG/ML
0.2 INJECTION INTRAMUSCULAR; INTRAVENOUS EVERY 4 HOURS PRN
Status: DISCONTINUED | OUTPATIENT
Start: 2024-09-13 | End: 2024-09-13

## 2024-09-13 RX ORDER — BISACODYL 10 MG/1
10 SUPPOSITORY RECTAL DAILY PRN
Status: DISCONTINUED | OUTPATIENT
Start: 2024-09-13 | End: 2024-09-19 | Stop reason: HOSPADM

## 2024-09-13 RX ORDER — ONDANSETRON HYDROCHLORIDE 2 MG/ML
4 INJECTION, SOLUTION INTRAVENOUS EVERY 4 HOURS PRN
Status: DISCONTINUED | OUTPATIENT
Start: 2024-09-13 | End: 2024-09-19 | Stop reason: HOSPADM

## 2024-09-13 RX ORDER — HYDROMORPHONE HYDROCHLORIDE 0.2 MG/ML
0.2 INJECTION INTRAMUSCULAR; INTRAVENOUS; SUBCUTANEOUS 4 TIMES DAILY
Status: CANCELLED | OUTPATIENT
Start: 2024-09-13

## 2024-09-13 RX ORDER — IPRATROPIUM BROMIDE AND ALBUTEROL SULFATE 2.5; .5 MG/3ML; MG/3ML
3 SOLUTION RESPIRATORY (INHALATION) EVERY 4 HOURS PRN
Status: DISCONTINUED | OUTPATIENT
Start: 2024-09-13 | End: 2024-09-13

## 2024-09-13 RX ORDER — IPRATROPIUM BROMIDE AND ALBUTEROL SULFATE 2.5; .5 MG/3ML; MG/3ML
3 SOLUTION RESPIRATORY (INHALATION) EVERY 4 HOURS PRN
Status: CANCELLED | OUTPATIENT
Start: 2024-09-13

## 2024-09-13 RX ORDER — LORAZEPAM 2 MG/ML
1 INJECTION INTRAMUSCULAR EVERY 4 HOURS PRN
Status: CANCELLED | OUTPATIENT
Start: 2024-09-13

## 2024-09-13 RX ORDER — GLYCOPYRROLATE 0.2 MG/ML
0.2 INJECTION INTRAMUSCULAR; INTRAVENOUS DAILY PRN
Status: DISCONTINUED | OUTPATIENT
Start: 2024-09-13 | End: 2024-09-13

## 2024-09-13 RX ADMIN — ONDANSETRON 4 MG: 2 INJECTION INTRAMUSCULAR; INTRAVENOUS at 21:09

## 2024-09-13 RX ADMIN — LORAZEPAM 1 MG: 2 INJECTION INTRAMUSCULAR; INTRAVENOUS at 19:46

## 2024-09-13 RX ADMIN — CARVEDILOL 6.25 MG: 6.25 TABLET, FILM COATED ORAL at 12:09

## 2024-09-13 RX ADMIN — Medication 2 L/MIN: at 20:30

## 2024-09-13 RX ADMIN — OXYCODONE HYDROCHLORIDE 5 MG: 5 TABLET ORAL at 12:09

## 2024-09-13 RX ADMIN — HYDRALAZINE HYDROCHLORIDE 25 MG: 25 TABLET ORAL at 12:09

## 2024-09-13 RX ADMIN — HYDROMORPHONE HYDROCHLORIDE 0.4 MG: 1 INJECTION, SOLUTION INTRAMUSCULAR; INTRAVENOUS; SUBCUTANEOUS at 21:57

## 2024-09-13 RX ADMIN — HYDROMORPHONE HYDROCHLORIDE 0.2 MG: 0.2 INJECTION, SOLUTION INTRAMUSCULAR; INTRAVENOUS; SUBCUTANEOUS at 19:46

## 2024-09-13 RX ADMIN — CLOPIDOGREL 75 MG: 75 TABLET ORAL at 12:09

## 2024-09-13 RX ADMIN — HYDROMORPHONE HYDROCHLORIDE 0.4 MG: 1 INJECTION, SOLUTION INTRAMUSCULAR; INTRAVENOUS; SUBCUTANEOUS at 21:09

## 2024-09-13 RX ADMIN — HYDROMORPHONE HYDROCHLORIDE 0.4 MG: 1 INJECTION, SOLUTION INTRAMUSCULAR; INTRAVENOUS; SUBCUTANEOUS at 14:40

## 2024-09-13 RX ADMIN — LOSARTAN POTASSIUM 50 MG: 50 TABLET, FILM COATED ORAL at 12:09

## 2024-09-13 RX ADMIN — ATROPINE SULFATE 1 DROP: 10 SOLUTION/ DROPS OPHTHALMIC at 23:38

## 2024-09-13 RX ADMIN — GLYCOPYRROLATE 0.2 MG: 0.2 INJECTION, SOLUTION INTRAMUSCULAR; INTRAVENOUS at 20:12

## 2024-09-13 RX ADMIN — HYDROMORPHONE HYDROCHLORIDE 0.4 MG: 1 INJECTION, SOLUTION INTRAMUSCULAR; INTRAVENOUS; SUBCUTANEOUS at 22:46

## 2024-09-13 RX ADMIN — Medication 2 L/MIN: at 12:37

## 2024-09-13 RX ADMIN — OXYCODONE HYDROCHLORIDE 5 MG: 5 TABLET ORAL at 03:36

## 2024-09-13 SDOH — SOCIAL STABILITY: SOCIAL INSECURITY: DO YOU FEEL ANYONE HAS EXPLOITED OR TAKEN ADVANTAGE OF YOU FINANCIALLY OR OF YOUR PERSONAL PROPERTY?: UNABLE TO ASSESS

## 2024-09-13 SDOH — SOCIAL STABILITY: SOCIAL INSECURITY: HAVE YOU HAD THOUGHTS OF HARMING ANYONE ELSE?: UNABLE TO ASSESS

## 2024-09-13 SDOH — SOCIAL STABILITY: SOCIAL INSECURITY: ARE THERE ANY APPARENT SIGNS OF INJURIES/BEHAVIORS THAT COULD BE RELATED TO ABUSE/NEGLECT?: UNABLE TO ASSESS

## 2024-09-13 SDOH — SOCIAL STABILITY: SOCIAL INSECURITY: WERE YOU ABLE TO COMPLETE ALL THE BEHAVIORAL HEALTH SCREENINGS?: NO

## 2024-09-13 SDOH — SOCIAL STABILITY: SOCIAL INSECURITY: HAVE YOU HAD ANY THOUGHTS OF HARMING ANYONE ELSE?: UNABLE TO ASSESS

## 2024-09-13 SDOH — SOCIAL STABILITY: SOCIAL INSECURITY: DOES ANYONE TRY TO KEEP YOU FROM HAVING/CONTACTING OTHER FRIENDS OR DOING THINGS OUTSIDE YOUR HOME?: UNABLE TO ASSESS

## 2024-09-13 SDOH — SOCIAL STABILITY: SOCIAL INSECURITY: HAS ANYONE EVER THREATENED TO HURT YOUR FAMILY OR YOUR PETS?: UNABLE TO ASSESS

## 2024-09-13 SDOH — SOCIAL STABILITY: SOCIAL INSECURITY: DO YOU FEEL UNSAFE GOING BACK TO THE PLACE WHERE YOU ARE LIVING?: UNABLE TO ASSESS

## 2024-09-13 SDOH — SOCIAL STABILITY: SOCIAL INSECURITY: ABUSE: ADULT

## 2024-09-13 SDOH — SOCIAL STABILITY: SOCIAL INSECURITY: ARE YOU OR HAVE YOU BEEN THREATENED OR ABUSED PHYSICALLY, EMOTIONALLY, OR SEXUALLY BY ANYONE?: UNABLE TO ASSESS

## 2024-09-13 ASSESSMENT — COGNITIVE AND FUNCTIONAL STATUS - GENERAL
TURNING FROM BACK TO SIDE WHILE IN FLAT BAD: TOTAL
PERSONAL GROOMING: TOTAL
DAILY ACTIVITIY SCORE: 6
CLIMB 3 TO 5 STEPS WITH RAILING: TOTAL
PERSONAL GROOMING: TOTAL
TOILETING: TOTAL
DRESSING REGULAR LOWER BODY CLOTHING: TOTAL
CLIMB 3 TO 5 STEPS WITH RAILING: TOTAL
STANDING UP FROM CHAIR USING ARMS: TOTAL
HELP NEEDED FOR BATHING: TOTAL
TOILETING: TOTAL
WALKING IN HOSPITAL ROOM: TOTAL
DRESSING REGULAR LOWER BODY CLOTHING: TOTAL
MOVING FROM LYING ON BACK TO SITTING ON SIDE OF FLAT BED WITH BEDRAILS: TOTAL
TURNING FROM BACK TO SIDE WHILE IN FLAT BAD: TOTAL
MOVING TO AND FROM BED TO CHAIR: TOTAL
DRESSING REGULAR UPPER BODY CLOTHING: TOTAL
EATING MEALS: TOTAL
HELP NEEDED FOR BATHING: TOTAL
WALKING IN HOSPITAL ROOM: TOTAL
MOVING FROM LYING ON BACK TO SITTING ON SIDE OF FLAT BED WITH BEDRAILS: TOTAL
EATING MEALS: TOTAL
MOVING TO AND FROM BED TO CHAIR: TOTAL
STANDING UP FROM CHAIR USING ARMS: TOTAL
MOBILITY SCORE: 6
MOBILITY SCORE: 6
DRESSING REGULAR UPPER BODY CLOTHING: TOTAL
DAILY ACTIVITIY SCORE: 6

## 2024-09-13 ASSESSMENT — LIFESTYLE VARIABLES
AUDIT-C TOTAL SCORE: -1
HOW OFTEN DO YOU HAVE A DRINK CONTAINING ALCOHOL: PATIENT UNABLE TO ANSWER
HOW OFTEN DO YOU HAVE 6 OR MORE DRINKS ON ONE OCCASION: PATIENT UNABLE TO ANSWER
HOW MANY STANDARD DRINKS CONTAINING ALCOHOL DO YOU HAVE ON A TYPICAL DAY: PATIENT UNABLE TO ANSWER
AUDIT-C TOTAL SCORE: -1
SKIP TO QUESTIONS 9-10: 0

## 2024-09-13 ASSESSMENT — PAIN SCALES - PAIN ASSESSMENT IN ADVANCED DEMENTIA (PAINAD)
BREATHING: OCCASIONAL LABORED BREATHING, SHORT PERIOD OF HYPERVENTILATION
TOTALSCORE: 7
BODYLANGUAGE: TENSE, DISTRESSED PACING, FIDGETING
CONSOLABILITY: UNABLE TO CONSOLE, DISTRACT OR REASSURE
NEGVOCALIZATION: REPEATED TROUBLED CALLING OUT, LOUD MOANING/GROANING, CRYING
BREATHING: NORMAL
FACIALEXPRESSION: SAD, FRIGHTENED, FROWN
TOTALSCORE: 6
BREATHING: OCCASIONAL LABORED BREATHING, SHORT PERIOD OF HYPERVENTILATION
TOTALSCORE: MEDICATION (SEE MAR)
FACIALEXPRESSION: SAD, FRIGHTENED, FROWN
FACIALEXPRESSION: FACIAL GRIMACING
CONSOLABILITY: UNABLE TO CONSOLE, DISTRACT OR REASSURE
NEGVOCALIZATION: OCCASIONAL MOAN/GROAN, LOW SPEECH, NEGATIVE/DISAPPROVING QUALITY
CONSOLABILITY: UNABLE TO CONSOLE, DISTRACT OR REASSURE
BODYLANGUAGE: TENSE, DISTRESSED PACING, FIDGETING
TOTALSCORE: 7
NEGVOCALIZATION: OCCASIONAL MOAN/GROAN, LOW SPEECH, NEGATIVE/DISAPPROVING QUALITY
NEGVOCALIZATION: REPEATED TROUBLED CALLING OUT, LOUD MOANING/GROANING, CRYING
CONSOLABILITY: UNABLE TO CONSOLE, DISTRACT OR REASSURE
BODYLANGUAGE: TENSE, DISTRESSED PACING, FIDGETING

## 2024-09-13 ASSESSMENT — PAIN - FUNCTIONAL ASSESSMENT: PAIN_FUNCTIONAL_ASSESSMENT: FLACC (FACE, LEGS, ACTIVITY, CRY, CONSOLABILITY)

## 2024-09-13 ASSESSMENT — PATIENT HEALTH QUESTIONNAIRE - PHQ9
2. FEELING DOWN, DEPRESSED OR HOPELESS: NOT AT ALL
SUM OF ALL RESPONSES TO PHQ9 QUESTIONS 1 & 2: 0
1. LITTLE INTEREST OR PLEASURE IN DOING THINGS: NOT AT ALL

## 2024-09-13 NOTE — PROGRESS NOTES
Updated by Dr Wan that he discussed GOC and family is in agreement with a referral to hospice. TCC notified SW of need for hospice choice.

## 2024-09-13 NOTE — CARE PLAN
Problem: Chronic Conditions and Co-morbidities  Goal: Patient's chronic conditions and co-morbidity symptoms are monitored and maintained or improved  Outcome: Progressing     Problem: Pain - Adult  Goal: Verbalizes/displays adequate comfort level or baseline comfort level  Outcome: Progressing

## 2024-09-13 NOTE — CONSULTS
GENERAL SURGERY CONSULTATION NOTE    Abdi Wilson   1945   [id number]     Inpatient consult to Acute Care Surgery  Consult performed by: Kostas Nielsen DO  Consult ordered by: Ronit Wan MD          Reason For Consult  Concern for foreign body in midline wound    History Of Present Illness  Abdi Wilson is a 79 y.o. male presenting with altered mental status. He recently had a stroke, PEG tube placement complicated by dislodgement with exlap and replacement of PEG. Has had a midline wound which has been treated with wet to dry BID packing changes. Also has a splenic abscess with drain in place. Admitted for seizure workup and concern for stroke at this time. General surgery was consulted for concern of a foreign body in the patients wound.     Past Medical History  He has a past medical history of CAD (coronary artery disease), Dementia (Multi), HTN (hypertension), and Stroke (Multi).    Surgical History  He has no past surgical history on file.    Medications  No current facility-administered medications on file prior to encounter.     Current Outpatient Medications on File Prior to Encounter   Medication Sig Dispense Refill    acetaminophen (Tylenol) 325 mg tablet Take 2 tablets (650 mg) by mouth every 4 hours if needed for mild pain (1 - 3) or fever (temp greater than 38.0 C).      atorvastatin (Lipitor) 80 mg tablet Take 1 tablet (80 mg) by mouth once daily at bedtime. 90 tablet 1    bisacodyl (Dulcolax, bisacodyl,) 10 mg suppository Insert 1 suppository (10 mg) into the rectum if needed for constipation.      carvedilol (Coreg) 6.25 mg tablet Take 1 tablet (6.25 mg) by mouth 2 times a day.      clopidogrel (Plavix) 75 mg tablet Take 1 tablet (75 mg) by mouth once daily. N-G TUBE      colestipol (Colestid) 5 gram granules Take 5 g by mouth once daily. as directed      ergocalciferol (Vitamin D2) 1.25 MG (24851 UT) capsule Take 1 capsule (1,250 mcg) by mouth 1 (one) time per week.      hydrALAZINE  "(Apresoline) 25 mg tablet Take 1 tablet (25 mg) by mouth 3 times a day.      hyoscyamine (Anaspaz, Levsin) 0.125 mg tablet Take 1 tablet (0.125 mg) by mouth every 4 hours if needed for cramping. VIA G-TUBE      ipratropium-albuteroL (Duo-Neb) 0.5-2.5 mg/3 mL nebulizer solution Take 3 mL by nebulization every 4 hours if needed for wheezing. 180 mL 11    lidocaine (LMX) 4 % cream Apply topically once daily as needed for mild pain (1 - 3).      loperamide (Imodium A-D) 2 mg tablet Take 1 tablet (2 mg) by mouth if needed for diarrhea. VIA N-G TUBE      losartan (Cozaar) 50 mg tablet Take 1 tablet (50 mg) by mouth once daily. 30 tablet 0    magnesium hydroxide (Milk of Magnesia) 2,400 mg/10 mL suspension suspension Take 30 mL by mouth if needed for constipation. VIA-G-TUBE      oxyCODONE (Roxicodone) 5 mg immediate release tablet 1 tablet (5 mg) by g-tube route every 6 hours if needed for moderate pain (4 - 6) or severe pain (7 - 10). 15 tablet 0       Allergies  Patient has no known allergies.     Social History  He reports that he has never smoked. He has never been exposed to tobacco smoke. He has never used smokeless tobacco. No history on file for alcohol use and drug use.    Family History  No family history on file.     Review of Systems   Unable to perform ROS: Dementia       Last Recorded Vitals  Blood pressure 107/70, pulse 97, temperature 36.1 °C (97 °F), temperature source Temporal, resp. rate 18, height 1.753 m (5' 9\"), weight 80.7 kg (177 lb 14.6 oz), SpO2 (!) 28%.     Physical Exam  Gen: NAD.  Resting peacefully  HEENT: NC/AT.  Moist mucous membranes.  Neck: Normal range of motion.  CV: Regular rate.  Chest: Normal chest rise.  Normal respiratory effort.  Abdomen: Soft.  NT/ND. Abdominal midline wound noted with healthy granulation tissue. Fascial sutures intact, seen within wound. No rigidity or guarding.  Extremities: No edema.       Relevant Results  Results for orders placed or performed during the " hospital encounter of 09/11/24 (from the past 24 hour(s))   Urinalysis with Reflex Culture and Microscopic   Result Value Ref Range    Color, Urine Yellow Light-Yellow, Yellow, Dark-Yellow    Appearance, Urine Clear Clear    Specific Gravity, Urine 1.027 1.005 - 1.035    pH, Urine 5.5 5.0, 5.5, 6.0, 6.5, 7.0, 7.5, 8.0    Protein, Urine 20 (TRACE) NEGATIVE, 10 (TRACE), 20 (TRACE) mg/dL    Glucose, Urine Normal Normal mg/dL    Blood, Urine NEGATIVE NEGATIVE    Ketones, Urine NEGATIVE NEGATIVE mg/dL    Bilirubin, Urine NEGATIVE NEGATIVE    Urobilinogen, Urine Normal Normal mg/dL    Nitrite, Urine NEGATIVE NEGATIVE    Leukocyte Esterase, Urine 25 Lorna/µL (A) NEGATIVE   Extra Urine Gray Tube   Result Value Ref Range    Extra Tube Hold for add-ons.    Microscopic Only, Urine   Result Value Ref Range    WBC, Urine 11-20 (A) 1-5, NONE /HPF    RBC, Urine NONE NONE, 1-2, 3-5 /HPF    Mucus, Urine FEW Reference range not established. /LPF    Hyaline Casts, Urine 1+ (A) NONE /LPF    Calcium Oxalate Crystals, Urine 1+ NONE, 1+ /HPF   CBC   Result Value Ref Range    WBC 14.4 (H) 4.4 - 11.3 x10*3/uL    nRBC 0.0 0.0 - 0.0 /100 WBCs    RBC 4.59 4.50 - 5.90 x10*6/uL    Hemoglobin 12.9 (L) 13.5 - 17.5 g/dL    Hematocrit 39.6 (L) 41.0 - 52.0 %    MCV 86 80 - 100 fL    MCH 28.1 26.0 - 34.0 pg    MCHC 32.6 32.0 - 36.0 g/dL    RDW 14.9 (H) 11.5 - 14.5 %    Platelets 244 150 - 450 x10*3/uL   Renal Function Panel   Result Value Ref Range    Glucose 124 (H) 74 - 99 mg/dL    Sodium 133 (L) 136 - 145 mmol/L    Potassium 3.8 3.5 - 5.3 mmol/L    Chloride 99 98 - 107 mmol/L    Bicarbonate 28 21 - 32 mmol/L    Anion Gap 10 10 - 20 mmol/L    Urea Nitrogen 20 6 - 23 mg/dL    Creatinine 0.72 0.50 - 1.30 mg/dL    eGFR >90 >60 mL/min/1.73m*2    Calcium 8.2 (L) 8.6 - 10.3 mg/dL    Phosphorus 3.7 2.5 - 4.9 mg/dL    Albumin 3.1 (L) 3.4 - 5.0 g/dL   Magnesium   Result Value Ref Range    Magnesium 1.93 1.60 - 2.40 mg/dL   Lactate   Result Value Ref Range     Lactate 1.8 0.4 - 2.0 mmol/L   Blood Culture    Specimen: Peripheral Venipuncture; Blood culture   Result Value Ref Range    Blood Culture Loaded on Instrument - Culture in progress    Blood Culture    Specimen: Peripheral Venipuncture; Blood culture   Result Value Ref Range    Blood Culture Loaded on Instrument - Culture in progress    TSH with reflex to Free T4 if abnormal   Result Value Ref Range    Thyroid Stimulating Hormone 2.98 0.44 - 3.98 mIU/L       EEG    Result Date: 9/12/2024  IMPRESSION Impression This routine EEG is indicative of a severe diffuse encephalopathy. No epileptiform activity or lateralizing signs are recorded. A full report will be scanned into the patient's chart at a later time. This report has been interpreted and electronically signed by    XR chest 1 view    Result Date: 9/11/2024  Interpreted By:  John Arnold, STUDY: XR CHEST 1 VIEW;  9/11/2024 3:05 pm   INDICATION: Signs/Symptoms:altered mental status.     COMPARISON: 08/14/2024   ACCESSION NUMBER(S): [id number]   ORDERING CLINICIAN: JEB MCCOY   FINDINGS:         CARDIOMEDIASTINAL SILHOUETTE: Cardiomediastinal silhouette is normal in size and configuration.   LUNGS: Dense airspace disease in the left lung base. There is a small left effusion. The right lung is clear   ABDOMEN: No remarkable upper abdominal findings.   BONES: No acute osseous changes.       Left basilar airspace disease and left effusion. Underlying pneumonia is high in the differential. Radiographic follow-up to resolution is advised.   MACRO: None   Signed by: John Arnold 9/11/2024 3:11 PM Dictation workstation:   [id number]    ECG 12 lead    Result Date: 9/11/2024  Sinus rhythm Ventricular premature complex Prolonged ME interval Incomplete left bundle branch block LVH with secondary repolarization abnormality Inferior infarct, old Baseline wander in lead(s) V3    CT head wo IV contrast    Result Date: 9/11/2024  Interpreted By:  Lauri  Beatriz, STUDY: CT HEAD WO IV CONTRAST;  9/11/2024 1:09 pm   INDICATION: Signs/Symptoms:change in mental status.     COMPARISON: 08/12/2024   ACCESSION NUMBER(S): [id number]   ORDERING CLINICIAN: JEB MCCOY   TECHNIQUE: Noncontrast axial CT scan of head was performed. Angled reformats in brain and bone windows were generated. The images were reviewed in bone, brain, blood and soft tissue windows.   FINDINGS: The ventricles, cisterns and sulci are prominent, consistent with moderate to severe diffuse volume loss. There are areas of nonspecific white matter hypodensity, which are probably age-related or microvascular in nature.   Gray-white differentiation is intact and there is no evidence of acute cortical infarct. Bilateral remote lacunar infarcts. The no mass, mass effect or midline shift is seen. There is no evidence of hemorrhage.   The visualized paranasal sinuses are clear.         No evidence of acute cortical infarct or intracranial hemorrhage.   Chronic changes as described   MACRO: None   Signed by: Beatriz Carreon 9/11/2024 1:34 PM Dictation workstation:   [id number]    Bedside Midline Imaging    Result Date: 8/30/2024  These images are not reportable by radiology and will not be interpreted by  Radiologists.    CT abdomen pelvis w IV contrast    Result Date: 8/27/2024  Interpreted By:  Valorie Montano, STUDY: CT ABDOMEN PELVIS W IV CONTRAST;  8/27/2024 4:56 am   INDICATION: Signs/Symptoms:abdominal wall infection, s/p recent peg with complication.   COMPARISON: 08/17/2024   ACCESSION NUMBER(S): [id number]   ORDERING CLINICIAN: DOLORES HANCOCK   TECHNIQUE: Axial CT images of the abdomen and pelvis with coronal and sagittal reconstructed images obtained after intravenous administration of contrast   FINDINGS: LOWER CHEST: Redemonstrated left pleural effusion and adjacent atelectasis, partially imaged. Coronary artery calcifications noted however exam is not optimized for evaluation. Small pericardial  effusion. BONES: No acute osseous abnormality. Degenerative changes with minimal grade 1 anterolisthesis of L3 on L4 and L4 on L5. Mild retrolisthesis of L5 on S1. ABDOMINAL WALL: Status post percutaneous gastrostomy tube again noted. There is a ventral abdominal wall complex collection with air and possible debris, inferior to the PEG tube, measuring up to 3.1 x 3.2 x 7.4 cm (AP by T by CC) with air-filled extension through the ventral abdominal wall (series 5, image 68/133). A few adjacent face I of free air are noted (series 2, image 63 and 70). Midline skin staples are again noted.   ABDOMEN:   LIVER: Within normal limits. BILE DUCTS: Normal caliber. GALLBLADDER: No calcified gallstones. No wall thickening. PANCREAS: Within normal limits. SPLEEN: A complex subdiaphragmatic and perisplenic collection is noted with fluid and air as well as a surgical drain, decreased in size since prior imaging. This region measures approximately 9.9 x 5.3 cm (oblique AP by T), previously measuring 15.7 x 9.7 cm. ADRENALS: Mild nodularity of the right adrenal body. KIDNEYS and URETERS: Symmetric renal enhancement. No hydronephrosis or perinephric fluid collection. A complex left renal cyst measures up to 1.7 cm. Additional left renal simple cysts noted. Subcentimeter low-attenuation lesion in the right renal the cortex.     VESSELS: Atherosclerotic calcifications without aneurysmal dilatation seen. RETROPERITONEUM: No pathologically enlarged retroperitoneal lymph nodes.   PELVIS:   REPRODUCTIVE ORGANS: The prostate is enlarged up to 6.1 cm. Correlation with PSA recommended. BLADDER: Wall thickening of the urinary bladder noted. Few foci of intraluminal air present, non specific. Interval removal of a Cleveland catheter.   BOWEL: Percutaneous gastrostomy tube appears in otherwise expected position. Mild stranding and possible collection along the proximal greater curvature of the stomach the measuring at least 4.3 cm. The stomach is  decompressed, partially limiting evaluation. No dilated loops of small bowel are identified. Fluid contents within colon present. Submucosal fat deposition predominantly in the sigmoid colon.  The appendix is not identified.  Therefore, appendicitis cannot be excluded on the basis of this exam. However, no significant inflammatory changes are noted within the right lower quadrant. PERITONEUM: Mesenteric stranding and fluid collections as well as free air as previously described.       Status post percutaneous gastrostomy to with complex collection noted in the abdominal wall inferiorly with extension through the ventral abdomen and few associated foci of pneumoperitoneum as described. Findings appear new since prior imaging.   Possible 4 cm collection along the gastric wall, possibly present on prior imaging.   Complex left subdiaphragmatic/perisplenic collection with drainage catheter appear smaller since prior imaging.   Mesenteric stranding.   Fluid within the colon which may be infectious or inflammatory.   Complex/hyperdense left renal cystic lesion again noted.   Small pericardial effusion.   Partially imaged left pleural effusion with adjacent atelectasis.   Coronary artery calcifications and additional findings as detailed.   MACRO: None   Signed by: Valorie Montano 8/27/2024 5:27 AM Dictation workstation:   [id number]    IR body drain placement    Result Date: 8/19/2024  Interpreted By:  Adria Wynn, STUDY: IR BODY DRAIN PLACEMENT;  8/19/2024 12:27 pm   INDICATION: Signs/Symptoms:Abdominal ABscess, Drain?, culture to be sent.   COMPARISON: None.   ACCESSION NUMBER(S): [id number]   ORDERING CLINICIAN: TIMA DE ANDA   TECHNIQUE:   CONSENT: The patient/patient's POA/next of kin was informed of the nature of the proposed procedure. The purposes, alternatives, risks, and benefits were explained and discussed. All questions were answered and consent was obtained.   RADIATION EXPOSURE: None   SEDATION: Moderate  conscious IV sedation services (supervision of administration, induction, and maintenance) were provided by the physician performing the procedure with intravenous fentanyl 50 mcg and versed 1 mg for 28 minutes. The physician was assisted by an independent trained observer, an interventional radiology nurse, in the continuous monitoring of patient level of consciousness and physiologic status.   MEDICATION/CONTRAST: No additional   TIME OUT: A time out was performed immediately prior to procedure start with the interventional team, correctly identifying the patient name, date of birth, MRN, procedure, anatomy (including marking of site and side), patient position, procedure consent form, relevant laboratory and imaging test results, antibiotic administration, safety precautions, and procedure-specific equipment needs.   COMPLICATIONS: No immediate adverse events identified.   FINDINGS: Patient placed in the right posterior oblique position and ultrasound evaluation performed over the left quadrant. Subdiaphragmatic, perisplenic collection identified congruent with CT imaging from 08/17/2024. The lowest intercostal space possible was identified and targeted. The area is prepped and draped in usual sterile fashion. Skin and subcutaneous tissues anesthetized using lidocaine solution. Small skin nick was made. A 10 Scottish all-purpose drain was then advanced into the collection under direct ultrasound guidance using a single step technique. With confirmation of placement within the collection per live ultrasound, the catheter was advanced off the sharp trocar into the posterior margin of the collection. Spontaneous drainage of turbid fluid present. A sample was sent for culture and sensitivity. The remainder of the fluid was aspirated to waist. No immediate complications. A single suture was used to secure the catheter.       Successful ultrasound-guided placement of a 10 Scottish drain into the left upper quadrant abscess  as outlined above.     Performed and dictated at [facility].   MACRO: None   Signed by: Adria Wynn 8/19/2024 12:38 PM Dictation workstation:   [id number]    CT abdomen pelvis w IV contrast    Result Date: 8/18/2024  Interpreted By:  Finkelstein, Evan, STUDY: CT ABDOMEN PELVIS W IV CONTRAST;  8/17/2024 5:38 pm   INDICATION: Signs/Symptoms:Minimal pain/abdominal drainage/previous malposition PEG tube.   COMPARISON: CT abdomen pelvis 08/10/2024   ACCESSION NUMBER(S): [id number]   ORDERING CLINICIAN: TATIANNA MILLS   TECHNIQUE: Axial CT images of the abdomen and pelvis with coronal and sagittal reconstructed images obtained after intravenous administration of 75 mL Omnipaque 350   FINDINGS: LOWER CHEST: There are coronary artery calcifications. Partially visualized moderate left and small right pleural effusions with superimposed atelectasis.   ABDOMEN:   LIVER: Normal attenuation and contour. BILE DUCTS: Normal caliber. GALLBLADDER: No calcified gallstones. No wall thickening. PORTAL VEIN: Patent SPLEEN: Perisplenic, possibly subcapsular collection of fluid and gas measuring 15.7 x 9.7 x 11.1 cm (maximum AP by transverse by craniocaudal dimensions). There is associated mass effect on the spleen. Remainder of the spleen demonstrates a homogeneous attenuation. PANCREAS: Unremarkable. ADRENALS: Unremarkable. KIDNEYS, URETERS and URINARY BLADDER: Symmetric renal enhancement. No hydronephrosis or perinephric fluid collection. 3 cm hypodensity in the inferior pole of the left kidney measures simple fluid attenuation and is most compatible with a simple cyst. There are additional indeterminate hypodensities in the left kidney measuring approximately 1.5 cm and 1.6 cm in size which measuring intermediate density. The bladder is decompressed with a Cleveland catheter. There is bladder wall thickening and mild adjacent stranding. REPRODUCTIVE ORGANS: No pelvic masses.   ABDOMINAL WALL: A  percutaneous gastrostomy tube is present. Defect in the anterior abdominal wall soft tissues inferior to the gastrostomy tube likely relates to prior surgery with overlying skin staples. Fat containing right inguinal hernia. PERITONEUM: Punctate focus of free air adjacent to the stomach.   BOWEL: A percutaneous gastrostomy tube is present and appears to be appropriately positioned within the stomach. Free air seen on prior imaging 08/10/2024 adjacent to the malpositioned gastrostomy tube is near resolved with a small punctate focus of air remaining best seen on image 71 of series 2. Prominence of the submucosal fat in the distal colon. The appendix is not definitively visualized, without focal pericecal inflammatory stranding.   VESSELS: Moderate aortoiliac calcifications. RETROPERITONEUM: No pathologically enlarged retroperitoneal lymph nodes.   BONES: No acute osseous abnormality.       15.7 x 9.7 x 11.1 cm perisplenic, possibly subcapsular, collection of fluid and gas most compatible with abscess.   Percutaneous gastrostomy tube is present and appears appropriately positioned. Free air seen on prior imaging 08/10/2024 adjacent to the malpositioned gastrostomy tube at that time is now near resolved with a small persistent punctate focus of free air adjacent to the stomach.   Defect in the anterior abdominal wall with overlying staples new from prior imaging most compatible with interval surgery. Mild stranding and fluid within this defect without evidence of a well-defined collection..   The bladder is decompressed with a Cleveland catheter, which limits evaluation, however, there is bladder wall thickening and mild adjacent stranding. Findings may be seen with cystitis. Correlate with urinalysis.   Partially visualized moderate left and small right pleural effusions with superimposed atelectasis.   3 cm simple appearing cyst in the left kidney. Additional indeterminate hypodensities in the left kidney measure up to  approximately 1.6 cm. Recommend nonemergent MRI to further characterize.   Prominence of submucosal fat in the distal colon which may be seen with chronic inflammatory processes.   MACRO: Critical Finding:  See findings. Notification was initiated on 8/18/2024 at 4:08 am by  Evan Finkelstein.  (**-YCF-**) Instructions:   Signed by: Evan Finkelstein 8/18/2024 4:08 AM Dictation workstation:   [id number]    FL upper GI w KUB    Result Date: 8/14/2024  Interpreted By:  Mei Crum, STUDY: FL UPPER GI W KUB;  8/14/2024 9:31 am   INDICATION: Signs/Symptoms:Please please contrast through patient's PEG tube in abdomen.   COMPARISON: None.   ACCESSION NUMBER(S): [id number]   ORDERING CLINICIAN: LUCY NUNES   TECHNIQUE: Total fluoroscopy time was  1 minutes 7 seconds with 8 spot images obtained. 60 mL of solution 50% Gastrografin and 50% water was inserted through the PEG tube.   FINDINGS: Peg tube, surgical drain and skin staples are noted in the upper abdomen. An NG tube is also present in the stomach. With injection of contrast the contrast flows freely into the gastric lumen. Contrast promptly passes from stomach into duodenal. No extravasation of contrast is noted outside of the stomach. There was no gastroesophageal reflux identified.       1.  PEG tube is positioned in the stomach with no extravasation. 2. Prompt passage of contrast into the duodenal and no gastroesophageal reflux     MACRO: None   Signed by: Mei Crum 8/14/2024 10:10 AM Dictation workstation:   [id number]    XR chest 2 views    Result Date: 8/14/2024  Interpreted By:  Mei Crum, STUDY: XR CHEST 2 VIEWS;  8/14/2024 9:11 am   INDICATION: Signs/Symptoms:Hypoxia.   COMPARISON: 08/13/2024   ACCESSION NUMBER(S): [id number]   ORDERING CLINICIAN: LUCY NUNES   FINDINGS: AP upright and lateral views were obtained with the patient sitting.   NG tube is present with the tip below the diaphragm and beyond the image.    CARDIOMEDIASTINAL SILHOUETTE: Heart is enlarged, which is accentuated by the shallow inspiration. Aorta is atherosclerotic.   LUNGS: Bilateral pleural effusions and bilateral basilar atelectasis are present, left worse than right. Appearance is similar to the prior exam.   ABDOMEN: No remarkable upper abdominal findings.   BONES: No acute osseous changes.       1.  Unchanged cardiomegaly 2. Pleural effusions and basilar atelectasis, left worse than right and unchanged from the prior exam       MACRO: None   Signed by: Mei Crum 8/14/2024 9:22 AM Dictation workstation:   [id number]      Assessment and Plan  Principal Problem:    Altered mental status, unspecified altered mental status type    79 y.o. male with altered mental status, midline abdominal wound, splenic abscess    Plan:  - Upon inspection, the wires concerning for foreign body are his fascial sutures from his prior ex lap. Fascia is intact as well as the suture. No other concern for foreign body noted in the wound. Overall, wound appears to be healing well. Recommended continued care of wound via wound care team.  - Continue to monitor splenic drain. Will add flushes daily  - No acute surgical intervention planned  - Rest of care per primary    Seen and discussed with Dr. Christen Nielsen, DO PGY3  General Surgery

## 2024-09-13 NOTE — PROGRESS NOTES
"Abdi Wilson is a 79 y.o. male on day 2 of admission presenting with Altered mental status, unspecified altered mental status type.      Subjective      Abdi Wilson is a 79 y.o. male with PMHx of stroke and dementia who presented from [facility] with altered mental status. Patient is unresponsive other than to painful stimuli and history was obtained from paramedics and his son. His son said this morning the pt was shaking and \"out of it\", gazing off into the distance (he is nonverbal at baseline). Since his stroke the pt was observed at least twice to be disengaged in this manner but the tremors are new today. He had a prolonged seizure according to bystanders. He was evidently hypertensive during the episode of seizure also. In the ED CT head showed no evidence of acute intracranial lesion. BP was controlled. Labwork was notable for , lactate 2.4 > 2,5, WBC 14.8k. CXR revealed left basilar airspace disease and left effusion. He was hypoxic and placed on 2 lpm O2 but is on RA during my exam with O2 sats 94%. He has had no evidence of seizure activity here . DNR CCA/DNI was reconfirmed with his son.      Of note, the pt suffered a stroke in June and underwent PEG placement 8/8/24 (prior to the PEG he pulled out his NGT 3 times). On 8/10/24 he was admitted for peritonitis and underwent emergent replacement of the gastrostomy tube.  On 8/19/24 he underwent drain placement for a large casandra-splenic abscess. He was discharged on 8/23/24 with ciprofloxacin and Flagyl until 9/4/2024. He was admitted 8/27/24 for abdominal wall abscess and underwent debridement. A midline was placed and he was discharged on IV cefepime for 7 days.   Remainder of ROS reviewed and negative except as indicated in HPI.     09/12: Patient was evaluated this morning.  Family member at bedside.  Patient remains nonverbal, unaware of regarding, lethargic.  9/13: Patient remains lethargic.  MRI of brain shows bilateral extensive PCA " territory infarction with occlusion of both basilar arteries.  Unable to start on anticoagulation as per neurology.  Discussed with patient's POA (son).  Does not want any aggressive management and considering hospice.    Objective     Last Recorded Vitals  /75 (BP Location: Right arm, Patient Position: Lying)   Pulse 94   Temp 36.8 °C (98.3 °F) (Temporal)   Resp 18   Wt 86.1 kg (189 lb 13.1 oz)   SpO2 97%   Intake/Output last 3 Shifts:    Intake/Output Summary (Last 24 hours) at 9/13/2024 1144  Last data filed at 9/13/2024 1113  Gross per 24 hour   Intake --   Output 900 ml   Net -900 ml       Admission Weight  Weight: 81.6 kg (180 lb) (09/11/24 1206)    Daily Weight  09/13/24 : 86.1 kg (189 lb 13.1 oz)    Image Results  MR brain w and wo IV contrast, MR angio head wo IV contrast, MR angio neck wo IV contrast  Narrative: Interpreted By:  Rojas Marte,   STUDY:  MR BRAIN W AND WO IV CONTRAST; MR ANGIO NECK WO IV CONTRAST; MR ANGIO  HEAD WO IV CONTRAST;  9/12/2024 7:39 pm      INDICATION:  Signs/Symptoms:altered mental status; Signs/Symptoms:Tia;  Signs/Symptoms:Ams.      COMPARISON:      CT brain dated 09/11/2024..      ACCESSION NUMBER(S):  [id number]; [id number]; [id number]      ORDERING CLINICIAN:  NICOLE FINK      TECHNIQUE:  Axial T2, FLAIR, DWI, gradient echo T2 and axial T1 weighted images  of brain were acquired.  Post-contrast brain MRI was obtained after  administration of 16 mL Dotarem intravenous contrast. Noncontrast  time-of-flight MR angiogram of the [id number] vessels was  obtained with MIP reformats. Noncontrast time of flight MR angiogram  of the neck vessels was obtained with MIP reformats.      FINDINGS:  Brain MRI:  There are new extensive acute to subacute infarcts involving the  bilateral PCA territories including scattered small infarcts in the  right greater than left thalamus and confluence infarcts in the  bilateral posteromedial temporal and occipital lobe  slightly greater  on the right.. There are also acute to early subacute infarcts in the  bilateral right greater than left cerebellar hemispheres and  bilateral daphney and midbrain. There is suspected involvement of the  left cortical spinal tract in the brainstem and also involvement of  the left facial nucleus. There is no evidence of hemorrhagic  conversion. There are a few unchanged chronic microhemorrhages in the  cerebellar vermis and there is also unchanged chronic hemorrhagic  infarct in the left basal ganglia and to lesser extent in the right  basal ganglia. No hydrocephalus. Generalized cerebral volume loss and  associated ventricular and sulcal enlargement. Hodge  periventricular deep white matter FLAIR hyperintensities suggestive  of at least moderate chronic microvascular ischemic change. No  abnormal intracranial enhancement.      There is no mass lesion and no midline shift. No herniation.      Paranasal Sinuses and Mastoids: Visualized paranasal sinuses are well  aerated. The mastoid air cells are clear. The orbits are grossly  normal.      MRA of the brain:  Anterior circulation: The intracranial portions of the internal  carotid, middle cerebral, and anterior cerebral arteries are patent,  without significant focal stenosis. There is a normal anterior  communicating artery.      Posterior circulation: There is unchanged chronic occlusion of the  right vertebral artery. There is focal short-segment thrombus or  occlusion of the mid basilar artery measuring aproximally 3 mm  craniocaudally. The left vertebral artery and the remainder of the  basilar artery and proximal posterior cerebral arteries are widely  patent.          MRA of the neck:  Common carotid arteries: Patent  Left internal carotid: No significant stenosis.  Right internal carotid: No significant stenosis.  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 Left vertebral artery is patent.       Impression: Extensive bilateral PCA territory infarcts with small scattered foci  in the right greater than left thalamus and confluent right greater  than left posteromedial temporal lobe and occipital lobe infarcts.  Additionally right greater than left cerebellar infarcts and  scattered bilateral pontine and midbrain infarcts. No hemorrhagic  conversion. No herniation or hydrocephalus.      Focal short-segment thrombus or occlusion of the mid basilar artery  with the remainder of the basilar and proximal posterior cerebral  artery is widely patent. 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      Rojas Marte discussed the significance and urgency of this critical  finding by tEpic with  NICOLE FINK on 9/12/2024 at 8:23 pm.  (**-RCF-**) Findings:  See findings.          Signed by: Rojas Marte 9/12/2024 8:33 PM  Dictation workstation:   [id number]  EEG  IMPRESSION    Impression  This routine EEG is indicative of a severe diffuse encephalopathy. No epileptiform activity or lateralizing signs are recorded.    A full report will be scanned into the patient's chart at a later time.    This report has been interpreted and electronically signed by      Physical Exam  Patient is lethargic, nonverbal  Eyes: PERRLA, no conjunctival congestion  Chest: Bilateral Air entry, no crackles or wheezing  Heart: s1S2 regular, no murmur  Abdomen: Soft, non tender, BS present, anterior abdominal wall wound was sutured, wound gaping present.  PEG tube in place.  Ext:    Relevant Results               Assessment/Plan   This patient currently has cardiac telemetry ordered; if you would like to modify or discontinue the telemetry order, click here to go to the orders activity to modify/discontinue the order.      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     Leukocytosis  Likely reactive, no fevers,   Monitor  Follow septic workup     Recent stroke in June leading to severe dysphagia and other neurological deficit  Likely etiology for seizure activity  On PEG tube for feeding  PT/OT  Continue Plavix and Lipitor     Multiple recent admissions for abdominal infections  Pt is s/p PEG placement 8/8/24, then was admitted for peritonitis and underwent emergent replacement of the gastrostomy tube  He then underwent drain placement for a large casandra-splenic abscess and was recently readmitted with abdominal wall abscess   He has just completed a course of IV cefepime      DVT ppx: Lovenox  Lovenox     Surgical wound anterior abdominal wall  Wound care on board  Surgery consulted for suspected foreign body-does wear fascial suture from his prior ex lap with intact fascia.  No further intervention needed at this time bedside wound care.    Ronit Wan MD

## 2024-09-13 NOTE — NURSING NOTE
2016 AIDEN Wilson  Met with family regarding hospice services, philosophy, and plan of care. In agreement and requested GIP admission today. Consents signed. GIP chart flip confirmed.     If stable- patient to either return to [facility] if private bed becomes available, or family is interested in [place] or [place] in [place].    [facility] to continue to follow    Update provided to:  Dr Ronit Maguire, ISHMAEL Alvarado, JORGE Noguera, SHARAN Willis, [facility] RN  [phone]

## 2024-09-13 NOTE — H&P
"HISTORY AND PHYSICAL    History Of Present Illness    Abdi Wilson is a 79 y.o. male with PMHx of stroke and dementia who presented from [facility] with altered mental status. Patient is unresponsive other than to painful stimuli and history was obtained from paramedics and his son. His son said this morning the pt was shaking and \"out of it\", gazing off into the distance (he is nonverbal at baseline). Since his stroke the pt was observed at least twice to be disengaged in this manner but the tremors are new today. He had a prolonged seizure according to bystanders. He was evidently hypertensive during the episode of seizure also. In the ED CT head showed no evidence of acute intracranial lesion. BP was controlled. Labwork was notable for , lactate 2.4 > 2,5, WBC 14.8k. CXR revealed left basilar airspace disease and left effusion. He was hypoxic and placed on 2 lpm O2 but is on RA during my exam with O2 sats 94%. He has had no evidence of seizure activity here . DNR CCA/DNI was reconfirmed with his son.      Of note, the pt suffered a stroke in June and underwent PEG placement 8/8/24 (prior to the PEG he pulled out his NGT 3 times). On 8/10/24 he was admitted for peritonitis and underwent emergent replacement of the gastrostomy tube.  On 8/19/24 he underwent drain placement for a large casandra-splenic abscess. He was discharged on 8/23/24 with ciprofloxacin and Flagyl until 9/4/2024. He was admitted 8/27/24 for abdominal wall abscess and underwent debridement. A midline was placed and he was discharged on IV cefepime for 7 days.   Remainder of ROS reviewed and negative except as indicated in HPI.      09/12: Patient was evaluated this morning.  Family member at bedside.  Patient remains nonverbal, unaware of regarding, lethargic.  9/13: Patient remains lethargic.  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  Unable to start on anticoagulation as " per neurology.  Discussed with patient's POA (son).  Does not want any aggressive management and considering hospice.  Hospice was consulted and patient being admitted to inpatient hospice for comfort care management          Past Medical History  He has a past medical history of CAD (coronary artery disease), Dementia (Multi), HTN (hypertension), and Stroke (Multi).    Surgical History  He has no past surgical history on file.     Social History  He reports that he has never smoked. He has never been exposed to tobacco smoke. He has never used smokeless tobacco. No history on file for alcohol use and drug use.    Family History  No family history on file.     Allergies  Patient has no known allergies.    Code Status  DNR Comfort Measures Only     Review of Systems  Unable to perform review of system  Last Recorded Vitals  There were no vitals taken for this visit.     Physical Exam  Patient is lethargic  Neck: Supple  Chest: Bilateral decreased air entry with conducted sounds  Heart: S1-S2  Abdomen: Soft, midline incision with wound gaping and supraumbilical area, PEG tube in place  Relevant Results  Results for orders placed or performed during the hospital encounter of 09/11/24 (from the past 24 hour(s))   TSH with reflex to Free T4 if abnormal   Result Value Ref Range    Thyroid Stimulating Hormone 2.98 0.44 - 3.98 mIU/L   POCT GLUCOSE   Result Value Ref Range    POCT Glucose 108 (H) 74 - 99 mg/dL   Basic Metabolic Panel   Result Value Ref Range    Glucose 114 (H) 74 - 99 mg/dL    Sodium 133 (L) 136 - 145 mmol/L    Potassium 3.6 3.5 - 5.3 mmol/L    Chloride 100 98 - 107 mmol/L    Bicarbonate 26 21 - 32 mmol/L    Anion Gap 11 10 - 20 mmol/L    Urea Nitrogen 18 6 - 23 mg/dL    Creatinine 0.66 0.50 - 1.30 mg/dL    eGFR >90 >60 mL/min/1.73m*2    Calcium 7.9 (L) 8.6 - 10.3 mg/dL   CBC   Result Value Ref Range    WBC 14.3 (H) 4.4 - 11.3 x10*3/uL    nRBC 0.0 0.0 - 0.0 /100 WBCs    RBC 4.32 (L) 4.50 - 5.90 x10*6/uL     Hemoglobin 12.4 (L) 13.5 - 17.5 g/dL    Hematocrit 37.4 (L) 41.0 - 52.0 %    MCV 87 80 - 100 fL    MCH 28.7 26.0 - 34.0 pg    MCHC 33.2 32.0 - 36.0 g/dL    RDW 14.9 (H) 11.5 - 14.5 %    Platelets 250 150 - 450 x10*3/uL   Magnesium   Result Value Ref Range    Magnesium 1.94 1.60 - 2.40 mg/dL   Ammonia   Result Value Ref Range    Ammonia 15 (L) 16 - 53 umol/L   Vitamin B12   Result Value Ref Range    Vitamin B12 409 211 - 911 pg/mL   POCT GLUCOSE   Result Value Ref Range    POCT Glucose 80 74 - 99 mg/dL      MR brain w and wo IV contrast    Result Date: 9/12/2024  Interpreted By:  Rojas Marte, STUDY: MR BRAIN W AND WO IV CONTRAST; MR ANGIO NECK WO IV CONTRAST; MR ANGIO HEAD WO IV CONTRAST;  9/12/2024 7:39 pm   INDICATION: Signs/Symptoms:altered mental status; Signs/Symptoms:Tia; Signs/Symptoms:Ams.   COMPARISON:   CT brain dated 09/11/2024..   ACCESSION NUMBER(S): [id number]; [id number]; [id number]   ORDERING CLINICIAN: NICOLE FINK   TECHNIQUE: Axial T2, FLAIR, DWI, gradient echo T2 and axial T1 weighted images of brain were acquired.  Post-contrast brain MRI was obtained after administration of 16 mL Dotarem intravenous contrast. Noncontrast time-of-flight MR angiogram of the dvtxvy-wb-Iixcfv vessels was obtained with MIP reformats. Noncontrast time of flight MR angiogram of the neck vessels was obtained with MIP reformats.   FINDINGS: Brain MRI: There are new extensive acute to subacute infarcts involving the bilateral PCA territories including scattered small infarcts in the right greater than left thalamus and confluence infarcts in the bilateral posteromedial temporal and occipital lobe slightly greater on the right.. There are also acute to early subacute infarcts in the bilateral right greater than left cerebellar hemispheres and bilateral daphney and midbrain. There is suspected involvement of the left cortical spinal tract in the brainstem and also involvement of the left facial nucleus. There is no  evidence of hemorrhagic conversion. There are a few unchanged chronic microhemorrhages in the cerebellar vermis and there is also unchanged chronic hemorrhagic infarct in the left basal ganglia and to lesser extent in the right basal ganglia. No hydrocephalus. Generalized cerebral volume loss and associated ventricular and sulcal enlargement. [place] periventricular deep white matter FLAIR hyperintensities suggestive of at least moderate chronic microvascular ischemic change. No abnormal intracranial enhancement.   There is no mass lesion and no midline shift. No herniation.   Paranasal Sinuses and Mastoids: Visualized paranasal sinuses are well aerated. The mastoid air cells are clear. The orbits are grossly normal.   MRA of the brain: Anterior circulation: The intracranial portions of the internal carotid, middle cerebral, and anterior cerebral arteries are patent, without significant focal stenosis. There is a normal anterior communicating artery.   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 There is focal short-segment thrombus or occlusion of the mid basilar artery measuring aproximally 3 mm craniocaudally. The left vertebral artery and the remainder of the basilar artery and proximal posterior cerebral arteries are widely patent.     MRA of the neck: Common carotid arteries: Patent Left internal carotid: No significant stenosis. Right internal carotid: No significant stenosis. 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 Left vertebral artery is patent.       Extensive bilateral PCA territory infarcts with small scattered foci in the right greater than left thalamus and confluent right greater than left posteromedial temporal lobe and occipital lobe infarcts. Additionally right greater than left cerebellar infarcts and scattered bilateral pontine and midbrain infarcts. No hemorrhagic conversion. No herniation or hydrocephalus.    Focal short-segment thrombus or occlusion of the mid basilar artery with the remainder of the basilar and proximal posterior cerebral artery is widely patent. 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   Rojas Marte discussed the significance and urgency of this critical finding by tEpic with  NICOLE FINK on 9/12/2024 at 8:23 pm. (**-RCF-**) Findings:  See findings.     Signed by: Rojas Marte 9/12/2024 8:33 PM Dictation workstation:   [id number]    MR angio head wo IV contrast    Result Date: 9/12/2024  Interpreted By:  Rojas Marte, STUDY: MR BRAIN W AND WO IV CONTRAST; MR ANGIO NECK WO IV CONTRAST; MR ANGIO HEAD WO IV CONTRAST;  9/12/2024 7:39 pm   INDICATION: Signs/Symptoms:altered mental status; Signs/Symptoms:Tia; Signs/Symptoms:Ams.   COMPARISON:   CT brain dated 09/11/2024..   ACCESSION NUMBER(S): [id number]; [id number]; [id number]   ORDERING CLINICIAN: NICOLE FINK   TECHNIQUE: Axial T2, FLAIR, DWI, gradient echo T2 and axial T1 weighted images of brain were acquired.  Post-contrast brain MRI was obtained after administration of 16 mL Dotarem intravenous contrast. Noncontrast time-of-flight MR angiogram of the nxxltu-uh-Durxhx vessels was obtained with MIP reformats. Noncontrast time of flight MR angiogram of the neck vessels was obtained with MIP reformats.   FINDINGS: Brain MRI: There are new extensive acute to subacute infarcts involving the bilateral PCA territories including scattered small infarcts in the right greater than left thalamus and confluence infarcts in the bilateral posteromedial temporal and occipital lobe slightly greater on the right.. There are also acute to early subacute infarcts in the bilateral right greater than left cerebellar hemispheres and bilateral daphney and midbrain. There is suspected involvement of the left cortical spinal tract in the brainstem and also involvement of the left facial nucleus. There is no evidence of hemorrhagic conversion.  There are a few unchanged chronic microhemorrhages in the cerebellar vermis and there is also unchanged chronic hemorrhagic infarct in the left basal ganglia and to lesser extent in the right basal ganglia. No hydrocephalus. Generalized cerebral volume loss and associated ventricular and sulcal enlargement. [place] periventricular deep white matter FLAIR hyperintensities suggestive of at least moderate chronic microvascular ischemic change. No abnormal intracranial enhancement.   There is no mass lesion and no midline shift. No herniation.   Paranasal Sinuses and Mastoids: Visualized paranasal sinuses are well aerated. The mastoid air cells are clear. The orbits are grossly normal.   MRA of the brain: Anterior circulation: The intracranial portions of the internal carotid, middle cerebral, and anterior cerebral arteries are patent, without significant focal stenosis. There is a normal anterior communicating artery.   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 There is focal short-segment thrombus or occlusion of the mid basilar artery measuring aproximally 3 mm craniocaudally. The left vertebral artery and the remainder of the basilar artery and proximal posterior cerebral arteries are widely patent.     MRA of the neck: Common carotid arteries: Patent Left internal carotid: No significant stenosis. Right internal carotid: No significant stenosis. 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 Left vertebral artery is patent.       Extensive bilateral PCA territory infarcts with small scattered foci in the right greater than left thalamus and confluent right greater than left posteromedial temporal lobe and occipital lobe infarcts. Additionally right greater than left cerebellar infarcts and scattered bilateral pontine and midbrain infarcts. No hemorrhagic conversion. No herniation or hydrocephalus.   Focal short-segment thrombus or  occlusion of the mid basilar artery with the remainder of the basilar and proximal posterior cerebral artery is widely patent. 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   Rojas Marte discussed the significance and urgency of this critical finding by tEpic with  NICOLE FINK on 9/12/2024 at 8:23 pm. (**-RCF-**) Findings:  See findings.     Signed by: Rojas Marte 9/12/2024 8:33 PM Dictation workstation:   [id number]    MR angio neck wo IV contrast    Result Date: 9/12/2024  Interpreted By:  Rojas Marte, STUDY: MR BRAIN W AND WO IV CONTRAST; MR ANGIO NECK WO IV CONTRAST; MR ANGIO HEAD WO IV CONTRAST;  9/12/2024 7:39 pm   INDICATION: Signs/Symptoms:altered mental status; Signs/Symptoms:Tia; Signs/Symptoms:Ams.   COMPARISON:   CT brain dated 09/11/2024..   ACCESSION NUMBER(S): [id number]; [id number]; [id number]   ORDERING CLINICIAN: NICOLE FINK   TECHNIQUE: Axial T2, FLAIR, DWI, gradient echo T2 and axial T1 weighted images of brain were acquired.  Post-contrast brain MRI was obtained after administration of 16 mL Dotarem intravenous contrast. Noncontrast time-of-flight MR angiogram of the demsvq-lt-Ghbhdx vessels was obtained with MIP reformats. Noncontrast time of flight MR angiogram of the neck vessels was obtained with MIP reformats.   FINDINGS: Brain MRI: There are new extensive acute to subacute infarcts involving the bilateral PCA territories including scattered small infarcts in the right greater than left thalamus and confluence infarcts in the bilateral posteromedial temporal and occipital lobe slightly greater on the right.. There are also acute to early subacute infarcts in the bilateral right greater than left cerebellar hemispheres and bilateral daphney and midbrain. There is suspected involvement of the left cortical spinal tract in the brainstem and also involvement of the left facial nucleus. There is no evidence of hemorrhagic conversion. There are a few unchanged chronic  microhemorrhages in the cerebellar vermis and there is also unchanged chronic hemorrhagic infarct in the left basal ganglia and to lesser extent in the right basal ganglia. No hydrocephalus. Generalized cerebral volume loss and associated ventricular and sulcal enlargement. [place] periventricular deep white matter FLAIR hyperintensities suggestive of at least moderate chronic microvascular ischemic change. No abnormal intracranial enhancement.   There is no mass lesion and no midline shift. No herniation.   Paranasal Sinuses and Mastoids: Visualized paranasal sinuses are well aerated. The mastoid air cells are clear. The orbits are grossly normal.   MRA of the brain: Anterior circulation: The intracranial portions of the internal carotid, middle cerebral, and anterior cerebral arteries are patent, without significant focal stenosis. There is a normal anterior communicating artery.   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 There is focal short-segment thrombus or occlusion of the mid basilar artery measuring aproximally 3 mm craniocaudally. The left vertebral artery and the remainder of the basilar artery and proximal posterior cerebral arteries are widely patent.     MRA of the neck: Common carotid arteries: Patent Left internal carotid: No significant stenosis. Right internal carotid: No significant stenosis. 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 Left vertebral artery is patent.       Extensive bilateral PCA territory infarcts with small scattered foci in the right greater than left thalamus and confluent right greater than left posteromedial temporal lobe and occipital lobe infarcts. Additionally right greater than left cerebellar infarcts and scattered bilateral pontine and midbrain infarcts. No hemorrhagic conversion. No herniation or hydrocephalus.   Focal short-segment thrombus or occlusion of the mid basilar artery  with the remainder of the basilar and proximal posterior cerebral artery is widely patent. 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   Rojas Marte discussed the significance and urgency of this critical finding by tEpic with  NICOLE FINK on 9/12/2024 at 8:23 pm. (**-RCF-**) Findings:  See findings.     Signed by: Rojas Marte 9/12/2024 8:33 PM Dictation workstation:   [id number]    EEG    IMPRESSION Impression This routine EEG is indicative of a severe diffuse encephalopathy. No epileptiform activity or lateralizing signs are recorded. A full report will be scanned into the patient's chart at a later time. This report has been interpreted and electronically signed by    XR chest 1 view    Result Date: 9/11/2024  Interpreted By:  John Arnold, STUDY: XR CHEST 1 VIEW;  9/11/2024 3:05 pm   INDICATION: Signs/Symptoms:altered mental status.     COMPARISON: 08/14/2024   ACCESSION NUMBER(S): [id number]   ORDERING CLINICIAN: JEB MCCOY   FINDINGS:         CARDIOMEDIASTINAL SILHOUETTE: Cardiomediastinal silhouette is normal in size and configuration.   LUNGS: Dense airspace disease in the left lung base. There is a small left effusion. The right lung is clear   ABDOMEN: No remarkable upper abdominal findings.   BONES: No acute osseous changes.       Left basilar airspace disease and left effusion. Underlying pneumonia is high in the differential. Radiographic follow-up to resolution is advised.   MACRO: None   Signed by: John Arnold 9/11/2024 3:11 PM Dictation workstation:   [id number]    ECG 12 lead    Result Date: 9/11/2024  Sinus rhythm Ventricular premature complex Prolonged MT interval Incomplete left bundle branch block LVH with secondary repolarization abnormality Inferior infarct, old Baseline wander in lead(s) V3    CT head wo IV contrast    Result Date: 9/11/2024  Interpreted By:  Beatriz Carreon, STUDY: CT HEAD WO IV CONTRAST;  9/11/2024 1:09 pm   INDICATION:  Signs/Symptoms:change in mental status.     COMPARISON: 08/12/2024   ACCESSION NUMBER(S): [id number]   ORDERING CLINICIAN: JEB MCCOY   TECHNIQUE: Noncontrast axial CT scan of head was performed. Angled reformats in brain and bone windows were generated. The images were reviewed in bone, brain, blood and soft tissue windows.   FINDINGS: The ventricles, cisterns and sulci are prominent, consistent with moderate to severe diffuse volume loss. There are areas of nonspecific white matter hypodensity, which are probably age-related or microvascular in nature.   Gray-white differentiation is intact and there is no evidence of acute cortical infarct. Bilateral remote lacunar infarcts. The no mass, mass effect or midline shift is seen. There is no evidence of hemorrhage.   The visualized paranasal sinuses are clear.         No evidence of acute cortical infarct or intracranial hemorrhage.   Chronic changes as described   MACRO: None   Signed by: Beatriz Carreon 9/11/2024 1:34 PM Dictation workstation:   [id number]    Bedside Midline Imaging    Result Date: 8/30/2024  These images are not reportable by radiology and will not be interpreted by  Radiologists.    CT abdomen pelvis w IV contrast    Result Date: 8/27/2024  Interpreted By:  Valorie Montano, STUDY: CT ABDOMEN PELVIS W IV CONTRAST;  8/27/2024 4:56 am   INDICATION: Signs/Symptoms:abdominal wall infection, s/p recent peg with complication.   COMPARISON: 08/17/2024   ACCESSION NUMBER(S): [id number]   ORDERING CLINICIAN: DOLORES HANCOCK   TECHNIQUE: Axial CT images of the abdomen and pelvis with coronal and sagittal reconstructed images obtained after intravenous administration of contrast   FINDINGS: LOWER CHEST: Redemonstrated left pleural effusion and adjacent atelectasis, partially imaged. Coronary artery calcifications noted however exam is not optimized for evaluation. Small pericardial effusion. BONES: No acute osseous abnormality. Degenerative changes  with minimal grade 1 anterolisthesis of L3 on L4 and L4 on L5. Mild retrolisthesis of L5 on S1. ABDOMINAL WALL: Status post percutaneous gastrostomy tube again noted. There is a ventral abdominal wall complex collection with air and possible debris, inferior to the PEG tube, measuring up to 3.1 x 3.2 x 7.4 cm (AP by T by CC) with air-filled extension through the ventral abdominal wall (series 5, image 68/133). A few adjacent face I of free air are noted (series 2, image 63 and 70). Midline skin staples are again noted.   ABDOMEN:   LIVER: Within normal limits. BILE DUCTS: Normal caliber. GALLBLADDER: No calcified gallstones. No wall thickening. PANCREAS: Within normal limits. SPLEEN: A complex subdiaphragmatic and perisplenic collection is noted with fluid and air as well as a surgical drain, decreased in size since prior imaging. This region measures approximately 9.9 x 5.3 cm (oblique AP by T), previously measuring 15.7 x 9.7 cm. ADRENALS: Mild nodularity of the right adrenal body. KIDNEYS and URETERS: Symmetric renal enhancement. No hydronephrosis or perinephric fluid collection. A complex left renal cyst measures up to 1.7 cm. Additional left renal simple cysts noted. Subcentimeter low-attenuation lesion in the right renal the cortex.     VESSELS: Atherosclerotic calcifications without aneurysmal dilatation seen. RETROPERITONEUM: No pathologically enlarged retroperitoneal lymph nodes.   PELVIS:   REPRODUCTIVE ORGANS: The prostate is enlarged up to 6.1 cm. Correlation with PSA recommended. BLADDER: Wall thickening of the urinary bladder noted. Few foci of intraluminal air present, non specific. Interval removal of a [place] catheter.   BOWEL: Percutaneous gastrostomy tube appears in otherwise expected position. Mild stranding and possible collection along the proximal greater curvature of the stomach the measuring at least 4.3 cm. The stomach is decompressed, partially limiting evaluation. No dilated loops of small  bowel are identified. Fluid contents within colon present. Submucosal fat deposition predominantly in the sigmoid colon.  The appendix is not identified.  Therefore, appendicitis cannot be excluded on the basis of this exam. However, no significant inflammatory changes are noted within the right lower quadrant. PERITONEUM: Mesenteric stranding and fluid collections as well as free air as previously described.       Status post percutaneous gastrostomy to with complex collection noted in the abdominal wall inferiorly with extension through the ventral abdomen and few associated foci of pneumoperitoneum as described. Findings appear new since prior imaging.   Possible 4 cm collection along the gastric wall, possibly present on prior imaging.   Complex left subdiaphragmatic/perisplenic collection with drainage catheter appear smaller since prior imaging.   Mesenteric stranding.   Fluid within the colon which may be infectious or inflammatory.   Complex/hyperdense left renal cystic lesion again noted.   Small pericardial effusion.   Partially imaged left pleural effusion with adjacent atelectasis.   Coronary artery calcifications and additional findings as detailed.   MACRO: None   Signed by: Valorie Montano 8/27/2024 5:27 AM Dictation workstation:   [id number]    IR body drain placement    Result Date: 8/19/2024  Interpreted By:  Adria Wynn, STUDY: IR BODY DRAIN PLACEMENT;  8/19/2024 12:27 pm   INDICATION: Signs/Symptoms:Abdominal ABscess, Drain?, culture to be sent.   COMPARISON: None.   ACCESSION NUMBER(S): [id number]   ORDERING CLINICIAN: TIMA DE ANDA   TECHNIQUE:   CONSENT: The patient/patient's POA/next of kin was informed of the nature of the proposed procedure. The purposes, alternatives, risks, and benefits were explained and discussed. All questions were answered and consent was obtained.   RADIATION EXPOSURE: None   SEDATION: Moderate conscious IV sedation services (supervision of administration,  induction, and maintenance) were provided by the physician performing the procedure with intravenous fentanyl 50 mcg and versed 1 mg for 28 minutes. The physician was assisted by an independent trained observer, an interventional radiology nurse, in the continuous monitoring of patient level of consciousness and physiologic status.   MEDICATION/CONTRAST: No additional   TIME OUT: A time out was performed immediately prior to procedure start with the interventional team, correctly identifying the patient name, date of birth, MRN, procedure, anatomy (including marking of site and side), patient position, procedure consent form, relevant laboratory and imaging test results, antibiotic administration, safety precautions, and procedure-specific equipment needs.   COMPLICATIONS: No immediate adverse events identified.   FINDINGS: Patient placed in the right posterior oblique position and ultrasound evaluation performed over the left quadrant. Subdiaphragmatic, perisplenic collection identified congruent with CT imaging from 08/17/2024. The lowest intercostal space possible was identified and targeted. The area is prepped and draped in usual sterile fashion. Skin and subcutaneous tissues anesthetized using lidocaine solution. Small skin nick was made. A 10 [place] all-purpose drain was then advanced into the collection under direct ultrasound guidance using a single step technique. With confirmation of placement within the collection per live ultrasound, the catheter was advanced off the sharp trocar into the posterior margin of the collection. Spontaneous drainage of turbid fluid present. A sample was sent for culture and sensitivity. The remainder of the fluid was aspirated to waist. No immediate complications. A single suture was used to secure the catheter.       Successful ultrasound-guided placement of a 10 [place] drain into the left upper quadrant abscess as outlined above.     Performed and dictated at [facility].   MACRO: None   Signed by: Adria Wynn 8/19/2024 12:38 PM Dictation workstation:   [id number]    CT abdomen pelvis w IV contrast    Result Date: 8/18/2024  Interpreted By:  Finkelstein, Evan, STUDY: CT ABDOMEN PELVIS W IV CONTRAST;  8/17/2024 5:38 pm   INDICATION: Signs/Symptoms:Minimal pain/abdominal drainage/previous malposition PEG tube.   COMPARISON: CT abdomen pelvis 08/10/2024   ACCESSION NUMBER(S): [id number]   ORDERING CLINICIAN: TATIANNA MILLS   TECHNIQUE: Axial CT images of the abdomen and pelvis with coronal and sagittal reconstructed images obtained after intravenous administration of 75 mL Omnipaque 350   FINDINGS: LOWER CHEST: There are coronary artery calcifications. Partially visualized moderate left and small right pleural effusions with superimposed atelectasis.   ABDOMEN:   LIVER: Normal attenuation and contour. BILE DUCTS: Normal caliber. GALLBLADDER: No calcified gallstones. No wall thickening. PORTAL VEIN: Patent SPLEEN: Perisplenic, possibly subcapsular collection of fluid and gas measuring 15.7 x 9.7 x 11.1 cm (maximum AP by transverse by craniocaudal dimensions). There is associated mass effect on the spleen. Remainder of the spleen demonstrates a homogeneous attenuation. PANCREAS: Unremarkable. ADRENALS: Unremarkable. KIDNEYS, URETERS and URINARY BLADDER: Symmetric renal enhancement. No hydronephrosis or perinephric fluid collection. 3 cm hypodensity in the inferior pole of the left kidney measures simple fluid attenuation and is most compatible with a simple cyst. There are additional indeterminate hypodensities in the left kidney measuring approximately 1.5 cm and 1.6 cm in size which measuring intermediate density. The bladder is decompressed with a Cleveland catheter. There is bladder wall thickening and mild adjacent stranding. REPRODUCTIVE ORGANS: No pelvic masses.   ABDOMINAL WALL: A percutaneous gastrostomy tube is present. Defect in the anterior  abdominal wall soft tissues inferior to the gastrostomy tube likely relates to prior surgery with overlying skin staples. Fat containing right inguinal hernia. PERITONEUM: Punctate focus of free air adjacent to the stomach.   BOWEL: A percutaneous gastrostomy tube is present and appears to be appropriately positioned within the stomach. Free air seen on prior imaging 08/10/2024 adjacent to the malpositioned gastrostomy tube is near resolved with a small punctate focus of air remaining best seen on image 71 of series 2. Prominence of the submucosal fat in the distal colon. The appendix is not definitively visualized, without focal pericecal inflammatory stranding.   VESSELS: Moderate aortoiliac calcifications. RETROPERITONEUM: No pathologically enlarged retroperitoneal lymph nodes.   BONES: No acute osseous abnormality.       15.7 x 9.7 x 11.1 cm perisplenic, possibly subcapsular, collection of fluid and gas most compatible with abscess.   Percutaneous gastrostomy tube is present and appears appropriately positioned. Free air seen on prior imaging 08/10/2024 adjacent to the malpositioned gastrostomy tube at that time is now near resolved with a small persistent punctate focus of free air adjacent to the stomach.   Defect in the anterior abdominal wall with overlying staples new from prior imaging most compatible with interval surgery. Mild stranding and fluid within this defect without evidence of a well-defined collection..   The bladder is decompressed with a Cleveland catheter, which limits evaluation, however, there is bladder wall thickening and mild adjacent stranding. Findings may be seen with cystitis. Correlate with urinalysis.   Partially visualized moderate left and small right pleural effusions with superimposed atelectasis.   3 cm simple appearing cyst in the left kidney. Additional indeterminate hypodensities in the left kidney measure up to approximately 1.6 cm. Recommend nonemergent MRI to further  characterize.   Prominence of submucosal fat in the distal colon which may be seen with chronic inflammatory processes.   MACRO: Critical Finding:  See findings. Notification was initiated on 8/18/2024 at 4:08 am by  Evan Finkelstein.  (**-YCF-**) Instructions:   Signed by: Evan Finkelstein 8/18/2024 4:08 AM Dictation workstation:   [id number]     Scheduled medications:  HYDROmorphone, 0.2 mg, intravenous, 4x daily  oxygen, , inhalation, Continuous - Inhalation      Continuous medications:     PRN medications:  PRN medications: acetaminophen, bisacodyl, glycopyrrolate, HYDROmorphone, ipratropium-albuteroL, LORazepam        Assessment/Plan     1.  Acute encephalopathy secondary to massive stroke  2.  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  Recent stroke in June leading to severe dysphagia and other significant neurological deficit  4.  Multiple recent admission for abdominal infection  5.  Surgical wound dehiscence and abdominal wall    Plan:  Patient will be admitted to inpatient hospice for comfort care management  Will start on Dilaudid 0.2 mg IV 4 times a day scheduled along with 0.4 mg every 4 hours as needed for agitation, distress and pain.  Ativan 1 mg every 4 hours as needed for agitation and seizure, glycopyrrolate as needed for secretion  Will continue other supportive measures as per hospice orders set  Hospice consulted    Ronit Wan MD  9/13/2024  4:18 PM

## 2024-09-13 NOTE — DISCHARGE SUMMARY
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    Test Results Pending At Discharge  Pending Labs       Order Current Status    Blood Culture Preliminary result    Blood Culture Preliminary result    Tissue/Wound Culture/Smear Preliminary result            Hospital Course   Abdi Wilson is a 79 y.o. male with PMHx of stroke and dementia who presented from [facility] with altered mental status. Patient is unresponsive other than to painful stimuli and history was obtained from paramedics and his son. His son said this morning the pt was shaking and \"out of it\", gazing off into the distance (he is nonverbal at baseline). Since his stroke the pt was observed at least twice to be disengaged in this manner but the tremors are new today. He had a prolonged seizure according to bystanders. He was evidently hypertensive during the episode of seizure also. In the ED CT head showed no evidence of acute intracranial lesion. BP was controlled. Labwork was notable for , lactate 2.4 > 2,5, WBC 14.8k. CXR revealed left basilar airspace disease and left effusion. He was hypoxic and placed on 2 lpm O2 but is on RA during my exam with O2 sats 94%. He has had no evidence of seizure activity here . DNR CCA/DNI was reconfirmed with his son.      Of note, the pt suffered a stroke in June and underwent PEG placement 8/8/24 (prior to the PEG he pulled out his NGT 3 times). On 8/10/24 he was admitted for peritonitis and underwent emergent replacement of the gastrostomy tube.  On 8/19/24 he underwent drain placement for a large casandra-splenic abscess. He was discharged on 8/23/24 " with ciprofloxacin and Flagyl until 9/4/2024. He was admitted 8/27/24 for abdominal wall abscess and underwent debridement. A midline was placed and he was discharged on IV cefepime for 7 days.   Remainder of ROS reviewed and negative except as indicated in HPI.      09/12: Patient was evaluated this morning.  Family member at bedside.  Patient remains nonverbal, unaware of regarding, lethargic.  9/13: Patient remains lethargic.  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  Unable to start on anticoagulation as per neurology.  Discussed with patient's POA (son) does not want any aggressive management and considering hospice.  Hospice consulted-after having family meeting it was decided patient qualifies for inpatient hospice care.  Patient will be discharged to inpatient hospice for comfort care management.    Pertinent Physical Exam At Time of Discharge  Physical Exam  Patient lethargic  Chest bilateral decreased air entry with occasional crackles  Heart: S1-S2  Abdomen: Surgical wound with staples, small wound gaping on supraumbilical area  Home Medications     Medication List      ASK your doctor about these medications     acetaminophen 325 mg tablet; Commonly known as: Tylenol   atorvastatin 80 mg tablet; Commonly known as: Lipitor; Take 1 tablet (80   mg) by mouth once daily at bedtime.   carvedilol 6.25 mg tablet; Commonly known as: Coreg; Take 1 tablet (6.25   mg) by mouth 2 times a day.   cefepime IV; Commonly known as: Maxipime; Infuse 100 mL (2 g) over 0.5   hours into a venous catheter every 8 hours for 5 days.; Ask about: Should   I take this medication?   clopidogrel 75 mg tablet; Commonly known as: Plavix   colestipol 5 gram granules; Commonly known as: Colestid   Dulcolax (bisacodyl) 10 mg suppository; Generic drug: bisacodyl   hydrALAZINE 25 mg tablet; Commonly known as: Apresoline; Take 1 tablet   (25 mg) by mouth 3 times a day.    hyoscyamine 0.125 mg tablet; Commonly known as: Anaspaz, Levsin   ipratropium-albuteroL 0.5-2.5 mg/3 mL nebulizer solution; Commonly known   as: Duo-Neb; Take 3 mL by nebulization every 4 hours if needed for   wheezing.   lidocaine 4 % cream; Commonly known as: LMX   loperamide 2 mg tablet; Commonly known as: Imodium A-D   losartan 50 mg tablet; Commonly known as: Cozaar; Take 1 tablet (50 mg)   by mouth once daily.   magnesium hydroxide 2,400 mg/10 mL suspension suspension; Commonly known   as: Milk of Magnesia   oxyCODONE 5 mg immediate release tablet; Commonly known as: Roxicodone;   1 tablet (5 mg) by g-tube route every 6 hours if needed for moderate pain   (4 - 6) or severe pain (7 - 10).   Vitamin D2 1.25 MG (66326 UT) capsule; Generic drug: ergocalciferol       Outpatient Follow-Up  No follow-ups on file.     Ronit Wan MD  9/13/2024  3:50 PM

## 2024-09-13 NOTE — PROGRESS NOTES
[facility] Progress Note    Abdi Wilson is a 79 y.o. male on day 2 of admission presenting with Altered mental status, unspecified altered mental status type.  Subjective   Pt seen and examined by myself for first time today. Initially seen by Dr. Pimentel. At time of evaluation, pt is in bed on SDU. Minimally responsive to painful stimuli. Moaning. No family at bedside. Discussed via telephone with son after visit. Son states they have decided to make him hospice based on his wishes.        Objective     Vitals:    09/13/24 0020 09/13/24 0317 09/13/24 0743 09/13/24 1113   BP: 110/69 123/70 154/83 121/75   BP Location: Left arm Left arm Right arm Right arm   Patient Position: Lying Lying Lying Lying   Pulse: 92 79 92 94   Resp: 17 20 18 18   Temp: 36.1 °C (96.9 °F) 36.2 °C (97.2 °F) 36.5 °C (97.7 °F) 36.8 °C (98.3 °F)   TempSrc: Temporal Temporal Temporal Temporal   SpO2: 95% 94% 96% 97%   Weight:  86.1 kg (189 lb 13.1 oz)     Height:             Physical Exam  Vitals reviewed.       Neurological Exam  Mental Status  Responsive to painful stimuli. Patient is nonverbal.    Cranial Nerves  Limited examination 2/2 mental status. No facial asymmetry noted. Pupils 1-2 mm bilaterally, sluggishly responsive to light.    Motor  Normal muscle bulk throughout. No fasciculations present. Decreased muscle tone. No abnormal involuntary movements.  Unable to test strength 2/2 mental status.    Sensory  No withdrawal to painful stimuli in any extremity.      Scheduled medications  atorvastatin, 80 mg, g-tube, Nightly  carvedilol, 6.25 mg, g-tube, BID  cholestyramine, 4 g, oral, Nightly  clopidogrel, 75 mg, g-tube, Daily  enoxaparin, 40 mg, subcutaneous, q24h  hydrALAZINE, 25 mg, g-tube, TID  losartan, 50 mg, g-tube, Daily  oxygen, , inhalation, Continuous - Inhalation      Continuous medications     PRN medications  PRN medications: acetaminophen, ipratropium-albuteroL, oxyCODONE     Lab Results   Component Value Date    WBC  14.3 (H) 09/13/2024    RBC 4.32 (L) 09/13/2024    HGB 12.4 (L) 09/13/2024    HCT 37.4 (L) 09/13/2024     09/13/2024     (L) 09/13/2024    K 3.6 09/13/2024     09/13/2024    BUN 18 09/13/2024    CREATININE 0.66 09/13/2024    EGFR >90 09/13/2024    CALCIUM 7.9 (L) 09/13/2024    ALKPHOS 95 09/11/2024    AST 21 09/11/2024    ALT 23 09/11/2024    MG 1.94 09/13/2024    LDCETUMV50 409 09/13/2024    VITD25 36 01/18/2023    HGBA1C 5.7 (H) 06/21/2024    LDLCALC 75 06/22/2024    CHOL 163 06/22/2024    HDL 39.3 06/22/2024    TRIG 245 (H) 06/22/2024    TSH 2.98 09/12/2024    TSH 3.30 08/12/2024    TSH 1.24 07/17/2023       Below CT and MRI images and reports have been personally reviewed in PACS, agree with interpretations.    MR brain w and wo IV contrast 09/12/2024  MR angio head wo IV contrast 09/12/2024  MR angio neck wo IV contrast 09/12/2024    Narrative  Interpreted By:  Rojas aMrte,  STUDY:  MR BRAIN W AND WO IV CONTRAST; MR ANGIO NECK WO IV CONTRAST; MR ANGIO  HEAD WO IV CONTRAST;  9/12/2024 7:39 pm    INDICATION:  Signs/Symptoms:altered mental status; Signs/Symptoms:Tia;  Signs/Symptoms:Ams.    COMPARISON:    CT brain dated 09/11/2024..    ACCESSION NUMBER(S):  [id number]; [id number]; [id number]    ORDERING CLINICIAN:  NICOLE FINK    TECHNIQUE:  Axial T2, FLAIR, DWI, gradient echo T2 and axial T1 weighted images  of brain were acquired.  Post-contrast brain MRI was obtained after  administration of 16 mL Dotarem intravenous contrast. Noncontrast  time-of-flight MR angiogram of the [id number]-[id number] vessels was  obtained with MIP reformats. Noncontrast time of flight MR angiogram  of the neck vessels was obtained with MIP reformats.    FINDINGS:  Brain MRI:  There are new extensive acute to subacute infarcts involving the  bilateral PCA territories including scattered small infarcts in the  right greater than left thalamus and confluence infarcts in the  bilateral posteromedial temporal and  occipital lobe slightly greater  on the right.. There are also acute to early subacute infarcts in the  bilateral right greater than left cerebellar hemispheres and  bilateral daphney and midbrain. There is suspected involvement of the  left cortical spinal tract in the brainstem and also involvement of  the left facial nucleus. There is no evidence of hemorrhagic  conversion. There are a few unchanged chronic microhemorrhages in the  cerebellar vermis and there is also unchanged chronic hemorrhagic  infarct in the left basal ganglia and to lesser extent in the right  basal ganglia. No hydrocephalus. Generalized cerebral volume loss and  associated ventricular and sulcal enlargement. Hospers  periventricular deep white matter FLAIR hyperintensities suggestive  of at least moderate chronic microvascular ischemic change. No  abnormal intracranial enhancement.    There is no mass lesion and no midline shift. No herniation.    Paranasal Sinuses and Mastoids: Visualized paranasal sinuses are well  aerated. The mastoid air cells are clear. The orbits are grossly  normal.    MRA of the brain:  Anterior circulation: The intracranial portions of the internal  carotid, middle cerebral, and anterior cerebral arteries are patent,  without significant focal stenosis. There is a normal anterior  communicating artery.    Posterior circulation: There is unchanged chronic occlusion of the  right vertebral artery. There is focal short-segment thrombus or  occlusion of the mid basilar artery measuring aproximally 3 mm  craniocaudally. The left vertebral artery and the remainder of the  basilar artery and proximal posterior cerebral arteries are widely  patent.      MRA of the neck:  Common carotid arteries: Patent  Left internal carotid: No significant stenosis.  Right internal carotid: No significant stenosis.  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 Left vertebral artery is  patent.    Impression  Extensive bilateral PCA territory infarcts with small scattered foci  in the right greater than left thalamus and confluent right greater  than left posteromedial temporal lobe and occipital lobe infarcts.  Additionally right greater than left cerebellar infarcts and  scattered bilateral pontine and midbrain infarcts. No hemorrhagic  conversion. No herniation or hydrocephalus.    Focal short-segment thrombus or occlusion of the mid basilar artery  with the remainder of the basilar and proximal posterior cerebral  artery is widely patent. 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    Rojas Marte discussed the significance and urgency of this critical  finding by tEpic with  JANEY DANAE on 9/12/2024 at 8:23 pm.  (**-RCF-**) Findings:  See findings.      Signed by: Rojas Marte 9/12/2024 8:33 PM  Dictation workstation:   [id number]      EEG 09/12/2024    Impression  IMPRESSION    Impression  This routine EEG is indicative of a severe diffuse encephalopathy. No epileptiform activity or lateralizing signs are recorded.    A full report will be scanned into the patient's chart at a later time.      This report has been interpreted and electronically signed by      CT head wo IV contrast 09/11/2024    Narrative  Interpreted By:  Beatriz Carreon,  STUDY:  CT HEAD WO IV CONTRAST;  9/11/2024 1:09 pm    INDICATION:  Signs/Symptoms:change in mental status.      COMPARISON:  08/12/2024    ACCESSION NUMBER(S):  [id number]    ORDERING CLINICIAN:  JEB MCCOY    TECHNIQUE:  Noncontrast axial CT scan of head was performed. Angled reformats in  brain and bone windows were generated. The images were reviewed in  bone, brain, blood and soft tissue windows.    FINDINGS:  The ventricles, cisterns and sulci are prominent, consistent with  moderate to severe diffuse volume loss. There are areas of  nonspecific white matter hypodensity, which are probably age-related  or microvascular in  nature.    Gray-white differentiation is intact and there is no evidence of  acute cortical infarct. Bilateral remote lacunar infarcts. The no  mass, mass effect or midline shift is seen. There is no evidence of  hemorrhage.    The visualized paranasal sinuses are clear.    Impression  No evidence of acute cortical infarct or intracranial hemorrhage.    Chronic changes as described    MACRO:  None    Signed by: Beatriz Carreon 9/11/2024 1:34 PM  Dictation workstation:   [id number]      [place] Coma Scale  Best Eye Response: To pain  Best Verbal Response: None  Best Motor Response: Withdraws to pain  [place] Coma Scale Score: 7        Assessment/Plan   This patient currently has cardiac telemetry ordered; if you would like to modify or discontinue the telemetry order, click here to go to the orders activity to modify/discontinue the order.  Principal Problem:    Altered mental status, unspecified altered mental status type      IMPRESSION:  Multiple posterior acute infarcts  Focal basilar thrombus vs occlusion  Chronic right vertebral occlusion  Altered mental status  Prior R anterior thalamic/R IC infarct 6/2024  Reported seizure like activity    Discussed MRI findings with son via telephone. With acute ischemic burden, would defer current anticoagulation for ? Basilar thrombus due to potential bleeding risk of temporal involvement. Based on the location of infarcts, likely will have some degree of visual difficulty bilaterally and possible extremity weakness bilaterally. Potential worsened swelling to pontine involvement may also worsen drooling.     RECOMMENDATIONS:  Continue supportive care  Planning to transition to Hospice care.     Discussed with son via telephone. All questions answered.   Will sign off from neurology at this time. Please reach out if further input needed.        I personally spent 75 minutes today, exclusive of procedures, providing care for this patient, including preparation, face to face  time, documentation and other services such as review of medical records, diagnostic result, patient education, counseling, coordination of care as specified in the encounter.    Angela Garcia, APRN-CNP

## 2024-09-14 PROCEDURE — 2500000005 HC RX 250 GENERAL PHARMACY W/O HCPCS: Performed by: INTERNAL MEDICINE

## 2024-09-14 PROCEDURE — 2500000004 HC RX 250 GENERAL PHARMACY W/ HCPCS (ALT 636 FOR OP/ED): Performed by: INTERNAL MEDICINE

## 2024-09-14 PROCEDURE — 99232 SBSQ HOSP IP/OBS MODERATE 35: CPT | Performed by: INTERNAL MEDICINE

## 2024-09-14 PROCEDURE — 1250000001 HC HOSPICE SEMI-PRIVATE ROOM DAILY

## 2024-09-14 RX ADMIN — HYDROMORPHONE HYDROCHLORIDE 0.4 MG: 1 INJECTION, SOLUTION INTRAMUSCULAR; INTRAVENOUS; SUBCUTANEOUS at 15:13

## 2024-09-14 RX ADMIN — HYDROMORPHONE HYDROCHLORIDE 0.2 MG: 0.2 INJECTION, SOLUTION INTRAMUSCULAR; INTRAVENOUS; SUBCUTANEOUS at 10:39

## 2024-09-14 RX ADMIN — GLYCOPYRROLATE 0.2 MG: 0.2 INJECTION, SOLUTION INTRAMUSCULAR; INTRAVENOUS at 04:05

## 2024-09-14 RX ADMIN — Medication 2 L/MIN: at 21:04

## 2024-09-14 RX ADMIN — LORAZEPAM 0.5 MG: 2 INJECTION INTRAMUSCULAR; INTRAVENOUS at 12:47

## 2024-09-14 RX ADMIN — HYDROMORPHONE HYDROCHLORIDE 0.4 MG: 1 INJECTION, SOLUTION INTRAMUSCULAR; INTRAVENOUS; SUBCUTANEOUS at 01:49

## 2024-09-14 RX ADMIN — LORAZEPAM 0.5 MG: 2 INJECTION INTRAMUSCULAR; INTRAVENOUS at 04:05

## 2024-09-14 RX ADMIN — GLYCOPYRROLATE 0.2 MG: 0.2 INJECTION, SOLUTION INTRAMUSCULAR; INTRAVENOUS at 21:00

## 2024-09-14 RX ADMIN — HYDROMORPHONE HYDROCHLORIDE 0.4 MG: 1 INJECTION, SOLUTION INTRAMUSCULAR; INTRAVENOUS; SUBCUTANEOUS at 06:20

## 2024-09-14 RX ADMIN — GLYCOPYRROLATE 0.2 MG: 0.2 INJECTION, SOLUTION INTRAMUSCULAR; INTRAVENOUS at 10:39

## 2024-09-14 RX ADMIN — HYDROMORPHONE HYDROCHLORIDE 0.4 MG: 1 INJECTION, SOLUTION INTRAMUSCULAR; INTRAVENOUS; SUBCUTANEOUS at 08:00

## 2024-09-14 RX ADMIN — HYDROMORPHONE HYDROCHLORIDE 0.2 MG: 0.2 INJECTION, SOLUTION INTRAMUSCULAR; INTRAVENOUS; SUBCUTANEOUS at 21:00

## 2024-09-14 RX ADMIN — HYDROMORPHONE HYDROCHLORIDE 0.4 MG: 1 INJECTION, SOLUTION INTRAMUSCULAR; INTRAVENOUS; SUBCUTANEOUS at 04:05

## 2024-09-14 ASSESSMENT — PAIN SCALES - PAIN ASSESSMENT IN ADVANCED DEMENTIA (PAINAD)
BREATHING: OCCASIONAL LABORED BREATHING, SHORT PERIOD OF HYPERVENTILATION
CONSOLABILITY: DISTRACTED OR REASSURED BY VOICE/TOUCH
BODYLANGUAGE: TENSE, DISTRESSED PACING, FIDGETING
BODYLANGUAGE: RELAXED
TOTALSCORE: 2
TOTALSCORE: 4
TOTALSCORE: MEDICATION (SEE MAR)
BREATHING: OCCASIONAL LABORED BREATHING, SHORT PERIOD OF HYPERVENTILATION
BREATHING: OCCASIONAL LABORED BREATHING, SHORT PERIOD OF HYPERVENTILATION
TOTALSCORE: MEDICATION (SEE MAR)
BODYLANGUAGE: RELAXED
CONSOLABILITY: UNABLE TO CONSOLE, DISTRACT OR REASSURE
BREATHING: OCCASIONAL LABORED BREATHING, SHORT PERIOD OF HYPERVENTILATION
NEGVOCALIZATION: OCCASIONAL MOAN/GROAN, LOW SPEECH, NEGATIVE/DISAPPROVING QUALITY
BODYLANGUAGE: TENSE, DISTRESSED PACING, FIDGETING
BODYLANGUAGE: TENSE, DISTRESSED PACING, FIDGETING
TOTALSCORE: 3
NEGVOCALIZATION: OCCASIONAL MOAN/GROAN, LOW SPEECH, NEGATIVE/DISAPPROVING QUALITY
FACIALEXPRESSION: SMILING OR INEXPRESSIVE
TOTALSCORE: 4
FACIALEXPRESSION: SMILING OR INEXPRESSIVE
TOTALSCORE: 1
BODYLANGUAGE: RELAXED
CONSOLABILITY: NO NEED TO CONSOLE
NEGVOCALIZATION: OCCASIONAL MOAN/GROAN, LOW SPEECH, NEGATIVE/DISAPPROVING QUALITY
NEGVOCALIZATION: OCCASIONAL MOAN/GROAN, LOW SPEECH, NEGATIVE/DISAPPROVING QUALITY
FACIALEXPRESSION: SMILING OR INEXPRESSIVE
BREATHING: OCCASIONAL LABORED BREATHING, SHORT PERIOD OF HYPERVENTILATION
TOTALSCORE: MEDICATION (SEE MAR)
FACIALEXPRESSION: SMILING OR INEXPRESSIVE
NEGVOCALIZATION: OCCASIONAL MOAN/GROAN, LOW SPEECH, NEGATIVE/DISAPPROVING QUALITY
NEGVOCALIZATION: OCCASIONAL MOAN/GROAN, LOW SPEECH, NEGATIVE/DISAPPROVING QUALITY
FACIALEXPRESSION: SMILING OR INEXPRESSIVE
BREATHING: OCCASIONAL LABORED BREATHING, SHORT PERIOD OF HYPERVENTILATION
CONSOLABILITY: DISTRACTED OR REASSURED BY VOICE/TOUCH
TOTALSCORE: MEDICATION (SEE MAR)
CONSOLABILITY: NO NEED TO CONSOLE
CONSOLABILITY: DISTRACTED OR REASSURED BY VOICE/TOUCH
FACIALEXPRESSION: SMILING OR INEXPRESSIVE

## 2024-09-14 ASSESSMENT — COGNITIVE AND FUNCTIONAL STATUS - GENERAL
WALKING IN HOSPITAL ROOM: TOTAL
MOVING TO AND FROM BED TO CHAIR: TOTAL
DRESSING REGULAR LOWER BODY CLOTHING: TOTAL
HELP NEEDED FOR BATHING: TOTAL
EATING MEALS: TOTAL
STANDING UP FROM CHAIR USING ARMS: TOTAL
DRESSING REGULAR UPPER BODY CLOTHING: TOTAL
PERSONAL GROOMING: TOTAL
TOILETING: TOTAL
MOVING FROM LYING ON BACK TO SITTING ON SIDE OF FLAT BED WITH BEDRAILS: TOTAL
DAILY ACTIVITIY SCORE: 6
MOBILITY SCORE: 6
CLIMB 3 TO 5 STEPS WITH RAILING: TOTAL
TURNING FROM BACK TO SIDE WHILE IN FLAT BAD: TOTAL

## 2024-09-14 NOTE — CARE PLAN
The patient's goals for the shift include      The clinical goals for the shift include patient's comfort needs will remain met this shift    Problem: Pain  Goal: Performs ADL's with improved pain control throughout shift  9/14/2024 0236 by Nahed Gutierrez RN  Outcome: Not Progressing  9/14/2024 0236 by Nahed Gutierrez RN  Reactivated  Goal: Participates in PT with improved pain control throughout the shift  9/14/2024 0236 by Nahed Gutierrez RN  Outcome: Not Progressing  9/14/2024 0236 by Nahed Gutierrez RN  Reactivated     Problem: Skin  Goal: Decreased wound size/increased tissue granulation at next dressing change  Outcome: Not Progressing  Goal: Participates in plan/prevention/treatment measures  Outcome: Not Progressing  Goal: Promote/optimize nutrition  Outcome: Not Progressing

## 2024-09-14 NOTE — PROGRESS NOTES
"Abdi Wilson is a 79 y.o. male on day 1 of admission presenting with No Principal Problem: There is no principal problem currently on the Problem List. Please update the Problem List and refresh..      Subjective      Abdi Wilson is a 79 y.o. male with PMHx of stroke and dementia who presented from [facility] with altered mental status. Patient is unresponsive other than to painful stimuli and history was obtained from paramedics and his son. His son said this morning the pt was shaking and \"out of it\", gazing off into the distance (he is nonverbal at baseline). Since his stroke the pt was observed at least twice to be disengaged in this manner but the tremors are new today. He had a prolonged seizure according to bystanders. He was evidently hypertensive during the episode of seizure also. In the ED CT head showed no evidence of acute intracranial lesion. BP was controlled. Labwork was notable for , lactate 2.4 > 2,5, WBC 14.8k. CXR revealed left basilar airspace disease and left effusion. He was hypoxic and placed on 2 lpm O2 but is on RA during my exam with O2 sats 94%. He has had no evidence of seizure activity here . DNR CCA/DNI was reconfirmed with his son.      Of note, the pt suffered a stroke in June and underwent PEG placement 8/8/24 (prior to the PEG he pulled out his NGT 3 times). On 8/10/24 he was admitted for peritonitis and underwent emergent replacement of the gastrostomy tube.  On 8/19/24 he underwent drain placement for a large casandra-splenic abscess. He was discharged on 8/23/24 with ciprofloxacin and Flagyl until 9/4/2024. He was admitted 8/27/24 for abdominal wall abscess and underwent debridement. A midline was placed and he was discharged on IV cefepime for 7 days.   Remainder of ROS reviewed and negative except as indicated in HPI.     09/12: Patient was evaluated this morning.  Family member at bedside.  Patient remains nonverbal, unaware of regarding, lethargic.    9/13: Patient " remains lethargic.  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  Unable to start on anticoagulation as per neurology.  Discussed with patient's POA (son).  Does not want any aggressive management and considering hospice.    9/14: Patient now admitted [facility] will maintain comfort measures only.    Objective     Last Recorded Vitals  /72 (BP Location: Left arm, Patient Position: Lying)   Pulse (!) 120   Temp 37.3 °C (99.2 °F) (Temporal)   Resp 20   SpO2 90%   Intake/Output last 3 Shifts:    Intake/Output Summary (Last 24 hours) at 9/14/2024 1226  Last data filed at 9/14/2024 0855  Gross per 24 hour   Intake 0 ml   Output 270 ml   Net -270 ml       Admission Weight       Daily Weight  09/13/24 : 86.1 kg (189 lb 13.1 oz)    Image Results  MR brain w and wo IV contrast, MR angio head wo IV contrast, MR angio neck wo IV contrast  Narrative: Interpreted By:  Rojas Marte,   STUDY:  MR BRAIN W AND WO IV CONTRAST; MR ANGIO NECK WO IV CONTRAST; MR ANGIO  HEAD WO IV CONTRAST;  9/12/2024 7:39 pm      INDICATION:  Signs/Symptoms:altered mental status; Signs/Symptoms:Tia;  Signs/Symptoms:Ams.      COMPARISON:      CT brain dated 09/11/2024..      ACCESSION NUMBER(S):  [id number]; [id number]; [id number]      ORDERING CLINICIAN:  NICOLE FINK      TECHNIQUE:  Axial T2, FLAIR, DWI, gradient echo T2 and axial T1 weighted images  of brain were acquired.  Post-contrast brain MRI was obtained after  administration of 16 mL Dotarem intravenous contrast. Noncontrast  time-of-flight MR angiogram of the wbmkdi-[id number]-[id number] vessels was  obtained with MIP reformats. Noncontrast time of flight MR angiogram  of the neck vessels was obtained with MIP reformats.      FINDINGS:  Brain MRI:  There are new extensive acute to subacute infarcts involving the  bilateral PCA territories including scattered small infarcts in the  right greater than left thalamus and  confluence infarcts in the  bilateral posteromedial temporal and occipital lobe slightly greater  on the right.. There are also acute to early subacute infarcts in the  bilateral right greater than left cerebellar hemispheres and  bilateral daphney and midbrain. There is suspected involvement of the  left cortical spinal tract in the brainstem and also involvement of  the left facial nucleus. There is no evidence of hemorrhagic  conversion. There are a few unchanged chronic microhemorrhages in the  cerebellar vermis and there is also unchanged chronic hemorrhagic  infarct in the left basal ganglia and to lesser extent in the right  basal ganglia. No hydrocephalus. Generalized cerebral volume loss and  associated ventricular and sulcal enlargement. [place]  periventricular deep white matter FLAIR hyperintensities suggestive  of at least moderate chronic microvascular ischemic change. No  abnormal intracranial enhancement.      There is no mass lesion and no midline shift. No herniation.      Paranasal Sinuses and Mastoids: Visualized paranasal sinuses are well  aerated. The mastoid air cells are clear. The orbits are grossly  normal.      MRA of the brain:  Anterior circulation: The intracranial portions of the internal  carotid, middle cerebral, and anterior cerebral arteries are patent,  without significant focal stenosis. There is a normal anterior  communicating artery.      Posterior circulation: There is unchanged chronic occlusion of the  right vertebral artery. There is focal short-segment thrombus or  occlusion of the mid basilar artery measuring aproximally 3 mm  craniocaudally. The left vertebral artery and the remainder of the  basilar artery and proximal posterior cerebral arteries are widely  patent.          MRA of the neck:  Common carotid arteries: Patent  Left internal carotid: No significant stenosis.  Right internal carotid: No significant stenosis.  Cervical vertebral arteries: Chronic occlusion  of the right vertebral  artery. Left vertebral artery is patent.      Impression: Extensive bilateral PCA territory infarcts with small scattered foci  in the right greater than left thalamus and confluent right greater  than left posteromedial temporal lobe and occipital lobe infarcts.  Additionally right greater than left cerebellar infarcts and  scattered bilateral pontine and midbrain infarcts. No hemorrhagic  conversion. No herniation or hydrocephalus.      Focal short-segment thrombus or occlusion of the mid basilar artery  with the remainder of the basilar and proximal posterior cerebral  artery is widely patent. 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      Rojas Marte discussed the significance and urgency of this critical  finding by tEpic with  NICOLE FINK on 9/12/2024 at 8:23 pm.  (**-RCF-**) Findings:  See findings.          Signed by: Rojas Marte 9/12/2024 8:33 PM  Dictation workstation:   [id number]  EEG  IMPRESSION    Impression  This routine EEG is indicative of a severe diffuse encephalopathy. No epileptiform activity or lateralizing signs are recorded.    A full report will be scanned into the patient's chart at a later time.    This report has been interpreted and electronically signed by      Physical Exam  Patient is lethargic, nonverbal very moist cough  Eyes: PERRLA, no conjunctival congestion  Chest: Bilateral Air entry, no crackles or wheezing  Heart: s1S2 regular, no murmur  Abdomen: Soft, non tender, BS present, anterior abdominal wall wound was sutured, wound gaping present.  PEG tube in place.  Ext:      Relevant Results             Scheduled medications  oxygen, , inhalation, Continuous - Inhalation      Continuous medications     PRN medications  PRN medications: acetaminophen, atropine, bisacodyl, glycopyrrolate, HYDROmorphone, HYDROmorphone, ipratropium-albuteroL, LORazepam, LORazepam, ondansetron    Assessment/Plan      Extensive bilateral PCA territory  infarction  Bilateral basal artery occlusion  Recent stroke in June leading to severe dysphagia  Hypertension  Leukocytosis  Recent multiple admissions for abdominal infections  Significant abdominal wound  End-of-life care    [facility] hospice/ [facility]  DNR comfort care only  Comfort medications    Ezekiel Cintron MD

## 2024-09-14 NOTE — CARE PLAN
Problem: Safety - Adult  Goal: Free from fall injury  Outcome: Progressing     Problem: Skin  Goal: Prevent/manage excess moisture  Outcome: Progressing  Flowsheets (Taken 9/14/2024 0946)  Prevent/manage excess moisture: Cleanse incontinence/protect with barrier cream  Goal: Prevent/minimize sheer/friction injuries  Outcome: Progressing  Flowsheets (Taken 9/14/2024 0946)  Prevent/minimize sheer/friction injuries:   Turn/reposition every 2 hours/use positioning/transfer devices   HOB 30 degrees or less   Use pull sheet  Goal: Promote skin healing  Outcome: Progressing  Flowsheets (Taken 9/14/2024 0946)  Promote skin healing: Turn/reposition every 2 hours/use positioning/transfer devices       The clinical goals for the shift include patient will have all comfort needs mets throughout this shift.

## 2024-09-15 VITALS
RESPIRATION RATE: 22 BRPM | OXYGEN SATURATION: 85 % | SYSTOLIC BLOOD PRESSURE: 102 MMHG | HEART RATE: 113 BPM | TEMPERATURE: 98.4 F | DIASTOLIC BLOOD PRESSURE: 69 MMHG

## 2024-09-15 LAB
ATRIAL RATE: 92 BPM
BACTERIA BLD CULT: NORMAL
BACTERIA BLD CULT: NORMAL
BACTERIA SPEC CULT: ABNORMAL
BACTERIA SPEC CULT: ABNORMAL
GRAM STN SPEC: ABNORMAL
GRAM STN SPEC: ABNORMAL
P AXIS: 37 DEGREES
PR INTERVAL: 245 MS
Q ONSET: 251 MS
QRS COUNT: 15 BEATS
QRS DURATION: 114 MS
QT INTERVAL: 363 MS
QTC CALCULATION(BAZETT): 450 MS
QTC FREDERICIA: 418 MS
R AXIS: -23 DEGREES
T AXIS: 134 DEGREES
T OFFSET: 432 MS
VENTRICULAR RATE: 92 BPM

## 2024-09-15 PROCEDURE — 99232 SBSQ HOSP IP/OBS MODERATE 35: CPT | Performed by: INTERNAL MEDICINE

## 2024-09-15 PROCEDURE — 2500000005 HC RX 250 GENERAL PHARMACY W/O HCPCS: Performed by: INTERNAL MEDICINE

## 2024-09-15 PROCEDURE — 2500000004 HC RX 250 GENERAL PHARMACY W/ HCPCS (ALT 636 FOR OP/ED): Performed by: INTERNAL MEDICINE

## 2024-09-15 PROCEDURE — 1250000001 HC HOSPICE SEMI-PRIVATE ROOM DAILY

## 2024-09-15 RX ORDER — HYDROMORPHONE HYDROCHLORIDE 0.2 MG/ML
0.2 INJECTION INTRAMUSCULAR; INTRAVENOUS; SUBCUTANEOUS EVERY 6 HOURS
Status: DISCONTINUED | OUTPATIENT
Start: 2024-09-15 | End: 2024-09-17

## 2024-09-15 RX ADMIN — HYDROMORPHONE HYDROCHLORIDE 0.2 MG: 0.2 INJECTION, SOLUTION INTRAMUSCULAR; INTRAVENOUS; SUBCUTANEOUS at 17:38

## 2024-09-15 RX ADMIN — GLYCOPYRROLATE 0.2 MG: 0.2 INJECTION, SOLUTION INTRAMUSCULAR; INTRAVENOUS at 05:40

## 2024-09-15 RX ADMIN — HYDROMORPHONE HYDROCHLORIDE 0.2 MG: 0.2 INJECTION, SOLUTION INTRAMUSCULAR; INTRAVENOUS; SUBCUTANEOUS at 05:40

## 2024-09-15 RX ADMIN — Medication 2 L/MIN: at 20:22

## 2024-09-15 RX ADMIN — LORAZEPAM 0.5 MG: 2 INJECTION INTRAMUSCULAR; INTRAVENOUS at 00:51

## 2024-09-15 RX ADMIN — HYDROMORPHONE HYDROCHLORIDE 0.2 MG: 0.2 INJECTION, SOLUTION INTRAMUSCULAR; INTRAVENOUS; SUBCUTANEOUS at 20:19

## 2024-09-15 ASSESSMENT — COGNITIVE AND FUNCTIONAL STATUS - GENERAL
DRESSING REGULAR LOWER BODY CLOTHING: TOTAL
WALKING IN HOSPITAL ROOM: TOTAL
DAILY ACTIVITIY SCORE: 6
HELP NEEDED FOR BATHING: TOTAL
CLIMB 3 TO 5 STEPS WITH RAILING: TOTAL
DRESSING REGULAR LOWER BODY CLOTHING: TOTAL
STANDING UP FROM CHAIR USING ARMS: TOTAL
MOVING FROM LYING ON BACK TO SITTING ON SIDE OF FLAT BED WITH BEDRAILS: TOTAL
EATING MEALS: TOTAL
PERSONAL GROOMING: TOTAL
MOVING TO AND FROM BED TO CHAIR: TOTAL
MOVING FROM LYING ON BACK TO SITTING ON SIDE OF FLAT BED WITH BEDRAILS: TOTAL
MOBILITY SCORE: 6
CLIMB 3 TO 5 STEPS WITH RAILING: TOTAL
STANDING UP FROM CHAIR USING ARMS: TOTAL
TOILETING: TOTAL
TURNING FROM BACK TO SIDE WHILE IN FLAT BAD: TOTAL
WALKING IN HOSPITAL ROOM: TOTAL
EATING MEALS: TOTAL
PERSONAL GROOMING: TOTAL
TOILETING: TOTAL
DRESSING REGULAR UPPER BODY CLOTHING: TOTAL
DRESSING REGULAR UPPER BODY CLOTHING: TOTAL
TURNING FROM BACK TO SIDE WHILE IN FLAT BAD: TOTAL
HELP NEEDED FOR BATHING: TOTAL
DAILY ACTIVITIY SCORE: 6
MOBILITY SCORE: 6
MOVING TO AND FROM BED TO CHAIR: TOTAL

## 2024-09-15 ASSESSMENT — PAIN SCALES - PAIN ASSESSMENT IN ADVANCED DEMENTIA (PAINAD)
CONSOLABILITY: NO NEED TO CONSOLE
BREATHING: OCCASIONAL LABORED BREATHING, SHORT PERIOD OF HYPERVENTILATION
NEGVOCALIZATION: OCCASIONAL MOAN/GROAN, LOW SPEECH, NEGATIVE/DISAPPROVING QUALITY
BODYLANGUAGE: RELAXED
CONSOLABILITY: NO NEED TO CONSOLE
BODYLANGUAGE: RELAXED
FACIALEXPRESSION: SMILING OR INEXPRESSIVE
FACIALEXPRESSION: SMILING OR INEXPRESSIVE
TOTALSCORE: 1
BREATHING: OCCASIONAL LABORED BREATHING, SHORT PERIOD OF HYPERVENTILATION
FACIALEXPRESSION: SMILING OR INEXPRESSIVE
CONSOLABILITY: NO NEED TO CONSOLE
BREATHING: OCCASIONAL LABORED BREATHING, SHORT PERIOD OF HYPERVENTILATION
BODYLANGUAGE: RELAXED
TOTALSCORE: 2
TOTALSCORE: MEDICATION (SEE MAR)
TOTALSCORE: 1

## 2024-09-15 NOTE — CARE PLAN
Problem: Pain - Adult  Goal: Verbalizes/displays adequate comfort level or baseline comfort level  9/15/2024 0934 by Diana Hernandez RN  Outcome: Progressing  9/15/2024 0933 by Diana Hernandez RN  Outcome: Progressing     Problem: Safety - Adult  Goal: Free from fall injury  9/15/2024 0934 by Diana Hernandez RN  Outcome: Progressing  9/15/2024 0933 by Diana Hernandez RN  Outcome: Progressing     Problem: Skin  Goal: Prevent/manage excess moisture  9/15/2024 0934 by Diana Hernandez RN  Flowsheets (Taken 9/15/2024 0934)  Prevent/manage excess moisture:   Cleanse incontinence/protect with barrier cream   Follow provider orders for dressing changes  9/15/2024 0933 by Diana Hernandez RN  Outcome: Progressing  Goal: Prevent/minimize sheer/friction injuries  9/15/2024 0934 by Diana Hernandez RN  Flowsheets (Taken 9/15/2024 0934)  Prevent/minimize sheer/friction injuries:   HOB 30 degrees or less   Turn/reposition every 2 hours/use positioning/transfer devices   Use pull sheet  9/15/2024 0933 by Diana Hernandez RN  Outcome: Progressing  Goal: Promote skin healing  Flowsheets (Taken 9/15/2024 0934)  Promote skin healing: Turn/reposition every 2 hours/use positioning/transfer devices     The clinical goals for the shift include patient will have all comfort needs met throughout this shift.

## 2024-09-15 NOTE — PROGRESS NOTES
"Abdi Wilson is a 79 y.o. male on day 2 of admission presenting with No Principal Problem: There is no principal problem currently on the Problem List. Please update the Problem List and refresh..      Subjective      Abdi Wilson is a 79 y.o. male with PMHx of stroke and dementia who presented from [facility] with altered mental status. Patient is unresponsive other than to painful stimuli and history was obtained from paramedics and his son. His son said this morning the pt was shaking and \"out of it\", gazing off into the distance (he is nonverbal at baseline). Since his stroke the pt was observed at least twice to be disengaged in this manner but the tremors are new today. He had a prolonged seizure according to bystanders. He was evidently hypertensive during the episode of seizure also. In the ED CT head showed no evidence of acute intracranial lesion. BP was controlled. Labwork was notable for , lactate 2.4 > 2,5, WBC 14.8k. CXR revealed left basilar airspace disease and left effusion. He was hypoxic and placed on 2 lpm O2 but is on RA during my exam with O2 sats 94%. He has had no evidence of seizure activity here . DNR CCA/DNI was reconfirmed with his son.      Of note, the pt suffered a stroke in June and underwent PEG placement 8/8/24 (prior to the PEG he pulled out his NGT 3 times). On 8/10/24 he was admitted for peritonitis and underwent emergent replacement of the gastrostomy tube.  On 8/19/24 he underwent drain placement for a large casandra-splenic abscess. He was discharged on 8/23/24 with ciprofloxacin and Flagyl until 9/4/2024. He was admitted 8/27/24 for abdominal wall abscess and underwent debridement. A midline was placed and he was discharged on IV cefepime for 7 days.   Remainder of ROS reviewed and negative except as indicated in HPI.     09/12: Patient was evaluated this morning.  Family member at bedside.  Patient remains nonverbal, unaware of regarding, lethargic.    9/13: Patient " remains lethargic.  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  Unable to start on anticoagulation as per neurology.  Discussed with patient's POA (son).  Does not want any aggressive management and considering hospice.    9/14: Patient now admitted [facility] hospice will maintain comfort measures only.    9/15:.  Patient with some crackles decreased saturations.  Yesterday was having more air hunger and dyspnea this appears to be improved today.  Patient continues to require inpatient care to manage symptoms.  Continue [facility] hospice management.  Objective     Last Recorded Vitals  /69 (BP Location: Right arm, Patient Position: Lying)   Pulse (!) 113   Temp 36.9 °C (98.4 °F) (Temporal)   Resp 22   SpO2 (!) 85%   Intake/Output last 3 Shifts:  No intake or output data in the 24 hours ending 09/15/24 1005      Admission Weight       Daily Weight  09/13/24 : 86.1 kg (189 lb 13.1 oz)    Image Results  MR brain w and wo IV contrast, MR angio head wo IV contrast, MR angio neck wo IV contrast  Narrative: Interpreted By:  Rojas Marte,   STUDY:  MR BRAIN W AND WO IV CONTRAST; MR ANGIO NECK WO IV CONTRAST; MR ANGIO  HEAD WO IV CONTRAST;  9/12/2024 7:39 pm      INDICATION:  Signs/Symptoms:altered mental status; Signs/Symptoms:Tia;  Signs/Symptoms:Ams.      COMPARISON:      CT brain dated 09/11/2024..      ACCESSION NUMBER(S):  [id number]; [id number]; [id number]      ORDERING CLINICIAN:  NICOLE FINK      TECHNIQUE:  Axial T2, FLAIR, DWI, gradient echo T2 and axial T1 weighted images  of brain were acquired.  Post-contrast brain MRI was obtained after  administration of 16 mL Dotarem intravenous contrast. Noncontrast  time-of-flight MR angiogram of the clkwzx-fz-Qqrpkj vessels was  obtained with MIP reformats. Noncontrast time of flight MR angiogram  of the neck vessels was obtained with MIP reformats.      FINDINGS:  Brain MRI:  There are new extensive  acute to subacute infarcts involving the  bilateral PCA territories including scattered small infarcts in the  right greater than left thalamus and confluence infarcts in the  bilateral posteromedial temporal and occipital lobe slightly greater  on the right.. There are also acute to early subacute infarcts in the  bilateral right greater than left cerebellar hemispheres and  bilateral daphney and midbrain. There is suspected involvement of the  left cortical spinal tract in the brainstem and also involvement of  the left facial nucleus. There is no evidence of hemorrhagic  conversion. There are a few unchanged chronic microhemorrhages in the  cerebellar vermis and there is also unchanged chronic hemorrhagic  infarct in the left basal ganglia and to lesser extent in the right  basal ganglia. No hydrocephalus. Generalized cerebral volume loss and  associated ventricular and sulcal enlargement. [place]  periventricular deep white matter FLAIR hyperintensities suggestive  of at least moderate chronic microvascular ischemic change. No  abnormal intracranial enhancement.      There is no mass lesion and no midline shift. No herniation.      Paranasal Sinuses and Mastoids: Visualized paranasal sinuses are well  aerated. The mastoid air cells are clear. The orbits are grossly  normal.      MRA of the brain:  Anterior circulation: The intracranial portions of the internal  carotid, middle cerebral, and anterior cerebral arteries are patent,  without significant focal stenosis. There is a normal anterior  communicating artery.      Posterior circulation: There is unchanged chronic occlusion of the  right vertebral artery. There is focal short-segment thrombus or  occlusion of the mid basilar artery measuring aproximally 3 mm  craniocaudally. The left vertebral artery and the remainder of the  basilar artery and proximal posterior cerebral arteries are widely  patent.          MRA of the neck:  Common carotid arteries:  Patent  Left internal carotid: No significant stenosis.  Right internal carotid: No significant stenosis.  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 Left vertebral artery is patent.      Impression: Extensive bilateral PCA territory infarcts with small scattered foci  in the right greater than left thalamus and confluent right greater  than left posteromedial temporal lobe and occipital lobe infarcts.  Additionally right greater than left cerebellar infarcts and  scattered bilateral pontine and midbrain infarcts. No hemorrhagic  conversion. No herniation or hydrocephalus.      Focal short-segment thrombus or occlusion of the mid basilar artery  with the remainder of the basilar and proximal posterior cerebral  artery is widely patent. 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      Rojas Marte discussed the significance and urgency of this critical  finding by tEpic with  NICOLE FINK on 9/12/2024 at 8:23 pm.  (**-RCF-**) Findings:  See findings.          Signed by: Rojas Marte 9/12/2024 8:33 PM  Dictation workstation:   [id number]  EEG  IMPRESSION    Impression  This routine EEG is indicative of a severe diffuse encephalopathy. No epileptiform activity or lateralizing signs are recorded.    A full report will be scanned into the patient's chart at a later time.    This report has been interpreted and electronically signed by      Physical Exam  Patient is lethargic, nonverbal very moist cough  Eyes: PERRLA, no conjunctival congestion  Chest: Bilateral Air entry, no crackles or wheezing  Heart: s1S2 regular, no murmur  Abdomen: Soft, non tender, BS present, anterior abdominal wall wound was sutured, wound gaping present.  PEG tube in place.  Ext:      Relevant Results             Scheduled medications  oxygen, , inhalation, Continuous - Inhalation      Continuous medications     PRN medications  PRN medications: acetaminophen, atropine, bisacodyl,  glycopyrrolate, HYDROmorphone, HYDROmorphone, ipratropium-albuteroL, LORazepam, LORazepam, ondansetron    Assessment/Plan      Extensive bilateral PCA territory infarction  Bilateral basal artery occlusion  Recent stroke in June leading to severe dysphagia  Hypertension  Leukocytosis  Recent multiple admissions for abdominal infections  Significant abdominal wound  End-of-life care    [facility] hospice/ [facility]  DNR comfort care only  Comfort medications    Ezekiel Cintron MD

## 2024-09-16 LAB
BACTERIA BLD CULT: NORMAL
BACTERIA BLD CULT: NORMAL
BACTERIA SPEC CULT: ABNORMAL
BACTERIA SPEC CULT: ABNORMAL
GRAM STN SPEC: ABNORMAL
GRAM STN SPEC: ABNORMAL

## 2024-09-16 PROCEDURE — 2500000004 HC RX 250 GENERAL PHARMACY W/ HCPCS (ALT 636 FOR OP/ED): Performed by: INTERNAL MEDICINE

## 2024-09-16 PROCEDURE — 2500000001 HC RX 250 WO HCPCS SELF ADMINISTERED DRUGS (ALT 637 FOR MEDICARE OP): Performed by: INTERNAL MEDICINE

## 2024-09-16 PROCEDURE — 2500000005 HC RX 250 GENERAL PHARMACY W/O HCPCS: Performed by: INTERNAL MEDICINE

## 2024-09-16 PROCEDURE — 1250000001 HC HOSPICE SEMI-PRIVATE ROOM DAILY

## 2024-09-16 PROCEDURE — 99232 SBSQ HOSP IP/OBS MODERATE 35: CPT | Performed by: INTERNAL MEDICINE

## 2024-09-16 RX ORDER — LORAZEPAM 2 MG/ML
1.5 INJECTION INTRAMUSCULAR ONCE
Status: COMPLETED | OUTPATIENT
Start: 2024-09-16 | End: 2024-09-16

## 2024-09-16 RX ORDER — GLYCOPYRROLATE 0.2 MG/ML
0.4 INJECTION INTRAMUSCULAR; INTRAVENOUS ONCE
Status: COMPLETED | OUTPATIENT
Start: 2024-09-16 | End: 2024-09-16

## 2024-09-16 RX ADMIN — LORAZEPAM 1.5 MG: 2 INJECTION INTRAMUSCULAR; INTRAVENOUS at 09:55

## 2024-09-16 RX ADMIN — ATROPINE SULFATE 1 DROP: 10 SOLUTION/ DROPS OPHTHALMIC at 20:11

## 2024-09-16 RX ADMIN — Medication 2 L/MIN: at 08:12

## 2024-09-16 RX ADMIN — HYDROMORPHONE HYDROCHLORIDE 0.2 MG: 0.2 INJECTION, SOLUTION INTRAMUSCULAR; INTRAVENOUS; SUBCUTANEOUS at 02:31

## 2024-09-16 RX ADMIN — HYDROMORPHONE HYDROCHLORIDE 0.2 MG: 0.2 INJECTION, SOLUTION INTRAMUSCULAR; INTRAVENOUS; SUBCUTANEOUS at 14:01

## 2024-09-16 RX ADMIN — HYDROMORPHONE HYDROCHLORIDE 0.2 MG: 0.2 INJECTION, SOLUTION INTRAMUSCULAR; INTRAVENOUS; SUBCUTANEOUS at 20:01

## 2024-09-16 RX ADMIN — HYDROMORPHONE HYDROCHLORIDE 0.2 MG: 0.2 INJECTION, SOLUTION INTRAMUSCULAR; INTRAVENOUS; SUBCUTANEOUS at 08:09

## 2024-09-16 RX ADMIN — GLYCOPYRROLATE 0.4 MG: 0.2 INJECTION, SOLUTION INTRAMUSCULAR; INTRAVENOUS at 09:55

## 2024-09-16 RX ADMIN — GLYCOPYRROLATE 0.2 MG: 0.2 INJECTION, SOLUTION INTRAMUSCULAR; INTRAVENOUS at 20:01

## 2024-09-16 RX ADMIN — HYDROMORPHONE HYDROCHLORIDE 0.2 MG: 0.2 INJECTION, SOLUTION INTRAMUSCULAR; INTRAVENOUS; SUBCUTANEOUS at 22:18

## 2024-09-16 ASSESSMENT — PAIN SCALES - PAIN ASSESSMENT IN ADVANCED DEMENTIA (PAINAD)
FACIALEXPRESSION: SAD, FRIGHTENED, FROWN
BODYLANGUAGE: RELAXED
NEGVOCALIZATION: OCCASIONAL MOAN/GROAN, LOW SPEECH, NEGATIVE/DISAPPROVING QUALITY
TOTALSCORE: MEDICATION (SEE MAR)
TOTALSCORE: MEDICATION (SEE MAR)
TOTALSCORE: PATIENT APPEARS COMFORTABLE
CONSOLABILITY: NO NEED TO CONSOLE
BREATHING: OCCASIONAL LABORED BREATHING, SHORT PERIOD OF HYPERVENTILATION
TOTALSCORE: 3
CONSOLABILITY: NO NEED TO CONSOLE
BREATHING: OCCASIONAL LABORED BREATHING, SHORT PERIOD OF HYPERVENTILATION
TOTALSCORE: 3
NEGVOCALIZATION: OCCASIONAL MOAN/GROAN, LOW SPEECH, NEGATIVE/DISAPPROVING QUALITY
BODYLANGUAGE: RELAXED
FACIALEXPRESSION: SAD, FRIGHTENED, FROWN
TOTALSCORE: PATIENT APPEARS COMFORTABLE

## 2024-09-16 ASSESSMENT — COGNITIVE AND FUNCTIONAL STATUS - GENERAL
DRESSING REGULAR LOWER BODY CLOTHING: TOTAL
PERSONAL GROOMING: TOTAL
WALKING IN HOSPITAL ROOM: TOTAL
DAILY ACTIVITIY SCORE: 6
DAILY ACTIVITIY SCORE: 6
MOVING FROM LYING ON BACK TO SITTING ON SIDE OF FLAT BED WITH BEDRAILS: TOTAL
STANDING UP FROM CHAIR USING ARMS: TOTAL
MOVING FROM LYING ON BACK TO SITTING ON SIDE OF FLAT BED WITH BEDRAILS: TOTAL
TURNING FROM BACK TO SIDE WHILE IN FLAT BAD: TOTAL
MOVING TO AND FROM BED TO CHAIR: TOTAL
EATING MEALS: TOTAL
STANDING UP FROM CHAIR USING ARMS: TOTAL
CLIMB 3 TO 5 STEPS WITH RAILING: TOTAL
DRESSING REGULAR LOWER BODY CLOTHING: TOTAL
HELP NEEDED FOR BATHING: TOTAL
MOBILITY SCORE: 6
PERSONAL GROOMING: TOTAL
TOILETING: TOTAL
HELP NEEDED FOR BATHING: TOTAL
EATING MEALS: TOTAL
TOILETING: TOTAL
CLIMB 3 TO 5 STEPS WITH RAILING: TOTAL
WALKING IN HOSPITAL ROOM: TOTAL
DRESSING REGULAR UPPER BODY CLOTHING: TOTAL
DRESSING REGULAR UPPER BODY CLOTHING: TOTAL
TURNING FROM BACK TO SIDE WHILE IN FLAT BAD: TOTAL
MOBILITY SCORE: 6
MOVING TO AND FROM BED TO CHAIR: TOTAL

## 2024-09-16 ASSESSMENT — PAIN SCALES - GENERAL: PAINLEVEL_OUTOF10: 0 - NO PAIN

## 2024-09-16 NOTE — PROGRESS NOTES
SW met with pt's son Rafi at bedside, introduced self and role. Rafi stated he had spoken with [facility] in [place] and they would have private room for pt tomorrow. Rafi was agreeable to SW placing a LTC referral to the facility. SW requested [facility] send referral and updated [facility] RN Marleny regarding bed availability for facility.     Vince Fitch, MSW, LSW ([id number])   Care Transitions

## 2024-09-16 NOTE — NURSING NOTE
RN Hospice Note    Abdi Wilson is a Hospice Patient.   Hospice terminal diagnosis: CVA   Physician: Saida  Visit type: Routine Visit    Comments/recommendations: Pt seen and assessed.  Currently on 2L O2, tachypneic at 34-36 breaths per minute, very shallow; crackles noted on auscultation.  SpO2 89%.  Does not appear in distress but breathing is rapid and shallow.  No s/sx of pain/agitation.  VS otherwise stable (, T 98.1, /81).  Noted pt had frequent administration of dilaudid yesterday, but very few doses given today.  Per bedside nurse the 0.2mg IV dilaudid has actually seemed more effective than the 0.4mg dose.  Recommended to covering hospitalist, Dr. Doss, to add scheduled 0.2mg IV dilaudid q6h, maintain current PRN dosing, for more consistent amount of medication to help keep respirations more deep and even, hopefully will better maintain pt respiratory comfort with greater consistency in dosing.  Reviewed with sonRafi, via phone, and expressed understanding and agreement with recommendation; states he will be back at the hospital in the morning, no other questions or concerns at this time.  Bedside nurse, Monet, will give a PRN dose now to help with pt tachypnea and aware to continue to assess; also aware Dr. Doss will place order for scheduled dilaudid tonight.  No other questions at this time.    Discharge Planning:  Patient to be discharged to  D    Plan of care reviewed with patient/family members Rafi Wilsoneden   Plan of care reviewed with hospital staff members: Dr. Doss, Monet Moreland, RN     Please notify [facility] of any changes in condition. Thank you.  Office: [phone] (8 am-6:30 pm M-F and 8 am-4:30 pm weekends and holidays)   [phone] (6:30 pm-8 am M-F and 4:30 pm-8 am weekends and holidays)    Nena Barragan, RN

## 2024-09-16 NOTE — CARE PLAN
The patient's goals for the shift include      The clinical goals for the shift include patient will have all comfort needs met throughout this shift.

## 2024-09-16 NOTE — PROGRESS NOTES
"Abdi Wilson is a 79 y.o. male on day 3 of admission presenting with No Principal Problem: There is no principal problem currently on the Problem List. Please update the Problem List and refresh..      Subjective      Abdi Wilson is a 79 y.o. male with PMHx of stroke and dementia who presented from [facility] with altered mental status. Patient is unresponsive other than to painful stimuli and history was obtained from paramedics and his son. His son said this morning the pt was shaking and \"out of it\", gazing off into the distance (he is nonverbal at baseline). Since his stroke the pt was observed at least twice to be disengaged in this manner but the tremors are new today. He had a prolonged seizure according to bystanders. He was evidently hypertensive during the episode of seizure also. In the ED CT head showed no evidence of acute intracranial lesion. BP was controlled. Labwork was notable for , lactate 2.4 > 2,5, WBC 14.8k. CXR revealed left basilar airspace disease and left effusion. He was hypoxic and placed on 2 lpm O2 but is on RA during my exam with O2 sats 94%. He has had no evidence of seizure activity here . DNR CCA/DNI was reconfirmed with his son.      Of note, the pt suffered a stroke in June and underwent PEG placement 8/8/24 (prior to the PEG he pulled out his NGT 3 times). On 8/10/24 he was admitted for peritonitis and underwent emergent replacement of the gastrostomy tube.  On 8/19/24 he underwent drain placement for a large casandra-splenic abscess. He was discharged on 8/23/24 with ciprofloxacin and Flagyl until 9/4/2024. He was admitted 8/27/24 for abdominal wall abscess and underwent debridement. A midline was placed and he was discharged on IV cefepime for 7 days.   Remainder of ROS reviewed and negative except as indicated in HPI.     09/12: Patient was evaluated this morning.  Family member at bedside.  Patient remains nonverbal, unaware of regarding, lethargic.    9/13: Patient " remains lethargic.  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  Unable to start on anticoagulation as per neurology.  Discussed with patient's POA (son).  Does not want any aggressive management and considering hospice.    9/14: Patient now admitted [facility] hospice will maintain comfort measures only.    9/15:.  Patient with some crackles decreased saturations.  Yesterday was having more air hunger and dyspnea this appears to be improved today.  Patient continues to require inpatient care to manage symptoms.  Continue [facility] hospice management.    9/16: Patient seen this morning. Patient lethargic. Patient continues to require inpatient care. Continue to [facility] hospice management. Will continue to provide supportive care and monitor.   Some agonal breathing will add a dose of Robinul and Ativan extra this morning.  Objective     Last Recorded Vitals  /75 (BP Location: Right arm, Patient Position: Lying)   Pulse 108   Temp 36.2 °C (97.2 °F) (Temporal)   Resp 21   Wt 76.1 kg (167 lb 12.8 oz)   SpO2 90%   Intake/Output last 3 Shifts:    Intake/Output Summary (Last 24 hours) at 9/16/2024 0753  Last data filed at 9/16/2024 0546  Gross per 24 hour   Intake 0 ml   Output 750 ml   Net -750 ml         Admission Weight  Weight: 76.1 kg (167 lb 12.8 oz) (09/16/24 0546)    Daily Weight  09/16/24 : 76.1 kg (167 lb 12.8 oz)    Image Results  ECG 12 lead  Sinus rhythm  Ventricular premature complex  Prolonged UT interval  Incomplete left bundle branch block  LVH with secondary repolarization abnormality  Inferior infarct, old  Baseline wander in lead(s) V3    See ED provider note for full interpretation and clinical correlation  Confirmed by Alexandria Cullen ([id number]) on 9/15/2024 12:42:40 PM    MR brain w and wo IV contrast    Result Date: 9/12/2024  Extensive bilateral PCA territory infarcts with small scattered foci in the right greater than left thalamus and  confluent right greater than left posteromedial temporal lobe and occipital lobe infarcts. Additionally right greater than left cerebellar infarcts and scattered bilateral pontine and midbrain infarcts. No hemorrhagic conversion. No herniation or hydrocephalus.   Focal short-segment thrombus or occlusion of the mid basilar artery with the remainder of the basilar and proximal posterior cerebral artery is widely patent. 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   Rojas Marte discussed the significance and urgency of this critical finding by tEpic with  NICOLE FINK on 9/12/2024 at 8:23 pm. (**-RCF-**) Findings:  See findings.     Signed by: Rojas Marte 9/12/2024 8:33 PM Dictation workstation:   [id number]      Physical Exam    Constitutional: Patient is lethargic and not verbally responsive. NAD and non-toxic.    Head and Face: Face is symmetric. Head is grossly normal with no step-offs    Neck: Supple. Trachea midline. No JVD.    Lungs: Crackles auscultated bilaterally. Effort normal.     Cardiovascular: Normal heart rate and rhythm. No murmurs, gallops, or rubs.     Abdominal: Soft, non-tender, non-distended. Bowel sounds present in all four quadrants    Extremities: Radial and Tibialis posterior pulses 2+ bilaterally. No swelling or erythema.    Neurological: No focal neural deficits.     Dermatologic: Normal sun and age related skin changes. Skin is warm and dry. Pallor appreciated. No erythema or lesions noted.     Psychiatric/behavioral: Appropriate mood and affect.       Relevant Results     Scheduled medications  HYDROmorphone, 0.2 mg, intravenous, q6h  oxygen, , inhalation, Continuous - Inhalation      Continuous medications     PRN medications  PRN medications: acetaminophen, atropine, bisacodyl, glycopyrrolate, HYDROmorphone, HYDROmorphone, ipratropium-albuteroL, LORazepam, LORazepam, ondansetron    Assessment/Plan      Extensive bilateral PCA territory infarction  Bilateral basal  artery occlusion  Recent stroke in June leading to severe dysphagia  Hypertension  Leukocytosis  Recent multiple admissions for abdominal infections  Significant abdominal wound  End-of-life care    [facility] hospice/ [facility]  DNR comfort care only  Maintain adequate pain control  Comfort medications    ANDREIA CALDERON    Shared visit with student  Patient seen and examined  Note amended and addended  See my orders for complete plan

## 2024-09-17 PROCEDURE — 99233 SBSQ HOSP IP/OBS HIGH 50: CPT | Performed by: INTERNAL MEDICINE

## 2024-09-17 PROCEDURE — 2500000004 HC RX 250 GENERAL PHARMACY W/ HCPCS (ALT 636 FOR OP/ED): Performed by: INTERNAL MEDICINE

## 2024-09-17 PROCEDURE — 1250000001 HC HOSPICE SEMI-PRIVATE ROOM DAILY

## 2024-09-17 RX ORDER — GLYCOPYRROLATE 0.2 MG/ML
0.4 INJECTION INTRAMUSCULAR; INTRAVENOUS EVERY 4 HOURS PRN
Status: DISCONTINUED | OUTPATIENT
Start: 2024-09-17 | End: 2024-09-19 | Stop reason: HOSPADM

## 2024-09-17 RX ORDER — HYDROMORPHONE HYDROCHLORIDE 2 MG/ML
1.5 INJECTION, SOLUTION INTRAMUSCULAR; INTRAVENOUS; SUBCUTANEOUS EVERY 6 HOURS
Status: DISCONTINUED | OUTPATIENT
Start: 2024-09-17 | End: 2024-09-18

## 2024-09-17 RX ORDER — KETOROLAC TROMETHAMINE 30 MG/ML
15 INJECTION, SOLUTION INTRAMUSCULAR; INTRAVENOUS EVERY 6 HOURS PRN
Status: DISCONTINUED | OUTPATIENT
Start: 2024-09-17 | End: 2024-09-19 | Stop reason: HOSPADM

## 2024-09-17 RX ORDER — HYDROMORPHONE HYDROCHLORIDE 1 MG/ML
1 INJECTION, SOLUTION INTRAMUSCULAR; INTRAVENOUS; SUBCUTANEOUS
Status: DISCONTINUED | OUTPATIENT
Start: 2024-09-17 | End: 2024-09-19 | Stop reason: HOSPADM

## 2024-09-17 RX ORDER — LORAZEPAM 2 MG/ML
0.5 INJECTION INTRAMUSCULAR EVERY 4 HOURS
Status: DISCONTINUED | OUTPATIENT
Start: 2024-09-17 | End: 2024-09-18

## 2024-09-17 RX ORDER — LORAZEPAM 2 MG/ML
1 INJECTION INTRAMUSCULAR EVERY 10 MIN PRN
Status: DISCONTINUED | OUTPATIENT
Start: 2024-09-17 | End: 2024-09-18

## 2024-09-17 RX ADMIN — LORAZEPAM 0.5 MG: 2 INJECTION INTRAMUSCULAR; INTRAVENOUS at 16:06

## 2024-09-17 RX ADMIN — GLYCOPYRROLATE 0.2 MG: 0.2 INJECTION, SOLUTION INTRAMUSCULAR; INTRAVENOUS at 00:14

## 2024-09-17 RX ADMIN — HYDROMORPHONE HYDROCHLORIDE 0.4 MG: 0.5 INJECTION, SOLUTION INTRAMUSCULAR; INTRAVENOUS; SUBCUTANEOUS at 15:53

## 2024-09-17 RX ADMIN — GLYCOPYRROLATE 0.2 MG: 0.2 INJECTION, SOLUTION INTRAMUSCULAR; INTRAVENOUS at 05:18

## 2024-09-17 RX ADMIN — LORAZEPAM 0.5 MG: 2 INJECTION INTRAMUSCULAR; INTRAVENOUS at 20:07

## 2024-09-17 RX ADMIN — HYDROMORPHONE HYDROCHLORIDE 0.2 MG: 0.2 INJECTION, SOLUTION INTRAMUSCULAR; INTRAVENOUS; SUBCUTANEOUS at 08:09

## 2024-09-17 RX ADMIN — GLYCOPYRROLATE 0.4 MG: 0.2 INJECTION, SOLUTION INTRAMUSCULAR; INTRAVENOUS at 21:56

## 2024-09-17 RX ADMIN — HYDROMORPHONE HYDROCHLORIDE 0.2 MG: 0.2 INJECTION, SOLUTION INTRAMUSCULAR; INTRAVENOUS; SUBCUTANEOUS at 13:42

## 2024-09-17 RX ADMIN — HYDROMORPHONE HYDROCHLORIDE 0.2 MG: 0.2 INJECTION, SOLUTION INTRAMUSCULAR; INTRAVENOUS; SUBCUTANEOUS at 01:59

## 2024-09-17 RX ADMIN — KETOROLAC TROMETHAMINE 15 MG: 30 INJECTION, SOLUTION INTRAMUSCULAR at 16:07

## 2024-09-17 RX ADMIN — KETOROLAC TROMETHAMINE 15 MG: 30 INJECTION, SOLUTION INTRAMUSCULAR at 21:56

## 2024-09-17 RX ADMIN — HYDROMORPHONE HYDROCHLORIDE 1.5 MG: 2 INJECTION, SOLUTION INTRAMUSCULAR; INTRAVENOUS; SUBCUTANEOUS at 20:07

## 2024-09-17 RX ADMIN — HYDROMORPHONE HYDROCHLORIDE 0.4 MG: 1 INJECTION, SOLUTION INTRAMUSCULAR; INTRAVENOUS; SUBCUTANEOUS at 15:55

## 2024-09-17 RX ADMIN — HYDROMORPHONE HYDROCHLORIDE 0.2 MG: 0.2 INJECTION, SOLUTION INTRAMUSCULAR; INTRAVENOUS; SUBCUTANEOUS at 05:18

## 2024-09-17 RX ADMIN — HYDROMORPHONE HYDROCHLORIDE 0.2 MG: 0.2 INJECTION, SOLUTION INTRAMUSCULAR; INTRAVENOUS; SUBCUTANEOUS at 00:14

## 2024-09-17 RX ADMIN — GLYCOPYRROLATE 0.2 MG: 0.2 INJECTION, SOLUTION INTRAMUSCULAR; INTRAVENOUS at 13:40

## 2024-09-17 ASSESSMENT — PAIN SCALES - PAIN ASSESSMENT IN ADVANCED DEMENTIA (PAINAD)
FACIALEXPRESSION: SAD, FRIGHTENED, FROWN
TOTALSCORE: PATIENT APPEARS COMFORTABLE
BREATHING: OCCASIONAL LABORED BREATHING, SHORT PERIOD OF HYPERVENTILATION
CONSOLABILITY: NO NEED TO CONSOLE
TOTALSCORE: PATIENT APPEARS COMFORTABLE
TOTALSCORE: 3
TOTALSCORE: 3
CONSOLABILITY: NO NEED TO CONSOLE
NEGVOCALIZATION: OCCASIONAL MOAN/GROAN, LOW SPEECH, NEGATIVE/DISAPPROVING QUALITY
TOTALSCORE: MEDICATION (SEE MAR)
BREATHING: OCCASIONAL LABORED BREATHING, SHORT PERIOD OF HYPERVENTILATION
BODYLANGUAGE: RELAXED
NEGVOCALIZATION: OCCASIONAL MOAN/GROAN, LOW SPEECH, NEGATIVE/DISAPPROVING QUALITY
CONSOLABILITY: NO NEED TO CONSOLE
TOTALSCORE: 3
TOTALSCORE: PATIENT APPEARS COMFORTABLE
BREATHING: OCCASIONAL LABORED BREATHING, SHORT PERIOD OF HYPERVENTILATION
BODYLANGUAGE: RELAXED
BODYLANGUAGE: RELAXED
TOTALSCORE: MEDICATION (SEE MAR)
FACIALEXPRESSION: SAD, FRIGHTENED, FROWN
NEGVOCALIZATION: OCCASIONAL MOAN/GROAN, LOW SPEECH, NEGATIVE/DISAPPROVING QUALITY
FACIALEXPRESSION: SAD, FRIGHTENED, FROWN
NEGVOCALIZATION: OCCASIONAL MOAN/GROAN, LOW SPEECH, NEGATIVE/DISAPPROVING QUALITY
CONSOLABILITY: NO NEED TO CONSOLE
BODYLANGUAGE: RELAXED
TOTALSCORE: MEDICATION (SEE MAR)
TOTALSCORE: PATIENT APPEARS COMFORTABLE
TOTALSCORE: MEDICATION (SEE MAR)
FACIALEXPRESSION: SMILING OR INEXPRESSIVE
TOTALSCORE: 2
BREATHING: OCCASIONAL LABORED BREATHING, SHORT PERIOD OF HYPERVENTILATION

## 2024-09-17 ASSESSMENT — COGNITIVE AND FUNCTIONAL STATUS - GENERAL
MOBILITY SCORE: 6
MOVING FROM LYING ON BACK TO SITTING ON SIDE OF FLAT BED WITH BEDRAILS: TOTAL
STANDING UP FROM CHAIR USING ARMS: TOTAL
CLIMB 3 TO 5 STEPS WITH RAILING: TOTAL
PERSONAL GROOMING: TOTAL
MOVING TO AND FROM BED TO CHAIR: TOTAL
PERSONAL GROOMING: TOTAL
MOVING FROM LYING ON BACK TO SITTING ON SIDE OF FLAT BED WITH BEDRAILS: TOTAL
TOILETING: TOTAL
CLIMB 3 TO 5 STEPS WITH RAILING: TOTAL
TURNING FROM BACK TO SIDE WHILE IN FLAT BAD: TOTAL
EATING MEALS: TOTAL
STANDING UP FROM CHAIR USING ARMS: TOTAL
DAILY ACTIVITIY SCORE: 6
WALKING IN HOSPITAL ROOM: TOTAL
TURNING FROM BACK TO SIDE WHILE IN FLAT BAD: TOTAL
DAILY ACTIVITIY SCORE: 6
HELP NEEDED FOR BATHING: TOTAL
HELP NEEDED FOR BATHING: TOTAL
DRESSING REGULAR UPPER BODY CLOTHING: TOTAL
TOILETING: TOTAL
WALKING IN HOSPITAL ROOM: TOTAL
MOBILITY SCORE: 6
EATING MEALS: TOTAL
DRESSING REGULAR UPPER BODY CLOTHING: TOTAL
DRESSING REGULAR LOWER BODY CLOTHING: TOTAL
MOVING TO AND FROM BED TO CHAIR: TOTAL
DRESSING REGULAR LOWER BODY CLOTHING: TOTAL

## 2024-09-17 ASSESSMENT — PAIN SCALES - WONG BAKER: WONGBAKER_NUMERICALRESPONSE: HURTS LITTLE BIT

## 2024-09-17 NOTE — PROGRESS NOTES
Nutrition progress note:  Per policy, NPO/clear liquids x 5 days    PMHx of stroke and dementia who presented from [facility] with altered mental status     Patient under hospice care and NPO, nutrition services to sign off at this time.

## 2024-09-17 NOTE — CARE PLAN
The patient's goals for the shift include      The clinical goals for the shift include no falls or injury

## 2024-09-17 NOTE — PROGRESS NOTES
"Abdi Wilson is a 79 y.o. male on day 4 of admission presenting with No Principal Problem: There is no principal problem currently on the Problem List. Please update the Problem List and refresh..      Subjective      Abdi Wilson is a 79 y.o. male with PMHx of stroke and dementia who presented from [facility] with altered mental status. Patient is unresponsive other than to painful stimuli and history was obtained from paramedics and his son. His son said this morning the pt was shaking and \"out of it\", gazing off into the distance (he is nonverbal at baseline). Since his stroke the pt was observed at least twice to be disengaged in this manner but the tremors are new today. He had a prolonged seizure according to bystanders. He was evidently hypertensive during the episode of seizure also. In the ED CT head showed no evidence of acute intracranial lesion. BP was controlled. Labwork was notable for , lactate 2.4 > 2,5, WBC 14.8k. CXR revealed left basilar airspace disease and left effusion. He was hypoxic and placed on 2 lpm O2 but is on RA during my exam with O2 sats 94%. He has had no evidence of seizure activity here . DNR CCA/DNI was reconfirmed with his son.      Of note, the pt suffered a stroke in June and underwent PEG placement 8/8/24 (prior to the PEG he pulled out his NGT 3 times). On 8/10/24 he was admitted for peritonitis and underwent emergent replacement of the gastrostomy tube.  On 8/19/24 he underwent drain placement for a large casandra-splenic abscess. He was discharged on 8/23/24 with ciprofloxacin and Flagyl until 9/4/2024. He was admitted 8/27/24 for abdominal wall abscess and underwent debridement. A midline was placed and he was discharged on IV cefepime for 7 days.   Remainder of ROS reviewed and negative except as indicated in HPI.     09/12: Patient was evaluated this morning.  Family member at bedside.  Patient remains nonverbal, unaware of regarding, lethargic.    9/13: Patient " remains lethargic.  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  Unable to start on anticoagulation as per neurology.  Discussed with patient's POA (son).  Does not want any aggressive management and considering hospice.    9/14: Patient now admitted [facility] hospice will maintain comfort measures only.    9/15:.  Patient with some crackles decreased saturations.  Yesterday was having more air hunger and dyspnea this appears to be improved today.  Patient continues to require inpatient care to manage symptoms.  Continue [facility] hospice management.    9/16: Patient seen this morning. Patient lethargic. Patient continues to require inpatient care. Continue to [facility] hospice management. Will continue to provide supportive care and monitor.   Some agonal breathing will add a dose of Robinul and Ativan extra this morning.    9/17: Patient seen this morning. Saturations decreased breathing labored. Patient taken off supplemental oxygen. Seen again this afternoon resting more comfortably. Will continue to provide supportive care and monitor.   Objective     Last Recorded Vitals  BP (!) 144/91 (BP Location: Right arm, Patient Position: Lying)   Pulse 105   Temp 36.6 °C (97.8 °F) (Temporal)   Resp 20   Wt 77.1 kg (170 lb)   SpO2 (!) 87%   Intake/Output last 3 Shifts:    Intake/Output Summary (Last 24 hours) at 9/17/2024 1548  Last data filed at 9/17/2024 0523  Gross per 24 hour   Intake 0 ml   Output 365 ml   Net -365 ml         Admission Weight  Weight: 76.1 kg (167 lb 12.8 oz) (09/16/24 0546)    Daily Weight  09/17/24 : 77.1 kg (170 lb)    Image Results  ECG 12 lead  Sinus rhythm  Ventricular premature complex  Prolonged [place] interval  Incomplete left bundle branch block  LVH with secondary repolarization abnormality  Inferior infarct, old  Baseline wander in lead(s) V3    See ED provider note for full interpretation and clinical correlation  Confirmed by Subhash  Alexandria ([id number]) on 9/15/2024 12:42:40 PM    MR brain w and wo IV contrast    Result Date: 9/12/2024  Extensive bilateral PCA territory infarcts with small scattered foci in the right greater than left thalamus and confluent right greater than left posteromedial temporal lobe and occipital lobe infarcts. Additionally right greater than left cerebellar infarcts and scattered bilateral pontine and midbrain infarcts. No hemorrhagic conversion. No herniation or hydrocephalus.   Focal short-segment thrombus or occlusion of the mid basilar artery with the remainder of the basilar and proximal posterior cerebral artery is widely patent. 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   Rojas Marte discussed the significance and urgency of this critical finding by tEpic with  NICOLE FINK on 9/12/2024 at 8:23 pm. (**-RCF-**) Findings:  See findings.     Signed by: Rojas Marte 9/12/2024 8:33 PM Dictation workstation:   [id number]      Physical Exam    Constitutional: Frail and elderly. Patient is lethargic and not verbally responsive.     Head and Face: Thick oral secretions. Face is symmetric. Head is grossly normal with no step-offs    Neck: Supple. Trachea midline. No JVD.    Lungs: Agonal breathing, Crackles auscultated bilaterally. Effort normal.     Cardiovascular: Normal heart rate and rhythm. No murmurs, gallops, or rubs.     Abdominal: Soft, non-tender, non-distended. Bowel sounds present in all four quadrants    Extremities: Radial and Tibialis posterior pulses 2+ bilaterally. No swelling or erythema.    Neurological: Unable to assess.    Dermatologic: Normal sun and age related skin changes. Skin is warm and dry. Pallor appreciated. No erythema or lesions noted.         Relevant Results     Scheduled medications  HYDROmorphone, 1.5 mg, intravenous, q6h  LORazepam, 0.5 mg, intravenous, q4h  oxygen, , inhalation, Continuous - Inhalation      Continuous medications     PRN medications  PRN medications:  acetaminophen, atropine, bisacodyl, glycopyrrolate, HYDROmorphone, HYDROmorphone, ipratropium-albuteroL, ketorolac, LORazepam, ondansetron    Assessment/Plan      Extensive bilateral PCA territory infarction  Bilateral basal artery occlusion  Recent stroke in June leading to severe dysphagia  Hypertension  Leukocytosis  Recent multiple admissions for abdominal infections  Significant abdominal wound  End-of-life care    [facility] hospice/ [facility]  DNR comfort care only  Maintain adequate pain control  Comfort medications    Ezekiel Cintron MD    Shared visit with student  Patient seen and examined  Note amended and addended  See my orders for complete plan

## 2024-09-17 NOTE — CARE PLAN
The clinical goals for the shift include comfort      Problem: Pain - Adult  Goal: Verbalizes/displays adequate comfort level or baseline comfort level  Outcome: Progressing     Problem: Safety - Adult  Goal: Free from fall injury  Outcome: Progressing     Problem: Pain  Goal: Takes deep breaths with improved pain control throughout the shift  Outcome: Progressing  Goal: Turns in bed with improved pain control throughout the shift  Outcome: Progressing  Goal: Walks with improved pain control throughout the shift  Outcome: Progressing  Goal: Performs ADL's with improved pain control throughout shift  Outcome: Progressing  Goal: Participates in PT with improved pain control throughout the shift  Outcome: Progressing  Goal: Free from opioid side effects throughout the shift  Outcome: Progressing  Goal: Free from acute confusion related to pain meds throughout the shift  Outcome: Progressing     Problem: Skin  Goal: Decreased wound size/increased tissue granulation at next dressing change  Outcome: Progressing  Flowsheets (Taken 9/16/2024 2301)  Decreased wound size/increased tissue granulation at next dressing change: Promote sleep for wound healing  Goal: Participates in plan/prevention/treatment measures  Outcome: Progressing  Flowsheets (Taken 9/16/2024 2301)  Participates in plan/prevention/treatment measures: Elevate heels  Goal: Prevent/manage excess moisture  Outcome: Progressing  Flowsheets (Taken 9/16/2024 2301)  Prevent/manage excess moisture: Cleanse incontinence/protect with barrier cream  Goal: Prevent/minimize sheer/friction injuries  Outcome: Progressing  Flowsheets (Taken 9/16/2024 2301)  Prevent/minimize sheer/friction injuries: Complete micro-shifts as needed if patient unable. Adjust patient position to relieve pressure points, not a full turn  Goal: Promote/optimize nutrition  Outcome: Progressing  Flowsheets (Taken 9/16/2024 2301)  Promote/optimize nutrition: Discuss with provider if NPO > 2  days  Goal: Promote skin healing  Outcome: Progressing  Flowsheets (Taken 9/15/2024 0934 by Diana Hernandez RN)  Promote skin healing: Turn/reposition every 2 hours/use positioning/transfer devices

## 2024-09-18 VITALS
TEMPERATURE: 97.2 F | BODY MASS INDEX: 24.84 KG/M2 | SYSTOLIC BLOOD PRESSURE: 141 MMHG | HEART RATE: 119 BPM | OXYGEN SATURATION: 85 % | RESPIRATION RATE: 20 BRPM | DIASTOLIC BLOOD PRESSURE: 83 MMHG | WEIGHT: 168.2 LBS

## 2024-09-18 PROCEDURE — 1250000001 HC HOSPICE SEMI-PRIVATE ROOM DAILY

## 2024-09-18 PROCEDURE — 2500000004 HC RX 250 GENERAL PHARMACY W/ HCPCS (ALT 636 FOR OP/ED): Performed by: INTERNAL MEDICINE

## 2024-09-18 PROCEDURE — 99232 SBSQ HOSP IP/OBS MODERATE 35: CPT | Performed by: INTERNAL MEDICINE

## 2024-09-18 RX ORDER — HYDROMORPHONE HYDROCHLORIDE 2 MG/ML
1.5 INJECTION, SOLUTION INTRAMUSCULAR; INTRAVENOUS; SUBCUTANEOUS EVERY 4 HOURS
Status: DISCONTINUED | OUTPATIENT
Start: 2024-09-18 | End: 2024-09-19 | Stop reason: HOSPADM

## 2024-09-18 RX ORDER — LORAZEPAM 2 MG/ML
1 INJECTION INTRAMUSCULAR EVERY 4 HOURS
Status: DISCONTINUED | OUTPATIENT
Start: 2024-09-18 | End: 2024-09-19 | Stop reason: HOSPADM

## 2024-09-18 RX ADMIN — HYDROMORPHONE HYDROCHLORIDE 1 MG: 1 INJECTION, SOLUTION INTRAMUSCULAR; INTRAVENOUS; SUBCUTANEOUS at 12:39

## 2024-09-18 RX ADMIN — HYDROMORPHONE HYDROCHLORIDE 1 MG: 1 INJECTION, SOLUTION INTRAMUSCULAR; INTRAVENOUS; SUBCUTANEOUS at 17:59

## 2024-09-18 RX ADMIN — LORAZEPAM 1 MG: 2 INJECTION INTRAMUSCULAR; INTRAVENOUS at 17:59

## 2024-09-18 RX ADMIN — LORAZEPAM 0.5 MG: 2 INJECTION INTRAMUSCULAR; INTRAVENOUS at 08:43

## 2024-09-18 RX ADMIN — HYDROMORPHONE HYDROCHLORIDE 1 MG: 1 INJECTION, SOLUTION INTRAMUSCULAR; INTRAVENOUS; SUBCUTANEOUS at 16:02

## 2024-09-18 RX ADMIN — HYDROMORPHONE HYDROCHLORIDE 1.5 MG: 2 INJECTION, SOLUTION INTRAMUSCULAR; INTRAVENOUS; SUBCUTANEOUS at 08:43

## 2024-09-18 RX ADMIN — LORAZEPAM 0.5 MG: 2 INJECTION INTRAMUSCULAR; INTRAVENOUS at 00:11

## 2024-09-18 RX ADMIN — LORAZEPAM 0.5 MG: 2 INJECTION INTRAMUSCULAR; INTRAVENOUS at 11:54

## 2024-09-18 RX ADMIN — LORAZEPAM 0.5 MG: 2 INJECTION INTRAMUSCULAR; INTRAVENOUS at 04:16

## 2024-09-18 RX ADMIN — HYDROMORPHONE HYDROCHLORIDE 1.5 MG: 2 INJECTION, SOLUTION INTRAMUSCULAR; INTRAVENOUS; SUBCUTANEOUS at 02:04

## 2024-09-18 RX ADMIN — HYDROMORPHONE HYDROCHLORIDE 1.5 MG: 2 INJECTION, SOLUTION INTRAMUSCULAR; INTRAVENOUS; SUBCUTANEOUS at 13:47

## 2024-09-18 RX ADMIN — HYDROMORPHONE HYDROCHLORIDE 1 MG: 1 INJECTION, SOLUTION INTRAMUSCULAR; INTRAVENOUS; SUBCUTANEOUS at 11:54

## 2024-09-18 RX ADMIN — LORAZEPAM 0.5 MG: 2 INJECTION INTRAMUSCULAR; INTRAVENOUS at 16:02

## 2024-09-18 ASSESSMENT — COGNITIVE AND FUNCTIONAL STATUS - GENERAL
HELP NEEDED FOR BATHING: TOTAL
TOILETING: TOTAL
PERSONAL GROOMING: TOTAL
CLIMB 3 TO 5 STEPS WITH RAILING: TOTAL
MOVING FROM LYING ON BACK TO SITTING ON SIDE OF FLAT BED WITH BEDRAILS: TOTAL
TURNING FROM BACK TO SIDE WHILE IN FLAT BAD: TOTAL
WALKING IN HOSPITAL ROOM: TOTAL
STANDING UP FROM CHAIR USING ARMS: TOTAL
MOVING TO AND FROM BED TO CHAIR: TOTAL
DAILY ACTIVITIY SCORE: 6
DRESSING REGULAR UPPER BODY CLOTHING: TOTAL
DRESSING REGULAR LOWER BODY CLOTHING: TOTAL
EATING MEALS: TOTAL
MOBILITY SCORE: 6

## 2024-09-18 ASSESSMENT — PAIN SCALES - GENERAL: PAINLEVEL_OUTOF10: 0 - NO PAIN

## 2024-09-18 ASSESSMENT — PAIN SCALES - WONG BAKER: WONGBAKER_NUMERICALRESPONSE: NO HURT

## 2024-09-18 NOTE — CARE PLAN
Problem: Pain - Adult  Goal: Verbalizes/displays adequate comfort level or baseline comfort level  Outcome: Progressing     Problem: Safety - Adult  Goal: Free from fall injury  Outcome: Progressing     Problem: Pain  Goal: Takes deep breaths with improved pain control throughout the shift  Outcome: Progressing  Goal: Turns in bed with improved pain control throughout the shift  Outcome: Progressing  Goal: Walks with improved pain control throughout the shift  Outcome: Progressing  Goal: Performs ADL's with improved pain control throughout shift  Outcome: Progressing  Goal: Participates in PT with improved pain control throughout the shift  Outcome: Progressing  Goal: Free from opioid side effects throughout the shift  Outcome: Progressing  Goal: Free from acute confusion related to pain meds throughout the shift  Outcome: Progressing     Problem: Skin  Goal: Decreased wound size/increased tissue granulation at next dressing change  Outcome: Progressing  Flowsheets (Taken 9/16/2024 2301 by Margot Sotelo RN)  Decreased wound size/increased tissue granulation at next dressing change: Promote sleep for wound healing  Goal: Participates in plan/prevention/treatment measures  Outcome: Progressing  Flowsheets (Taken 9/18/2024 1617)  Participates in plan/prevention/treatment measures: Elevate heels  Goal: Prevent/manage excess moisture  Outcome: Progressing  Flowsheets (Taken 9/18/2024 1617)  Prevent/manage excess moisture: Cleanse incontinence/protect with barrier cream  Goal: Prevent/minimize sheer/friction injuries  Outcome: Progressing  Goal: Promote/optimize nutrition  Outcome: Progressing  Goal: Promote skin healing  Outcome: Progressing   The patient's goals for the shift include      The clinical goals for the shift include no falls or injury

## 2024-09-18 NOTE — CARE PLAN
The clinical goals for the shift include no falls or injury    Problem: Pain - Adult  Goal: Verbalizes/displays adequate comfort level or baseline comfort level  Outcome: Progressing     Problem: Safety - Adult  Goal: Free from fall injury  Outcome: Progressing     Problem: Pain  Goal: Takes deep breaths with improved pain control throughout the shift  Outcome: Progressing  Goal: Turns in bed with improved pain control throughout the shift  Outcome: Progressing  Goal: Walks with improved pain control throughout the shift  Outcome: Progressing  Goal: Performs ADL's with improved pain control throughout shift  Outcome: Progressing  Goal: Participates in PT with improved pain control throughout the shift  Outcome: Progressing  Goal: Free from opioid side effects throughout the shift  Outcome: Progressing  Goal: Free from acute confusion related to pain meds throughout the shift  Outcome: Progressing     Problem: Skin  Goal: Decreased wound size/increased tissue granulation at next dressing change  Outcome: Progressing  Flowsheets (Taken 9/16/2024 2301)  Decreased wound size/increased tissue granulation at next dressing change: Promote sleep for wound healing  Goal: Participates in plan/prevention/treatment measures  Outcome: Progressing  Flowsheets (Taken 9/16/2024 2301)  Participates in plan/prevention/treatment measures: Elevate heels  Goal: Prevent/manage excess moisture  Outcome: Progressing  Flowsheets (Taken 9/16/2024 2301)  Prevent/manage excess moisture: Cleanse incontinence/protect with barrier cream  Goal: Prevent/minimize sheer/friction injuries  Outcome: Progressing  Flowsheets (Taken 9/17/2024 2338)  Prevent/minimize sheer/friction injuries: Complete micro-shifts as needed if patient unable. Adjust patient position to relieve pressure points, not a full turn  Goal: Promote/optimize nutrition  Outcome: Progressing  Flowsheets (Taken 9/16/2024 2301)  Promote/optimize nutrition: Discuss with provider if  NPO > 2 days  Goal: Promote skin healing  Outcome: Progressing  Flowsheets (Taken 9/17/2024 3099)  Promote skin healing: Protective dressings over bony prominences

## 2024-09-18 NOTE — PROGRESS NOTES
"Abdi Wilson is a 79 y.o. male on day 5 of admission presenting with No Principal Problem: There is no principal problem currently on the Problem List. Please update the Problem List and refresh..      Subjective      Abdi Wilson is a 79 y.o. male with PMHx of stroke and dementia who presented from [facility] with altered mental status. Patient is unresponsive other than to painful stimuli and history was obtained from paramedics and his son. His son said this morning the pt was shaking and \"out of it\", gazing off into the distance (he is nonverbal at baseline). Since his stroke the pt was observed at least twice to be disengaged in this manner but the tremors are new today. He had a prolonged seizure according to bystanders. He was evidently hypertensive during the episode of seizure also. In the ED CT head showed no evidence of acute intracranial lesion. BP was controlled. Labwork was notable for , lactate 2.4 > 2,5, WBC 14.8k. CXR revealed left basilar airspace disease and left effusion. He was hypoxic and placed on 2 lpm O2 but is on RA during my exam with O2 sats 94%. He has had no evidence of seizure activity here . DNR CCA/DNI was reconfirmed with his son.      Of note, the pt suffered a stroke in June and underwent PEG placement 8/8/24 (prior to the PEG he pulled out his NGT 3 times). On 8/10/24 he was admitted for peritonitis and underwent emergent replacement of the gastrostomy tube.  On 8/19/24 he underwent drain placement for a large casandra-splenic abscess. He was discharged on 8/23/24 with ciprofloxacin and Flagyl until 9/4/2024. He was admitted 8/27/24 for abdominal wall abscess and underwent debridement. A midline was placed and he was discharged on IV cefepime for 7 days.   Remainder of ROS reviewed and negative except as indicated in HPI.     09/12: Patient was evaluated this morning.  Family member at bedside.  Patient remains nonverbal, unaware of regarding, lethargic.    9/13: Patient " remains lethargic.  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  Unable to start on anticoagulation as per neurology.  Discussed with patient's POA (son).  Does not want any aggressive management and considering hospice.    9/14: Patient now admitted [facility] hospice will maintain comfort measures only.    9/15:.  Patient with some crackles decreased saturations.  Yesterday was having more air hunger and dyspnea this appears to be improved today.  Patient continues to require inpatient care to manage symptoms.  Continue [facility] hospice management.    9/16: Patient seen this morning. Patient lethargic. Patient continues to require inpatient care. Continue to [facility] hospice management. Will continue to provide supportive care and monitor.   Some agonal breathing will add a dose of Robinul and Ativan extra this morning.    9/17: Patient seen this morning. Saturations decreased breathing labored. Patient taken off supplemental oxygen. Seen again this afternoon resting more comfortably. Will continue to provide supportive care and monitor.     9/18: Needing the pericardial catch up this morning with as needed meds.  Continue with current regiment may consider increasing scheduled if symptoms warrant.  Patient remains relatively comfortable now with some apneic episodes.  Objective     Last Recorded Vitals  /83 (BP Location: Left arm, Patient Position: Lying)   Pulse (!) 119   Temp 36.2 °C (97.2 °F) (Temporal)   Resp 20   Wt 76.3 kg (168 lb 3.2 oz)   SpO2 (!) 85%   Intake/Output last 3 Shifts:    Intake/Output Summary (Last 24 hours) at 9/18/2024 1250  Last data filed at 9/18/2024 0511  Gross per 24 hour   Intake 0 ml   Output 100 ml   Net -100 ml         Admission Weight  Weight: 76.1 kg (167 lb 12.8 oz) (09/16/24 0546)    Daily Weight  09/18/24 : 76.3 kg (168 lb 3.2 oz)    Image Results  ECG 12 lead  Sinus rhythm  Ventricular premature complex  Prolonged MO  interval  Incomplete left bundle branch block  LVH with secondary repolarization abnormality  Inferior infarct, old  Baseline wander in lead(s) V3    See ED provider note for full interpretation and clinical correlation  Confirmed by Alexandria Cullen ([id number]) on 9/15/2024 12:42:40 PM    MR brain w and wo IV contrast    Result Date: 9/12/2024  Extensive bilateral PCA territory infarcts with small scattered foci in the right greater than left thalamus and confluent right greater than left posteromedial temporal lobe and occipital lobe infarcts. Additionally right greater than left cerebellar infarcts and scattered bilateral pontine and midbrain infarcts. No hemorrhagic conversion. No herniation or hydrocephalus.   Focal short-segment thrombus or occlusion of the mid basilar artery with the remainder of the basilar and proximal posterior cerebral artery is widely patent. 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   Rojas Marte discussed the significance and urgency of this critical finding by tEpic with  JANEY DANAE on 9/12/2024 at 8:23 pm. (**-RCF-**) Findings:  See findings.     Signed by: Rojas Marte 9/12/2024 8:33 PM Dictation workstation:   [id number]      Physical Exam    Constitutional: Frail and elderly. Patient is lethargic and not verbally responsive.     Head and Face: Thick oral secretions. Face is symmetric. Head is grossly normal with no step-offs    Neck: Supple. Trachea midline. No JVD.    Lungs: Agonal breathing, Crackles auscultated bilaterally. Effort normal.     Cardiovascular: Normal heart rate and rhythm. No murmurs, gallops, or rubs.     Abdominal: Soft, non-tender, non-distended. Bowel sounds present in all four quadrants    Extremities: Radial and Tibialis posterior pulses 2+ bilaterally. No swelling or erythema.    Neurological: Unable to assess.    Dermatologic: Normal sun and age related skin changes. Skin is warm and dry. Pallor appreciated. No erythema or lesions  noted.         Relevant Results     Scheduled medications  HYDROmorphone, 1.5 mg, intravenous, q6h  LORazepam, 0.5 mg, intravenous, q4h      Continuous medications     PRN medications  PRN medications: acetaminophen, atropine, bisacodyl, glycopyrrolate, HYDROmorphone, HYDROmorphone, ipratropium-albuteroL, ketorolac, LORazepam, ondansetron    Assessment/Plan      Extensive bilateral PCA territory infarction  Bilateral basal artery occlusion  Recent stroke in June leading to severe dysphagia  Hypertension  Leukocytosis  Recent multiple admissions for abdominal infections  Significant abdominal wound  End-of-life care    [facility] hospice/ [facility]  DNR comfort care only  Maintain adequate pain control  Comfort medications    Ezekiel Cintron MD    Shared visit with student  Patient seen and examined  Note amended and addended  See my orders for complete plan

## 2024-09-19 NOTE — DISCHARGE SUMMARY
"Discharge Diagnosis    Extensive bilateral PCA territory infarction  Bilateral basal artery occlusion  Recent stroke in  leading to severe dysphagia  Hypertension  Leukocytosis  Recent multiple admissions for abdominal infections  Significant abdominal wound  End-of-life care       Issues Requiring Follow-Up  Pt  at 8 PM on 2024    Discharge Meds      Test Results Pending At Discharge  Pending Labs       No current pending labs.            Hospital Course   Abdi Wilson is a 79 y.o. male on day 5 of admission presenting with No Principal Problem: There is no principal problem currently on the Problem List. Please update the Problem List and refresh..           Subjective     Abdi Wilson is a 79 y.o. male with PMHx of stroke and dementia who presented from [facility] with altered mental status. Patient is unresponsive other than to painful stimuli and history was obtained from paramedics and his son. His son said this morning the pt was shaking and \"out of it\", gazing off into the distance (he is nonverbal at baseline). Since his stroke the pt was observed at least twice to be disengaged in this manner but the tremors are new today. He had a prolonged seizure according to bystanders. He was evidently hypertensive during the episode of seizure also. In the ED CT head showed no evidence of acute intracranial lesion. BP was controlled. Labwork was notable for , lactate 2.4 > 2,5, WBC 14.8k. CXR revealed left basilar airspace disease and left effusion. He was hypoxic and placed on 2 lpm O2 but is on RA during my exam with O2 sats 94%. He has had no evidence of seizure activity here . DNR CCA/DNI was reconfirmed with his son.      Of note, the pt suffered a stroke in  and underwent PEG placement 24 (prior to the PEG he pulled out his NGT 3 times). On 8/10/24 he was admitted for peritonitis and underwent emergent replacement of the gastrostomy tube.  On 24 he underwent drain " placement for a large casandra-splenic abscess. He was discharged on 24 with ciprofloxacin and Flagyl until 2024. He was admitted 24 for abdominal wall abscess and underwent debridement. A midline was placed and he was discharged on IV cefepime for 7 days.   Remainder of ROS reviewed and negative except as indicated in HPI.      : Patient was evaluated this morning.  Family member at bedside.  Patient remains nonverbal, unaware of regarding, lethargic.     : Patient remains lethargic.  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  Unable to start on anticoagulation as per neurology.  Discussed with patient's POA (son).  Does not want any aggressive management and considering hospice.     : Patient now admitted [facility] hospice will maintain comfort measures only.     9/15:.  Patient with some crackles decreased saturations.  Yesterday was having more air hunger and dyspnea this appears to be improved today.  Patient continues to require inpatient care to manage symptoms.  Continue [facility] hospice management.     : Patient seen this morning. Patient lethargic. Patient continues to require inpatient care. Continue to [facility] hospice management. Will continue to provide supportive care and monitor.   Some agonal breathing will add a dose of Robinul and Ativan extra this morning.     : Patient seen this morning. Saturations decreased breathing labored. Patient taken off supplemental oxygen. Seen again this afternoon resting more comfortably. Will continue to provide supportive care and monitor.      : Needing the pericardial catch up this morning with as needed meds.  Continue with current regiment may consider increasing scheduled if symptoms warrant.  Patient remains relatively comfortable now with some apneic episodes.    Patient  at 8 PM on 2024.    Pertinent Physical Exam At Time of Discharge  Physical Exam    Outpatient  Follow-Up  No future appointments.      Ezekiel Cintron MD

## 2024-09-19 NOTE — SIGNIFICANT EVENT
Was called by nursing to patient's bedside. There were no heart tones present. There were no breath sounds or spontaneous respirations. There was no palpable carotid pulse. The pupils were fixed and dilated. There was no response to noxious stimli. Family is updated. Patient is pronounced on 09/18/24 at 2000.

## 2024-09-19 NOTE — DOCUMENTATION CLARIFICATION NOTE
"    PATIENT:               FRANCISCO ELI  ACCT #:                  [id number]  MRN:                       [id number]  :                       1945  ADMIT DATE:       2024 12:21 PM  DISCH DATE:        2024 4:08 PM  RESPONDING PROVIDER #:        [id number]          PROVIDER RESPONSE TEXT:    Metabolic Encephalopathy 2/2 CVA    CDI QUERY TEXT:    Clarification        Instruction:    Based on your assessment of the patient and the clinical information, please provide the requested documentation by clicking on the appropriate radio button and enter any additional information if prompted.    Question: Please further clarify the type of Encephalopathy as    When answering this query, please exercise your independent professional judgment. The fact that a question is being asked, does not imply that any particular answer is desired or expected.    The patient's clinical indicators include:  Clinical Information: 78 yo male with CVA    Clinical Indicators:   Vital signs T36.9 HR 88 R18 /88 95 percent 2 L   Discharge summary:\" Acute encephalopathy secondary to massive stroke\"   note: \"Mental Status  Responsive to painful stimuli. Patient is nonverbal. \"   MRI:\"Brain MRI:  There are new extensive acute to subacute infarcts involving the  bilateral PCA territories including scattered small infarcts in the  right greater than left thalamus and confluence infarcts in the  bilateral posteromedial temporal and occipital lobe slightly greater  on the right.. There are also acute to early subacute infarcts in the  bilateral right greater than left cerebellar hemispheres and  bilateral daphney and midbrain. There is suspected involvement of the  left cortical spinal tract in the brainstem and also involvement of  the left facial nucleus. \"    Treatment:  Neuro consult  Neuro checks  Plavix, Lipitor    Risk Factors: CVA, Hyponatremia, encephalopathy, acidosis, dementia, dysphagia, aphasia  Options " provided:  -- Metabolic Encephalopathy 2/2 CVA  -- Other - I will add my own diagnosis  -- Refer to Clinical Documentation Reviewer    Query created by: Minerva Webb on 9/19/2024 9:00 AM      Electronically signed by:  NICOLE FINK MD 9/19/2024 1:16 PM

## 2024-09-27 LAB
ATRIAL RATE: 83 BPM
P OFFSET: 127 MS
P ONSET: 89 MS
PR INTERVAL: 238 MS
Q ONSET: 208 MS
QRS COUNT: 13 BEATS
QRS DURATION: 106 MS
QT INTERVAL: 392 MS
QTC CALCULATION(BAZETT): 460 MS
QTC FREDERICIA: 437 MS
R AXIS: -23 DEGREES
T AXIS: 105 DEGREES
T OFFSET: 404 MS
VENTRICULAR RATE: 83 BPM

## (undated) DEVICE — GLOVE, PROTEXIS PI CLASSIC, SZ-7.5, PF, LF

## (undated) DEVICE — PULL PEG, SAFETY, 20 FR, W/XYLOCAINE AMPULE

## (undated) DEVICE — TRAY, SURESTEP, URINE METER, 16FR, COMPLETE, W/STATLOCK

## (undated) DEVICE — SLEEVE, SUCTION, E-SEP, 165MM

## (undated) DEVICE — DRAPE, SHEET, ENDOSCOPY, GENERAL, FENESTRATED, ARMBOARD COVER, 98 X 123.5 IN, DISPOSABLE, LF, STERILE

## (undated) DEVICE — DRESSING, ABDOMINAL, WET PRUF, TENDERSORB, 5 X 9 IN, STERILE

## (undated) DEVICE — RESERVOIR, DRAINAGE, WOUND, JACKSON-PRATT, 100 CC, SILICONE

## (undated) DEVICE — SUTURE, SILK, 3-0, 18 IN SH/CR, BLACK

## (undated) DEVICE — BITE BLOCK, MAXI, WOVEN STRAP

## (undated) DEVICE — STAPLER, SKIN, MULTIFIRE, PREMIUM, WIDE, 35 W

## (undated) DEVICE — SPONGE, LAP, XRAY DECT, MASTER TAG, N-D, 18 X 36 IN

## (undated) DEVICE — PAD, GROUNDING, ELECTROSURGICAL, W/9 FT CABLE, POLYHESIVE II, ADULT, LF

## (undated) DEVICE — Device

## (undated) DEVICE — GLOVE, SURGICAL, PROTEXIS PI BLUE W/NEUTHERA, 7.5, PF, LF

## (undated) DEVICE — ELECTRODE, ELECTROSURGICAL, BLADE, EXTENDED

## (undated) DEVICE — DRESSING, GAUZE, SPONGE, VERSALON, ALL PURPOSE, 4 X 4 IN, SOFT

## (undated) DEVICE — GOWN, ASTOUND, XL

## (undated) DEVICE — SUTURE, PDSII, 1, TP-1, VIL, MONO, 48LP

## (undated) DEVICE — DRAIN, WOUND, FLAT, HUBLESS, 0.75 PERFORATION, 10 MM X 20 CM, SILICONE

## (undated) DEVICE — COVER HANDLE LIGHT, STERIS, BLUE, STERILE

## (undated) DEVICE — PREP TRAY, SKIN, DRY, W/GLOVES

## (undated) DEVICE — SOLUTION, IRRIGATION, SODIUM CHLORIDE 0.9%, 1000 ML, POUR BOTTLE

## (undated) DEVICE — PACKING, IODOFORM, CURITY, 1 IN X 5 YD, STERILE

## (undated) DEVICE — DRESSING, WOUND, POST OP, 10 X 4

## (undated) DEVICE — SUTURE, VICRYL, 2-0, 27 IN, SH, UNDYED

## (undated) DEVICE — SUTURE, SILK, 2-0, TIES, 12-30 IN, BLACK